# Patient Record
Sex: FEMALE | Race: ASIAN | NOT HISPANIC OR LATINO | Employment: OTHER | URBAN - METROPOLITAN AREA
[De-identification: names, ages, dates, MRNs, and addresses within clinical notes are randomized per-mention and may not be internally consistent; named-entity substitution may affect disease eponyms.]

---

## 2017-01-10 ENCOUNTER — HOSPITAL ENCOUNTER (OUTPATIENT)
Dept: RADIOLOGY | Facility: HOSPITAL | Age: 82
Discharge: HOME | End: 2017-01-10
Attending: INTERNAL MEDICINE
Payer: MEDICARE

## 2017-01-10 DIAGNOSIS — R05.9 COUGH: ICD-10-CM

## 2017-01-10 PROCEDURE — 71020 XR CHEST PA AND LATERAL: CPT | Mod: 26,,, | Performed by: RADIOLOGY

## 2017-01-10 PROCEDURE — 71020 XR CHEST PA AND LATERAL: CPT | Mod: TC

## 2017-01-13 ENCOUNTER — HOSPITAL ENCOUNTER (INPATIENT)
Facility: HOSPITAL | Age: 82
LOS: 1 days | Discharge: HOME HEALTH | DRG: 195 | End: 2017-01-17
Attending: EMERGENCY MEDICINE | Admitting: INTERNAL MEDICINE
Payer: MEDICARE

## 2017-01-13 DIAGNOSIS — R53.1 GENERALIZED WEAKNESS: ICD-10-CM

## 2017-01-13 DIAGNOSIS — I10 ESSENTIAL HYPERTENSION: ICD-10-CM

## 2017-01-13 DIAGNOSIS — J18.9 PNEUMONIA DUE TO INFECTIOUS ORGANISM, UNSPECIFIED LATERALITY, UNSPECIFIED PART OF LUNG: ICD-10-CM

## 2017-01-13 DIAGNOSIS — E86.0 DEHYDRATION: Primary | ICD-10-CM

## 2017-01-13 DIAGNOSIS — G20.A1 PARKINSON'S DISEASE: ICD-10-CM

## 2017-01-13 LAB
ANION GAP SERPL CALC-SCNC: 12 MMOL/L
BASOPHILS # BLD AUTO: 0 K/UL
BASOPHILS NFR BLD: 0.1 %
BUN SERPL-MCNC: 35 MG/DL
CALCIUM SERPL-MCNC: 10 MG/DL
CHLORIDE SERPL-SCNC: 99 MMOL/L
CO2 SERPL-SCNC: 24 MMOL/L
CREAT SERPL-MCNC: 1.1 MG/DL
DIFFERENTIAL METHOD: ABNORMAL
EOSINOPHIL # BLD AUTO: 0 K/UL
EOSINOPHIL NFR BLD: 0 %
ERYTHROCYTE [DISTWIDTH] IN BLOOD BY AUTOMATED COUNT: 12.3 %
EST. GFR  (AFRICAN AMERICAN): 51 ML/MIN/1.73 M^2
EST. GFR  (NON AFRICAN AMERICAN): 45 ML/MIN/1.73 M^2
GLUCOSE SERPL-MCNC: 111 MG/DL
HCT VFR BLD AUTO: 34.3 %
HGB BLD-MCNC: 11.6 G/DL
LYMPHOCYTES # BLD AUTO: 1.2 K/UL
LYMPHOCYTES NFR BLD: 10 %
MCH RBC QN AUTO: 32.4 PG
MCHC RBC AUTO-ENTMCNC: 33.9 %
MCV RBC AUTO: 96 FL
MONOCYTES # BLD AUTO: 0.4 K/UL
MONOCYTES NFR BLD: 3.5 %
NEUTROPHILS # BLD AUTO: 10.8 K/UL
NEUTROPHILS NFR BLD: 86.4 %
PLATELET # BLD AUTO: 168 K/UL
PMV BLD AUTO: 8.4 FL
POTASSIUM SERPL-SCNC: 4.2 MMOL/L
RBC # BLD AUTO: 3.58 M/UL
SODIUM SERPL-SCNC: 135 MMOL/L
WBC # BLD AUTO: 12.5 K/UL

## 2017-01-13 PROCEDURE — 25000003 PHARM REV CODE 250: Performed by: EMERGENCY MEDICINE

## 2017-01-13 PROCEDURE — 25000003 PHARM REV CODE 250: Performed by: INTERNAL MEDICINE

## 2017-01-13 PROCEDURE — 63600175 PHARM REV CODE 636 W HCPCS: Performed by: INTERNAL MEDICINE

## 2017-01-13 PROCEDURE — 93005 ELECTROCARDIOGRAM TRACING: CPT

## 2017-01-13 PROCEDURE — 99285 EMERGENCY DEPT VISIT HI MDM: CPT | Mod: 25

## 2017-01-13 PROCEDURE — 99219 PR INITIAL OBSERVATION CARE,LEVL II: CPT | Mod: ,,, | Performed by: INTERNAL MEDICINE

## 2017-01-13 PROCEDURE — G0378 HOSPITAL OBSERVATION PER HR: HCPCS

## 2017-01-13 PROCEDURE — 36415 COLL VENOUS BLD VENIPUNCTURE: CPT

## 2017-01-13 PROCEDURE — 96360 HYDRATION IV INFUSION INIT: CPT

## 2017-01-13 PROCEDURE — 85025 COMPLETE CBC W/AUTO DIFF WBC: CPT

## 2017-01-13 PROCEDURE — 63600175 PHARM REV CODE 636 W HCPCS: Performed by: EMERGENCY MEDICINE

## 2017-01-13 PROCEDURE — 80048 BASIC METABOLIC PNL TOTAL CA: CPT

## 2017-01-13 RX ORDER — PANTOPRAZOLE SODIUM 40 MG/1
40 TABLET, DELAYED RELEASE ORAL DAILY
Status: DISCONTINUED | OUTPATIENT
Start: 2017-01-14 | End: 2017-01-17 | Stop reason: HOSPADM

## 2017-01-13 RX ORDER — CALCIUM CARBONATE 200(500)MG
1 TABLET,CHEWABLE ORAL 3 TIMES DAILY
Status: DISCONTINUED | OUTPATIENT
Start: 2017-01-13 | End: 2017-01-17 | Stop reason: HOSPADM

## 2017-01-13 RX ORDER — CARBIDOPA AND LEVODOPA 25; 100 MG/1; MG/1
2 TABLET ORAL 2 TIMES DAILY WITH MEALS
Status: DISCONTINUED | OUTPATIENT
Start: 2017-01-14 | End: 2017-01-17 | Stop reason: HOSPADM

## 2017-01-13 RX ORDER — AMOXICILLIN 250 MG
1 CAPSULE ORAL DAILY
Status: DISCONTINUED | OUTPATIENT
Start: 2017-01-14 | End: 2017-01-17 | Stop reason: HOSPADM

## 2017-01-13 RX ORDER — CARBIDOPA AND LEVODOPA 25; 100 MG/1; MG/1
1 TABLET, EXTENDED RELEASE ORAL NIGHTLY
Status: DISCONTINUED | OUTPATIENT
Start: 2017-01-13 | End: 2017-01-14

## 2017-01-13 RX ORDER — HYDRALAZINE HYDROCHLORIDE 20 MG/ML
10 INJECTION INTRAMUSCULAR; INTRAVENOUS EVERY 6 HOURS PRN
Status: DISCONTINUED | OUTPATIENT
Start: 2017-01-13 | End: 2017-01-17 | Stop reason: HOSPADM

## 2017-01-13 RX ORDER — FERROUS SULFATE 325(65) MG
325 TABLET, DELAYED RELEASE (ENTERIC COATED) ORAL DAILY
Status: DISCONTINUED | OUTPATIENT
Start: 2017-01-14 | End: 2017-01-17 | Stop reason: HOSPADM

## 2017-01-13 RX ORDER — CHOLECALCIFEROL (VITAMIN D3) 25 MCG
1000 TABLET ORAL DAILY
Status: DISCONTINUED | OUTPATIENT
Start: 2017-01-14 | End: 2017-01-13

## 2017-01-13 RX ORDER — ENOXAPARIN SODIUM 100 MG/ML
40 INJECTION SUBCUTANEOUS EVERY 24 HOURS
Status: DISCONTINUED | OUTPATIENT
Start: 2017-01-13 | End: 2017-01-17 | Stop reason: HOSPADM

## 2017-01-13 RX ORDER — CARBIDOPA AND LEVODOPA 50; 200 MG/1; MG/1
1 TABLET, EXTENDED RELEASE ORAL NIGHTLY
Status: DISCONTINUED | OUTPATIENT
Start: 2017-01-13 | End: 2017-01-17 | Stop reason: HOSPADM

## 2017-01-13 RX ORDER — OXYCODONE AND ACETAMINOPHEN 10; 325 MG/1; MG/1
1 TABLET ORAL 3 TIMES DAILY
Status: DISCONTINUED | OUTPATIENT
Start: 2017-01-13 | End: 2017-01-17 | Stop reason: HOSPADM

## 2017-01-13 RX ORDER — CARBIDOPA AND LEVODOPA 50; 200 MG/1; MG/1
1 TABLET, EXTENDED RELEASE ORAL NIGHTLY
Status: DISCONTINUED | OUTPATIENT
Start: 2017-01-13 | End: 2017-01-13

## 2017-01-13 RX ORDER — CARBIDOPA AND LEVODOPA 25; 100 MG/1; MG/1
2 TABLET ORAL 2 TIMES DAILY
COMMUNITY
End: 2020-04-18 | Stop reason: CLARIF

## 2017-01-13 RX ORDER — ONDANSETRON 2 MG/ML
4 INJECTION INTRAMUSCULAR; INTRAVENOUS EVERY 12 HOURS PRN
Status: DISCONTINUED | OUTPATIENT
Start: 2017-01-13 | End: 2017-01-17 | Stop reason: HOSPADM

## 2017-01-13 RX ORDER — CARBIDOPA AND LEVODOPA 50; 200 MG/1; MG/1
1.5 TABLET, EXTENDED RELEASE ORAL NIGHTLY
Status: DISCONTINUED | OUTPATIENT
Start: 2017-01-13 | End: 2017-01-13

## 2017-01-13 RX ORDER — SODIUM CHLORIDE 9 MG/ML
INJECTION, SOLUTION INTRAVENOUS CONTINUOUS
Status: DISCONTINUED | OUTPATIENT
Start: 2017-01-13 | End: 2017-01-15

## 2017-01-13 RX ORDER — PRAVASTATIN SODIUM 40 MG/1
40 TABLET ORAL NIGHTLY
Status: DISCONTINUED | OUTPATIENT
Start: 2017-01-13 | End: 2017-01-17 | Stop reason: HOSPADM

## 2017-01-13 RX ORDER — OLMESARTAN MEDOXOMIL 20 MG/1
20 TABLET ORAL DAILY
Status: DISCONTINUED | OUTPATIENT
Start: 2017-01-13 | End: 2017-01-17 | Stop reason: HOSPADM

## 2017-01-13 RX ORDER — PNV NO.95/FERROUS FUM/FOLIC AC 28MG-0.8MG
100 TABLET ORAL DAILY
Status: DISCONTINUED | OUTPATIENT
Start: 2017-01-14 | End: 2017-01-17 | Stop reason: HOSPADM

## 2017-01-13 RX ADMIN — SODIUM CHLORIDE: 0.9 INJECTION, SOLUTION INTRAVENOUS at 05:01

## 2017-01-13 RX ADMIN — OXYCODONE HYDROCHLORIDE AND ACETAMINOPHEN 1 TABLET: 10; 325 TABLET ORAL at 09:01

## 2017-01-13 RX ADMIN — HYDRALAZINE HYDROCHLORIDE 10 MG: 20 INJECTION INTRAMUSCULAR; INTRAVENOUS at 05:01

## 2017-01-13 RX ADMIN — CALCIUM CARBONATE (ANTACID) CHEW TAB 500 MG 500 MG: 500 CHEW TAB at 09:01

## 2017-01-13 RX ADMIN — ENOXAPARIN SODIUM 40 MG: 100 INJECTION SUBCUTANEOUS at 05:01

## 2017-01-13 RX ADMIN — CARBIDOPA AND LEVODOPA 1 TABLET: 50; 200 TABLET, EXTENDED RELEASE ORAL at 09:01

## 2017-01-13 RX ADMIN — PRAVASTATIN SODIUM 40 MG: 40 TABLET ORAL at 09:01

## 2017-01-13 RX ADMIN — OLMESARTAN MEDOXOMIL 20 MG: 20 TABLET, FILM COATED ORAL at 05:01

## 2017-01-13 RX ADMIN — SODIUM CHLORIDE 500 ML: 0.9 INJECTION, SOLUTION INTRAVENOUS at 02:01

## 2017-01-13 NOTE — ED NOTES
Pt here for eval of decreased appetite and generalized fatigue for several days. pts caretaker states yesterday she was disoriented.

## 2017-01-13 NOTE — H&P
PCP: Haris Brambila MD    History & Physical    Chief Complaint: Increasing fatigue    History of Present Illness:  Patient is a 89 y.o. female admitted to Hospitalist Service from [facility] Emergency Room with complaint of increasing fatigue. Patient reportedly has past medical history significant for Parkinson's disease and hypertension. Part of the history obtained from patient's sister in law. Patient is mostly bed-bound and with assistance can get to bedside wheel chair. Patient has not been eating or drinking for 2 days, patient has a care taker. No fever or chills reported. No abdominal pain. Patient denied chest pain, shortness of breath, abdominal pain, nausea, vomiting, headache, vision changes, focal neuro-deficits, cough or fever.    Past Medical History   Diagnosis Date    Arthritis     Hypertension     Parkinson's disease      Past Surgical History   Procedure Laterality Date    Fracture surgery      Joint replacement       right hip replacement     History reviewed. No pertinent family history.  Social History   Substance Use Topics    Smoking status: Light Tobacco Smoker     Packs/day: 0.25     Years: 5.00     Last attempt to quit: 4/17/2009    Smokeless tobacco: None    Alcohol use No      Review of patient's allergies indicates:  No Known Allergies  PTA Medications   Medication Sig    calcium carbonate (CALCIUM ANTACID) 300 mg (750 mg) Chew Take 1 tablet by mouth 2 (two) times daily.    carbidopa-levodopa  mg (SINEMET CR)  mg TbSR Take 1.5 tablets by mouth every evening.    cholecalciferol, vitamin D3, (VITAMIN D3) 2,000 unit Tab Take by mouth once daily.    clonazepam (KLONOPIN) 0.5 MG tablet Take 0.5 mg by mouth 2 (two) times daily.    cyanocobalamin (VITAMIN B-12) 1000 MCG tablet Take 100 mcg by mouth once daily.    ferrous sulfate 325 (65 FE) MG EC tablet Take 325 mg by mouth once daily.    multivit-iron-min-folic acid (MULTIVITAMIN-IRON-MINERALS-FOLIC  ACID) 3,500-18-0.4 unit-mg-mg Chew Take 1 tablet by mouth once daily.      olmesartan-hydrochlorothiazide (BENICAR HCT) 20-12.5 mg per tablet Take 1 tablet by mouth once daily.    omega-3 fatty acids-vitamin E (FISH OIL) 1,000 mg Cap Take 1 capsule by mouth once daily.    omeprazole (PRILOSEC) 40 MG capsule Take 40 mg by mouth once daily.    oxycodone-acetaminophen  mg (PERCOCET)  mg per tablet Take 1 tablet by mouth 3 (three) times daily.    potassium chloride SA (K-DUR,KLOR-CON) 10 MEQ tablet Take 10 mEq by mouth once daily.      pravastatin (PRAVACHOL) 40 MG tablet Take 40 mg by mouth every evening.    senna-docusate 8.6-50 mg (SENNA WITH DOCUSATE SODIUM) 8.6-50 mg per tablet Take 1 tablet by mouth once daily.    tizanidine 4 mg Cap Take 4 mg by mouth 3 (three) times daily.     Review of Systems:  Constitutional: no fever or chills. + fatigue  Eyes: no visual changes  Ears, nose, mouth, throat, and face: no nasal congestion or sore throat  Respiratory: no cough or shorness of breath  Cardiovascular: no chest pain or palpitations  Gastrointestinal: no nausea or vomiting, no abdominal pain or change in bowel habits  Genitourinary: no hematuria or dysuria  Integument/breast: no rash or pruritis  Hematologic/lymphatic: no easy bruising or lymphadenopathy  Musculoskeletal: no arthralgias or myalgias  Neurological: no seizures or tremors.  Behavioral/Psych: no auditory or visual hallucinations  Endocrine: no heat or cold intolerance     OBJECTIVE:     Vital Signs (Most Recent)  Temp: 98.4 °F (36.9 °C) (01/13/17 1612)  Pulse: 87 (01/13/17 1612)  Resp: 18 (01/13/17 1612)  BP: (!) 194/82 (01/13/17 1612)  SpO2: 97 % (01/13/17 1612)    Physical Exam:  General appearance: well developed, appears stated age  Head: normocephalic, atraumatic  Eyes:  conjunctivae/corneas clear. PERRL.  Nose: Nares normal. Septum midline.  Throat: lips, DRY mucosa, and tongue normal; teeth and gums normal, no throat  erythema.  Neck: supple, symmetrical, trachea midline, no JVD and thyroid not enlarged, symmetric, no tenderness/mass/nodules  Lungs:  clear to auscultation bilaterally and normal respiratory effort  Chest wall: no tenderness  Heart: regular rate and rhythm, S1, S2 normal, no murmur, click, rub or gallop  Abdomen: soft, non-tender non-distented; bowel sounds normal; no masses,  no organomegaly  Extremities: no cyanosis, clubbing or edema.   Pulses: 2+ and symmetric  Skin: Skin color, texture, turgor normal. No rashes or lesions.  Lymph nodes: Cervical, supraclavicular, and axillary nodes normal.  Neurologic: CNII-XII intact. Generalized hypertonia and rigidity noted.    Laboratory:   CBC:   Recent Labs  Lab 01/13/17  1243   WBC 12.50   RBC 3.58*   HGB 11.6*   HCT 34.3*      MCV 96   MCH 32.4*   MCHC 33.9     CMP:   Recent Labs  Lab 01/13/17  1243   *   CALCIUM 10.0   *   K 4.2   CO2 24   CL 99   BUN 35*   CREATININE 1.1     No results found for: HGBA1C  Microbiology Results (last 7 days)     ** No results found for the last 168 hours. **        Diagnostic Results:  Chest X-Ray: No active cardiopulmonary process.  No significant change.    Assessment/Plan:   * Dehydration  Start IVF hydration and monitor serum lytes and renal panel.    Weakness  Supportive care, up with assist.  Fall precautions.  PT to evaluate and treat in AM.    Hypertension  Chronic problem. Will continue chronic medications and monitor for any changes, adjusting as needed.    Parkinson's disease  Chronic problem. Will continue chronic medications and monitor for any changes, adjusting as needed.    VTE Risk Mitigation         Ordered     enoxaparin injection 40 mg  Daily     Route:  Subcutaneous        01/13/17 1609     Medium Risk of VTE  Once      01/13/17 1609        Nichol Buchanan MD  Department of [facility]   [facility]

## 2017-01-13 NOTE — ED PROVIDER NOTES
"Encounter Date: 1/13/2017    SCRIBE #1 NOTE: I, Bette Alarcon, am scribing for, and in the presence of, Dr. Nicole.       History     Chief Complaint   Patient presents with    Fatigue     cbg 130. per ems pt has not been wanting to eat or drink     Review of patient's allergies indicates:  No Known Allergies  HPI Comments:   01/13/2017 12:02 PM     Chief Complaint: Not wanting to eat or drink      The patient is a 89 y.o. female with a PMHx of HTN and parkinson's disease who presents to the ED via EMS with an acute onset of not wanting to eat or drink. She resides at home with family, who are not currently present. The patient denies SOB, dysuria, any pain, or any other symptoms at this time. No pertinent SHx or known drug allergies noted.  Per EMS, the family states that she eats "a lot" and are concerned.  HPI is limited due to the patient's condition.     The history is provided by the patient and the EMS personnel.     Past Medical History   Diagnosis Date    Arthritis     Hypertension     Parkinson's disease      Past Medical History Pertinent Negatives   Diagnosis Date Noted    Anticoagulant long-term use 4/17/2012    Cancer 4/17/2012    CHF (congestive heart failure) 4/17/2012    COPD (chronic obstructive pulmonary disease) 4/17/2012    Coronary artery disease 4/17/2012    General anesthetics causing adverse effect in therapeutic use 4/17/2012    Hypotension, iatrogenic 4/17/2012    Malignant hyperthermia 4/17/2012    PONV (postoperative nausea and vomiting) 4/17/2012    Respiratory distress 4/17/2012    Seizures 4/17/2012    Stroke 4/17/2012    Thyroid disease 4/17/2012    Transfusion reaction 4/17/2012     Past Surgical History   Procedure Laterality Date    Fracture surgery      Joint replacement       right hip replacement     History reviewed. No pertinent family history.  Social History   Substance Use Topics    Smoking status: Light Tobacco Smoker     Packs/day: 0.25     " Years: 5.00     Last attempt to quit: 4/17/2009    Smokeless tobacco: None    Alcohol use No     Review of Systems   Constitutional: Negative for chills and fever.   HENT: Negative for congestion, rhinorrhea, sneezing and sore throat.    Eyes: Negative for visual disturbance.   Respiratory: Negative for cough and shortness of breath.    Cardiovascular: Negative for chest pain.   Gastrointestinal: Negative for abdominal pain, diarrhea, nausea and vomiting.   Genitourinary: Negative for dysuria.   Musculoskeletal: Negative for back pain and neck pain.   Skin: Negative for rash.   Neurological: Negative for syncope and headaches.     Physical Exam     Visit Vitals    BP (!) 173/74    Pulse 82    Resp 16    SpO2 98%     Physical Exam    Nursing note and vitals reviewed.  Constitutional: She appears well-developed and well-nourished.   HENT:   Head: Normocephalic and atraumatic.   Mouth/Throat: Oropharynx is clear and moist. Mucous membranes are dry (with some tongue fissuring).   Eyes: Conjunctivae are normal.   Neck: Neck supple.   Cardiovascular: Normal rate, regular rhythm, normal heart sounds and intact distal pulses. Exam reveals no gallop and no friction rub.    No murmur heard.  Pulmonary/Chest: Breath sounds normal. She has no wheezes. She has no rhonchi. She has no rales.   Abdominal: Soft. She exhibits no distension. There is no tenderness.   Musculoskeletal: Normal range of motion. She exhibits no edema (peripheral).   Neurological: She is alert. She has normal strength. No cranial nerve deficit.   Cranial nerves III-XII intact. 5/5 strength.   Skin: No rash noted. No erythema.   Psychiatric:   Flat affect. Slow speech pattern but no dysarthria.       ED Course   Procedures  Labs Reviewed   CBC W/ AUTO DIFFERENTIAL - Abnormal; Notable for the following:        Result Value    RBC 3.58 (*)     Hemoglobin 11.6 (*)     Hematocrit 34.3 (*)     MCH 32.4 (*)     MPV 8.4 (*)     Gran # 10.8 (*)     Gran% 86.4  (*)     Lymph% 10.0 (*)     Mono% 3.5 (*)     All other components within normal limits   BASIC METABOLIC PANEL - Abnormal; Notable for the following:     Sodium 135 (*)     Glucose 111 (*)     BUN, Bld 35 (*)     eGFR if  51 (*)     eGFR if non  45 (*)     All other components within normal limits     Imaging Results         X-Ray Chest 1 View (Final result) Result time:  01/13/17 12:39:38    Final result by Amrik Jeong MD (01/13/17 12:39:38)    Impression:     No active cardiopulmonary process.  No significant change.      Electronically signed by: Amrik Jeong MD  Date:     01/13/17  Time:    12:39     Narrative:    AP chest compared to 01/10/2017    Findings: The cardiomediastinal silhouette is within normal limits.  There is calcification of the aorta.  There is no consolidation.  A few scattered old calcified granulomas are seen.  There is no pleural effusion.          (radiology reading, visualized by me)        Medical Decision Making:   History:   Old Medical Records: I decided to obtain old medical records.  Clinical Tests:   Lab Tests: Reviewed and Ordered  Radiological Study: Reviewed and Ordered  Medical Tests: Reviewed and Ordered            Scribe Attestation:   Scribe #1: I performed the above scribed service and the documentation accurately describes the services I performed. I attest to the accuracy of the note.    Attending Attestation:           Physician Attestation for Scribe:  Physician Attestation Statement for Scribe #1: I, Dr. Nicole, reviewed documentation, as scribed by Bette Alarcon in my presence, and it is both accurate and complete.         Maricarmen Woodward is a 89 y.o. female presenting with generalized weakness in the setting of decreased intake as well as recent URI symptoms.  Work of breathing is normal with symptoms consistent with viral URI.  Patient's appetite is been decreased.  Likely noncompliance with medications as oral  intake has declined over the last few days.  I suspect parkinsonian symptoms are increased with lack of carbidopa levodopa.  Patient appears dehydrated as well here.  Although she is wheelchair-bound, she is not able to assist with transfers is normal and family is having difficulty caring for her.  I will admit for IV fluids for hydration as well as resumption of normal medications reassessment.  No focal deficits to suggest emergent primary neurologic process such as CVA.  No sign of other infectious phenomenon.  I have discussed with Dr. Buchanan from the hospitalist service who will assume care.        ED Course   Comment By Time   EKG:  Sinus rhythm with rate of 78, occasional PACs.  Mirela intervals and axis.  There are no acute ST or T wave changes suggestive of acute ischemia or infarction.   Faustino Nicole MD 01/13 120     Clinical Impression:     1. Dehydration    2. Generalized weakness          Disposition:   Disposition: Admitted       Faustino Nicole MD  01/13/17 2079

## 2017-01-13 NOTE — ASSESSMENT & PLAN NOTE
Chronic problem. Will continue chronic medications and monitor for any changes, adjusting as needed.

## 2017-01-13 NOTE — IP AVS SNAPSHOT
[address] [place] [address]  Phone: [phone]           Patient Discharge Instructions     Our goal is to set you up for success. This packet includes information on your condition, medications, and your home care. It will help you to care for yourself so you don't get sicker and need to go back to the hospital.     Please ask your nurse if you have any questions.        There are many details to remember when preparing to leave the hospital. Here is what you will need to do:    1. Take your medicine. If you are prescribed medications, review your Medication List in the following pages. You may have new medications to  at the pharmacy and others that you'll need to stop taking. Review the instructions for how and when to take your medications. Talk with your doctor or nurses if you are unsure of what to do.     2. Go to your follow-up appointments. Specific follow-up information is listed in the following pages. Your may be contacted by a transition nurse or clinical provider about future appointments. Be sure we have all of the phone numbers to reach you, if needed. Please contact your provider's office if you are unable to make an appointment.     3. Watch for warning signs. Your doctor or nurse will give you detailed warning signs to watch for and when to call for assistance. These instructions may also include educational information about your condition. If you experience any of warning signs to your health, call your doctor.               [facility]  Unless otherwise directed by your provider, please contact [facility], our nurse care line that is available for 24/7 assistance.     [phone] (toll-free)    Registered nurses in the [facility] provide clinical advisement, health education, appointment booking, and other advisory services.                    ** Verify the list of medication(s) below is accurate and up to date.  Carry this with you in case of emergency. If your medications have changed, please notify your healthcare provider.             Medication List      START taking these medications        Additional Info                      levoFLOXacin 500 MG tablet   Commonly known as:  LEVAQUIN   Quantity:  7 tablet   Refills:  0   Dose:  500 mg    Instructions:  Take 1 tablet (500 mg total) by mouth once daily.     Begin Date    AM    Noon    PM    Bedtime         CONTINUE taking these medications        Additional Info                      CALCIUM ANTACID 300 mg (750 mg) Chew   Refills:  0   Dose:  1 tablet   Generic drug:  calcium carbonate    Last time this was given:  500 mg on 1/17/2017  1:17 PM   Instructions:  Take 1 tablet by mouth 2 (two) times daily.     Begin Date    AM    Noon    PM    Bedtime       * carbidopa-levodopa  mg  mg per tablet   Commonly known as:  SINEMET   Refills:  0   Dose:  2 tablet    Last time this was given:  2 tablets on 1/17/2017  1:17 PM   Instructions:  Take 2 tablets by mouth 2 (two) times daily with meals.     Begin Date    AM    Noon    PM    Bedtime       * carbidopa-levodopa  mg  mg Tbsr   Commonly known as:  SINEMET CR   Refills:  0   Dose:  1.5 tablet    Last time this was given:  1 tablet on 1/16/2017  9:05 PM   Instructions:  Take 1.5 tablets by mouth every evening.     Begin Date    AM    Noon    PM    Bedtime       CENTRUM 3,500-18-0.4 unit-mg-mg Chew   Refills:  0   Dose:  1 tablet   Generic drug:  multivit-iron-min-folic acid    Instructions:  Take 1 tablet by mouth once daily.     Begin Date    AM    Noon    PM    Bedtime       clonazePAM 0.5 MG tablet   Commonly known as:  KLONOPIN   Refills:  0   Dose:  0.5 mg    Instructions:  Take 0.5 mg by mouth 2 (two) times daily.     Begin Date    AM    Noon    PM    Bedtime       ferrous sulfate 325 (65 FE) MG EC tablet   Refills:  0   Dose:  325 mg    Last time this was given:  325 mg on 1/17/2017  9:01 AM    Instructions:  Take 325 mg by mouth once daily.     Begin Date    AM    Noon    PM    Bedtime       FISH OIL 1,000 mg Cap   Refills:  0   Dose:  1 capsule   Generic drug:  omega-3 fatty acids-vitamin E    Instructions:  Take 1 capsule by mouth once daily.     Begin Date    AM    Noon    PM    Bedtime       olmesartan-hydrochlorothiazide 20-12.5 mg per tablet   Commonly known as:  BENICAR HCT   Refills:  0   Dose:  1 tablet    Instructions:  Take 1 tablet by mouth once daily.     Begin Date    AM    Noon    PM    Bedtime       omeprazole 40 MG capsule   Commonly known as:  PRILOSEC   Refills:  0   Dose:  40 mg    Instructions:  Take 40 mg by mouth once daily.     Begin Date    AM    Noon    PM    Bedtime       PERCOCET  mg per tablet   Refills:  0   Dose:  1 tablet   Indications:  Pain   Generic drug:  oxycodone-acetaminophen    Last time this was given:  1 tablet on 1/17/2017  2:00 PM   Instructions:  Take 1 tablet by mouth 3 (three) times daily.     Begin Date    AM    Noon    PM    Bedtime       potassium chloride SA 10 MEQ tablet   Commonly known as:  K-DUR,KLOR-CON   Refills:  0   Dose:  10 mEq    Last time this was given:  40 mEq on 1/15/2017 12:46 PM   Instructions:  Take 10 mEq by mouth once daily.     Begin Date    AM    Noon    PM    Bedtime       pravastatin 40 MG tablet   Commonly known as:  PRAVACHOL   Refills:  0   Dose:  40 mg    Last time this was given:  40 mg on 1/16/2017  9:05 PM   Instructions:  Take 40 mg by mouth every evening.     Begin Date    AM    Noon    PM    Bedtime       SENNA WITH DOCUSATE SODIUM 8.6-50 mg per tablet   Refills:  0   Dose:  1 tablet   Generic drug:  senna-docusate 8.6-50 mg    Last time this was given:  1 tablet on 1/17/2017  8:58 AM   Instructions:  Take 1 tablet by mouth once daily.     Begin Date    AM    Noon    PM    Bedtime       tizanidine 4 mg Cap   Refills:  0   Dose:  4 mg    Instructions:  Take 4 mg by mouth 3 (three) times daily.     Begin Date    AM     Noon    PM    Bedtime       VITAMIN B-12 1000 MCG tablet   Refills:  0   Dose:  100 mcg   Generic drug:  cyanocobalamin    Last time this was given:  100 mcg on 1/17/2017  8:58 AM   Instructions:  Take 100 mcg by mouth once daily.     Begin Date    AM    Noon    PM    Bedtime       VITAMIN D3 2,000 unit Tab   Refills:  0   Generic drug:  cholecalciferol (vitamin D3)    Instructions:  Take by mouth once daily.     Begin Date    AM    Noon    PM    Bedtime       * Notice:  This list has 2 medication(s) that are the same as other medications prescribed for you. Read the directions carefully, and ask your doctor or other care provider to review them with you.         Where to Get Your Medications      You can get these medications from any pharmacy     Bring a paper prescription for each of these medications     levoFLOXacin 500 MG tablet                  Please bring to all follow up appointments:    1. A copy of your discharge instructions.  2. All medicines you are currently taking in their original bottles.  3. Identification and insurance card.    Please arrive 15 minutes ahead of scheduled appointment time.    Please call 24 hours in advance if you must reschedule your appointment and/or time.        Follow-up Information     Follow up with [facility].    Specialty:  Home Health Services    Why:  Home Health    Contact information:    [place] [id number] [phone]        Referrals     Future Orders    Ambulatory referral to Home Health     Referral to Home health         Discharge Instructions     Future Orders    Call MD for:     Comments:    For worsening symptoms, chest pain, shortness of breath, increased abdominal pain, high grade fever, stroke or stroke like symptoms, immediately go to the nearest Emergency Room or call 911 as soon as possible.    Diet general     Comments:    Cardiac/ 2 gram sodium low cholesterol diet    Questions:    Total calories:       "Fat restriction, if any:      Protein restriction, if any:      Na restriction, if any:      Fluid restriction:      Additional restrictions:      Other restrictions (specify):     Comments:    Fall precautions        Primary Diagnosis     Your primary diagnosis was:  Dehydration      Admission Information     Date & Time Provider Department CSN    1/13/2017 11:43 AM Nichol Buchanan MD [facility] [id number]      Care Providers     Provider Role Specialty Primary office phone    Nichol Buchanan MD Attending Provider Internal Medicine [phone]      Important Medicare Message          Most Recent Value    Important Message from Medicare Regarding Discharge Appeal Rights  Explained to patient/caregiver, Signed/date by patient/caregiver yes 01/17/2017 0938      Your Vitals Were     BP Pulse Temp Resp Height Weight    159/71 91 98.2 °F (36.8 °C) (Oral) 18 5' 3" (1.6 m) 54.4 kg (120 lb)    SpO2 BMI             97% 21.26 kg/m2         Recent Lab Values     No lab values to display.      Allergies as of 1/17/2017     No Known Allergies      Advance Directives     An advance directive is a document which, in the event you are no longer able to make decisions for yourself, tells your healthcare team what kind of treatment you do or do not want to receive, or who you would like to make those decisions for you.  If you do not currently have an advance directive, [facility] encourages you to create one.  For more information call:  [phone] ([phone]), [phone] ([phone]),  or log on to [email or web address].        Smoking Cessation     If you would like to quit smoking:   You may be eligible for free services if you are a [place] resident and started smoking cigarettes before September 1, 1988.  Call the Smoking Cessation [employer] (SCT) toll free at (463) 177-7212 or (942) 772-3970.   Call 4-800QUIT-NOW if you do not meet the above criteria.            Language Assistance Services     " ATTENTION: Language assistance services are available, free of charge. Please call [phone].      ATENCIÓN: Si an anton, tiene a baez disposición servicios gratuitos de asistencia lingüística. Llame al [phone].     CHÚ Ý: N?u b?n nói Ti?ng Vi?t, có các d?ch v? h? tr? ngôn ng? mi?n phí dành cho b?n. G?i s? [phone].        Pneumonmia Discharge Instructions                [facility] Sign-Up     Activating your [facility] account is as easy as 1-2-3!     1) Visit [email or web address], select Sign Up Now, enter this activation code and your date of birth, then select Next.  [email or web address]  Expires: 3/3/2017  2:09 PM      2) Create a username and password to use when you visit [facility] in the future and select a security question in case you lose your password and select Next.    3) Enter your e-mail address and click Sign Up!    Additional Information  If you have questions, please e-mail [email or web address] or call [phone] to talk to our [facility] staff. Remember, [facility] is NOT to be used for urgent needs. For medical emergencies, dial 911.          [facility] complies with applicable Federal civil rights laws and does not discriminate on the basis of race, color, national origin, age, disability, or sex.

## 2017-01-13 NOTE — IP AVS SNAPSHOT
[address] [place] [address]  Phone: [phone]           I have received a copy of my After Visit Summary and discharge instructions from [facility].    INSTRUCTIONS RECEIVED AND UNDERSTOOD BY:                     Patient/Patient Representative: ________________________________________________________________     Date/Time: ________________________________________________________________                     Instructions Given By: ________________________________________________________________     Date/Time: ________________________________________________________________

## 2017-01-14 LAB
ANION GAP SERPL CALC-SCNC: 11 MMOL/L
BASOPHILS # BLD AUTO: 0 K/UL
BASOPHILS NFR BLD: 0.2 %
BUN SERPL-MCNC: 26 MG/DL
CALCIUM SERPL-MCNC: 8.7 MG/DL
CHLORIDE SERPL-SCNC: 105 MMOL/L
CO2 SERPL-SCNC: 20 MMOL/L
CREAT SERPL-MCNC: 0.8 MG/DL
DIFFERENTIAL METHOD: ABNORMAL
EOSINOPHIL # BLD AUTO: 0 K/UL
EOSINOPHIL NFR BLD: 0.2 %
ERYTHROCYTE [DISTWIDTH] IN BLOOD BY AUTOMATED COUNT: 12.4 %
EST. GFR  (AFRICAN AMERICAN): >60 ML/MIN/1.73 M^2
EST. GFR  (NON AFRICAN AMERICAN): >60 ML/MIN/1.73 M^2
GLUCOSE SERPL-MCNC: 80 MG/DL
HCT VFR BLD AUTO: 29.5 %
HGB BLD-MCNC: 10 G/DL
LYMPHOCYTES # BLD AUTO: 1.3 K/UL
LYMPHOCYTES NFR BLD: 14.8 %
MCH RBC QN AUTO: 32.4 PG
MCHC RBC AUTO-ENTMCNC: 34 %
MCV RBC AUTO: 95 FL
MONOCYTES # BLD AUTO: 0.4 K/UL
MONOCYTES NFR BLD: 4.3 %
NEUTROPHILS # BLD AUTO: 7.3 K/UL
NEUTROPHILS NFR BLD: 80.5 %
PLATELET # BLD AUTO: 156 K/UL
PMV BLD AUTO: 8.4 FL
POTASSIUM SERPL-SCNC: 3.5 MMOL/L
RBC # BLD AUTO: 3.1 M/UL
SODIUM SERPL-SCNC: 136 MMOL/L
WBC # BLD AUTO: 9 K/UL

## 2017-01-14 PROCEDURE — 36415 COLL VENOUS BLD VENIPUNCTURE: CPT

## 2017-01-14 PROCEDURE — 80048 BASIC METABOLIC PNL TOTAL CA: CPT

## 2017-01-14 PROCEDURE — 63600175 PHARM REV CODE 636 W HCPCS: Performed by: EMERGENCY MEDICINE

## 2017-01-14 PROCEDURE — G0378 HOSPITAL OBSERVATION PER HR: HCPCS

## 2017-01-14 PROCEDURE — 87086 URINE CULTURE/COLONY COUNT: CPT

## 2017-01-14 PROCEDURE — 99225 PR SUBSEQUENT OBSERVATION CARE,LEVEL II: CPT | Mod: ,,, | Performed by: INTERNAL MEDICINE

## 2017-01-14 PROCEDURE — G8979 MOBILITY GOAL STATUS: HCPCS | Mod: CM

## 2017-01-14 PROCEDURE — G8978 MOBILITY CURRENT STATUS: HCPCS | Mod: CN

## 2017-01-14 PROCEDURE — 97161 PT EVAL LOW COMPLEX 20 MIN: CPT

## 2017-01-14 PROCEDURE — 81000 URINALYSIS NONAUTO W/SCOPE: CPT

## 2017-01-14 PROCEDURE — 25000003 PHARM REV CODE 250: Performed by: INTERNAL MEDICINE

## 2017-01-14 PROCEDURE — 25000003 PHARM REV CODE 250: Performed by: EMERGENCY MEDICINE

## 2017-01-14 PROCEDURE — 85025 COMPLETE CBC W/AUTO DIFF WBC: CPT

## 2017-01-14 RX ADMIN — CALCIUM CARBONATE (ANTACID) CHEW TAB 500 MG 500 MG: 500 CHEW TAB at 08:01

## 2017-01-14 RX ADMIN — VITAM B12 100 MCG: 100 TAB at 09:01

## 2017-01-14 RX ADMIN — OXYCODONE HYDROCHLORIDE AND ACETAMINOPHEN 1 TABLET: 10; 325 TABLET ORAL at 05:01

## 2017-01-14 RX ADMIN — ENOXAPARIN SODIUM 40 MG: 100 INJECTION SUBCUTANEOUS at 12:01

## 2017-01-14 RX ADMIN — Medication 1 CAPSULE: at 09:01

## 2017-01-14 RX ADMIN — STANDARDIZED SENNA CONCENTRATE AND DOCUSATE SODIUM 1 TABLET: 8.6; 5 TABLET, FILM COATED ORAL at 09:01

## 2017-01-14 RX ADMIN — PANTOPRAZOLE SODIUM 40 MG: 40 TABLET, DELAYED RELEASE ORAL at 09:01

## 2017-01-14 RX ADMIN — CALCIUM CARBONATE (ANTACID) CHEW TAB 500 MG 500 MG: 500 CHEW TAB at 02:01

## 2017-01-14 RX ADMIN — CALCIUM CARBONATE (ANTACID) CHEW TAB 500 MG 500 MG: 500 CHEW TAB at 05:01

## 2017-01-14 RX ADMIN — CARBIDOPA AND LEVODOPA 2 TABLET: 25; 100 TABLET ORAL at 09:01

## 2017-01-14 RX ADMIN — CARBIDOPA AND LEVODOPA 2 TABLET: 25; 100 TABLET ORAL at 12:01

## 2017-01-14 RX ADMIN — OXYCODONE HYDROCHLORIDE AND ACETAMINOPHEN 1 TABLET: 10; 325 TABLET ORAL at 08:01

## 2017-01-14 RX ADMIN — PRAVASTATIN SODIUM 40 MG: 40 TABLET ORAL at 08:01

## 2017-01-14 RX ADMIN — FERROUS SULFATE TAB EC 325 MG (65 MG FE EQUIVALENT) 325 MG: 325 (65 FE) TABLET DELAYED RESPONSE at 09:01

## 2017-01-14 RX ADMIN — OXYCODONE HYDROCHLORIDE AND ACETAMINOPHEN 1 TABLET: 10; 325 TABLET ORAL at 02:01

## 2017-01-14 RX ADMIN — OLMESARTAN MEDOXOMIL 20 MG: 20 TABLET, FILM COATED ORAL at 09:01

## 2017-01-14 RX ADMIN — CARBIDOPA AND LEVODOPA 1 TABLET: 50; 200 TABLET, EXTENDED RELEASE ORAL at 08:01

## 2017-01-14 NOTE — PT/OT/SLP EVAL
Physical Therapy  Evaluation    Maricarmen Woodward   MRN: [id number]   Admitting Diagnosis: Dehydration    PT Received On: 17  PT Start Time: 845     PT Stop Time: 907    PT Total Time (min): 22 min       Billable Minutes:  Evaluation 22    Diagnosis: Dehydration      Past Medical History   Diagnosis Date    Arthritis     Hypertension     Parkinson's disease       Past Surgical History   Procedure Laterality Date    Fracture surgery      Joint replacement       right hip replacement       Referring physician:   Date referred to PT: 17    General Precautions: Standard, fall  Orthopedic Precautions:     Braces:              Patient History:  Lives With: other (see comments) (sitter)  Living Arrangements: house  Home Layout: Able to live on 1st floor  Transportation Available: family or friend will provide  Equipment Currently Used at Home: 3-in-1 commode, wheelchair  DME owned (not currently used): wheelchair    Previous Level of Function:  Ambulation Skills: needs device and assist  Transfer Skills: needs device and assist  ADL Skills: needs device and assist  Work/Leisure Activity: needs device and assist    Subjective:  Communicated with RN prior to session.    Chief Complaint: Weakness.  Patient goals: evaluation    Pain Ratin/10                    Objective:   Patient found with: peripheral IV     Cognitive Exam:  Oriented to: Person, Place, Time and Situation    Follows Commands/attention: Follows multistep  commands  Communication: clear/fluent  Safety awareness/insight to disability: intact    Physical Exam:  Postural examination/scapula alignment: Rounded shoulder, Head forward, Abnormal trunk flexion and Kyphosis    Skin integrity: Visible skin intact  Edema: None noted     Sensation:   Intact    Upper Extremity Range of Motion:  Right Upper Extremity:   Left Upper Extremity:     Upper Extremity Strength:  Right Upper Extremity:   Left Upper Extremity:     Lower Extremity Range of  Motion:  Right Lower Extremity: WFL  Left Lower Extremity: WFL    Lower Extremity Strength:3-  Right Lower Extremity: WFL 3-  Left Lower Extremity: 3-     Fine motor coordination:      Gross motor coordination: WFL    Functional Mobility:  Bed Mobility:  Rolling/Turning to Left: Moderate assistance  Rolling/Turning Right: Moderate assistance  Scooting/Bridging: Moderate Assistance  Supine to Sit: Modified Independent  Sit to Supine: Modified Independent    Transfers:  Sit <> Stand Assistance: Maximum Assistance  Sit <> Stand Assistive Device: Rolling Walker    Gait:   Gait Distance: 1'  Assistance 1: Maximum assistance  Gait Assistive Device: Rolling walker  Gait Pattern: 2-point gait  Gait Deviation(s): decreased nany, increased time in double stance, decreased velocity of limb motion, decreased stride length, decreased step length, decreased weight-shifting ability    Stairs:      Balance:   Static Sit: FAIR+: Able to take MINIMAL challenges from all directions  Dynamic Sit: FAIR+: Maintains balance through MINIMAL excursions of active trunk motion  Static Stand: POOR: Needs MODERATE assist to maintain  Dynamic stand: POOR: N/A    Therapeutic Activities and Exercises:  EVAL    AM-PAC 6 CLICK MOBILITY  How much help from another person does this patient currently need?   1 = Unable, Total/Dependent Assistance  2 = A lot, Maximum/Moderate Assistance  3 = A little, Minimum/Contact Guard/Supervision  4 = None, Modified Drift/Independent          AM-PAC Raw Score CMS G-Code Modifier Level of Impairment Assistance   6 % Total / Unable   7 - 9 CM 80 - 100% Maximal Assist   10 - 14 CL 60 - 80% Moderate Assist   15 - 19 CK 40 - 60% Moderate Assist   20 - 22 CJ 20 - 40% Minimal Assist   23 CI 1-20% SBA / CGA   24 CH 0% Independent/ Mod I     Patient left supine with all lines intact, call button in reach and bed alarm on.    Assessment:   Maricarmen Woodward is a 89 y.o. female with a medical diagnosis  of Dehydration and presents with decreased functional status, requires mod/max A for bed mobility, transfers. .    Rehab identified problem list/impairments: Rehab identified problem list/impairments: weakness, impaired endurance, gait instability, impaired functional mobilty, impaired balance    Rehab potential is fair.    Activity tolerance: Good    Discharge recommendations: Discharge Facility/Level Of Care Needs: home health PT     Barriers to discharge: Barriers to Discharge: None    Equipment recommendations: Equipment Needed After Discharge: none     GOALS:   Physical Therapy Goals        Problem: Physical Therapy Goal    Goal Priority Disciplines Outcome Goal Variances Interventions   Physical Therapy Goal     PT/OT, PT            1.Pt will require min A for bed mobility and transfers.  2.Pt will ambulate 100' with RW with mod A.    PLAN:    Patient to be seen daily to address the above listed problems via    Plan of Care expires:  1/21/17  Plan of Care reviewed with: patient          Shorty Domitila, PT  01/14/2017

## 2017-01-14 NOTE — NURSING
1010: pt bladder distended.  Has urge to urinate but only able to urinate <50cc odorous urine.  Bladder scan reveals >700cc urine.  Primary nurse updated, phys called, orders received, placed and carried out.

## 2017-01-14 NOTE — PLAN OF CARE
Problem: Patient Care Overview  Goal: Plan of Care Review  Outcome: Ongoing (interventions implemented as appropriate)  Plan of care reviewed with patient. Patient verbalized understanding. Patient is receiving IV fluids for hydration. Pain monitored , restroom offered, patient repositions with 1 person assist.  Patient has remained free from fall/injury. Side rails up X2 , bed in lowest , locked position, call light within reach.

## 2017-01-14 NOTE — UM SECONDARY REVIEW
Physician Advisor External    Level of Care Issue    Case sent to EHR for review of OBS continued stay 1/14/2017.

## 2017-01-14 NOTE — PROGRESS NOTES
01/14/17 1017   PT G-Codes   Functional Assessment Tool Used FIM   Functional Limitation Mobility: Walking and moving around   Mobility: Walking and Moving Around Current Status () CN   Mobility: Walking and Moving Around Goal Status () CM

## 2017-01-14 NOTE — PLAN OF CARE
Problem: Physical Therapy Goal  Goal: Physical Therapy Goal  1.Pt will require min A for bed mobility and transfers.  2.Pt will ambulate 100' with RW with mod A.

## 2017-01-14 NOTE — PLAN OF CARE
This discharge planner met with patient at the bedside; AAOx2, unable to assist with assessment questionnaire, however, gave permission to contact her Jose manuel at [phone].  Per Jose, she lives with the patient and is her caretaker. Patient will discharge back home with caretaker, but would benefit from home health care w/ nursing aide at time of discharge, as the sitter is unable to manage patient's ADL's at time.  Kaleb unable to recall name of previous home health provider and gave verbal consent for patient's choice disclosure form, choosing the first avail agency within patient's insurance network[place] [facility]     1/14/17 1000   Discharge Assessment    Assessment Type  Discharge Planning Assessment    Assessment information obtained from?  Jose Manuel   Type of Healthcare Directive Received  Patient has no MPOA or Living will at this time   Arrived From  Home    Lives With  kaleb   Prior to hospitalization cognitive status:  Alert/Oriented    Prior to hospitalization functional status:  dependent   Current cognitive status:   AAOx2   Current Functional Status:  Dependent    Is patient able to return to prior living arrangements?  yes   Is patient able to care for self after discharge?  no   Does the patient have family/friends to help with healtcare needs after discharge?  yes   Who are your caregiver(s) and their phone number(s)?  Jose, [phone]   Patient's perception of discharge disposition  Home with home health    Readmission Within the Last 30 Days  no   Patient currently being followed by outpatient case management?  no   Patient currently receives home health services?  no   Patient currently receives private duty nursing?  no   Does the patient currently use HME?  yes   Equipment Currently Used at Home  Wheel chair, walker, shower bench, bedside commode,    Do you have any problems affording any of your prescribed medications?  yes   Is the patient taking medications as  prescribed?  yes   Do you have any financial concerns preventing you from receiving the healthcare you need?  no   Transportation Available  sitter   On Dialysis?  no   Does the patient receive outpatient dialysis?  no   Does the patient receive services at the [facility]?  no   Confirmed/corrected address and phone number on facesheet?   Patient able to verify discharging home address as [id number] [address] [facility] [address] [place] [address]   Discharge Plan A  Home with home health    Discharge Plan B  Pending PT/OT marco

## 2017-01-14 NOTE — PROGRESS NOTES
Progress Note  Hospital Medicine  Patient Name:Maricarmen Woodward  MRN:  [id number]  Patient Class: OP- Observation  Admit Date: 1/13/2017  Length of Stay: 0 days  Expected Discharge Date:   Attending Physician: Nichol Buchanan MD  Primary Care Provider:  Haris Brambila MD    SUBJECTIVE:     Principal Problem: Dehydration  Initial history of present illness: Patient is a 89 y.o. female admitted to Hospitalist Service from [facility] with complaint of increasing fatigue. Patient reportedly has past medical history significant for Parkinson's disease and hypertension. Part of the history obtained from patient's sister in law. Patient is mostly bed-bound and with assistance can get to bedside wheel chair. Patient has not been eating or drinking for 2 days, patient has a care taker. No fever or chills reported. No abdominal pain. Patient denied chest pain, shortness of breath, abdominal pain, nausea, vomiting, headache, vision changes, focal neuro-deficits, cough or fever.    PMH/PSH/SH/FH/Meds: reviewed.    Symptoms/Review of Systems: Feeling a little better. Having urinary retention, Jenkins placed. No shortness of breath, cough, chest pain or headache, fever or abdominal pain.     Diet:  Adequate intake.    Activity level: Up with assistance  Pain:  Patient reports no pain.       OBJECTIVE:   Vital Signs (Most Recent):      Temp: 98.4 °F (36.9 °C) (01/14/17 0800)  Pulse: 76 (01/14/17 0800)  Resp: 18 (01/14/17 0800)  BP: 139/69 (01/14/17 0800)  SpO2: (!) 94 % (01/14/17 0800)       Vital Signs Range (Last 24H):  Temp:  [97.6 °F (36.4 °C)-98.5 °F (36.9 °C)]   Pulse:  [73-92]   Resp:  [16-18]   BP: (119-194)/(56-96)   SpO2:  [93 %-98 %]     Weight: 54.4 kg (120 lb)  Body mass index is 21.26 kg/(m^2).    Intake/Output Summary (Last 24 hours) at 01/14/17 1116  Last data filed at 01/13/17 7226   Gross per 24 hour   Intake                0 ml   Output                0 ml   Net                0 ml      Physical Examination:  General appearance: well developed, appears stated age  Head: normocephalic, atraumatic  Eyes: conjunctivae/corneas clear. PERRL.  Nose: Nares normal. Septum midline.  Throat: lips, DRY mucosa, and tongue normal; teeth and gums normal, no throat erythema.  Neck: supple, symmetrical, trachea midline, no JVD and thyroid not enlarged, symmetric, no tenderness/mass/nodules  Lungs: clear to auscultation bilaterally and normal respiratory effort  Chest wall: no tenderness  Heart: regular rate and rhythm, S1, S2 normal, no murmur, click, rub or gallop  Abdomen: soft, non-tender non-distented; bowel sounds normal; no masses, no organomegaly  Extremities: no cyanosis, clubbing or edema.   Pulses: 2+ and symmetric  Skin: Skin color, texture, turgor normal. No rashes or lesions.  Lymph nodes: Cervical, supraclavicular, and axillary nodes normal.  Neurologic: CNII-XII intact. Generalized hypertonia and rigidity noted.    Laboratory:  CBC:    Recent Labs  Lab 01/13/17  1243 01/14/17  0506   WBC 12.50 9.00   RBC 3.58* 3.10*   HGB 11.6* 10.0*   HCT 34.3* 29.5*    156   MCV 96 95   MCH 32.4* 32.4*   MCHC 33.9 34.0   BMP    Recent Labs  Lab 01/13/17  1243 01/14/17  0506   * 80   * 136   K 4.2 3.5   CL 99 105   CO2 24 20*   BUN 35* 26*   CREATININE 1.1 0.8   CALCIUM 10.0 8.7      Diagnostic Results:  Chest X-Ray: No active cardiopulmonary process.  No significant change.    Microbiology Results (last 7 days)     ** No results found for the last 168 hours. **         Assessment/Plan:     Active Hospital Problems    Diagnosis  POA    *Dehydration [E86.0]  Yes    Hypertension [I10]  Yes    Parkinson's disease [G20]  Yes    Weakness [R53.1]  Yes      Resolved Hospital Problems    Diagnosis Date Resolved POA   No resolved problems to display.   Dehydration  Continue IVF hydration and monitor serum lytes and renal panel.     Weakness  Supportive care, up with assist.  Fall precautions.  PT  to evaluate and treat.     Hypertension  Chronic problem. Will continue chronic medications and monitor for any changes, adjusting as needed.     Parkinson's disease  Chronic problem. Will continue chronic medications and monitor for any changes, adjusting as needed.    Urinary Retention  Jenkins placed. Voiding trial in AM.  VTE Risk Mitigation         Ordered     enoxaparin injection 40 mg  Daily     Route:  Subcutaneous        01/13/17 1609     Medium Risk of VTE  Once      01/13/17 1609        Anticipated Disposition: Home or Self Care    Nichol Buchanan MD  Department of Hospital Medicine  [facility]

## 2017-01-15 PROBLEM — E87.6 HYPOKALEMIA: Status: ACTIVE | Noted: 2017-01-15

## 2017-01-15 PROBLEM — J18.9 PNEUMONIA: Status: ACTIVE | Noted: 2017-01-15

## 2017-01-15 LAB
ANION GAP SERPL CALC-SCNC: 8 MMOL/L
BACTERIA #/AREA URNS HPF: NORMAL /HPF
BASOPHILS # BLD AUTO: 0 K/UL
BASOPHILS NFR BLD: 0.3 %
BILIRUB UR QL STRIP: NEGATIVE
BUN SERPL-MCNC: 15 MG/DL
CALCIUM SERPL-MCNC: 8.1 MG/DL
CHLORIDE SERPL-SCNC: 108 MMOL/L
CLARITY UR: CLEAR
CO2 SERPL-SCNC: 20 MMOL/L
COLOR UR: YELLOW
CREAT SERPL-MCNC: 0.7 MG/DL
DIFFERENTIAL METHOD: ABNORMAL
EOSINOPHIL # BLD AUTO: 0.1 K/UL
EOSINOPHIL NFR BLD: 1.3 %
ERYTHROCYTE [DISTWIDTH] IN BLOOD BY AUTOMATED COUNT: 12.3 %
EST. GFR  (AFRICAN AMERICAN): >60 ML/MIN/1.73 M^2
EST. GFR  (NON AFRICAN AMERICAN): >60 ML/MIN/1.73 M^2
GLUCOSE SERPL-MCNC: 93 MG/DL
GLUCOSE UR QL STRIP: NEGATIVE
HCT VFR BLD AUTO: 29 %
HGB BLD-MCNC: 9.8 G/DL
HGB UR QL STRIP: ABNORMAL
KETONES UR QL STRIP: NEGATIVE
LEUKOCYTE ESTERASE UR QL STRIP: NEGATIVE
LYMPHOCYTES # BLD AUTO: 1.3 K/UL
LYMPHOCYTES NFR BLD: 18.6 %
MCH RBC QN AUTO: 32.2 PG
MCHC RBC AUTO-ENTMCNC: 33.9 %
MCV RBC AUTO: 95 FL
MICROSCOPIC COMMENT: NORMAL
MONOCYTES # BLD AUTO: 0.4 K/UL
MONOCYTES NFR BLD: 6.1 %
NEUTROPHILS # BLD AUTO: 5 K/UL
NEUTROPHILS NFR BLD: 73.7 %
NITRITE UR QL STRIP: NEGATIVE
PH UR STRIP: 6 [PH] (ref 5–8)
PLATELET # BLD AUTO: 170 K/UL
PMV BLD AUTO: 7.6 FL
POTASSIUM SERPL-SCNC: 3.2 MMOL/L
PROT UR QL STRIP: ABNORMAL
RBC # BLD AUTO: 3.06 M/UL
RBC #/AREA URNS HPF: 2 /HPF (ref 0–4)
SODIUM SERPL-SCNC: 136 MMOL/L
SP GR UR STRIP: 1.01 (ref 1–1.03)
URN SPEC COLLECT METH UR: ABNORMAL
UROBILINOGEN UR STRIP-ACNC: NEGATIVE EU/DL
WBC # BLD AUTO: 6.8 K/UL
WBC #/AREA URNS HPF: 4 /HPF (ref 0–5)

## 2017-01-15 PROCEDURE — G0378 HOSPITAL OBSERVATION PER HR: HCPCS

## 2017-01-15 PROCEDURE — 80048 BASIC METABOLIC PNL TOTAL CA: CPT

## 2017-01-15 PROCEDURE — 25000003 PHARM REV CODE 250: Performed by: EMERGENCY MEDICINE

## 2017-01-15 PROCEDURE — 99226 PR SUBSEQUENT OBSERVATION CARE,LEVEL III: CPT | Mod: ,,, | Performed by: INTERNAL MEDICINE

## 2017-01-15 PROCEDURE — 63600175 PHARM REV CODE 636 W HCPCS: Performed by: EMERGENCY MEDICINE

## 2017-01-15 PROCEDURE — 36415 COLL VENOUS BLD VENIPUNCTURE: CPT

## 2017-01-15 PROCEDURE — 85025 COMPLETE CBC W/AUTO DIFF WBC: CPT

## 2017-01-15 PROCEDURE — 25000003 PHARM REV CODE 250: Performed by: INTERNAL MEDICINE

## 2017-01-15 PROCEDURE — 63600175 PHARM REV CODE 636 W HCPCS: Performed by: INTERNAL MEDICINE

## 2017-01-15 RX ORDER — FUROSEMIDE 10 MG/ML
20 INJECTION INTRAMUSCULAR; INTRAVENOUS ONCE
Status: COMPLETED | OUTPATIENT
Start: 2017-01-15 | End: 2017-01-15

## 2017-01-15 RX ORDER — MOXIFLOXACIN HYDROCHLORIDE 400 MG/250ML
400 INJECTION, SOLUTION INTRAVENOUS
Status: DISCONTINUED | OUTPATIENT
Start: 2017-01-15 | End: 2017-01-17 | Stop reason: HOSPADM

## 2017-01-15 RX ORDER — POTASSIUM CHLORIDE 20 MEQ/1
40 TABLET, EXTENDED RELEASE ORAL ONCE
Status: COMPLETED | OUTPATIENT
Start: 2017-01-15 | End: 2017-01-15

## 2017-01-15 RX ADMIN — OXYCODONE HYDROCHLORIDE AND ACETAMINOPHEN 1 TABLET: 10; 325 TABLET ORAL at 02:01

## 2017-01-15 RX ADMIN — CALCIUM CARBONATE (ANTACID) CHEW TAB 500 MG 500 MG: 500 CHEW TAB at 02:01

## 2017-01-15 RX ADMIN — SODIUM CHLORIDE: 0.9 INJECTION, SOLUTION INTRAVENOUS at 05:01

## 2017-01-15 RX ADMIN — CARBIDOPA AND LEVODOPA 1 TABLET: 50; 200 TABLET, EXTENDED RELEASE ORAL at 08:01

## 2017-01-15 RX ADMIN — CALCIUM CARBONATE (ANTACID) CHEW TAB 500 MG 500 MG: 500 CHEW TAB at 10:01

## 2017-01-15 RX ADMIN — CARBIDOPA AND LEVODOPA 2 TABLET: 25; 100 TABLET ORAL at 08:01

## 2017-01-15 RX ADMIN — HYDRALAZINE HYDROCHLORIDE 10 MG: 20 INJECTION INTRAMUSCULAR; INTRAVENOUS at 12:01

## 2017-01-15 RX ADMIN — CALCIUM CARBONATE (ANTACID) CHEW TAB 500 MG 500 MG: 500 CHEW TAB at 05:01

## 2017-01-15 RX ADMIN — STANDARDIZED SENNA CONCENTRATE AND DOCUSATE SODIUM 1 TABLET: 8.6; 5 TABLET, FILM COATED ORAL at 08:01

## 2017-01-15 RX ADMIN — PIPERACILLIN SODIUM AND TAZOBACTAM SODIUM 4.5 G: 4; .5 INJECTION, POWDER, FOR SOLUTION INTRAVENOUS at 08:01

## 2017-01-15 RX ADMIN — PRAVASTATIN SODIUM 40 MG: 40 TABLET ORAL at 08:01

## 2017-01-15 RX ADMIN — CARBIDOPA AND LEVODOPA 2 TABLET: 25; 100 TABLET ORAL at 12:01

## 2017-01-15 RX ADMIN — FERROUS SULFATE TAB EC 325 MG (65 MG FE EQUIVALENT) 325 MG: 325 (65 FE) TABLET DELAYED RESPONSE at 08:01

## 2017-01-15 RX ADMIN — MOXIFLOXACIN HYDROCHLORIDE 400 MG: 400 INJECTION, SOLUTION INTRAVENOUS at 06:01

## 2017-01-15 RX ADMIN — Medication 1 CAPSULE: at 08:01

## 2017-01-15 RX ADMIN — PANTOPRAZOLE SODIUM 40 MG: 40 TABLET, DELAYED RELEASE ORAL at 08:01

## 2017-01-15 RX ADMIN — OXYCODONE HYDROCHLORIDE AND ACETAMINOPHEN 1 TABLET: 10; 325 TABLET ORAL at 10:01

## 2017-01-15 RX ADMIN — POTASSIUM CHLORIDE 40 MEQ: 1500 TABLET, EXTENDED RELEASE ORAL at 12:01

## 2017-01-15 RX ADMIN — ENOXAPARIN SODIUM 40 MG: 100 INJECTION SUBCUTANEOUS at 12:01

## 2017-01-15 RX ADMIN — OLMESARTAN MEDOXOMIL 20 MG: 20 TABLET, FILM COATED ORAL at 08:01

## 2017-01-15 RX ADMIN — FUROSEMIDE 20 MG: 10 INJECTION, SOLUTION INTRAMUSCULAR; INTRAVENOUS at 06:01

## 2017-01-15 RX ADMIN — VITAM B12 100 MCG: 100 TAB at 08:01

## 2017-01-15 NOTE — PROGRESS NOTES
Progress Note  Hospital Medicine  Patient Name:Maricarmen Woodward  MRN:  [id number]  Patient Class: OP- Observation  Admit Date: 1/13/2017  Length of Stay: 0 days  Expected Discharge Date:   Attending Physician: Nichol Buchanan MD  Primary Care Provider:  Haris Brambila MD    SUBJECTIVE:     Principal Problem: Dehydration  Initial history of present illness: Patient is a 89 y.o. female admitted to Hospitalist Service from [facility] with complaint of increasing fatigue. Patient reportedly has past medical history significant for Parkinson's disease and hypertension. Part of the history obtained from patient's sister in law. Patient is mostly bed-bound and with assistance can get to bedside wheel chair. Patient has not been eating or drinking for 2 days, patient has a care taker. No fever or chills reported. No abdominal pain. Patient denied chest pain, shortness of breath, abdominal pain, nausea, vomiting, headache, vision changes, focal neuro-deficits, cough or fever.    PMH/PSH/SH/FH/Meds: reviewed.    Symptoms/Review of Systems: Feeling a little better. Having urinary retention, Jenkins placed. No shortness of breath, cough, chest pain or headache, fever or abdominal pain.     Diet:  Adequate intake.    Activity level: Up with assistance  Pain:  Patient reports no pain.       OBJECTIVE:   Vital Signs (Most Recent):      Temp: 97.8 °F (36.6 °C) (01/15/17 0400)  Pulse: 78 (01/15/17 0400)  Resp: 20 (01/15/17 0400)  BP: (!) 144/64 (01/15/17 0400)  SpO2: 95 % (01/15/17 0400)       Vital Signs Range (Last 24H):  Temp:  [96.2 °F (35.7 °C)-98.3 °F (36.8 °C)]   Pulse:  [73-82]   Resp:  [16-20]   BP: (127-166)/(58-70)   SpO2:  [94 %-97 %]     Weight: 54.4 kg (120 lb)  Body mass index is 21.26 kg/(m^2).    Intake/Output Summary (Last 24 hours) at 01/15/17 1029  Last data filed at 01/15/17 0640   Gross per 24 hour   Intake          4063.33 ml   Output             1880 ml   Net          2183.33 ml      Physical Examination:  General appearance: well developed, appears stated age  Head: normocephalic, atraumatic  Eyes: conjunctivae/corneas clear. PERRL.  Nose: Nares normal. Septum midline.  Throat: lips, DRY mucosa, and tongue normal; teeth and gums normal, no throat erythema.  Neck: supple, symmetrical, trachea midline, no JVD and thyroid not enlarged, symmetric, no tenderness/mass/nodules  Lungs: clear to auscultation bilaterally and normal respiratory effort  Chest wall: no tenderness  Heart: regular rate and rhythm, S1, S2 normal, no murmur, click, rub or gallop  Abdomen: soft, non-tender non-distented; bowel sounds normal; no masses, no organomegaly  Extremities: no cyanosis, clubbing or edema.   Pulses: 2+ and symmetric  Skin: Skin color, texture, turgor normal. No rashes or lesions.  Lymph nodes: Cervical, supraclavicular, and axillary nodes normal.  Neurologic: CNII-XII intact. Generalized hypertonia and rigidity noted.    Laboratory:  CBC:    Recent Labs  Lab 01/13/17  1243 01/14/17  0506 01/15/17  0528   WBC 12.50 9.00 6.80   RBC 3.58* 3.10* 3.06*   HGB 11.6* 10.0* 9.8*   HCT 34.3* 29.5* 29.0*    156 170   MCV 96 95 95   MCH 32.4* 32.4* 32.2*   MCHC 33.9 34.0 33.9   BMP    Recent Labs  Lab 01/13/17  1243 01/14/17  0506 01/15/17  0528   * 80 93   * 136 136   K 4.2 3.5 3.2*   CL 99 105 108   CO2 24 20* 20*   BUN 35* 26* 15   CREATININE 1.1 0.8 0.7   CALCIUM 10.0 8.7 8.1*      Diagnostic Results:  Chest X-Ray: No active cardiopulmonary process.  No significant change.    Microbiology Results (last 7 days)     Procedure Component Value Units Date/Time    Urine culture [[id number]] Collected:  01/14/17 7125    Order Status:  Sent Specimen:  Urine from Urine, Catheterized Updated:  01/15/17 0954         Assessment/Plan:     Active Hospital Problems    Diagnosis  POA    *Dehydration [E86.0]  Yes    Hypertension [I10]  Yes    Parkinson's disease [G20]  Yes    Weakness [R53.1]  Yes       Resolved Hospital Problems    Diagnosis Date Resolved POA   No resolved problems to display.   RLL Pneumonia  Sputum HS and Cx.  Start IV Zosyn and Avelox.  Swallow evaluation.    Dehydration  Continue IVF hydration and monitor serum lytes and renal panel.     Weakness  Supportive care, up with assist.  Fall precautions.  PT to evaluate and treat.    Hypokalemia  Replete KCl. Follow BMP.     Hypertension  Chronic problem. Will continue chronic medications and monitor for any changes, adjusting as needed.     Parkinson's disease  Chronic problem. Will continue chronic medications and monitor for any changes, adjusting as needed.    Urinary Retention  DC Jenkins and monitor.    VTE Risk Mitigation         Ordered     enoxaparin injection 40 mg  Daily     Route:  Subcutaneous        01/13/17 1609     Medium Risk of VTE  Once      01/13/17 1609        Anticipated Disposition: Home or Self Care    Harley Rose MD  Department of Hospital Medicine  [facility]

## 2017-01-15 NOTE — NURSING
Dr. Rose making rounds. Notified him regarding chest x ray results. Also addressed the order to discontinue the philippe. Family stated yesterday patient has trouble voiding and the problem has been going on for a couple of months.  Keep philippe in per MD.

## 2017-01-15 NOTE — PLAN OF CARE
Problem: Patient Care Overview  Goal: Plan of Care Review  Outcome: Ongoing (interventions implemented as appropriate)  Patient aao x 4. Denies pain during shift. Jenkins inserted for urinary retention. Tolerated well. Clear yellow urine produced. Regular diet tolerated. PT evaluation done. Patient remained free from fall and injury. Call light within reach and bed in lowest position. Will continue to monitor.

## 2017-01-15 NOTE — NURSING
"Patient has persistent cough this morning. Rechecked lung sounds. Lungs sound "wet". Dr. Rose notified. Fluids discontinued and chest xray ordered.Will continue to monitor.   "

## 2017-01-15 NOTE — PLAN OF CARE
Problem: Patient Care Overview  Goal: Plan of Care Review  Outcome: Ongoing (interventions implemented as appropriate)  Patient aao x 4. Denies pain. Fluids discontinued. Blood pressure monitored during shift. Hydralazine given. Shifted weight and turned patient throughout shift. Patient remained free from fall and injury. Plan of care was reviewed with sister in law and patient. Verbalized understanding.  Bed in lowest position and call light within reach. Will continue to monitor.

## 2017-01-15 NOTE — UM SECONDARY REVIEW
Physician Advisor External    Level of Care Issue    Per Dr. Solis at [facility], pt is OP appropriate for 1/13/2017 and 1/14/2017

## 2017-01-16 LAB
ANION GAP SERPL CALC-SCNC: 11 MMOL/L
BACTERIA UR CULT: NO GROWTH
BASOPHILS # BLD AUTO: 0 K/UL
BASOPHILS NFR BLD: 0.3 %
BUN SERPL-MCNC: 16 MG/DL
CALCIUM SERPL-MCNC: 8.2 MG/DL
CHLORIDE SERPL-SCNC: 104 MMOL/L
CO2 SERPL-SCNC: 21 MMOL/L
CREAT SERPL-MCNC: 1 MG/DL
DIFFERENTIAL METHOD: ABNORMAL
EOSINOPHIL # BLD AUTO: 0.1 K/UL
EOSINOPHIL NFR BLD: 1.1 %
ERYTHROCYTE [DISTWIDTH] IN BLOOD BY AUTOMATED COUNT: 12.3 %
EST. GFR  (AFRICAN AMERICAN): 58 ML/MIN/1.73 M^2
EST. GFR  (NON AFRICAN AMERICAN): 50 ML/MIN/1.73 M^2
GLUCOSE SERPL-MCNC: 103 MG/DL
HCT VFR BLD AUTO: 28.9 %
HGB BLD-MCNC: 9.8 G/DL
LYMPHOCYTES # BLD AUTO: 1.4 K/UL
LYMPHOCYTES NFR BLD: 19.9 %
MCH RBC QN AUTO: 32.4 PG
MCHC RBC AUTO-ENTMCNC: 34.1 %
MCV RBC AUTO: 95 FL
MONOCYTES # BLD AUTO: 0.5 K/UL
MONOCYTES NFR BLD: 7.4 %
NEUTROPHILS # BLD AUTO: 5.1 K/UL
NEUTROPHILS NFR BLD: 71.3 %
PLATELET # BLD AUTO: 197 K/UL
PMV BLD AUTO: 8.1 FL
POTASSIUM SERPL-SCNC: 3.7 MMOL/L
RBC # BLD AUTO: 3.04 M/UL
SODIUM SERPL-SCNC: 136 MMOL/L
WBC # BLD AUTO: 7.1 K/UL

## 2017-01-16 PROCEDURE — 99232 SBSQ HOSP IP/OBS MODERATE 35: CPT | Mod: ,,, | Performed by: INTERNAL MEDICINE

## 2017-01-16 PROCEDURE — 36415 COLL VENOUS BLD VENIPUNCTURE: CPT

## 2017-01-16 PROCEDURE — 25000003 PHARM REV CODE 250: Performed by: EMERGENCY MEDICINE

## 2017-01-16 PROCEDURE — 97530 THERAPEUTIC ACTIVITIES: CPT

## 2017-01-16 PROCEDURE — 63600175 PHARM REV CODE 636 W HCPCS: Performed by: EMERGENCY MEDICINE

## 2017-01-16 PROCEDURE — 85025 COMPLETE CBC W/AUTO DIFF WBC: CPT

## 2017-01-16 PROCEDURE — 80048 BASIC METABOLIC PNL TOTAL CA: CPT

## 2017-01-16 PROCEDURE — 25000003 PHARM REV CODE 250: Performed by: INTERNAL MEDICINE

## 2017-01-16 PROCEDURE — 12000002 HC ACUTE/MED SURGE SEMI-PRIVATE ROOM

## 2017-01-16 PROCEDURE — 97110 THERAPEUTIC EXERCISES: CPT

## 2017-01-16 RX ADMIN — CARBIDOPA AND LEVODOPA 2 TABLET: 25; 100 TABLET ORAL at 08:01

## 2017-01-16 RX ADMIN — PIPERACILLIN SODIUM AND TAZOBACTAM SODIUM 4.5 G: 4; .5 INJECTION, POWDER, FOR SOLUTION INTRAVENOUS at 06:01

## 2017-01-16 RX ADMIN — OXYCODONE HYDROCHLORIDE AND ACETAMINOPHEN 1 TABLET: 10; 325 TABLET ORAL at 05:01

## 2017-01-16 RX ADMIN — FERROUS SULFATE TAB EC 325 MG (65 MG FE EQUIVALENT) 325 MG: 325 (65 FE) TABLET DELAYED RESPONSE at 08:01

## 2017-01-16 RX ADMIN — PRAVASTATIN SODIUM 40 MG: 40 TABLET ORAL at 09:01

## 2017-01-16 RX ADMIN — MOXIFLOXACIN HYDROCHLORIDE 400 MG: 400 INJECTION, SOLUTION INTRAVENOUS at 05:01

## 2017-01-16 RX ADMIN — OXYCODONE HYDROCHLORIDE AND ACETAMINOPHEN 1 TABLET: 10; 325 TABLET ORAL at 09:01

## 2017-01-16 RX ADMIN — CARBIDOPA AND LEVODOPA 1 TABLET: 50; 200 TABLET, EXTENDED RELEASE ORAL at 09:01

## 2017-01-16 RX ADMIN — PIPERACILLIN SODIUM AND TAZOBACTAM SODIUM 4.5 G: 4; .5 INJECTION, POWDER, FOR SOLUTION INTRAVENOUS at 12:01

## 2017-01-16 RX ADMIN — CALCIUM CARBONATE (ANTACID) CHEW TAB 500 MG 500 MG: 500 CHEW TAB at 05:01

## 2017-01-16 RX ADMIN — CARBIDOPA AND LEVODOPA 2 TABLET: 25; 100 TABLET ORAL at 11:01

## 2017-01-16 RX ADMIN — ENOXAPARIN SODIUM 40 MG: 100 INJECTION SUBCUTANEOUS at 11:01

## 2017-01-16 RX ADMIN — PANTOPRAZOLE SODIUM 40 MG: 40 TABLET, DELAYED RELEASE ORAL at 08:01

## 2017-01-16 RX ADMIN — PIPERACILLIN SODIUM AND TAZOBACTAM SODIUM 4.5 G: 4; .5 INJECTION, POWDER, FOR SOLUTION INTRAVENOUS at 08:01

## 2017-01-16 RX ADMIN — STANDARDIZED SENNA CONCENTRATE AND DOCUSATE SODIUM 1 TABLET: 8.6; 5 TABLET, FILM COATED ORAL at 08:01

## 2017-01-16 RX ADMIN — OXYCODONE HYDROCHLORIDE AND ACETAMINOPHEN 1 TABLET: 10; 325 TABLET ORAL at 01:01

## 2017-01-16 RX ADMIN — VITAM B12 100 MCG: 100 TAB at 08:01

## 2017-01-16 RX ADMIN — OLMESARTAN MEDOXOMIL 20 MG: 20 TABLET, FILM COATED ORAL at 08:01

## 2017-01-16 RX ADMIN — CALCIUM CARBONATE (ANTACID) CHEW TAB 500 MG 500 MG: 500 CHEW TAB at 09:01

## 2017-01-16 RX ADMIN — Medication 1 CAPSULE: at 08:01

## 2017-01-16 RX ADMIN — CALCIUM CARBONATE (ANTACID) CHEW TAB 500 MG 500 MG: 500 CHEW TAB at 01:01

## 2017-01-16 NOTE — UM SECONDARY REVIEW
Physician Advisor External    Level of Care Issue    Approved Inpatient for 1/15/17 per ehr review, will notify dr larkin...

## 2017-01-16 NOTE — PLAN OF CARE
Problem: Patient Care Overview  Goal: Plan of Care Review  Outcome: Ongoing (interventions implemented as appropriate)  Resting quietly during two hour rounds. Antibiotic therapy in progress. No pressure ulcer development to date. Encouraged fluids. No falls or trauma this shift. Jenkins intact & patent draining yellow urine to  Bag. Cath care provided. Pt. Verbalizes understanding of their plan of care.

## 2017-01-16 NOTE — PLAN OF CARE
Problem: Physical Therapy Goal  Goal: Physical Therapy Goal  Outcome: Ongoing (interventions implemented as appropriate)  Pt seen for LE thera ex x 10 reps. Stood with mod assist with inability to stand upright, unable to step. the patient with posterior leaning.

## 2017-01-16 NOTE — PHYSICIAN QUERY
"PT Name: Maricarmen Woodward  MR #: [id number]  Physician Query Form - Renal Clarification   Reviewer  Ext [phone] Tawanna Buckner RN CDS    This form is a permanent document in the medical record.     QueryDate: January 16, 2017    By submitting this query, we are merely seeking further clarification of documentation. Please utilize your independent clinical judgment when addressing the question(s) below.    The Medical record reflects the following:   Indicator Supporting Clinical Findings Location in Medical Record    "Renal Insufficiency" documented      X "Dehydration" documented   Patient appears dehydrated as well here    DX: Dehydration ED Prov note 1/13      ED prov note 1/13  H/P 1/13     X CR/BUN =               GFR = BUN 35-26  GFR 45 Lab 1/13   X K=  K+ 3.2 Lab 1/15    "Nausea/Vomiting" documented      Dialysis/CRRT      X Medication:  0.9 % NaCl infusion 125 ml/hr IV continuous    Sodium Chloride 0.9 % bolus 500 ml IV ED 1 time MAR 1/13-1/17      MAR 1/13   X Treatment: Jenkins cath, Bedrest Flowsheet 1/13-1/16    Other:        Provider, please specify the diagnosis or diagnoses associated with above clinical findings.    [ ] Acute Kidney Injury / Acute Renal Failure    [ ] Acute Kidney Injury With lesion of tubular necrosis (ATN)    [ ] Acute Interstitial Nephritis (AIN)    [ ] Glomerulonephritis    [x] Other Renal Diagnosis (Specify) _Dehydration; no acute kidney injury_________________________________________________    [ ] Clinically Undetermined      Please document in your progress notes daily for the duration of treatment, until resolved, and include in your discharge summary.  "

## 2017-01-16 NOTE — PROGRESS NOTES
Dr. Buchanan on rounds. Okay to VICTORINO philippe at this time and monitor for urinary retention

## 2017-01-16 NOTE — PT/OT/SLP PROGRESS
Physical Therapy  Treatment    Maricarmen Woodward   MRN: [id number]   Admitting Diagnosis: Dehydration    PT Received On: 17  PT Start Time: 835     PT Stop Time: 858    PT Total Time (min): 23 min       Billable Minutes:  Therapeutic Activity 13 and Therapeutic Exercise 10    Treatment Type: Treatment  PT/PTA: PT             General Precautions: Standard, fall  Orthopedic Precautions: N/A   Braces:           Subjective:  Communicated with nurse Parekh prior to session.  Pt seen seated at the bedside recliner, alert, interactive    Pain Ratin/10                   Objective:   Patient found with: peripheral IV    Functional Mobility:  Bed Mobility:        Transfers:  Sit <> Stand Assistance: Moderate Assistance, Maximum Assistance  Sit <> Stand Assistive Device: No Assistive Device    Gait:   Gait Distance: attempted gait but unable to stand upright/posterior leaning  Assistance 1: Maximum assistance  Gait Assistive Device: Hand held assist  Gait Deviation(s): decreased weight-shifting ability    Stairs:      Balance:   Static Sit: FAIR-: Maintains without assist but inconsistent   Dynamic Sit: FAIR: Cannot move trunk without losing balance  Static Stand: 0: Needs MAXIMAL assist to maintain   Dynamic stand: 0: N/A     Therapeutic Activities and Exercises:  Requiring frequent cueing to attend to task  Completed LE thera ex x 10 reps   sit to stand activities max assist with posterior poor midline orientation  Unable to step       AM-PAC 6 CLICK MOBILITY  How much help from another person does this patient currently need?   1 = Unable, Total/Dependent Assistance  2 = A lot, Maximum/Moderate Assistance  3 = A little, Minimum/Contact Guard/Supervision  4 = None, Modified [place]/Independent         AM-PAC Raw Score CMS G-Code Modifier Level of Impairment Assistance   6 % Total / Unable   7 - 9 CM 80 - 100% Maximal Assist   10 - 14 CL 60 - 80% Moderate Assist   15 - 19 CK 40 - 60% Moderate Assist    20 - 22 CJ 20 - 40% Minimal Assist   23 CI 1-20% SBA / CGA   24 CH 0% Independent/ Mod I     Patient left up in chair with all lines intact, call button in reach and CNA eric notified.    Assessment:  Maricarmen Woodward is a 89 y.o. female with a medical diagnosis of Dehydration and presents with weakness, impaired mobility with inability to ambulate. Pt will benefit from 24 hr supervision /SNF with therapies.    Rehab identified problem list/impairments: Rehab identified problem list/impairments: weakness, impaired endurance, impaired self care skills, impaired functional mobilty, impaired balance, gait instability, decreased lower extremity function    Rehab potential is fair.    Activity tolerance: Fair    Discharge recommendations: Discharge Facility/Level Of Care Needs: nursing facility, skilled     Barriers to discharge: Barriers to Discharge: Decreased caregiver support    Equipment recommendations:       GOALS:   Physical Therapy Goals        Problem: Physical Therapy Goal    Goal Priority Disciplines Outcome Goal Variances Interventions   Physical Therapy Goal     PT/OT, PT Ongoing (interventions implemented as appropriate)               PLAN:    Patient to be seen 6 x/week  to address the above listed problems via gait training, therapeutic activities, therapeutic exercises  Plan of Care expires: 01/21/17  Plan of Care reviewed with: patient         Lupe Kentmontse, PT  01/16/2017

## 2017-01-16 NOTE — PROGRESS NOTES
Progress Note  Hospital Medicine  Patient Name:Maricarmen Woodward  MRN:  [id number]  Patient Class: IP- Inpatient  Admit Date: 1/13/2017  Length of Stay: 0 days  Expected Discharge Date:   Attending Physician: Nichol Buchanan MD  Primary Care Provider:  Haris Brambila MD    SUBJECTIVE:     Principal Problem: Dehydration  Initial history of present illness: Patient is a 89 y.o. female admitted to Hospitalist Service from [facility] Room with complaint of increasing fatigue. Patient reportedly has past medical history significant for Parkinson's disease and hypertension. Part of the history obtained from patient's sister in law. Patient is mostly bed-bound and with assistance can get to bedside wheel chair. Patient has not been eating or drinking for 2 days, patient has a care taker. No fever or chills reported. No abdominal pain. Patient denied chest pain, shortness of breath, abdominal pain, nausea, vomiting, headache, vision changes, focal neuro-deficits, cough or fever.    PMH/PSH/SH/FH/Meds: reviewed.    Symptoms/Review of Systems: Feeling a little better. No shortness of breath, cough, chest pain or headache, fever or abdominal pain.     Diet:  Adequate intake.    Activity level: Up with assistance  Pain:  Patient reports no pain.       OBJECTIVE:   Vital Signs (Most Recent):      Temp: 98.2 °F (36.8 °C) (01/16/17 0811)  Pulse: 79 (01/16/17 0811)  Resp: 18 (01/16/17 0811)  BP: 133/78 (01/16/17 0811)  SpO2: 98 % (01/16/17 0811)       Vital Signs Range (Last 24H):  Temp:  [98.2 °F (36.8 °C)-98.5 °F (36.9 °C)]   Pulse:  [76-94]   Resp:  [16-18]   BP: (132-198)/(59-78)   SpO2:  [95 %-98 %]     Weight: 54.4 kg (120 lb)  Body mass index is 21.26 kg/(m^2).    Intake/Output Summary (Last 24 hours) at 01/16/17 1056  Last data filed at 01/16/17 0817   Gross per 24 hour   Intake              540 ml   Output             1100 ml   Net             -560 ml     Physical Examination:  General appearance:  well developed, appears stated age  Head: normocephalic, atraumatic  Eyes: conjunctivae/corneas clear. PERRL.  Nose: Nares normal. Septum midline.  Throat: lips, DRY mucosa, and tongue normal; teeth and gums normal, no throat erythema.  Neck: supple, symmetrical, trachea midline, no JVD and thyroid not enlarged, symmetric, no tenderness/mass/nodules  Lungs: clear to auscultation bilaterally and normal respiratory effort  Chest wall: no tenderness  Heart: regular rate and rhythm, S1, S2 normal, no murmur, click, rub or gallop  Abdomen: soft, non-tender non-distented; bowel sounds normal; no masses, no organomegaly  Extremities: no cyanosis, clubbing or edema.   Pulses: 2+ and symmetric  Skin: Skin color, texture, turgor normal. No rashes or lesions.  Lymph nodes: Cervical, supraclavicular, and axillary nodes normal.  Neurologic: CNII-XII intact. Generalized hypertonia and rigidity noted.    Laboratory:  CBC:    Recent Labs  Lab 01/14/17  0506 01/15/17  0528 01/16/17  0505   WBC 9.00 6.80 7.10   RBC 3.10* 3.06* 3.04*   HGB 10.0* 9.8* 9.8*   HCT 29.5* 29.0* 28.9*    170 197   MCV 95 95 95   MCH 32.4* 32.2* 32.4*   MCHC 34.0 33.9 34.1   BMP    Recent Labs  Lab 01/14/17  0506 01/15/17  0528 01/16/17  0505   GLU 80 93 103    136 136   K 3.5 3.2* 3.7    108 104   CO2 20* 20* 21*   BUN 26* 15 16   CREATININE 0.8 0.7 1.0   CALCIUM 8.7 8.1* 8.2*      Diagnostic Results:  Chest X-Ray: No active cardiopulmonary process.  No significant change.    Microbiology Results (last 7 days)     Procedure Component Value Units Date/Time    Urine culture [[id number]] Collected:  01/14/17 9191    Order Status:  Completed Specimen:  Urine from Urine, Catheterized Updated:  01/16/17 1036     Urine Culture, Routine No growth    Culture, Respiratory with Gram Stain [[id number]]     Order Status:  No result Specimen:  Respiratory          Assessment/Plan:     Active Hospital Problems    Diagnosis  POA    *Dehydration [E86.0]   Yes    Hypokalemia [E87.6]  No    Pneumonia- RLL [J18.9]  No    Essential hypertension [I10]  Yes    Parkinson's disease [G20]  Yes    Generalized weakness [R53.1]  Yes      Resolved Hospital Problems    Diagnosis Date Resolved POA   No resolved problems to display.   RLL Pneumonia  Sputum HS and Cx.  Continue IV Zosyn and Avelox.  Swallow evaluation.    Dehydration  Continue IVF hydration and monitor serum lytes and renal panel.     Weakness  Supportive care, up with assist.  Fall precautions.  PT to evaluate and treat.    Hypokalemia - corrected  Follow BMP.     Hypertension  Chronic problem. Will continue chronic medications and monitor for any changes, adjusting as needed.     Parkinson's disease  Chronic problem. Will continue chronic medications and monitor for any changes, adjusting as needed.    Urinary Retention  DC Jenkins and monitor.    VTE Risk Mitigation         Ordered     enoxaparin injection 40 mg  Daily     Route:  Subcutaneous        01/13/17 1609     Medium Risk of VTE  Once      01/13/17 1609        Anticipated Disposition: Home or Self Care    Nichol Buchanan MD  Department of Hospital Medicine  [facility]

## 2017-01-17 VITALS
OXYGEN SATURATION: 97 % | TEMPERATURE: 98 F | DIASTOLIC BLOOD PRESSURE: 71 MMHG | SYSTOLIC BLOOD PRESSURE: 159 MMHG | WEIGHT: 120 LBS | BODY MASS INDEX: 21.26 KG/M2 | HEART RATE: 91 BPM | RESPIRATION RATE: 18 BRPM | HEIGHT: 63 IN

## 2017-01-17 LAB
ANION GAP SERPL CALC-SCNC: 9 MMOL/L
BASOPHILS # BLD AUTO: 0 K/UL
BASOPHILS NFR BLD: 0.4 %
BUN SERPL-MCNC: 12 MG/DL
CALCIUM SERPL-MCNC: 8.6 MG/DL
CHLORIDE SERPL-SCNC: 108 MMOL/L
CO2 SERPL-SCNC: 23 MMOL/L
CREAT SERPL-MCNC: 0.8 MG/DL
DIFFERENTIAL METHOD: ABNORMAL
EOSINOPHIL # BLD AUTO: 0.1 K/UL
EOSINOPHIL NFR BLD: 1.6 %
ERYTHROCYTE [DISTWIDTH] IN BLOOD BY AUTOMATED COUNT: 11.9 %
EST. GFR  (AFRICAN AMERICAN): >60 ML/MIN/1.73 M^2
EST. GFR  (NON AFRICAN AMERICAN): >60 ML/MIN/1.73 M^2
GLUCOSE SERPL-MCNC: 97 MG/DL
HCT VFR BLD AUTO: 30.9 %
HGB BLD-MCNC: 10.5 G/DL
LYMPHOCYTES # BLD AUTO: 1.2 K/UL
LYMPHOCYTES NFR BLD: 19.4 %
MCH RBC QN AUTO: 32.3 PG
MCHC RBC AUTO-ENTMCNC: 33.9 %
MCV RBC AUTO: 95 FL
MONOCYTES # BLD AUTO: 0.5 K/UL
MONOCYTES NFR BLD: 7.4 %
NEUTROPHILS # BLD AUTO: 4.4 K/UL
NEUTROPHILS NFR BLD: 71.2 %
PLATELET # BLD AUTO: 224 K/UL
PMV BLD AUTO: 7.7 FL
POTASSIUM SERPL-SCNC: 3.7 MMOL/L
RBC # BLD AUTO: 3.25 M/UL
SODIUM SERPL-SCNC: 140 MMOL/L
WBC # BLD AUTO: 6.2 K/UL

## 2017-01-17 PROCEDURE — 85025 COMPLETE CBC W/AUTO DIFF WBC: CPT

## 2017-01-17 PROCEDURE — 36415 COLL VENOUS BLD VENIPUNCTURE: CPT

## 2017-01-17 PROCEDURE — 25000003 PHARM REV CODE 250: Performed by: EMERGENCY MEDICINE

## 2017-01-17 PROCEDURE — 25000003 PHARM REV CODE 250: Performed by: INTERNAL MEDICINE

## 2017-01-17 PROCEDURE — 80048 BASIC METABOLIC PNL TOTAL CA: CPT

## 2017-01-17 PROCEDURE — G8979 MOBILITY GOAL STATUS: HCPCS | Mod: CM

## 2017-01-17 PROCEDURE — 99239 HOSP IP/OBS DSCHRG MGMT >30: CPT | Mod: ,,, | Performed by: INTERNAL MEDICINE

## 2017-01-17 PROCEDURE — 97530 THERAPEUTIC ACTIVITIES: CPT

## 2017-01-17 PROCEDURE — G8980 MOBILITY D/C STATUS: HCPCS | Mod: CN

## 2017-01-17 RX ORDER — LEVOFLOXACIN 500 MG/1
500 TABLET, FILM COATED ORAL DAILY
Qty: 7 TABLET | Refills: 0 | Status: SHIPPED | OUTPATIENT
Start: 2017-01-17 | End: 2017-01-27

## 2017-01-17 RX ADMIN — OXYCODONE HYDROCHLORIDE AND ACETAMINOPHEN 1 TABLET: 10; 325 TABLET ORAL at 05:01

## 2017-01-17 RX ADMIN — PANTOPRAZOLE SODIUM 40 MG: 40 TABLET, DELAYED RELEASE ORAL at 09:01

## 2017-01-17 RX ADMIN — STANDARDIZED SENNA CONCENTRATE AND DOCUSATE SODIUM 1 TABLET: 8.6; 5 TABLET, FILM COATED ORAL at 08:01

## 2017-01-17 RX ADMIN — CARBIDOPA AND LEVODOPA 2 TABLET: 25; 100 TABLET ORAL at 09:01

## 2017-01-17 RX ADMIN — FERROUS SULFATE TAB EC 325 MG (65 MG FE EQUIVALENT) 325 MG: 325 (65 FE) TABLET DELAYED RESPONSE at 09:01

## 2017-01-17 RX ADMIN — CALCIUM CARBONATE (ANTACID) CHEW TAB 500 MG 500 MG: 500 CHEW TAB at 01:01

## 2017-01-17 RX ADMIN — PIPERACILLIN SODIUM AND TAZOBACTAM SODIUM 4.5 G: 4; .5 INJECTION, POWDER, FOR SOLUTION INTRAVENOUS at 09:01

## 2017-01-17 RX ADMIN — PIPERACILLIN SODIUM AND TAZOBACTAM SODIUM 4.5 G: 4; .5 INJECTION, POWDER, FOR SOLUTION INTRAVENOUS at 12:01

## 2017-01-17 RX ADMIN — OLMESARTAN MEDOXOMIL 20 MG: 20 TABLET, FILM COATED ORAL at 08:01

## 2017-01-17 RX ADMIN — Medication 1 CAPSULE: at 09:01

## 2017-01-17 RX ADMIN — CARBIDOPA AND LEVODOPA 2 TABLET: 25; 100 TABLET ORAL at 01:01

## 2017-01-17 RX ADMIN — VITAM B12 100 MCG: 100 TAB at 08:01

## 2017-01-17 RX ADMIN — CALCIUM CARBONATE (ANTACID) CHEW TAB 500 MG 500 MG: 500 CHEW TAB at 05:01

## 2017-01-17 RX ADMIN — OXYCODONE HYDROCHLORIDE AND ACETAMINOPHEN 1 TABLET: 10; 325 TABLET ORAL at 02:01

## 2017-01-17 NOTE — PT/OT/SLP PROGRESS
Physical Therapy  Treatment    Maricarmen Woodward   MRN: [id number]   Admitting Diagnosis: Dehydration    PT Received On: 17  PT Start Time: 855     PT Stop Time: 925    PT Total Time (min): 30 min       Billable Minutes:  Therapeutic Activity 30    Treatment Type: Treatment  PT/PTA: PTA     PTA Visit Number: 1       General Precautions: Standard, fall  Orthopedic Precautions: N/A   Braces:           Subjective:  Communicated with nurse Tyler prior to session.  Pt pleasant and agreeable to therapy. Pt requesting BTB after standing attempts.     Pain Ratin/10                   Objective:   Patient found with: peripheral IV; seated in bedside chair with grand daughter present.    Functional Mobility:  Bed Mobility:   Rolling/Turning to Left: Moderate assistance  Rolling/Turning Right: Moderate assistance  Scooting/Bridging: Moderate Assistance (to L in bed)  Sit to Supine: Moderate Assistance    Transfers:  Sit <> Stand Assistance: Maximum Assistance (3 x's total)  Sit <> Stand Assistive Device: Rolling Walker     Bed to Chair t/f: Stand pivot  Bed to Chair t/f Assistance: Maximum Assistance  Bed to Chair t/f Device: Rolling Walker      Gait:   Gait Distance: did not occur; pt unable to fully stand upright       Balance:   Static Sit: FAIR: Maintains without assist, but unable to take any challenges   Dynamic Sit: FAIR: Cannot move trunk without losing balance  Static Stand: 0: Needs MAXIMAL assist to maintain   Dynamic stand: 0: N/A     Therapeutic Activities and Exercises:    Static Stand x 3 trials: 30-40 seconds each trial with Max/Mod A to maintain balance/upright position. Increased knee flexion, trunk flexion and posterior lean noted.        Patient left supine with all lines intact, call button in reach, nurse Tyler notified and grand daughter present.    Assessment:  Maricarmen Woodward is a 89 y.o. female with a medical diagnosis of Dehydration and presents with significant weakness,  deconditioning and impaired trunk control/core strength in standing. Pt unable to initiate steps despite allotted increased time and encouragement.     Rehab identified problem list/impairments: Rehab identified problem list/impairments: weakness, impaired endurance, impaired self care skills, impaired functional mobilty, impaired balance, decreased lower extremity function, decreased safety awareness, impaired coordination, decreased ROM    Rehab potential is fair.    Activity tolerance: Fair    Discharge recommendations:       Barriers to discharge:      Equipment recommendations:       GOALS:   Physical Therapy Goals        Problem: Physical Therapy Goal    Goal Priority Disciplines Outcome Goal Variances Interventions   Physical Therapy Goal     PT/OT, PT Ongoing (interventions implemented as appropriate)               PLAN:    Patient to be seen 6 x/week  to address the above listed problems via gait training, therapeutic activities, therapeutic exercises  Plan of Care expires: 01/21/17  Plan of Care reviewed with: patient         Zoya Garrett, PTA  01/17/2017

## 2017-01-17 NOTE — PROGRESS NOTES
01/17/17 1200   Room Air SpO2 At Rest   Room Air SpO2 At Rest 96 %   Exertional SpO2 Evaluation on Room Air   Room Air SpO2 on Exertion 97 %   Pulse 96 bpm

## 2017-01-17 NOTE — PLAN OF CARE
Problem: Patient Care Overview  Goal: Plan of Care Review  Outcome: Ongoing (interventions implemented as appropriate)  Pt VSS and afebrile, free of falls, trauma, injury, and skin break down, pain controlled with medication, positions self in bed, up with assist x2, incontinent of bowel and bladder. Pt in low locked bed, call light in reach, safety precautions maintained.

## 2017-01-17 NOTE — PROGRESS NOTES
Pt remains free of falls, RR even and unlabored, Abd non-distended, BS in all four quadrants, clear speech, makes needs known, bed in low position with wheels locked call light in reach, Patient discharging home, no signs of distress at this time, VS stable, IV removed as ordered, tolerated well, discharge instructions given and patient verbalizes understanding, patient leaving the floor assisted by staff member, past home O2 eval

## 2017-01-17 NOTE — PROGRESS NOTES
Pt remains free of falls, VS stable, RR even and unlabored, Abd non-distended, BS in all four quadrants, clear speech, makes needs known, bed in low position with wheels locked call light in reach.

## 2017-01-17 NOTE — PLAN OF CARE
Home health orders sent to [facility] via [facility] system. ROD [place]     Accepted by Marylou . Bonita SSC

## 2017-01-17 NOTE — DISCHARGE SUMMARY
Discharge Summary  Hospital Medicine    Admit Date: 1/13/2017    Date and Time: 1/17/20171:38 PM    Discharge Attending Physician: Nichol Buchanan MD    Primary Care Physician: Haris Brambila MD    Diagnoses:  Active Hospital Problems    Diagnosis  POA    *Dehydration [E86.0]  Yes    Hypokalemia [E87.6]  No    Pneumonia- RLL [J18.9]  No    Essential hypertension [I10]  Yes    Parkinson's disease [G20]  Yes    Generalized weakness [R53.1]  Yes      Resolved Hospital Problems    Diagnosis Date Resolved POA   No resolved problems to display.     Discharged Condition: Good    Hospital Course:   Patient is a 89 y.o. female admitted to Hospitalist Service from [facility] Room with complaint of increasing fatigue. Patient reportedly has past medical history significant for Parkinson's disease and hypertension. Part of the history obtained from patient's sister in law. Patient is mostly bed-bound and with assistance can get to bedside wheel chair. Patient had not been eating or drinking for 2 days, patient has a care taker. No fever or chills reported. No abdominal pain. Patient denied chest pain, shortness of breath, abdominal pain, nausea, vomiting, headache, vision changes, focal neuro-deficits, cough or fever. Patient was admitted to Hospitalist medicine service. Patient was treated with IVF hydration for dehydration. CXR later confirmed pneumonia which was treated with IV antibiotics. Symptoms improved. Patient received PT. Home health and home PT is arranged. Patient was discharged home in stable condition with following discharge plan of care. Total time with the patient was 30 minutes and greater than 50% was spent in counseling and coordination of care. The assessment and plan have been discussed at length. Physicians' notes reviewed. Labs and procedure reviewed.     Consults: None    Significant Diagnostic Studies:   Chest X-Ray: No active cardiopulmonary process.  No significant  change.    Microbiology Results (last 7 days)     Procedure Component Value Units Date/Time    Urine culture [[id number]] Collected:  01/14/17 0888    Order Status:  Completed Specimen:  Urine from Urine, Catheterized Updated:  01/16/17 1036     Urine Culture, Routine No growth    Culture, Respiratory with Gram Stain [[id number]]     Order Status:  No result Specimen:  Respiratory         Special Treatments/Procedures: None  Disposition: Home or Self Care    Medications:  Reconciled Home Medications: Current Discharge Medication List      START taking these medications    Details   levoFLOXacin (LEVAQUIN) 500 MG tablet Take 1 tablet (500 mg total) by mouth once daily.  Qty: 7 tablet, Refills: 0         CONTINUE these medications which have NOT CHANGED    Details   calcium carbonate (CALCIUM ANTACID) 300 mg (750 mg) Chew Take 1 tablet by mouth 2 (two) times daily.      carbidopa-levodopa  mg (SINEMET)  mg per tablet Take 2 tablets by mouth 2 (two) times daily with meals.      carbidopa-levodopa  mg (SINEMET CR)  mg TbSR Take 1.5 tablets by mouth every evening.      cholecalciferol, vitamin D3, (VITAMIN D3) 2,000 unit Tab Take by mouth once daily.      clonazepam (KLONOPIN) 0.5 MG tablet Take 0.5 mg by mouth 2 (two) times daily.      cyanocobalamin (VITAMIN B-12) 1000 MCG tablet Take 100 mcg by mouth once daily.      ferrous sulfate 325 (65 FE) MG EC tablet Take 325 mg by mouth once daily.      multivit-iron-min-folic acid (MULTIVITAMIN-IRON-MINERALS-FOLIC ACID) 3,500-18-0.4 unit-mg-mg Chew Take 1 tablet by mouth once daily.        olmesartan-hydrochlorothiazide (BENICAR HCT) 20-12.5 mg per tablet Take 1 tablet by mouth once daily.      omega-3 fatty acids-vitamin E (FISH OIL) 1,000 mg Cap Take 1 capsule by mouth once daily.      omeprazole (PRILOSEC) 40 MG capsule Take 40 mg by mouth once daily.      oxycodone-acetaminophen  mg (PERCOCET)  mg per tablet Take 1 tablet by mouth 3  (three) times daily.      potassium chloride SA (K-DUR,KLOR-CON) 10 MEQ tablet Take 10 mEq by mouth once daily.        pravastatin (PRAVACHOL) 40 MG tablet Take 40 mg by mouth every evening.      senna-docusate 8.6-50 mg (SENNA WITH DOCUSATE SODIUM) 8.6-50 mg per tablet Take 1 tablet by mouth once daily.      tizanidine 4 mg Cap Take 4 mg by mouth 3 (three) times daily.             Discharge Procedure Orders  Ambulatory referral to Home Health   Referral Priority: Routine Referral Type: Home Health   Referral Reason: Specialty Services Required    Requested Specialty: Home Health Services    Number of Visits Requested: 1      Referral to Home health   Referral Priority: Routine Referral Type: Home Health   Referral Reason: Specialty Services Required    Requested Specialty: Home Health Services    Number of Visits Requested: 1      Diet general   Order Comments: Cardiac/ 2 gram sodium low cholesterol diet     Other restrictions (specify):   Order Comments: Fall precautions     Call MD for:   Order Comments: For worsening symptoms, chest pain, shortness of breath, increased abdominal pain, high grade fever, stroke or stroke like symptoms, immediately go to the nearest Emergency Room or call 911 as soon as possible.

## 2017-01-17 NOTE — PROGRESS NOTES
5:04 PM  Spoke w/ patient's home aid Anastasia @ [phone], who states patient will be picked up by family friend Tina. Patient at this time reporting that her wheelchair is missing.   Anastasia confirms patient did not bring wheelchair- wheelchair at patient's home at this time.

## 2017-01-18 NOTE — PLAN OF CARE
01/18/17 0754   Final Note   Assessment Type Discharge Planning Assessment   Discharge Disposition Home-Health   Discharge planning education complete? Yes

## 2017-01-24 ENCOUNTER — HOSPITAL ENCOUNTER (EMERGENCY)
Facility: HOSPITAL | Age: 82
Discharge: HOME | End: 2017-01-24
Attending: EMERGENCY MEDICINE
Payer: MEDICARE

## 2017-01-24 VITALS
HEIGHT: 63 IN | TEMPERATURE: 97 F | SYSTOLIC BLOOD PRESSURE: 99 MMHG | OXYGEN SATURATION: 99 % | HEART RATE: 76 BPM | BODY MASS INDEX: 22.15 KG/M2 | DIASTOLIC BLOOD PRESSURE: 48 MMHG | WEIGHT: 125 LBS | RESPIRATION RATE: 14 BRPM

## 2017-01-24 DIAGNOSIS — R41.0 CONFUSION: ICD-10-CM

## 2017-01-24 LAB
ALBUMIN SERPL BCP-MCNC: 3.4 G/DL
ALP SERPL-CCNC: 46 U/L
ALT SERPL W/O P-5'-P-CCNC: <5 U/L
ANION GAP SERPL CALC-SCNC: 13 MMOL/L
AST SERPL-CCNC: 16 U/L
BASOPHILS # BLD AUTO: 0 K/UL
BASOPHILS NFR BLD: 0.1 %
BILIRUB SERPL-MCNC: 0.4 MG/DL
BILIRUB UR QL STRIP: NEGATIVE
BUN SERPL-MCNC: 17 MG/DL
CALCIUM SERPL-MCNC: 10.8 MG/DL
CHLORIDE SERPL-SCNC: 104 MMOL/L
CLARITY UR: CLEAR
CO2 SERPL-SCNC: 20 MMOL/L
COLOR UR: YELLOW
CREAT SERPL-MCNC: 1 MG/DL
DIFFERENTIAL METHOD: ABNORMAL
EOSINOPHIL # BLD AUTO: 0 K/UL
EOSINOPHIL NFR BLD: 0.4 %
ERYTHROCYTE [DISTWIDTH] IN BLOOD BY AUTOMATED COUNT: 12.5 %
EST. GFR  (AFRICAN AMERICAN): 58 ML/MIN/1.73 M^2
EST. GFR  (NON AFRICAN AMERICAN): 50 ML/MIN/1.73 M^2
GLUCOSE SERPL-MCNC: 112 MG/DL
GLUCOSE UR QL STRIP: NEGATIVE
HCT VFR BLD AUTO: 30.5 %
HGB BLD-MCNC: 10 G/DL
HGB UR QL STRIP: NEGATIVE
KETONES UR QL STRIP: NEGATIVE
LEUKOCYTE ESTERASE UR QL STRIP: NEGATIVE
LYMPHOCYTES # BLD AUTO: 1.3 K/UL
LYMPHOCYTES NFR BLD: 13.1 %
MCH RBC QN AUTO: 31.7 PG
MCHC RBC AUTO-ENTMCNC: 32.7 %
MCV RBC AUTO: 97 FL
MONOCYTES # BLD AUTO: 0.3 K/UL
MONOCYTES NFR BLD: 2.9 %
NEUTROPHILS # BLD AUTO: 8.2 K/UL
NEUTROPHILS NFR BLD: 83.5 %
NITRITE UR QL STRIP: NEGATIVE
PH UR STRIP: 7 [PH] (ref 5–8)
PLATELET # BLD AUTO: 381 K/UL
PMV BLD AUTO: 6.9 FL
POTASSIUM SERPL-SCNC: 4.1 MMOL/L
PROT SERPL-MCNC: 6.6 G/DL
PROT UR QL STRIP: ABNORMAL
RBC # BLD AUTO: 3.15 M/UL
SODIUM SERPL-SCNC: 137 MMOL/L
SP GR UR STRIP: 1.01 (ref 1–1.03)
URN SPEC COLLECT METH UR: ABNORMAL
UROBILINOGEN UR STRIP-ACNC: NEGATIVE EU/DL
WBC # BLD AUTO: 9.9 K/UL

## 2017-01-24 PROCEDURE — 85025 COMPLETE CBC W/AUTO DIFF WBC: CPT

## 2017-01-24 PROCEDURE — 87086 URINE CULTURE/COLONY COUNT: CPT

## 2017-01-24 PROCEDURE — 81003 URINALYSIS AUTO W/O SCOPE: CPT

## 2017-01-24 PROCEDURE — 80053 COMPREHEN METABOLIC PANEL: CPT

## 2017-01-24 PROCEDURE — 99284 EMERGENCY DEPT VISIT MOD MDM: CPT | Mod: 25

## 2017-01-24 PROCEDURE — P9612 CATHETERIZE FOR URINE SPEC: HCPCS

## 2017-01-24 PROCEDURE — 36415 COLL VENOUS BLD VENIPUNCTURE: CPT

## 2017-01-24 NOTE — DISCHARGE INSTRUCTIONS
Confusion  Confusion is a change in a persons ability to think clearly. There may be trouble recognizing familiar people and places or knowing what day it is. Memory, judgment, and decision-making may also be affected. In severe cases, the person may have limited or no response to being spoken to.  Confusion is usually a sign of an underlying problem. It may occur suddenly. Or it may develop gradually over time. Causes of confusion include brain injury, medicines, alcohol, withdrawal from certain medicines or illegal drugs, and infection. Heart attack and stroke may cause it. Confusion can also be a sign of dementia or a mental illness.  Treatment will depend on the cause of the problem. If the issue is a medicine, stopping the medicine may help. The healthcare provider will perform an evaluation and certain tests may be done. Follow up with the healthcare provider for the results.  Home care  · Be sure someone is with the confused person at all times. He or she should not be left alone or unsupervised.  · Tell the healthcare provider about all medicines that the person takes. These include prescription, over-the-counter, herbs, and supplements.  · Dehydration can increase confusion. Ask the healthcare provider how much fluid the person should be drinking. Offer liquids and ensure that they are taken.  · Keep all medicines in a secure place under the caregivers control. To prevent overdose, a confused person should take medicines only under the supervision of a caregiver.  · To help a person with confusion:  ¨ Establish a daily routine. Change can be a source of stress for someone with confusion. Make and keep a time schedule for common tasks such as bathing, dressing, taking medicines, meals, going for walks, shopping, naps and bed time.  ¨ Speak slowly and clearly with a gentle tone of voice. Use short simple words and sentences. Ask one question at a time. Do not interrupt, criticize or argue. Be calm and  supportive. Use friendly facial expressions. Use pointing and touching to help communicate. If there has been loss of long-term memory, do not ask questions about past events. This would only cause frustration for the person.  ¨ Use lists, signs, family photos, clocks and calendars as memory aids. Label cabinets and drawers. Try to distract, not confront, the patient. When he/she becomes frustrated or upset, redirect his/her attention to eating or some other activity of interest.  ¨ If this proves to be due to a permanent condition, talk to the healthcare provider or a  about getting a Power of  for healthcare and for financial decisions. It is best to do this while the person can still sign legal documents and make his or her own decisions. Otherwise, a court order will be required.  Follow-up care  Follow up with the person's healthcare provider or as advised for further testing or changes in medical care.  When to seek medical advice  Call the healthcare provider for any of the following:  · Frequent falling  · Refusal to eat or drink  · Violent behavior or behavior too difficult to manage at home  · New hallucinations or delusions  · Increased drowsiness  · Complaints of headache or numbness or weakness of the face, arm or leg  · Nausea or vomiting  · Slurred speech or trouble speaking, walking, or seeing  · Fainting spell, dizziness or seizure  · Unexplained fever over 100.4º F (38.0º C) or as directed by the healthcare provider  © 1379-4620 [employer]. [address] [place] [address], [place] [address]. All rights reserved. This information is not intended as a substitute for professional medical care. Always follow your healthcare professional's instructions.

## 2017-01-24 NOTE — ED AVS SNAPSHOT
[facility] [address]  [place] [address]               Maricarmen Woodward   2017 11:32 AM   ED    Description:  Female : 1927   Department:  [facility]           Your Care was Coordinated By:     Provider Role From To    Luke Ibarra III, MD Attending Provider 17 1158 --      Reason for Visit     Altered Mental Status           Diagnoses this Visit        Comments    Confusion           ED Disposition     None           To Do List           Follow-up Information     Follow up with Haris Brambila MD In 3 days.    Specialty:  Internal Medicine    Contact information:    [address]  [place] [address]  [phone]        [facility]     [facility] -  Assistance  Registered nurses in the [facility] provide clinical advisement, health education, appointment booking, and other advisory services.  Call for this free service at [phone].             Medications           Message regarding Medications     Verify the changes and/or additions to your medication regime listed below are the same as discussed with your clinician today.  If any of these changes or additions are incorrect, please notify your healthcare provider.             Verify that the below list of medications is an accurate representation of the medications you are currently taking.  If none reported, the list may be blank. If incorrect, please contact your healthcare provider. Carry this list with you in case of emergency.           Current Medications     calcium carbonate (CALCIUM ANTACID) 300 mg (750 mg) Chew Take 1 tablet by mouth 2 (two) times daily.    carbidopa-levodopa  mg (SINEMET)  mg per tablet Take 2 tablets by mouth 2 (two) times daily with meals.    carbidopa-levodopa  mg (SINEMET CR)  mg TbSR Take 1.5 tablets by mouth every evening.    cholecalciferol, vitamin D3,  "(VITAMIN D3) 2,000 unit Tab Take by mouth once daily.    clonazepam (KLONOPIN) 0.5 MG tablet Take 0.5 mg by mouth 2 (two) times daily.    cyanocobalamin (VITAMIN B-12) 1000 MCG tablet Take 100 mcg by mouth once daily.    ferrous sulfate 325 (65 FE) MG EC tablet Take 325 mg by mouth once daily.    levoFLOXacin (LEVAQUIN) 500 MG tablet Take 1 tablet (500 mg total) by mouth once daily.    multivit-iron-min-folic acid (MULTIVITAMIN-IRON-MINERALS-FOLIC ACID) 3,500-18-0.4 unit-mg-mg Chew Take 1 tablet by mouth once daily.      olmesartan-hydrochlorothiazide (BENICAR HCT) 20-12.5 mg per tablet Take 1 tablet by mouth once daily.    omega-3 fatty acids-vitamin E (FISH OIL) 1,000 mg Cap Take 1 capsule by mouth once daily.    omeprazole (PRILOSEC) 40 MG capsule Take 40 mg by mouth once daily.    oxycodone-acetaminophen  mg (PERCOCET)  mg per tablet Take 1 tablet by mouth 3 (three) times daily.    potassium chloride SA (K-DUR,KLOR-CON) 10 MEQ tablet Take 10 mEq by mouth once daily.      pravastatin (PRAVACHOL) 40 MG tablet Take 40 mg by mouth every evening.    senna-docusate 8.6-50 mg (SENNA WITH DOCUSATE SODIUM) 8.6-50 mg per tablet Take 1 tablet by mouth once daily.    tizanidine 4 mg Cap Take 4 mg by mouth 3 (three) times daily.           Clinical Reference Information           Your Vitals Were     BP Pulse Temp Resp Height Weight    94/50 77 96.9 °F (36.1 °C) (Oral) 14 5' 3" (1.6 m) 56.7 kg (125 lb)    SpO2 BMI             96% 22.14 kg/m2         Allergies as of 1/24/2017     No Known Allergies      Immunizations Administered on Date of Encounter - 1/24/2017     None      ED Micro, Lab, POCT     Start Ordered       Status Ordering Provider    01/24/17 1205 01/24/17 1204  Urine culture  STAT      In process     01/24/17 1205 01/24/17 1204  Urinalysis  STAT      Final result     01/24/17 1205 01/24/17 1204  CBC auto differential  STAT      Final result     01/24/17 1205 01/24/17 1204  Comprehensive metabolic " panel  STAT      Final result       ED Imaging Orders     Start Ordered       Status Ordering Provider    01/24/17 1205 01/24/17 1204  X-Ray Chest AP Portable  1 time imaging      Final result         Discharge Instructions         Confusion  Confusion is a change in a persons ability to think clearly. There may be trouble recognizing familiar people and places or knowing what day it is. Memory, judgment, and decision-making may also be affected. In severe cases, the person may have limited or no response to being spoken to.  Confusion is usually a sign of an underlying problem. It may occur suddenly. Or it may develop gradually over time. Causes of confusion include brain injury, medicines, alcohol, withdrawal from certain medicines or illegal drugs, and infection. Heart attack and stroke may cause it. Confusion can also be a sign of dementia or a mental illness.  Treatment will depend on the cause of the problem. If the issue is a medicine, stopping the medicine may help. The healthcare provider will perform an evaluation and certain tests may be done. Follow up with the healthcare provider for the results.  Home care  · Be sure someone is with the confused person at all times. He or she should not be left alone or unsupervised.  · Tell the healthcare provider about all medicines that the person takes. These include prescription, over-the-counter, herbs, and supplements.  · Dehydration can increase confusion. Ask the healthcare provider how much fluid the person should be drinking. Offer liquids and ensure that they are taken.  · Keep all medicines in a secure place under the caregivers control. To prevent overdose, a confused person should take medicines only under the supervision of a caregiver.  · To help a person with confusion:  ¨ Establish a daily routine. Change can be a source of stress for someone with confusion. Make and keep a time schedule for common tasks such as bathing, dressing, taking medicines,  meals, going for walks, shopping, naps and bed time.  ¨ Speak slowly and clearly with a gentle tone of voice. Use short simple words and sentences. Ask one question at a time. Do not interrupt, criticize or argue. Be calm and supportive. Use friendly facial expressions. Use pointing and touching to help communicate. If there has been loss of long-term memory, do not ask questions about past events. This would only cause frustration for the person.  ¨ Use lists, signs, family photos, clocks and calendars as memory aids. Label cabinets and drawers. Try to distract, not confront, the patient. When he/she becomes frustrated or upset, redirect his/her attention to eating or some other activity of interest.  ¨ If this proves to be due to a permanent condition, talk to the healthcare provider or a  about getting a Power of  for healthcare and for financial decisions. It is best to do this while the person can still sign legal documents and make his or her own decisions. Otherwise, a court order will be required.  Follow-up care  Follow up with the person's healthcare provider or as advised for further testing or changes in medical care.  When to seek medical advice  Call the healthcare provider for any of the following:  · Frequent falling  · Refusal to eat or drink  · Violent behavior or behavior too difficult to manage at home  · New hallucinations or delusions  · Increased drowsiness  · Complaints of headache or numbness or weakness of the face, arm or leg  · Nausea or vomiting  · Slurred speech or trouble speaking, walking, or seeing  · Fainting spell, dizziness or seizure  · Unexplained fever over 100.4º F (38.0º C) or as directed by the healthcare provider  © 1758-0691 [employer]. [address] [place] [address], [place] [address]. All rights reserved. This information is not intended as a substitute for professional medical care. Always follow your healthcare professional's  instructions.          [facility] Sign-Up     Activating your [facility] account is as easy as 1-2-3!     1) Visit [email or web address], select Sign Up Now, enter this activation code and your date of birth, then select Next.  [email or web address]  Expires: 3/3/2017  2:09 PM      2) Create a username and password to use when you visit [facility] in the future and select a security question in case you lose your password and select Next.    3) Enter your e-mail address and click Sign Up!    Additional Information  If you have questions, please e-mail [email or web address] or call [phone] to talk to our [facility] staff. Remember, [facility] is NOT to be used for urgent needs. For medical emergencies, dial 911.         Smoking Cessation     If you would like to quit smoking:   You may be eligible for free services if you are a [place] resident and started smoking cigarettes before September 1, 1988.  Call the Smoking Cessation Trust (SCT) toll free at (926) 121-6711 or (992) 822-9861.   Call -688-QUIT-NOW if you do not meet the above criteria.             [facility] complies with applicable Federal civil rights laws and does not discriminate on the basis of race, color, national origin, age, disability, or sex.        Language Assistance Services     ATTENTION: Language assistance services are available, free of charge. Please call [phone].      ATENCIÓN: Si habla español, tiene a baez disposición servicios gratuitos de asistencia lingüística. Llame al [phone].     CHÚ Ý: N?u b?n nói Ti?ng Vi?t, có các d?ch v? h? tr? ngôn ng? mi?n phí dành cho b?n. G?i s? [phone].

## 2017-01-24 NOTE — ED NOTES
Sitter has not returned as expected, called [facility] (cellphone rolled over to voicemail & left message that pt needed ride home); called patient's house as pt suggested and sitter was evident not there--also left message on home answer machine re: ride home requested.

## 2017-01-24 NOTE — ED NOTES
AAOx3, unremarkable presentation for elderly female. Denies any c/o of her own. Is aware that sitter called to have her evaluated d/t being difficult to arouse this AM. Primarily W/C dependent @ home with assoc use of walker with assist.

## 2017-01-24 NOTE — ED PROVIDER NOTES
Encounter Date: 1/24/2017       History     Chief Complaint   Patient presents with    Altered Mental Status     Difficult to arouse per family. EMS aroused patient with panful stimulus and she became talkative,nad.     Review of patient's allergies indicates:  No Known Allergies  HPI Comments: Chief complaint: Difficult to arouse    History of present illness:Maricarmen Woodward is a 89 y.o. female who presents via ambulance after the family had difficulty arousing her.  After painful stimuli were applied she became talkative and returned to her baseline mental status.  She was discharged from the hospital one week ago after treatment for pneumonia with a 7 day course of Levaquin.  Her past medical history is significant for Parkinson's disease.  She is predominantly bedridden but does spend a small portion of the day and wheelchair and on the couch.  She denies any symptoms.  She has had no fever, headache or neck stiffness.  She denies any cough currently and has no shortness of breath.  She denies any dysuria or urinary frequency.    The history is provided by the patient.     Past Medical History   Diagnosis Date    Arthritis     Hypertension     Parkinson's disease      Past Medical History Pertinent Negatives   Diagnosis Date Noted    Anticoagulant long-term use 4/17/2012    Cancer 4/17/2012    CHF (congestive heart failure) 4/17/2012    COPD (chronic obstructive pulmonary disease) 4/17/2012    Coronary artery disease 4/17/2012    General anesthetics causing adverse effect in therapeutic use 4/17/2012    Hypotension, iatrogenic 4/17/2012    Malignant hyperthermia 4/17/2012    PONV (postoperative nausea and vomiting) 4/17/2012    Respiratory distress 4/17/2012    Seizures 4/17/2012    Stroke 4/17/2012    Thyroid disease 4/17/2012    Transfusion reaction 4/17/2012     Past Surgical History   Procedure Laterality Date    Fracture surgery      Joint replacement       right hip replacement      History reviewed. No pertinent family history.  Social History   Substance Use Topics    Smoking status: Light Tobacco Smoker     Packs/day: 0.25     Years: 5.00     Last attempt to quit: 4/17/2009    Smokeless tobacco: None    Alcohol use No     Review of Systems   Constitutional: Negative for activity change, appetite change and fever.   HENT: Negative for voice change.    Eyes: Negative for visual disturbance.   Respiratory: Negative for apnea and shortness of breath.    Cardiovascular: Negative for chest pain.   Gastrointestinal: Negative for abdominal pain and vomiting.   Genitourinary: Negative for decreased urine volume.   Musculoskeletal: Negative for back pain and neck pain.   Skin: Negative for color change.   Neurological: Negative for tremors, seizures, syncope, facial asymmetry, speech difficulty, weakness, light-headedness, numbness and headaches.   Hematological: Does not bruise/bleed easily.   Psychiatric/Behavioral: Positive for decreased concentration. Negative for confusion.       Physical Exam   Initial Vitals   BP Pulse Resp Temp SpO2   01/24/17 1135 01/24/17 1135 01/24/17 1135 01/24/17 1135 01/24/17 1135   111/51 79 14 96.9 °F (36.1 °C) 100 %     Physical Exam    Nursing note and vitals reviewed.  Constitutional: She appears well-developed.   HENT:   Head: Normocephalic and atraumatic.   Eyes: EOM are normal. Pupils are equal, round, and reactive to light.   Neck: Neck supple.   Cardiovascular: Normal rate, regular rhythm, normal heart sounds and intact distal pulses.   Pulmonary/Chest: She has rhonchi.   Abdominal: Soft. Bowel sounds are normal.   Musculoskeletal: Normal range of motion.   Neurological: She is alert and oriented to person, place, and time.   Skin: Skin is warm and dry.   Psychiatric: She has a normal mood and affect.         ED Course   Procedures  Labs Reviewed   URINALYSIS - Abnormal; Notable for the following:        Result Value    Protein, UA Trace (*)     All  other components within normal limits   CBC W/ AUTO DIFFERENTIAL - Abnormal; Notable for the following:     RBC 3.15 (*)     Hemoglobin 10.0 (*)     Hematocrit 30.5 (*)     MCH 31.7 (*)     Platelets 381 (*)     MPV 6.9 (*)     Gran # 8.2 (*)     Gran% 83.5 (*)     Lymph% 13.1 (*)     Mono% 2.9 (*)     All other components within normal limits   COMPREHENSIVE METABOLIC PANEL - Abnormal; Notable for the following:     CO2 20 (*)     Glucose 112 (*)     Calcium 10.8 (*)     Albumin 3.4 (*)     Alkaline Phosphatase 46 (*)     ALT <5 (*)     eGFR if  58 (*)     eGFR if non  50 (*)     All other components within normal limits   CULTURE, URINE             Medical Decision Making:   History:   Old Records Summarized: records from previous admission(s).  Independently Interpreted Test(s):   I have ordered and independently interpreted X-rays - see summary below.       <> Summary of X-Ray Reading(s): chest x-ray interpreted by me reveals no infiltrates, effusions or mediastinal widening  Clinical Tests:   Lab Tests: Ordered and Reviewed  The following lab test(s) were unremarkable: CBC, CMP and Urinalysis  ED Management:  Maricarmen Woodward is a 89 y.o. female who presents via ambulance after she was difficult to arouse.  She has no localizing symptoms at present and is oriented to person place time and situation.  She is afebrile with no leukocytosis with no suggestion of infection.  Chest x-ray and urinalysis failed to demonstrate any evidence of pneumonia and UTI respectively.  She has no headache or neck stiffness to suggest intracranial hemorrhage or infection.                   ED Course     Clinical Impression:   The encounter diagnosis was Confusion.          Luke Ibarra III, MD  01/24/17 9641

## 2017-01-25 ENCOUNTER — LAB VISIT (OUTPATIENT)
Dept: LAB | Facility: HOSPITAL | Age: 82
End: 2017-01-25
Attending: INTERNAL MEDICINE
Payer: MEDICARE

## 2017-01-25 DIAGNOSIS — J18.9 PNEUMONIA, ORGANISM UNSPECIFIED(486): Primary | ICD-10-CM

## 2017-01-25 LAB
ANION GAP SERPL CALC-SCNC: 12 MMOL/L
BACTERIA UR CULT: NO GROWTH
BASOPHILS # BLD AUTO: 0.01 K/UL
BASOPHILS NFR BLD: 0.1 %
BUN SERPL-MCNC: 18 MG/DL
CALCIUM SERPL-MCNC: 10.7 MG/DL
CHLORIDE SERPL-SCNC: 98 MMOL/L
CO2 SERPL-SCNC: 24 MMOL/L
CREAT SERPL-MCNC: 1.1 MG/DL
DIFFERENTIAL METHOD: ABNORMAL
EOSINOPHIL # BLD AUTO: 0 K/UL
EOSINOPHIL NFR BLD: 0.5 %
ERYTHROCYTE [DISTWIDTH] IN BLOOD BY AUTOMATED COUNT: 13.2 %
EST. GFR  (AFRICAN AMERICAN): 51.4 ML/MIN/1.73 M^2
EST. GFR  (NON AFRICAN AMERICAN): 44.6 ML/MIN/1.73 M^2
GLUCOSE SERPL-MCNC: 86 MG/DL
HCT VFR BLD AUTO: 32 %
HGB BLD-MCNC: 10.8 G/DL
LYMPHOCYTES # BLD AUTO: 2 K/UL
LYMPHOCYTES NFR BLD: 26.7 %
MCH RBC QN AUTO: 32.5 PG
MCHC RBC AUTO-ENTMCNC: 33.8 %
MCV RBC AUTO: 96 FL
MONOCYTES # BLD AUTO: 0.3 K/UL
MONOCYTES NFR BLD: 4.5 %
NEUTROPHILS # BLD AUTO: 5.1 K/UL
NEUTROPHILS NFR BLD: 67.8 %
PLATELET # BLD AUTO: 422 K/UL
PMV BLD AUTO: 9.8 FL
POTASSIUM SERPL-SCNC: 3.6 MMOL/L
RBC # BLD AUTO: 3.32 M/UL
SODIUM SERPL-SCNC: 134 MMOL/L
WBC # BLD AUTO: 7.5 K/UL

## 2017-01-25 PROCEDURE — 80048 BASIC METABOLIC PNL TOTAL CA: CPT

## 2017-01-25 PROCEDURE — 85025 COMPLETE CBC W/AUTO DIFF WBC: CPT

## 2018-12-05 ENCOUNTER — HOSPITAL ENCOUNTER (INPATIENT)
Facility: HOSPITAL | Age: 83
LOS: 6 days | Discharge: NURSING, REHAB OR LONG-TERM CARE | DRG: 470 | End: 2018-12-11
Attending: EMERGENCY MEDICINE | Admitting: INTERNAL MEDICINE
Payer: MEDICARE

## 2018-12-05 ENCOUNTER — ANESTHESIA EVENT (OUTPATIENT)
Dept: SURGERY | Facility: HOSPITAL | Age: 83
DRG: 470 | End: 2018-12-05
Payer: MEDICARE

## 2018-12-05 DIAGNOSIS — Z01.818 PRE-OP EVALUATION: ICD-10-CM

## 2018-12-05 DIAGNOSIS — D64.9 ANEMIA, UNSPECIFIED TYPE: ICD-10-CM

## 2018-12-05 DIAGNOSIS — G20.A1 PARKINSON'S DISEASE: ICD-10-CM

## 2018-12-05 DIAGNOSIS — S72.002A CLOSED LEFT HIP FRACTURE, INITIAL ENCOUNTER: Primary | ICD-10-CM

## 2018-12-05 DIAGNOSIS — I10 ESSENTIAL HYPERTENSION: ICD-10-CM

## 2018-12-05 DIAGNOSIS — M25.552 LEFT HIP PAIN: ICD-10-CM

## 2018-12-05 LAB
ABO + RH BLD: NORMAL
ALBUMIN SERPL BCP-MCNC: 4.5 G/DL
ALP SERPL-CCNC: 53 U/L
ALT SERPL W/O P-5'-P-CCNC: <5 U/L
ANION GAP SERPL CALC-SCNC: 9 MMOL/L
AST SERPL-CCNC: 25 U/L
BASOPHILS # BLD AUTO: 0 K/UL
BASOPHILS NFR BLD: 0.4 %
BILIRUB SERPL-MCNC: 0.7 MG/DL
BLD GP AB SCN CELLS X3 SERPL QL: NORMAL
BUN SERPL-MCNC: 19 MG/DL
CALCIUM SERPL-MCNC: 10.2 MG/DL
CHLORIDE SERPL-SCNC: 101 MMOL/L
CO2 SERPL-SCNC: 27 MMOL/L
CREAT SERPL-MCNC: 0.9 MG/DL
DIFFERENTIAL METHOD: ABNORMAL
EOSINOPHIL # BLD AUTO: 0.1 K/UL
EOSINOPHIL NFR BLD: 1.6 %
ERYTHROCYTE [DISTWIDTH] IN BLOOD BY AUTOMATED COUNT: 12.5 %
EST. GFR  (AFRICAN AMERICAN): >60 ML/MIN/1.73 M^2
EST. GFR  (NON AFRICAN AMERICAN): 56 ML/MIN/1.73 M^2
GLUCOSE SERPL-MCNC: 103 MG/DL
HCT VFR BLD AUTO: 34.4 %
HGB BLD-MCNC: 11.6 G/DL
INR PPP: 1
LYMPHOCYTES # BLD AUTO: 1.9 K/UL
LYMPHOCYTES NFR BLD: 31.2 %
MCH RBC QN AUTO: 32.8 PG
MCHC RBC AUTO-ENTMCNC: 33.8 G/DL
MCV RBC AUTO: 97 FL
MONOCYTES # BLD AUTO: 0.3 K/UL
MONOCYTES NFR BLD: 5.1 %
NEUTROPHILS # BLD AUTO: 3.8 K/UL
NEUTROPHILS NFR BLD: 61.7 %
PLATELET # BLD AUTO: 192 K/UL
PMV BLD AUTO: 9 FL
POTASSIUM SERPL-SCNC: 3.7 MMOL/L
PROT SERPL-MCNC: 7.5 G/DL
PROTHROMBIN TIME: 10 SEC
RBC # BLD AUTO: 3.54 M/UL
SODIUM SERPL-SCNC: 137 MMOL/L
WBC # BLD AUTO: 6.2 K/UL

## 2018-12-05 PROCEDURE — 93010 ELECTROCARDIOGRAM REPORT: CPT | Mod: ,,, | Performed by: INTERNAL MEDICINE

## 2018-12-05 PROCEDURE — 80053 COMPREHEN METABOLIC PANEL: CPT

## 2018-12-05 PROCEDURE — 99223 1ST HOSP IP/OBS HIGH 75: CPT | Mod: AI,,, | Performed by: INTERNAL MEDICINE

## 2018-12-05 PROCEDURE — 93005 ELECTROCARDIOGRAM TRACING: CPT

## 2018-12-05 PROCEDURE — 63600175 PHARM REV CODE 636 W HCPCS: Performed by: EMERGENCY MEDICINE

## 2018-12-05 PROCEDURE — 86850 RBC ANTIBODY SCREEN: CPT

## 2018-12-05 PROCEDURE — 99285 EMERGENCY DEPT VISIT HI MDM: CPT | Mod: 25

## 2018-12-05 PROCEDURE — 25000003 PHARM REV CODE 250: Performed by: EMERGENCY MEDICINE

## 2018-12-05 PROCEDURE — 85610 PROTHROMBIN TIME: CPT

## 2018-12-05 PROCEDURE — 96374 THER/PROPH/DIAG INJ IV PUSH: CPT

## 2018-12-05 PROCEDURE — 85025 COMPLETE CBC W/AUTO DIFF WBC: CPT

## 2018-12-05 PROCEDURE — 25000003 PHARM REV CODE 250: Performed by: NURSE PRACTITIONER

## 2018-12-05 PROCEDURE — 36415 COLL VENOUS BLD VENIPUNCTURE: CPT

## 2018-12-05 PROCEDURE — 12000002 HC ACUTE/MED SURGE SEMI-PRIVATE ROOM

## 2018-12-05 RX ORDER — AMOXICILLIN 250 MG
1 CAPSULE ORAL DAILY
Status: DISCONTINUED | OUTPATIENT
Start: 2018-12-06 | End: 2018-12-11 | Stop reason: HOSPADM

## 2018-12-05 RX ORDER — IRBESARTAN 150 MG/1
150 TABLET ORAL DAILY
Status: DISCONTINUED | OUTPATIENT
Start: 2018-12-06 | End: 2018-12-11 | Stop reason: HOSPADM

## 2018-12-05 RX ORDER — ONDANSETRON 2 MG/ML
4 INJECTION INTRAMUSCULAR; INTRAVENOUS EVERY 8 HOURS PRN
Status: DISCONTINUED | OUTPATIENT
Start: 2018-12-05 | End: 2018-12-11 | Stop reason: HOSPADM

## 2018-12-05 RX ORDER — PANTOPRAZOLE SODIUM 40 MG/1
40 TABLET, DELAYED RELEASE ORAL DAILY
Status: DISCONTINUED | OUTPATIENT
Start: 2018-12-06 | End: 2018-12-11 | Stop reason: HOSPADM

## 2018-12-05 RX ORDER — AMOXICILLIN 250 MG
1 CAPSULE ORAL 2 TIMES DAILY PRN
Status: DISCONTINUED | OUTPATIENT
Start: 2018-12-05 | End: 2018-12-11 | Stop reason: HOSPADM

## 2018-12-05 RX ORDER — OXYCODONE AND ACETAMINOPHEN 5; 325 MG/1; MG/1
2 TABLET ORAL
Status: ACTIVE | OUTPATIENT
Start: 2018-12-05 | End: 2018-12-06

## 2018-12-05 RX ORDER — PRAVASTATIN SODIUM 40 MG/1
40 TABLET ORAL NIGHTLY
Status: DISCONTINUED | OUTPATIENT
Start: 2018-12-05 | End: 2018-12-11 | Stop reason: HOSPADM

## 2018-12-05 RX ORDER — SODIUM CHLORIDE 9 MG/ML
INJECTION, SOLUTION INTRAVENOUS CONTINUOUS
Status: DISCONTINUED | OUTPATIENT
Start: 2018-12-06 | End: 2018-12-11

## 2018-12-05 RX ORDER — FERROUS SULFATE 325(65) MG
325 TABLET, DELAYED RELEASE (ENTERIC COATED) ORAL DAILY
Status: DISCONTINUED | OUTPATIENT
Start: 2018-12-06 | End: 2018-12-11 | Stop reason: HOSPADM

## 2018-12-05 RX ORDER — PANTOPRAZOLE SODIUM 40 MG/1
40 TABLET, DELAYED RELEASE ORAL DAILY
Status: DISCONTINUED | OUTPATIENT
Start: 2018-12-06 | End: 2018-12-05 | Stop reason: SDUPTHER

## 2018-12-05 RX ORDER — IRBESARTAN AND HYDROCHLOROTHIAZIDE 150; 12.5 MG/1; MG/1
1 TABLET, FILM COATED ORAL DAILY
Status: DISCONTINUED | OUTPATIENT
Start: 2018-12-06 | End: 2018-12-05 | Stop reason: RX

## 2018-12-05 RX ORDER — CARBIDOPA AND LEVODOPA 25; 100 MG/1; MG/1
2 TABLET ORAL 2 TIMES DAILY WITH MEALS
Status: DISCONTINUED | OUTPATIENT
Start: 2018-12-05 | End: 2018-12-11 | Stop reason: HOSPADM

## 2018-12-05 RX ORDER — IRBESARTAN AND HYDROCHLOROTHIAZIDE 150; 12.5 MG/1; MG/1
1 TABLET, FILM COATED ORAL DAILY
Status: ON HOLD | COMMUNITY
End: 2019-11-10 | Stop reason: HOSPADM

## 2018-12-05 RX ORDER — ACETAMINOPHEN 325 MG/1
650 TABLET ORAL EVERY 6 HOURS PRN
Status: DISCONTINUED | OUTPATIENT
Start: 2018-12-05 | End: 2018-12-11 | Stop reason: HOSPADM

## 2018-12-05 RX ORDER — TIZANIDINE 4 MG/1
4 TABLET ORAL EVERY 8 HOURS PRN
Status: DISCONTINUED | OUTPATIENT
Start: 2018-12-06 | End: 2018-12-11 | Stop reason: HOSPADM

## 2018-12-05 RX ORDER — LIDOCAINE HYDROCHLORIDE 10 MG/ML
5 INJECTION, SOLUTION EPIDURAL; INFILTRATION; INTRACAUDAL; PERINEURAL ONCE
Status: CANCELLED | OUTPATIENT
Start: 2018-12-05 | End: 2018-12-05

## 2018-12-05 RX ORDER — OMEGA-3/DHA/EPA/FISH OIL 300-1000MG
1 CAPSULE,DELAYED RELEASE (ENTERIC COATED) ORAL DAILY
Status: DISCONTINUED | OUTPATIENT
Start: 2018-12-06 | End: 2018-12-06 | Stop reason: SDUPTHER

## 2018-12-05 RX ORDER — DEXTROSE MONOHYDRATE, SODIUM CHLORIDE, AND POTASSIUM CHLORIDE 50; 1.49; 9 G/1000ML; G/1000ML; G/1000ML
INJECTION, SOLUTION INTRAVENOUS CONTINUOUS
Status: ACTIVE | OUTPATIENT
Start: 2018-12-05 | End: 2018-12-06

## 2018-12-05 RX ORDER — MORPHINE SULFATE 8 MG/ML
3 INJECTION INTRAMUSCULAR; INTRAVENOUS; SUBCUTANEOUS
Status: COMPLETED | OUTPATIENT
Start: 2018-12-05 | End: 2018-12-05

## 2018-12-05 RX ORDER — HYDROCHLOROTHIAZIDE 12.5 MG/1
12.5 TABLET ORAL DAILY
Status: DISCONTINUED | OUTPATIENT
Start: 2018-12-06 | End: 2018-12-11 | Stop reason: HOSPADM

## 2018-12-05 RX ORDER — DEXTROSE MONOHYDRATE, SODIUM CHLORIDE, AND POTASSIUM CHLORIDE 50; 1.49; 9 G/1000ML; G/1000ML; G/1000ML
INJECTION, SOLUTION INTRAVENOUS CONTINUOUS
Status: DISCONTINUED | OUTPATIENT
Start: 2018-12-05 | End: 2018-12-05

## 2018-12-05 RX ORDER — OXYCODONE AND ACETAMINOPHEN 10; 325 MG/1; MG/1
1 TABLET ORAL 3 TIMES DAILY
Status: DISCONTINUED | OUTPATIENT
Start: 2018-12-05 | End: 2018-12-11 | Stop reason: HOSPADM

## 2018-12-05 RX ORDER — CLONAZEPAM 0.5 MG/1
0.5 TABLET ORAL 2 TIMES DAILY
Status: DISCONTINUED | OUTPATIENT
Start: 2018-12-05 | End: 2018-12-11 | Stop reason: HOSPADM

## 2018-12-05 RX ORDER — OLMESARTAN MEDOXOMIL AND HYDROCHLOROTHIAZIDE 20/12.5 20; 12.5 MG/1; MG/1
1 TABLET ORAL DAILY
Status: DISCONTINUED | OUTPATIENT
Start: 2018-12-06 | End: 2018-12-05 | Stop reason: SDUPTHER

## 2018-12-05 RX ORDER — CALCIUM CARBONATE 500(1250)
500 TABLET ORAL 2 TIMES DAILY
Status: DISCONTINUED | OUTPATIENT
Start: 2018-12-06 | End: 2018-12-11 | Stop reason: HOSPADM

## 2018-12-05 RX ORDER — MORPHINE SULFATE 2 MG/ML
2 INJECTION, SOLUTION INTRAMUSCULAR; INTRAVENOUS EVERY 4 HOURS PRN
Status: DISCONTINUED | OUTPATIENT
Start: 2018-12-05 | End: 2018-12-11 | Stop reason: HOSPADM

## 2018-12-05 RX ORDER — CARBIDOPA AND LEVODOPA 25; 100 MG/1; MG/1
3 TABLET, EXTENDED RELEASE ORAL NIGHTLY
Status: DISCONTINUED | OUTPATIENT
Start: 2018-12-05 | End: 2018-12-11 | Stop reason: HOSPADM

## 2018-12-05 RX ORDER — SODIUM CHLORIDE, SODIUM LACTATE, POTASSIUM CHLORIDE, CALCIUM CHLORIDE 600; 310; 30; 20 MG/100ML; MG/100ML; MG/100ML; MG/100ML
INJECTION, SOLUTION INTRAVENOUS CONTINUOUS
Status: CANCELLED | OUTPATIENT
Start: 2018-12-05

## 2018-12-05 RX ADMIN — DEXTROSE MONOHYDRATE, SODIUM CHLORIDE, AND POTASSIUM CHLORIDE: 50; 9; 1.49 INJECTION, SOLUTION INTRAVENOUS at 11:12

## 2018-12-05 RX ADMIN — CLONAZEPAM 0.5 MG: 0.5 TABLET ORAL at 09:12

## 2018-12-05 RX ADMIN — CARBIDOPA AND LEVODOPA 3 TABLET: 25; 100 TABLET, EXTENDED RELEASE ORAL at 10:12

## 2018-12-05 RX ADMIN — PRAVASTATIN SODIUM 40 MG: 40 TABLET ORAL at 09:12

## 2018-12-05 RX ADMIN — DEXTROSE MONOHYDRATE, SODIUM CHLORIDE, AND POTASSIUM CHLORIDE: 50; 9; 1.49 INJECTION, SOLUTION INTRAVENOUS at 10:12

## 2018-12-05 RX ADMIN — MORPHINE SULFATE 3.04 MG: 8 INJECTION INTRAVENOUS at 06:12

## 2018-12-05 RX ADMIN — OXYCODONE HYDROCHLORIDE AND ACETAMINOPHEN 1 TABLET: 10; 325 TABLET ORAL at 09:12

## 2018-12-05 RX ADMIN — MORPHINE SULFATE 2 MG: 2 INJECTION, SOLUTION INTRAMUSCULAR; INTRAVENOUS at 11:12

## 2018-12-05 NOTE — ED PROVIDER NOTES
"Encounter Date: 12/5/2018    SCRIBE #1 NOTE: I, Harley Bettencourt, am scribing for, and in the presence of, Dr. Regalado.       History     Chief Complaint   Patient presents with    Fall     LEFT hip/leg pain     Time seen by provider: 4:06 PM on 12/05/2018      Maricarmen Woodward is a 89 y.o. female with a PMHx of HTN, arthritis, and parkinson's disease who presents to the ED via EMS for further evaluation of left hip pain status post fall that occurred < 3 hours PTA. Patient states that she was in her wheelchair, when she hit a bump in her house and fell out of the chair. She states that she landed on her left hip and has pain down the leg. Patient denies LOC and headache. She states that it is more of a "muscle pain." Patient admits that she does have a sitter in the house, but she left for the day. She admits that she has been wheelchair bound for the last year. Patient denies chest pain, abdominal pain, fever, back pain, neck pain, weakness, numbness, SOB, and any other compliant at this time. The patient has a PSHx of fracture surgery and joint replacement of the right hip. Patient is on Klonopin, percocet's, Prilosec, and other medications.       The history is provided by the patient.     Review of patient's allergies indicates:  No Known Allergies  Past Medical History:   Diagnosis Date    Arthritis     Hypertension     Parkinson's disease      Past Surgical History:   Procedure Laterality Date    COLONOSCOPY N/A 4/19/2012    [facility]    EGD (ESOPHAGOGASTRODUODENOSCOPY) N/A 5/15/2012    [facility]    EGD (ESOPHAGOGASTRODUODENOSCOPY) N/A 4/18/2012    [facility]    FRACTURE SURGERY      JOINT REPLACEMENT      right hip replacement     History reviewed. No pertinent family history.  Social History     Tobacco Use    Smoking status: Light Tobacco Smoker     Packs/day: 0.25     Years: 5.00     Pack years: " 1.25     Last attempt to quit: 2009     Years since quittin.6   Substance Use Topics    Alcohol use: No    Drug use: No     Review of Systems  REVIEW OF SYSTEMS:  CONSTITUTIONAL: no fevers, chills, appetite changes, weight changes  ENT: no sore throat, congestion, hearing deficitis, or ear pain  EYES: no blurred vision, eye pain,   Neck: no pain, stiffness   CV: no chest pain, edema, palpitations, or chest wall pain  RESP: no shortness of breath, wheezing, dyspnea on exertion, orthopnea, or cough  GI: no abdominal pain, nausea, vomiting, diarrhea, or bloody stools  : no dysuria, hematuria, discharge,   MUSCULOSKELETAL: no back pain or neck pain. Positive for arthralgia and   SKIN: no rashes or lesions  NEURO: no numbness, weakness, headaches, syncope    Physical Exam     Initial Vitals [18 1534]   BP Pulse Resp Temp SpO2   (!) 191/81 72 16 98 °F (36.7 °C) 95 %      MAP       --         Physical Exam    Nursing note and vitals reviewed.  Constitutional: She appears well-developed.   HENT:   Head: Normocephalic and atraumatic.   Eyes: EOM are normal. Pupils are equal, round, and reactive to light.   Neck: Neck supple.   Cardiovascular: Normal rate, regular rhythm, normal heart sounds and intact distal pulses. Exam reveals no gallop and no friction rub.    No murmur heard.  Pulmonary/Chest: Breath sounds normal. No respiratory distress. She has no decreased breath sounds. She has no wheezes. She has no rhonchi. She has no rales.   Abdominal: Soft. Bowel sounds are normal. She exhibits no distension. There is no tenderness.   Musculoskeletal: Normal range of motion. She exhibits tenderness.   Able to bend right leg. No internal rotation or shortening of the left leg. Left anterior hip tenderness.    Neurological: She is alert and oriented to person, place, and time.   Skin: Skin is warm and dry.   Psychiatric: She has a normal mood and affect.         ED Course   Procedures  Labs Reviewed - No data to  display       Imaging Results    None          Medical Decision Making:   History:   Old Medical Records: I decided to obtain old medical records.  Clinical Tests:   Lab Tests: Ordered and Reviewed  Radiological Study: Ordered and Reviewed  Patient has a hip fracture.  This recurred for mechanical fall.  Spoke with the family and the orthopedist and the hospitalist.  Patient will be admitted for surgery in the a.m..  No signs of head or neck injury.            Scribe Attestation:   Scribe #1: I performed the above scribed service and the documentation accurately describes the services I performed. I attest to the accuracy of the note.    I, Dr. Harley Regalado personally performed the services described in this documentation. All medical record entries made by the scribe were at my direction and in my presence.  I have reviewed the chart and agree that the record reflects my personal performance and is accurate and complete. Harley Regalado MD.  12:13 PM 12/06/2018    DISCLAIMER: This note was prepared with Dragon NaturallySpeaking voice recognition transcription software. Garbled syntax, mangled pronouns, and other bizarre constructions may be attributed to that software system            Clinical Impression:   The primary encounter diagnosis was Closed left hip fracture, initial encounter. Diagnoses of Left hip pain and Pre-op evaluation were also pertinent to this visit.      Disposition:   Disposition: Admitted                        Harley Regalado MD  12/06/18 1213

## 2018-12-06 ENCOUNTER — ANESTHESIA (OUTPATIENT)
Dept: SURGERY | Facility: HOSPITAL | Age: 83
DRG: 470 | End: 2018-12-06
Payer: MEDICARE

## 2018-12-06 PROBLEM — D64.9 ANEMIA: Status: ACTIVE | Noted: 2018-12-06

## 2018-12-06 PROBLEM — Z01.818 PRE-OP EVALUATION: Status: ACTIVE | Noted: 2018-12-06

## 2018-12-06 LAB
ALBUMIN SERPL BCP-MCNC: 4.2 G/DL
ALP SERPL-CCNC: 50 U/L
ALT SERPL W/O P-5'-P-CCNC: 6 U/L
ANION GAP SERPL CALC-SCNC: 11 MMOL/L
AST SERPL-CCNC: 28 U/L
BACTERIA #/AREA URNS HPF: ABNORMAL /HPF
BASOPHILS # BLD AUTO: 0.1 K/UL
BASOPHILS NFR BLD: 0.5 %
BILIRUB SERPL-MCNC: 0.9 MG/DL
BILIRUB UR QL STRIP: NEGATIVE
BUN SERPL-MCNC: 18 MG/DL
CALCIUM SERPL-MCNC: 9.6 MG/DL
CHLORIDE SERPL-SCNC: 104 MMOL/L
CLARITY UR: ABNORMAL
CO2 SERPL-SCNC: 24 MMOL/L
COLOR UR: YELLOW
CREAT SERPL-MCNC: 0.9 MG/DL
DIFFERENTIAL METHOD: ABNORMAL
EOSINOPHIL # BLD AUTO: 0 K/UL
EOSINOPHIL NFR BLD: 0.1 %
ERYTHROCYTE [DISTWIDTH] IN BLOOD BY AUTOMATED COUNT: 12.5 %
EST. GFR  (AFRICAN AMERICAN): >60 ML/MIN/1.73 M^2
EST. GFR  (NON AFRICAN AMERICAN): 56 ML/MIN/1.73 M^2
FERRITIN SERPL-MCNC: 1121 NG/ML
FOLATE SERPL-MCNC: 19.2 NG/ML
GLUCOSE SERPL-MCNC: 121 MG/DL
GLUCOSE UR QL STRIP: NEGATIVE
HCT VFR BLD AUTO: 35.7 %
HGB BLD-MCNC: 12.3 G/DL
HGB UR QL STRIP: NEGATIVE
IRON SERPL-MCNC: 36 UG/DL
KETONES UR QL STRIP: NEGATIVE
LEUKOCYTE ESTERASE UR QL STRIP: ABNORMAL
LYMPHOCYTES # BLD AUTO: 0.9 K/UL
LYMPHOCYTES NFR BLD: 7.7 %
MAGNESIUM SERPL-MCNC: 2 MG/DL
MCH RBC QN AUTO: 33 PG
MCHC RBC AUTO-ENTMCNC: 34.3 G/DL
MCV RBC AUTO: 96 FL
MICROSCOPIC COMMENT: ABNORMAL
MONOCYTES # BLD AUTO: 0.6 K/UL
MONOCYTES NFR BLD: 4.8 %
NEUTROPHILS # BLD AUTO: 10 K/UL
NEUTROPHILS NFR BLD: 86.9 %
NITRITE UR QL STRIP: NEGATIVE
PH UR STRIP: 7 [PH] (ref 5–8)
PHOSPHATE SERPL-MCNC: 2.1 MG/DL
PLATELET # BLD AUTO: 164 K/UL
PMV BLD AUTO: 9.6 FL
POTASSIUM SERPL-SCNC: 3.9 MMOL/L
PROT SERPL-MCNC: 7.1 G/DL
PROT UR QL STRIP: ABNORMAL
RBC # BLD AUTO: 3.72 M/UL
RBC #/AREA URNS HPF: 0 /HPF (ref 0–4)
SATURATED IRON: 13 %
SODIUM SERPL-SCNC: 139 MMOL/L
SP GR UR STRIP: 1.01 (ref 1–1.03)
SQUAMOUS #/AREA URNS HPF: 1 /HPF
TOTAL IRON BINDING CAPACITY: 271 UG/DL
TRANSFERRIN SERPL-MCNC: 183 MG/DL
URN SPEC COLLECT METH UR: ABNORMAL
UROBILINOGEN UR STRIP-ACNC: NEGATIVE EU/DL
WBC # BLD AUTO: 11.5 K/UL
WBC #/AREA URNS HPF: 36 /HPF (ref 0–5)

## 2018-12-06 PROCEDURE — 82746 ASSAY OF FOLIC ACID SERUM: CPT

## 2018-12-06 PROCEDURE — 37000009 HC ANESTHESIA EA ADD 15 MINS: Performed by: ORTHOPAEDIC SURGERY

## 2018-12-06 PROCEDURE — 99900104 DSU ONLY-NO CHARGE-EA ADD'L HR (STAT): Performed by: ORTHOPAEDIC SURGERY

## 2018-12-06 PROCEDURE — 80053 COMPREHEN METABOLIC PANEL: CPT

## 2018-12-06 PROCEDURE — 63600175 PHARM REV CODE 636 W HCPCS: Performed by: EMERGENCY MEDICINE

## 2018-12-06 PROCEDURE — 25000003 PHARM REV CODE 250: Performed by: NURSE PRACTITIONER

## 2018-12-06 PROCEDURE — D9220A PRA ANESTHESIA: Mod: ANES,,, | Performed by: ANESTHESIOLOGY

## 2018-12-06 PROCEDURE — 99499 UNLISTED E&M SERVICE: CPT | Mod: ,,, | Performed by: ORTHOPAEDIC SURGERY

## 2018-12-06 PROCEDURE — 81000 URINALYSIS NONAUTO W/SCOPE: CPT

## 2018-12-06 PROCEDURE — D9220A PRA ANESTHESIA: Mod: CRNA,,, | Performed by: NURSE ANESTHETIST, CERTIFIED REGISTERED

## 2018-12-06 PROCEDURE — 71000039 HC RECOVERY, EACH ADD'L HOUR: Performed by: ORTHOPAEDIC SURGERY

## 2018-12-06 PROCEDURE — 84100 ASSAY OF PHOSPHORUS: CPT

## 2018-12-06 PROCEDURE — 63600175 PHARM REV CODE 636 W HCPCS: Performed by: NURSE ANESTHETIST, CERTIFIED REGISTERED

## 2018-12-06 PROCEDURE — 99232 SBSQ HOSP IP/OBS MODERATE 35: CPT | Mod: ,,, | Performed by: ORTHOPAEDIC SURGERY

## 2018-12-06 PROCEDURE — 12000002 HC ACUTE/MED SURGE SEMI-PRIVATE ROOM

## 2018-12-06 PROCEDURE — 83735 ASSAY OF MAGNESIUM: CPT

## 2018-12-06 PROCEDURE — 82728 ASSAY OF FERRITIN: CPT

## 2018-12-06 PROCEDURE — 36000707: Performed by: ORTHOPAEDIC SURGERY

## 2018-12-06 PROCEDURE — 27200651 HC AIRWAY, LMA: Performed by: NURSE ANESTHETIST, CERTIFIED REGISTERED

## 2018-12-06 PROCEDURE — 83540 ASSAY OF IRON: CPT

## 2018-12-06 PROCEDURE — 25000003 PHARM REV CODE 250: Performed by: EMERGENCY MEDICINE

## 2018-12-06 PROCEDURE — 25000003 PHARM REV CODE 250: Performed by: ANESTHESIOLOGY

## 2018-12-06 PROCEDURE — 87086 URINE CULTURE/COLONY COUNT: CPT

## 2018-12-06 PROCEDURE — 71000033 HC RECOVERY, INTIAL HOUR: Performed by: ORTHOPAEDIC SURGERY

## 2018-12-06 PROCEDURE — 99232 SBSQ HOSP IP/OBS MODERATE 35: CPT | Mod: ,,, | Performed by: INTERNAL MEDICINE

## 2018-12-06 PROCEDURE — 99900103 DSU ONLY-NO CHARGE-INITIAL HR (STAT): Performed by: ORTHOPAEDIC SURGERY

## 2018-12-06 PROCEDURE — 37000008 HC ANESTHESIA 1ST 15 MINUTES: Performed by: ORTHOPAEDIC SURGERY

## 2018-12-06 PROCEDURE — 85025 COMPLETE CBC W/AUTO DIFF WBC: CPT

## 2018-12-06 PROCEDURE — 94761 N-INVAS EAR/PLS OXIMETRY MLT: CPT

## 2018-12-06 PROCEDURE — 36000706: Performed by: ORTHOPAEDIC SURGERY

## 2018-12-06 RX ORDER — CEFAZOLIN SODIUM 2 G/50ML
2 SOLUTION INTRAVENOUS ONCE
Status: DISCONTINUED | OUTPATIENT
Start: 2018-12-06 | End: 2018-12-06 | Stop reason: HOSPADM

## 2018-12-06 RX ORDER — ONDANSETRON 2 MG/ML
4 INJECTION INTRAMUSCULAR; INTRAVENOUS ONCE
Status: DISCONTINUED | OUTPATIENT
Start: 2018-12-06 | End: 2018-12-06 | Stop reason: HOSPADM

## 2018-12-06 RX ORDER — SODIUM CHLORIDE, SODIUM LACTATE, POTASSIUM CHLORIDE, CALCIUM CHLORIDE 600; 310; 30; 20 MG/100ML; MG/100ML; MG/100ML; MG/100ML
INJECTION, SOLUTION INTRAVENOUS CONTINUOUS
Status: DISCONTINUED | OUTPATIENT
Start: 2018-12-06 | End: 2018-12-08

## 2018-12-06 RX ORDER — HYDROMORPHONE HYDROCHLORIDE 2 MG/ML
0.2 INJECTION, SOLUTION INTRAMUSCULAR; INTRAVENOUS; SUBCUTANEOUS EVERY 5 MIN PRN
Status: DISCONTINUED | OUTPATIENT
Start: 2018-12-06 | End: 2018-12-06 | Stop reason: HOSPADM

## 2018-12-06 RX ORDER — CEFAZOLIN SODIUM 1 G/3ML
INJECTION, POWDER, FOR SOLUTION INTRAMUSCULAR; INTRAVENOUS
Status: DISCONTINUED | OUTPATIENT
Start: 2018-12-06 | End: 2018-12-06 | Stop reason: HOSPADM

## 2018-12-06 RX ORDER — SODIUM CHLORIDE, SODIUM LACTATE, POTASSIUM CHLORIDE, CALCIUM CHLORIDE 600; 310; 30; 20 MG/100ML; MG/100ML; MG/100ML; MG/100ML
75 INJECTION, SOLUTION INTRAVENOUS CONTINUOUS
Status: DISCONTINUED | OUTPATIENT
Start: 2018-12-06 | End: 2018-12-08

## 2018-12-06 RX ORDER — FENTANYL CITRATE 50 UG/ML
25 INJECTION, SOLUTION INTRAMUSCULAR; INTRAVENOUS EVERY 5 MIN PRN
Status: DISCONTINUED | OUTPATIENT
Start: 2018-12-06 | End: 2018-12-06 | Stop reason: HOSPADM

## 2018-12-06 RX ORDER — PROPOFOL 10 MG/ML
VIAL (ML) INTRAVENOUS
Status: DISCONTINUED | OUTPATIENT
Start: 2018-12-06 | End: 2018-12-06 | Stop reason: HOSPADM

## 2018-12-06 RX ORDER — DIPHENHYDRAMINE HYDROCHLORIDE 50 MG/ML
25 INJECTION INTRAMUSCULAR; INTRAVENOUS EVERY 6 HOURS PRN
Status: DISCONTINUED | OUTPATIENT
Start: 2018-12-06 | End: 2018-12-06 | Stop reason: HOSPADM

## 2018-12-06 RX ORDER — SODIUM CHLORIDE 0.9 % (FLUSH) 0.9 %
3 SYRINGE (ML) INJECTION
Status: DISCONTINUED | OUTPATIENT
Start: 2018-12-06 | End: 2018-12-06 | Stop reason: HOSPADM

## 2018-12-06 RX ORDER — GLUCOSAM/CHONDRO/HERB 149/HYAL 750-100 MG
1 TABLET ORAL DAILY
Status: DISCONTINUED | OUTPATIENT
Start: 2018-12-06 | End: 2018-12-11 | Stop reason: HOSPADM

## 2018-12-06 RX ORDER — OXYCODONE HYDROCHLORIDE 5 MG/1
5 TABLET ORAL
Status: DISCONTINUED | OUTPATIENT
Start: 2018-12-06 | End: 2018-12-06 | Stop reason: HOSPADM

## 2018-12-06 RX ORDER — MEPERIDINE HYDROCHLORIDE 50 MG/ML
12.5 INJECTION INTRAMUSCULAR; INTRAVENOUS; SUBCUTANEOUS ONCE AS NEEDED
Status: DISCONTINUED | OUTPATIENT
Start: 2018-12-06 | End: 2018-12-06 | Stop reason: HOSPADM

## 2018-12-06 RX ORDER — FENTANYL CITRATE 50 UG/ML
INJECTION, SOLUTION INTRAMUSCULAR; INTRAVENOUS
Status: DISCONTINUED | OUTPATIENT
Start: 2018-12-06 | End: 2018-12-06 | Stop reason: HOSPADM

## 2018-12-06 RX ADMIN — STANDARDIZED SENNA CONCENTRATE AND DOCUSATE SODIUM 1 TABLET: 8.6; 5 TABLET, FILM COATED ORAL at 08:12

## 2018-12-06 RX ADMIN — OMEGA-3 FATTY ACIDS CAP 1000 MG 1 CAPSULE: 1000 CAP at 08:12

## 2018-12-06 RX ADMIN — CLONAZEPAM 0.5 MG: 0.5 TABLET ORAL at 09:12

## 2018-12-06 RX ADMIN — PRAVASTATIN SODIUM 40 MG: 40 TABLET ORAL at 09:12

## 2018-12-06 RX ADMIN — MORPHINE SULFATE 2 MG: 2 INJECTION, SOLUTION INTRAMUSCULAR; INTRAVENOUS at 05:12

## 2018-12-06 RX ADMIN — CARBIDOPA AND LEVODOPA 3 TABLET: 25; 100 TABLET, EXTENDED RELEASE ORAL at 09:12

## 2018-12-06 RX ADMIN — OXYCODONE HYDROCHLORIDE AND ACETAMINOPHEN 1 TABLET: 10; 325 TABLET ORAL at 08:12

## 2018-12-06 RX ADMIN — CARBIDOPA AND LEVODOPA 2 TABLET: 25; 100 TABLET ORAL at 04:12

## 2018-12-06 RX ADMIN — MULTIPLE VITAMINS W/ MINERALS TAB 1 TABLET: TAB at 08:12

## 2018-12-06 RX ADMIN — OXYCODONE HYDROCHLORIDE AND ACETAMINOPHEN 1 TABLET: 10; 325 TABLET ORAL at 04:12

## 2018-12-06 RX ADMIN — OXYCODONE HYDROCHLORIDE AND ACETAMINOPHEN 1 TABLET: 10; 325 TABLET ORAL at 09:12

## 2018-12-06 RX ADMIN — CLONAZEPAM 0.5 MG: 0.5 TABLET ORAL at 08:12

## 2018-12-06 RX ADMIN — SODIUM CHLORIDE: 0.9 INJECTION, SOLUTION INTRAVENOUS at 05:12

## 2018-12-06 RX ADMIN — Medication 500 MG: at 08:12

## 2018-12-06 RX ADMIN — PANTOPRAZOLE SODIUM 40 MG: 40 TABLET, DELAYED RELEASE ORAL at 08:12

## 2018-12-06 RX ADMIN — SODIUM CHLORIDE: 0.9 INJECTION, SOLUTION INTRAVENOUS at 09:12

## 2018-12-06 RX ADMIN — FERROUS SULFATE TAB EC 325 MG (65 MG FE EQUIVALENT) 325 MG: 325 (65 FE) TABLET DELAYED RESPONSE at 08:12

## 2018-12-06 RX ADMIN — SODIUM CHLORIDE, SODIUM LACTATE, POTASSIUM CHLORIDE, AND CALCIUM CHLORIDE: .6; .31; .03; .02 INJECTION, SOLUTION INTRAVENOUS at 10:12

## 2018-12-06 RX ADMIN — Medication 500 MG: at 09:12

## 2018-12-06 RX ADMIN — FENTANYL CITRATE 50 MCG: 50 INJECTION, SOLUTION INTRAMUSCULAR; INTRAVENOUS at 12:12

## 2018-12-06 RX ADMIN — PROPOFOL 80 MG: 10 INJECTION, EMULSION INTRAVENOUS at 12:12

## 2018-12-06 RX ADMIN — CARBIDOPA AND LEVODOPA 2 TABLET: 25; 100 TABLET ORAL at 08:12

## 2018-12-06 RX ADMIN — CEFAZOLIN 2 G: 1 INJECTION, POWDER, FOR SOLUTION INTRAVENOUS at 12:12

## 2018-12-06 NOTE — PROGRESS NOTES
12/05/18 2048   Patient Assessment/Suction   Level of Consciousness (AVPU) alert   Respiratory Effort Normal;Unlabored   PRE-TX-O2-ETCO2   O2 Device (Oxygen Therapy) room air   SpO2 99 %   Pulse 95   Resp 18

## 2018-12-06 NOTE — ASSESSMENT & PLAN NOTE
Bedrest for now.  NPO after midnight  Consult orthopedic surgeon  PT/OT consult  Case management for discharge planning  Requiring IV narcotics for pain control.

## 2018-12-06 NOTE — PROGRESS NOTES
Progress Note  Hospital Medicine  Patient Name:Maricarmen Woodward  MRN:  [id number]  Patient Class: IP- Inpatient  Admit Date: 12/5/2018  Length of Stay: 1 days  Expected Discharge Date:   Attending Physician: Nichol Buchanan MD  Primary Care Provider:  Haris Brambila MD    SUBJECTIVE:     Principal Problem: Closed left hip fracture, initial encounter  Initial history of present illness: Maricarmen Woodward is a 89 y/o female with a medical history significant for Parkinson's disease and HTN who presented to the ED today with c/o L hip pain which began after an accidental fall from her wheelchair today.  Pt lives at home and has a  24 hr sitter.  She requires assistance to get from her bed to her wheelchair due to her Parkinson's disease.  Today, she hit a bump on her floor, causing her to slip out of the seat of her wheelchair, landing on her L hip.  Xray confirms a L hip fracture.  Pt denies recent fever, cough, chest pain, SOB, N/V or dysuria.  Denies further injury.  She will be admitted to the service of hospital medicine with an orthopedic consult.    PMH/PSH/SH/FH/Meds: reviewed.    Symptoms/Review of Systems:  No shortness of breath, cough, chest pain or headache, fever or abdominal pain.     Diet:  NPO   Activity level: Bed rest  Pain: Controlled     OBJECTIVE:   Vital Signs (Most Recent):      Temp: 98.9 °F (37.2 °C) (12/06/18 0757)  Pulse: 91 (12/06/18 0757)  Resp: 18 (12/06/18 0757)  BP: (!) 160/70 (12/06/18 0757)  SpO2: 96 % (12/06/18 0757)       Vital Signs Range (Last 24H):  Temp:  [98 °F (36.7 °C)-99 °F (37.2 °C)]   Pulse:  []   Resp:  [16-20]   BP: (147-191)/(70-81)   SpO2:  [95 %-100 %]     Weight: 52.5 kg (115 lb 11.9 oz)  Body mass index is 20.5 kg/m².    Intake/Output Summary (Last 24 hours) at 12/6/2018 0923  Last data filed at 12/6/2018 0600  Gross per 24 hour   Intake 1262.91 ml   Output 550 ml   Net 712.91 ml     Physical Examination:  Constitutional: She is oriented to person, place,  and time. She appears well-developed and well-nourished.   HENT:   Head: Normocephalic and atraumatic.   Mouth/Throat: Oropharynx is clear and moist.   Eyes: Conjunctivae and EOM are normal. Pupils are equal, round, and reactive to light.   Neck: Normal range of motion. Neck supple. No JVD present.   Cardiovascular: Normal rate, regular rhythm, normal heart sounds and intact distal pulses.   No murmur heard.  Pulmonary/Chest: Effort normal and breath sounds normal. No respiratory distress.   Abdominal: Soft. Bowel sounds are normal. She exhibits no distension. There is no tenderness.   Genitourinary:   Genitourinary Comments: deferred   Musculoskeletal: She exhibits tenderness (left hip). She exhibits no deformity.   Neurological: She is alert and oriented to person, place, and time.   Skin: Skin is warm and dry. Capillary refill takes 2 to 3 seconds. No rash noted.   Psychiatric: She has a normal mood and affect. Her behavior is normal. Judgment and thought content normal.   Nursing note and vitals reviewed.  CRANIAL NERVES   CN III, IV, VI   Pupils are equal, round, and reactive to light.  Extraocular motions are normal.     CBC:  Recent Labs   Lab 12/05/18  1647 12/06/18  0637   WBC 6.20 11.50   RBC 3.54* 3.72*   HGB 11.6* 12.3   HCT 34.4* 35.7*    164   MCV 97 96   MCH 32.8* 33.0*   MCHC 33.8 34.3   BMP  Recent Labs   Lab 12/05/18  1647 12/06/18  0637    121*    139   K 3.7 3.9    104   CO2 27 24   BUN 19 18   CREATININE 0.9 0.9   CALCIUM 10.2 9.6   MG  --  2.0      Diagnostic Results:  Microbiology Results (last 7 days)     ** No results found for the last 168 hours. **         CXR: Strandy reticulonodular scarring of the right lung apex, stable.  Atherosclerosis.    Left hip x-ray: Impacted left femoral neck fracture.    Assessment/Plan:     * Closed left hip fracture, initial encounter     Maintain NPO. Left hip pinning planned for today by Dr. Mei.  Consult orthopedic  surgeon  PT/OT consult  Case management for discharge planning  Requiring IV narcotics for pain control.  Would need aggressive IS post-operatively.      Essential hypertension     Chronic problem. Will continue chronic medications and monitor for any changes, adjusting as needed.     Parkinson's disease     Chronic issue.  Fall precautions.  Continue chronic meds.     Anemia     Chronic problem.  Stable.  Monitor H/H.  Continue iron supplementation.  Transfuse for hemodynamic instability and/or H/H <7/21  Check iron, TIBC, ferritin, folate  T&S pending possible orthopedic procedure.      Pre-op evaluation     NPO after midnight.    CXR, EKG in am  CBC, CMP, PT/INR, UA  Type and screen  RCRI score-0     VTE Risk Mitigation (From admission, onward)        Ordered     IP VTE HIGH RISK PATIENT  Once. Start Lovenox 40 mg SQ q day post-operatively, once cleared by Dr. Mei.      12/05/18 2019     Place VIVIEN hose  Until discontinued      12/05/18 2019     Place sequential compression device  Until discontinued      12/05/18 2019        Nichol Buchanan MD  Department of Hospital Medicine   [facility]

## 2018-12-06 NOTE — SUBJECTIVE & OBJECTIVE
Past Medical History:   Diagnosis Date    Arthritis     Hypertension     Parkinson's disease        Past Surgical History:   Procedure Laterality Date    COLONOSCOPY N/A 4/19/2012    [facility]formed [facility]    EGD (ESOPHAGOGASTRODUODENOSCOPY) N/A 5/15/2012    Performed by [facility] at [facility]    EGD (ESOPHAGOGASTRODUODENOSCOPY) N/A 4/18/2012    Performed by [facility] at [facility]    FRACTURE SURGERY      JOINT REPLACEMENT      right hip replacement       Review of patient's allergies indicates:  No Known Allergies    No current facility-administered medications on file prior to encounter.      Current Outpatient Medications on File Prior to Encounter   Medication Sig    calcium carbonate (CALCIUM ANTACID) 300 mg (750 mg) Chew Take 1 tablet by mouth 2 (two) times daily.    carbidopa-levodopa  mg (SINEMET)  mg per tablet Take 2 tablets by mouth 2 (two) times daily with meals.    carbidopa-levodopa  mg (SINEMET CR)  mg TbSR Take 1.5 tablets by mouth every evening.    cholecalciferol, vitamin D3, (VITAMIN D3) 2,000 unit Tab Take 1 tablet by mouth once daily.     clonazepam (KLONOPIN) 0.5 MG tablet Take 0.5 mg by mouth 2 (two) times daily.    cyanocobalamin (VITAMIN B-12) 1000 MCG tablet Take 100 mcg by mouth once daily.    ferrous sulfate 325 (65 FE) MG EC tablet Take 325 mg by mouth once daily.    irbesartan-hydrochlorothiazide (AVALIDE) 150-12.5 mg per tablet Take 1 tablet by mouth once daily.    multivit-iron-min-folic acid (MULTIVITAMIN-IRON-MINERALS-FOLIC ACID) 3,500-18-0.4 unit-mg-mg Chew Take 1 tablet by mouth once daily.      omega-3 fatty acids-vitamin E (FISH OIL) 1,000 mg Cap Take 1 capsule by mouth once daily.    omeprazole (PRILOSEC) 40 MG capsule Take 40 mg by mouth once daily.    oxycodone-acetaminophen  mg (PERCOCET)  mg per tablet Take 1 tablet by mouth 3 (three) times daily.    potassium chloride SA  (K-DUR,KLOR-CON) 10 MEQ tablet Take 10 mEq by mouth once daily.      pravastatin (PRAVACHOL) 40 MG tablet Take 40 mg by mouth every evening.    senna-docusate 8.6-50 mg (SENNA WITH DOCUSATE SODIUM) 8.6-50 mg per tablet Take 1 tablet by mouth once daily.    tizanidine 4 mg Cap Take 4 mg by mouth 3 (three) times daily.    [DISCONTINUED] olmesartan-hydrochlorothiazide (BENICAR HCT) 20-12.5 mg per tablet Take 1 tablet by mouth once daily.     Family History     Problem Relation (Age of Onset)    No Known Problems Mother        Tobacco Use    Smoking status: Light Tobacco Smoker     Packs/day: 0.25     Years: 5.00     Pack years: 1.25     Last attempt to quit: 2009     Years since quittin.6   Substance and Sexual Activity    Alcohol use: No    Drug use: No    Sexual activity: No     Partners: Male     Birth control/protection: Abstinence, Post-menopausal     Review of Systems   Constitutional: Negative for appetite change, chills, fatigue and fever.   HENT: Negative for sore throat and trouble swallowing.    Eyes: Negative for photophobia and visual disturbance.   Respiratory: Negative for cough, shortness of breath and wheezing.    Cardiovascular: Negative for chest pain and palpitations.   Gastrointestinal: Negative for abdominal pain, diarrhea, nausea and vomiting.   Endocrine: Negative for polyphagia and polyuria.   Genitourinary: Negative for dysuria and flank pain.   Musculoskeletal: Positive for arthralgias and gait problem.   Skin: Negative for color change and rash.   Neurological: Negative for weakness and light-headedness.   Hematological: Negative for adenopathy.   Psychiatric/Behavioral: Negative for agitation and confusion.   All other systems reviewed and are negative.    Objective:     Vital Signs (Most Recent):  Temp: 98.7 °F (37.1 °C) (18)  Pulse: 100 (18)  Resp: 20 (18)  BP: (!) 174/76 (18)  SpO2: 96 % (18) Vital Signs (24h  Range):  Temp:  [98 °F (36.7 °C)-99 °F (37.2 °C)] 98.7 °F (37.1 °C)  Pulse:  [] 100  Resp:  [16-20] 20  SpO2:  [95 %-100 %] 96 %  BP: (147-191)/(70-81) 174/76     Weight: 52.5 kg (115 lb 11.9 oz)  Body mass index is 20.5 kg/m².    Physical Exam   Constitutional: She is oriented to person, place, and time. She appears well-developed and well-nourished.   HENT:   Head: Normocephalic and atraumatic.   Mouth/Throat: Oropharynx is clear and moist.   Eyes: Conjunctivae and EOM are normal. Pupils are equal, round, and reactive to light.   Neck: Normal range of motion. Neck supple. No JVD present.   Cardiovascular: Normal rate, regular rhythm, normal heart sounds and intact distal pulses.   No murmur heard.  Pulmonary/Chest: Effort normal and breath sounds normal. No respiratory distress.   Abdominal: Soft. Bowel sounds are normal. She exhibits no distension. There is no tenderness.   Genitourinary:   Genitourinary Comments: deferred   Musculoskeletal: She exhibits tenderness (left hip). She exhibits no deformity.   Neurological: She is alert and oriented to person, place, and time.   Skin: Skin is warm and dry. Capillary refill takes 2 to 3 seconds. No rash noted.   Psychiatric: She has a normal mood and affect. Her behavior is normal. Judgment and thought content normal.   Nursing note and vitals reviewed.        CRANIAL NERVES     CN III, IV, VI   Pupils are equal, round, and reactive to light.  Extraocular motions are normal.        Significant Labs:   CBC:   Recent Labs   Lab 12/05/18  1647   WBC 6.20   HGB 11.6*   HCT 34.4*        CMP:   Recent Labs   Lab 12/05/18  1647      K 3.7      CO2 27      BUN 19   CREATININE 0.9   CALCIUM 10.2   PROT 7.5   ALBUMIN 4.5   BILITOT 0.7   ALKPHOS 53*   AST 25   ALT <5*   ANIONGAP 9   EGFRNONAA 56*       Significant Imaging: XRAY L hip:  Impacted left femoral neck fracture.

## 2018-12-06 NOTE — ASSESSMENT & PLAN NOTE
Chronic problem.  Stable.  Monitor H/H.  Continue iron supplementation.  Transfuse for hemodynamic instability and/or H/H <7/21  Check iron, TIBC, ferritin, folate  T&S pending possible orthopedic procedure.

## 2018-12-06 NOTE — TRANSFER OF CARE
"Anesthesia Transfer of Care Note    Patient: Maricarmen MILLER Trace    Procedure(s) Performed: Procedure(s) (LRB):  ORIF, HIP (Left)    Patient location: PACU    Anesthesia Type: general    Transport from OR: Transported from OR on 2-3 L/min O2 by NC with adequate spontaneous ventilation    Post pain: adequate analgesia    Post assessment: no apparent anesthetic complications    Post vital signs: stable    Level of consciousness: sedated    Nausea/Vomiting: no nausea/vomiting    Complications: none    Transfer of care protocol was followed      Last vitals:   Visit Vitals  BP (!) 175/75 (BP Location: Right arm, Patient Position: Lying)   Pulse 94   Temp 37.4 °C (99.3 °F) (Temporal)   Resp 16   Ht 5' 3" (1.6 m)   Wt 52.2 kg (115 lb)   SpO2 97%   Breastfeeding? No   BMI 20.37 kg/m²     "

## 2018-12-06 NOTE — PLAN OF CARE
Report from Kayce RAMOS    Pt has philippe/NPO after midnight sip of water this am with meds  Pt nephedilberto has POA and will come to pre with the pt  Denny called for transfer

## 2018-12-06 NOTE — NURSING
Jenkins catheter placed in pt.  Pt tolerated well.  500 mL of dark yellow urine emptied from urimeter

## 2018-12-06 NOTE — ASSESSMENT & PLAN NOTE
Hypertension Medications             irbesartan-hydrochlorothiazide (AVALIDE) 150-12.5 mg per tablet Take 1 tablet by mouth once daily.      Hospital Medications             hydroCHLOROthiazide tablet 12.5 mg Take 1 tablet (12.5 mg total) by mouth once daily.    irbesartan tablet 150 mg Take 2 tablets (150 mg total) by mouth once daily.        Chronic problem.  Initially hypertensive, possibly 2/2 pain.  Currently stable.  Continue home meds.  Monitor and treat as clinically indicated.

## 2018-12-06 NOTE — H&P
[facility] Hospital Medicine  History & Physical    Patient Name: Maricarmen Woodward  MRN: [id number]  Admission Date: 12/5/2018  Attending Physician: Nichol Buchanan MD   Primary Care Provider: Haris Brambila MD         Patient information was obtained from patient, past medical records and ER records.     Subjective:     Principal Problem:Closed left hip fracture, initial encounter    Chief Complaint:   Chief Complaint   Patient presents with    Fall     LEFT hip/leg pain        HPI: Maricarmen Woodward is a 89 y/o female with a medical history significant for Parkinson's disease and HTN who presented to the ED today with c/o L hip pain which began after an accidental fall from her wheelchair today.  Pt lives at home and has a  24 hr sitter.  She requires assistance to get from her bed to her wheelchair due to her Parkinson's disease.  Today, she hit a bump on her floor, causing her to slip out of the seat of her wheelchair, landing on her L hip.  Xray confirms a L hip fracture.  Pt denies recent fever, cough, chest pain, SOB, N/V or dysuria.  Denies further injury.  She will be admitted to the service of hospital medicine with an orthopedic consult.    Past Medical History:   Diagnosis Date    Arthritis     Hypertension     Parkinson's disease        Past Surgical History:   Procedure Laterality Date    COLONOSCOPY N/A 4/19/2012    [facility]    EGD (ESOPHAGOGASTRODUODENOSCOPY) N/A 5/15/2012    Performed by [facility] at [facility] END[facility]    EGD (ESOPHAGOGASTRODUODENOSCOPY) N/A 4/18/2012    Performed by [facility] at [facility] END[facility]    FRACTURE SURGERY      JOINT REPLACEMENT      right hip replacement       Review of patient's allergies indicates:  No Known Allergies    No current facility-administered medications on file prior to encounter.      Current Outpatient Medications on File Prior to Encounter   Medication Sig    calcium carbonate (CALCIUM  ANTACID) 300 mg (750 mg) Chew Take 1 tablet by mouth 2 (two) times daily.    carbidopa-levodopa  mg (SINEMET)  mg per tablet Take 2 tablets by mouth 2 (two) times daily with meals.    carbidopa-levodopa  mg (SINEMET CR)  mg TbSR Take 1.5 tablets by mouth every evening.    cholecalciferol, vitamin D3, (VITAMIN D3) 2,000 unit Tab Take 1 tablet by mouth once daily.     clonazepam (KLONOPIN) 0.5 MG tablet Take 0.5 mg by mouth 2 (two) times daily.    cyanocobalamin (VITAMIN B-12) 1000 MCG tablet Take 100 mcg by mouth once daily.    ferrous sulfate 325 (65 FE) MG EC tablet Take 325 mg by mouth once daily.    irbesartan-hydrochlorothiazide (AVALIDE) 150-12.5 mg per tablet Take 1 tablet by mouth once daily.    multivit-iron-min-folic acid (MULTIVITAMIN-IRON-MINERALS-FOLIC ACID) 3,500-18-0.4 unit-mg-mg Chew Take 1 tablet by mouth once daily.      omega-3 fatty acids-vitamin E (FISH OIL) 1,000 mg Cap Take 1 capsule by mouth once daily.    omeprazole (PRILOSEC) 40 MG capsule Take 40 mg by mouth once daily.    oxycodone-acetaminophen  mg (PERCOCET)  mg per tablet Take 1 tablet by mouth 3 (three) times daily.    potassium chloride SA (K-DUR,KLOR-CON) 10 MEQ tablet Take 10 mEq by mouth once daily.      pravastatin (PRAVACHOL) 40 MG tablet Take 40 mg by mouth every evening.    senna-docusate 8.6-50 mg (SENNA WITH DOCUSATE SODIUM) 8.6-50 mg per tablet Take 1 tablet by mouth once daily.    tizanidine 4 mg Cap Take 4 mg by mouth 3 (three) times daily.    [DISCONTINUED] olmesartan-hydrochlorothiazide (BENICAR HCT) 20-12.5 mg per tablet Take 1 tablet by mouth once daily.     Family History     Problem Relation (Age of Onset)    No Known Problems Mother        Tobacco Use    Smoking status: Light Tobacco Smoker     Packs/day: 0.25     Years: 5.00     Pack years: 1.25     Last attempt to quit: 2009     Years since quittin.6   Substance and Sexual Activity    Alcohol use: No     Drug use: No    Sexual activity: No     Partners: Male     Birth control/protection: Abstinence, Post-menopausal     Review of Systems   Constitutional: Negative for appetite change, chills, fatigue and fever.   HENT: Negative for sore throat and trouble swallowing.    Eyes: Negative for photophobia and visual disturbance.   Respiratory: Negative for cough, shortness of breath and wheezing.    Cardiovascular: Negative for chest pain and palpitations.   Gastrointestinal: Negative for abdominal pain, diarrhea, nausea and vomiting.   Endocrine: Negative for polyphagia and polyuria.   Genitourinary: Negative for dysuria and flank pain.   Musculoskeletal: Positive for arthralgias and gait problem.   Skin: Negative for color change and rash.   Neurological: Negative for weakness and light-headedness.   Hematological: Negative for adenopathy.   Psychiatric/Behavioral: Negative for agitation and confusion.   All other systems reviewed and are negative.    Objective:     Vital Signs (Most Recent):  Temp: 98.7 °F (37.1 °C) (12/05/18 2240)  Pulse: 100 (12/05/18 2240)  Resp: 20 (12/05/18 2240)  BP: (!) 174/76 (12/05/18 2240)  SpO2: 96 % (12/05/18 2240) Vital Signs (24h Range):  Temp:  [98 °F (36.7 °C)-99 °F (37.2 °C)] 98.7 °F (37.1 °C)  Pulse:  [] 100  Resp:  [16-20] 20  SpO2:  [95 %-100 %] 96 %  BP: (147-191)/(70-81) 174/76     Weight: 52.5 kg (115 lb 11.9 oz)  Body mass index is 20.5 kg/m².    Physical Exam   Constitutional: She is oriented to person, place, and time. She appears well-developed and well-nourished.   HENT:   Head: Normocephalic and atraumatic.   Mouth/Throat: Oropharynx is clear and moist.   Eyes: Conjunctivae and EOM are normal. Pupils are equal, round, and reactive to light.   Neck: Normal range of motion. Neck supple. No JVD present.   Cardiovascular: Normal rate, regular rhythm, normal heart sounds and intact distal pulses.   No murmur heard.  Pulmonary/Chest: Effort normal and breath sounds  normal. No respiratory distress.   Abdominal: Soft. Bowel sounds are normal. She exhibits no distension. There is no tenderness.   Genitourinary:   Genitourinary Comments: deferred   Musculoskeletal: She exhibits tenderness (left hip). She exhibits no deformity.   Neurological: She is alert and oriented to person, place, and time.   Skin: Skin is warm and dry. Capillary refill takes 2 to 3 seconds. No rash noted.   Psychiatric: She has a normal mood and affect. Her behavior is normal. Judgment and thought content normal.   Nursing note and vitals reviewed.        CRANIAL NERVES     CN III, IV, VI   Pupils are equal, round, and reactive to light.  Extraocular motions are normal.        Significant Labs:   CBC:   Recent Labs   Lab 12/05/18  1647   WBC 6.20   HGB 11.6*   HCT 34.4*        CMP:   Recent Labs   Lab 12/05/18  1647      K 3.7      CO2 27      BUN 19   CREATININE 0.9   CALCIUM 10.2   PROT 7.5   ALBUMIN 4.5   BILITOT 0.7   ALKPHOS 53*   AST 25   ALT <5*   ANIONGAP 9   EGFRNONAA 56*       Significant Imaging: XRAY L hip:  Impacted left femoral neck fracture.    Assessment/Plan:     * Closed left hip fracture, initial encounter    Bedrest for now.  NPO after midnight  Consult orthopedic surgeon  PT/OT consult  Case management for discharge planning  Requiring IV narcotics for pain control.     Essential hypertension    Hypertension Medications             irbesartan-hydrochlorothiazide (AVALIDE) 150-12.5 mg per tablet Take 1 tablet by mouth once daily.      Hospital Medications             hydroCHLOROthiazide tablet 12.5 mg Take 1 tablet (12.5 mg total) by mouth once daily.    irbesartan tablet 150 mg Take 2 tablets (150 mg total) by mouth once daily.        Chronic problem.  Initially hypertensive, possibly 2/2 pain.  Currently stable.  Continue home meds.  Monitor and treat as clinically indicated.       Parkinson's disease    Chronic issue.  Fall precautions.  Continue chronic  meds.       Anemia    Chronic problem.  Stable.  Monitor H/H.  Continue iron supplementation.  Transfuse for hemodynamic instability and/or H/H <7/21  Check iron, TIBC, ferritin, folate  T&S pending possible orthopedic procedure.       Pre-op evaluation    NPO after midnight.    CXR, EKG in am  CBC, CMP, PT/INR, UA  Type and screen  RCRI score-0             VTE Risk Mitigation (From admission, onward)        Ordered     IP VTE HIGH RISK PATIENT  Once      12/05/18 2019     Place VIVIEN hose  Until discontinued      12/05/18 2019     Place sequential compression device  Until discontinued      12/05/18 2019      Time spent seeing patient( greater than 1/2 spent in direct contact) : 60 minutes       BERNIE Drake  Department of Hospital Medicine   [facility]

## 2018-12-06 NOTE — NURSING
Pt had a medication list in bag with her clothes, it did not match completley with the list in the computer and her nephew is not here to ask  clonazapam  Pravastatin  Irbesartan-hctz  Tizanidine hcl  K+ clor  Carbidop/levo  Oxycodone  Carbidopa/levo  This is the list I had and pt was unsure of the time of last dose.

## 2018-12-06 NOTE — PLAN OF CARE
12/06/18 1003   PRE-TX-O2-ETCO2   O2 Device (Oxygen Therapy) room air   SpO2 (!) 93 %   Pulse Oximetry Type Intermittent   $ Pulse Oximetry - Multiple Charge Pulse Oximetry - Multiple

## 2018-12-06 NOTE — PLAN OF CARE
Problem: Patient Care Overview  Goal: Plan of Care Review  Outcome: Revised  Pt is awake and alert, confused intermittently, reoriented throughout shift.  Pt c/o pain, prn medication given, pt tolerated well.  Cardiac monitoring in place, ST.  IVF infusing, no redness or swelling at site.  L DP pulses palpable, neuro checks Q 4 hr.  VSS, in NAD, pt remains afebrile.  Pt remains free from injury.  Bed in low position, wheels locked, call light within reach.  Pt verbalized understanding of POC.  Will continue to monitor.

## 2018-12-06 NOTE — ANESTHESIA PREPROCEDURE EVALUATION
12/06/2018  Maricarmen Woodward is a 89 y.o., female.    Anesthesia Evaluation    I have reviewed the Patient Summary Reports.    I have reviewed the Nursing Notes.   I have reviewed the Medications.     Review of Systems  Anesthesia Hx:  No problems with previous Anesthesia    Social:  Non-Smoker    Cardiovascular:   Hypertension    Pulmonary:   Pneumonia    Musculoskeletal:  Bone Disorders: Fracture , location of femur.     Neurological:  Movement Disorder Dx, Parkinson's Disease       Physical Exam  General:  Well nourished    Airway/Jaw/Neck:  Airway Findings: Mouth Opening: Normal Tongue: Normal  General Airway Assessment: Adult, Good  Mallampati: II  Improves to II with phonation.  TM Distance: 4-6 cm      Dental:  Dental Findings: In tact   Chest/Lungs:  Chest/Lungs Findings: Clear to auscultation, Normal Respiratory Rate     Heart/Vascular:  Heart Findings: Rate: Normal  Rhythm: Regular Rhythm  Sounds: Normal  Heart murmur: negative       Mental Status:  Mental Status Findings:  Somnolent, Cooperative         Anesthesia Plan  Type of Anesthesia, risks & benefits discussed:  Anesthesia Type:  general  Patient's Preference:   Intra-op Monitoring Plan: standard ASA monitors  Intra-op Monitoring Plan Comments:   Post Op Pain Control Plan:   Post Op Pain Control Plan Comments:   Induction:   IV  Beta Blocker:  Patient is not currently on a Beta-Blocker (No further documentation required).       Informed Consent: Patient understands risks and agrees with Anesthesia plan.  Questions answered. Anesthesia consent signed with patient.  ASA Score: 3  emergent   Day of Surgery Review of History & Physical: I have interviewed and examined the patient. I have reviewed the patient's H&P dated:  There are no significant changes.  H&P update referred to the surgeon.         Ready For Surgery From Anesthesia  Perspective.

## 2018-12-06 NOTE — NURSING
Nephew keily at bedside at this time, he states she does live at home alone with a 24 hour sitter, she is unable to ambulate even prior to fall related to parkinson.  He states she has assistance to stand and is placed in a wheelchair.  He states the wheelchair is brought to restroom and she is assisted to toilet but he said she does not have incontinence.  She states she had a BM today.

## 2018-12-06 NOTE — NURSING
Pt to room via stretcher to room 301 bed B  Left hip pain, pulses positive, no discoloration.  Iv flushes well, she is on room air, c/o pain 8/10.  She oriented to self only.  Her nephew was here with her

## 2018-12-06 NOTE — PLAN OF CARE
12/06/18 0929   Discharge Assessment   Assessment Type Discharge Planning Assessment   Confirmed/corrected address and phone number on facesheet? Yes   Assessment information obtained from? Other  (Denny Trace, nephew MPOA)   Expected Length of Stay (days) 4   Communicated expected length of stay with patient/caregiver yes   Prior to hospitilization cognitive status: Alert/Oriented   Prior to hospitalization functional status: Needs Assistance;Partially Dependent   Lives With alone  (24/7 Anastasia manuel )   Able to Return to Prior Arrangements unable to determine at this time (comments)   Is patient able to care for self after discharge? Unable to determine at this time (comments)   Patient's perception of discharge disposition admitted as an inpatient   Readmission Within The Last 30 Days no previous admission in last 30 days   Patient currently being followed by outpatient case management? No   Patient currently receives any other outside agency services? No   Equipment Currently Used at Home walker, rolling;wheelchair;shower chair   Do you have any problems affording any of your prescribed medications? No   Is the patient taking medications as prescribed? yes   Discharge Plan A Skilled Nursing Facility     I was called to the unit to speak with the nephewDenny regarding MPOA; he has a MPOA, I placed a copy in the pt's blue folder.   The pt was only able to transfer at home, she was not ambulating any distance before coming into the hospital. Her caretaker, Anastasia is also older and mainly there as a .   Denny states that the pt has been to SNF in the past but he can not remember which one. We will further discuss SNF as a discharge option post op.   At this time the pt's discharge plan can not be determined until she participates with PT/OT after surgery....RACHEL Ndiaye CM

## 2018-12-06 NOTE — CONSULTS
Past Medical History:   Diagnosis Date    Arthritis     Hypertension     Parkinson's disease        Past Surgical History:   Procedure Laterality Date    COLONOSCOPY N/A 4/19/2012    Performed by [facility] at [facility]    EGD (ESOPHAGOGASTRODUODENOSCOPY) N/A 5/15/2012    Performed by [facility] at [facility] END[facility]    EGD (ESOPHAGOGASTRODUODENOSCOPY) N/A 4/18/2012    Performed by [facility] at [facility] END[facility]    FRACTURE SURGERY      JOINT REPLACEMENT      right hip replacement       Current Facility-Administered Medications   Medication    0.9%  NaCl infusion    acetaminophen tablet 650 mg    calcium carbonate (OS-JALIL) tablet 500 mg    carbidopa-levodopa  mg per tablet 2 tablet    carbidopa-levodopa  mg TBSR 3 tablet    cefazolin (ANCEF) 2 gram in dextrose 5% 50 mL IVPB (premix)    clonazePAM tablet 0.5 mg    ferrous sulfate EC tablet 325 mg    hydroCHLOROthiazide tablet 12.5 mg    irbesartan tablet 150 mg    lactated ringers infusion    morphine injection 2 mg    multivit-iron-FA-calcium-mins 9 mg iron-400 mcg tablet Tab 1 tablet    omega 3-dha-epa-fish oil 1,000 mg (120 mg-180 mg) Cap 1 capsule    ondansetron injection 4 mg    oxyCODONE-acetaminophen  mg per tablet 1 tablet    pantoprazole EC tablet 40 mg    pravastatin tablet 40 mg    senna-docusate 8.6-50 mg per tablet 1 tablet    senna-docusate 8.6-50 mg per tablet 1 tablet    tiZANidine tablet 4 mg       Review of patient's allergies indicates:  No Known Allergies    Family History   Problem Relation Age of Onset    No Known Problems Mother        Social History     Socioeconomic History    Marital status:      Spouse name: Not on file    Number of children: Not on file    Years of education: Not on file    Highest education level: Not on file   Social Needs    Financial resource strain: Not on file    Food insecurity - worry: Not on file    Food insecurity - inability: Not on file     "Transportation needs - medical: Not on file    Transportation needs - non-medical: Not on file   Occupational History    Not on file   Tobacco Use    Smoking status: Light Tobacco Smoker     Packs/day: 0.25     Years: 5.00     Pack years: 1.25     Last attempt to quit: 2009     Years since quittin.6   Substance and Sexual Activity    Alcohol use: No    Drug use: No    Sexual activity: No     Partners: Male     Birth control/protection: Abstinence, Post-menopausal   Other Topics Concern    Not on file   Social History Narrative    Not on file       Chief Complaint:   Chief Complaint   Patient presents with    Fall     LEFT hip/leg pain       Consulting Physician: Self, Aaareferral    History of Present Illness:    This is a 89 y.o. year old female who complains of left hip pain following a fall. Seen this am in her room with her nephew who has POA.      ROS    Examination:    Vital Signs:    Vitals:    18 0255 18 0757 18 1003 18 1015   BP: (!) 166/72 (!) 160/70  (!) 175/75   Pulse: 103 91  94   Resp: 20 18  16   Temp: 98 °F (36.7 °C) 98.9 °F (37.2 °C)  99.3 °F (37.4 °C)   TempSrc: Oral Oral  Temporal   SpO2: 97% 96% (!) 93% 97%   Weight:    52.2 kg (115 lb)   Height:    5' 3" (1.6 m)       Body mass index is 20.37 kg/m².    This a well-developed, well nourished patient in no acute distress.    Pt is sleeping and not easily aroused. Nursing reports confusion overnight.    Physical Exam: Left Hip Exam    Gait:   Non weight bearing    Skin  Rash:   None  Scars:   None    Inspection  Erythema:  None  Bruising:  Mild  Swelling:  Mild  Masses:  None  Lymphadenopathy: None    Range of Motion: Not tested due to fracture    Tenderness:  Diffuse    Strength:  Not tested due to fracture    Stability:  Not tested due to fracture    Sensation:  Intact    Vascular  Pulses:  Palpable distally          Imaging: X-rays ordered and images interpreted today personally by me of left hip show a " non-displaced femoral neck fracture.    Assessment: Closed left hip fracture, initial encounter    Left hip pain    Pre-op evaluation  -     EKG 12-lead; Standing    Other orders  -     X-Ray Hip 2 View Left; Standing  -     Cancel: X-Ray Pelvis Routine AP; Standing  -     CBC auto differential; Standing  -     Comprehensive metabolic panel; Standing  -     Saline lock IV; Standing  -     EKG 12-lead; Standing  -     Protime-INR; Standing  -     Type & Screen; Standing  -     carbidopa-levodopa  mg per tablet 2 tablet  -     carbidopa-levodopa  mg TBSR 3 tablet  -     clonazePAM tablet 0.5 mg  -     Discontinue: irbesartan-hydrochlorothiazide 150-12.5 mg per tablet 1 tablet  -     Discontinue: olmesartan-hydrochlorothiazide 20-12.5 mg per tablet 1 tablet  -     pantoprazole EC tablet 40 mg  -     oxyCODONE-acetaminophen  mg per tablet 1 tablet  -     pravastatin tablet 40 mg  -     senna-docusate 8.6-50 mg per tablet 1 tablet  -     Vital signs; Standing  -     Notify physician ; Standing  -     IP VTE HIGH RISK PATIENT; Standing  -     senna-docusate 8.6-50 mg per tablet 1 tablet  -     Discontinue: pantoprazole EC tablet 40 mg  -     acetaminophen tablet 650 mg  -     Pulse Oximetry Q4H; Standing  -     Full code; Standing  -     Admit to Inpatient; Standing  -     Bed rest; Standing  -     Diet NPO; Standing  -     Cancel: Diet Adult Regular (IDDSI Level 7); Standing  -     Discontinue: dextrose 5 % and 0.9 % NaCl with KCl 20 mEq infusion  -     Place VIVIEN hose; Standing  -     Place sequential compression device; Standing  -     morphine injection 2 mg  -     ondansetron injection 4 mg  -     CBC auto differential; Standing  -     Comprehensive metabolic panel; Standing  -     Inpatient consult to Orthopedic Surgery; Standing  -     Cardiac Monitoring - Adult; Standing  -     morphine injection 3.04 mg  -     oxyCODONE-acetaminophen 5-325 mg per tablet 2 tablet  -     Vital signs; Standing  -      Cancel: Insert peripheral IV; Standing  -     Cancel: Use 1% lidocaine at IV site; Standing  -     Cancel: lactated ringers infusion  -     Cancel: lidocaine (PF) 10 mg/ml (1%) injection 5 mg  -     Pulse Oximetry Once; Standing  -     irbesartan tablet 150 mg  -     hydroCHLOROthiazide tablet 12.5 mg  -     calcium carbonate (OS-JALIL) tablet 500 mg  -     ferrous sulfate EC tablet 325 mg  -     multivit-iron-FA-calcium-mins 9 mg iron-400 mcg tablet Tab 1 tablet  -     Discontinue: omega 3-dha-epa-fish oil capsule 1 capsule  -     tiZANidine tablet 4 mg  -     dextrose 5 % and 0.9 % NaCl with KCl 20 mEq infusion  -     0.9%  NaCl infusion  -     Cancel: Magnesium; Standing  -     Magnesium; Standing  -     Phosphorus; Standing  -     Cancel: Phosphorus; Standing  -     X-Ray Chest AP Portable; Standing  -     PT evaluate and treat; Standing  -     OT evaluate and treat; Standing  -     Inpatient consult to Social Work/Case Management; Standing  -     Ferritin; Standing  -     Folate; Standing  -     Iron and TIBC; Standing  -     Fall precautions; Standing  -     Urinalysis, Reflex to Urine Culture Urine, Clean Catch; Standing  -     omega 3-dha-epa-fish oil 1,000 mg (120 mg-180 mg) Cap 1 capsule  -     Philippe to Gravity; Standing  -     Philippe Catheter Care every 12 hours; Standing  -     Nurse to discontinue philippe when patient no longer meets criteria; Standing  -     Post Philippe Catheter Removal Protocol; Standing  -     Admit to Phase 1 PACU, transfer to Phase 2 per protocol when indicated ; Standing  -     Vital signs; Standing  -     Apply warming blanket; Standing  -     Notify Anesthesiologist; Standing  -     Notify Physician - Potential Need of Opioid Reversal; Standing  -     sodium chloride 0.9% flush 3 mL  -     lactated ringers infusion  -     oxyCODONE immediate release tablet 5 mg  -     fentaNYL injection 25 mcg  -     hydromorphone (PF) injection 0.2 mg  -     meperidine injection 12.5 mg  -      diphenhydrAMINE injection 25 mg  -     ondansetron injection 4 mg  -     promethazine (PHENERGAN) 6.25 mg in dextrose 5 % 50 mL IVPB  -     Oxygen Continuous; Standing  -     Pulse Oximetry Continuous; Standing  -     cefazolin (ANCEF) 2 gram in dextrose 5% 50 mL IVPB (premix)  -     lactated ringers infusion  -     Cancel: SURG Fl Surgery FLuoro Greater Than 1 Hour; Standing  -     SURG FL Surgery Fluoro Less Than 1 Hour; Standing        Plan:  We discussed options with Pt nephew (POA) and elected to proceed with hip pinning. After discussing the diagnosis and reviewing treatment options, the patient/family elected to proceed with surgical intervention.    Pre-operative antibiotics were ordered.    The risks, benefits, and alternatives to the procedure were explained to the patient including, but not limited to: incomplete pain relief, surgical failure, hardware failure, need for further procedures, damage to nerves, arteries, blood vessels and other structures, infection, Deep Vein Thrombosis (DVT), Pulmonary Embolus (PE), Complex Regional Pain Syndrome as well as general anesthetic complications including seizure, stroke, heart attack and even death. The patient understood these risks and wished to proceed and signed the informed consent. All questions were answered. No guarantees were implied or stated.    We will obtain the necessary clearances and schedule the patient for surgery.            DISCLAIMER: This note may have been dictated using voice recognition software and may contain grammatical errors.     NOTE: Consult report sent to referring provider via [employer].

## 2018-12-06 NOTE — HPI
Maricarmen Woodward is a 89 y/o female with a medical history significant for Parkinson's disease and HTN who presented to the ED today with c/o L hip pain which began after an accidental fall from her wheelchair today.  Pt lives at home and has a  24 hr sitter.  She requires assistance to get from her bed to her wheelchair due to her Parkinson's disease.  Today, she hit a bump on her floor, causing her to slip out of the seat of her wheelchair, landing on her L hip.  Xray confirms a L hip fracture.  Pt denies recent fever, cough, chest pain, SOB, N/V or dysuria.  Denies further injury.  She will be admitted to the service of hospital medicine with an orthopedic consult.

## 2018-12-06 NOTE — PLAN OF CARE
Pt with urinary catheter, urine is cloudy    157/69  89hr  98% on room air    Pt nephew is at the bedside    Denny NUNEZ

## 2018-12-06 NOTE — NURSING
"Situation Principle Problem:  Closed left hip fracture, initial encounter      Reason for Calling: Pt unable to void    Provider Calling: Cathy Booker NP   Background Vitals:    12/05/18 2048 12/05/18 2101 12/05/18 2240 12/06/18 0255   BP:   (!) 174/76 (!) 166/72   BP Location:       Patient Position:   Lying    Pulse: 95  100 103   Resp: 18  20 20   Temp:   98.7 °F (37.1 °C) 98 °F (36.7 °C)   TempSrc:   Oral Oral   SpO2: 99%  96% 97%   Weight:  52.5 kg (115 lb 11.9 oz)     Height:  5' 3" (1.6 m)         No results found for: POCTGLUCOSE    Intake/Output:  No intake or output data in the 24 hours ending 12/06/18 0410     Assessment What is happening: Pt unable to void.  Tried to place on bed pan and brief, pt still unable to void.  Doulens contacted for surgery already.  Bladder scanned pt and found >737 mL in bladder.  Did you want to do In N Out or cathether since she is going to surgery for orif?   Response Provider Response: Gregory     "

## 2018-12-06 NOTE — BRIEF OP NOTE
[facility]  Brief Operative Note    SUMMARY     Surgery Date: 12/6/2018     Surgeon(s) and Role:     * Greg Mei MD - Primary    Assisting Surgeon: None    Pre-op Diagnosis:  left hip fracture    Post-op Diagnosis:  Post-Op Diagnosis Codes:     * Hip fracture [S72.009A]    Procedure(s) (LRB):  ORIF, HIP (Left)    Anesthesia: Choice    Description of Procedure: Attempted percutaneous pinning of left hip.    Description of the findings of the procedure: See Dictation     Estimated Blood Loss: * No values recorded between 12/6/2018 12:00 AM and 12/6/2018  1:05 PM *         Specimens:   Specimen (12h ago, onward)    None      Dictation #1  MRN:[id number]  CSN:[id number]  [id number]

## 2018-12-06 NOTE — PLAN OF CARE
Dr cervantes released pt back to her room from pacu snoring at intervals no grimace no co of pain scd and foot pump on int ivf philippe emptied for 100 cc cloudy urine sr up x 4   fall bracelet on  No nausea No emesis  phil sips of water

## 2018-12-07 LAB
ALBUMIN SERPL BCP-MCNC: 3.1 G/DL
ALP SERPL-CCNC: 41 U/L
ALT SERPL W/O P-5'-P-CCNC: <5 U/L
ANION GAP SERPL CALC-SCNC: 7 MMOL/L
AST SERPL-CCNC: 26 U/L
BASOPHILS # BLD AUTO: 0 K/UL
BASOPHILS NFR BLD: 0.4 %
BILIRUB SERPL-MCNC: 0.8 MG/DL
BUN SERPL-MCNC: 12 MG/DL
CALCIUM SERPL-MCNC: 8.7 MG/DL
CHLORIDE SERPL-SCNC: 108 MMOL/L
CO2 SERPL-SCNC: 23 MMOL/L
CREAT SERPL-MCNC: 0.7 MG/DL
DIFFERENTIAL METHOD: ABNORMAL
EOSINOPHIL # BLD AUTO: 0.1 K/UL
EOSINOPHIL NFR BLD: 1.7 %
ERYTHROCYTE [DISTWIDTH] IN BLOOD BY AUTOMATED COUNT: 12.8 %
EST. GFR  (AFRICAN AMERICAN): >60 ML/MIN/1.73 M^2
EST. GFR  (NON AFRICAN AMERICAN): >60 ML/MIN/1.73 M^2
GLUCOSE SERPL-MCNC: 86 MG/DL
HCT VFR BLD AUTO: 30.1 %
HGB BLD-MCNC: 10.2 G/DL
LYMPHOCYTES # BLD AUTO: 1.4 K/UL
LYMPHOCYTES NFR BLD: 18.4 %
MAGNESIUM SERPL-MCNC: 1.8 MG/DL
MCH RBC QN AUTO: 32.8 PG
MCHC RBC AUTO-ENTMCNC: 33.8 G/DL
MCV RBC AUTO: 97 FL
MONOCYTES # BLD AUTO: 0.3 K/UL
MONOCYTES NFR BLD: 4.3 %
NEUTROPHILS # BLD AUTO: 5.8 K/UL
NEUTROPHILS NFR BLD: 75.2 %
PHOSPHATE SERPL-MCNC: 2.2 MG/DL
PLATELET # BLD AUTO: 170 K/UL
PMV BLD AUTO: 9 FL
POTASSIUM SERPL-SCNC: 3.6 MMOL/L
PROT SERPL-MCNC: 5.7 G/DL
RBC # BLD AUTO: 3.11 M/UL
SODIUM SERPL-SCNC: 138 MMOL/L
WBC # BLD AUTO: 7.7 K/UL

## 2018-12-07 PROCEDURE — 80053 COMPREHEN METABOLIC PANEL: CPT

## 2018-12-07 PROCEDURE — 94761 N-INVAS EAR/PLS OXIMETRY MLT: CPT

## 2018-12-07 PROCEDURE — 83735 ASSAY OF MAGNESIUM: CPT

## 2018-12-07 PROCEDURE — 36415 COLL VENOUS BLD VENIPUNCTURE: CPT

## 2018-12-07 PROCEDURE — 12000002 HC ACUTE/MED SURGE SEMI-PRIVATE ROOM

## 2018-12-07 PROCEDURE — 25000003 PHARM REV CODE 250: Performed by: NURSE PRACTITIONER

## 2018-12-07 PROCEDURE — 25000003 PHARM REV CODE 250: Performed by: INTERNAL MEDICINE

## 2018-12-07 PROCEDURE — 84100 ASSAY OF PHOSPHORUS: CPT

## 2018-12-07 PROCEDURE — 25000003 PHARM REV CODE 250: Performed by: EMERGENCY MEDICINE

## 2018-12-07 PROCEDURE — 99232 SBSQ HOSP IP/OBS MODERATE 35: CPT | Mod: ,,, | Performed by: INTERNAL MEDICINE

## 2018-12-07 PROCEDURE — 63600175 PHARM REV CODE 636 W HCPCS: Performed by: INTERNAL MEDICINE

## 2018-12-07 PROCEDURE — 85025 COMPLETE CBC W/AUTO DIFF WBC: CPT

## 2018-12-07 RX ORDER — ENOXAPARIN SODIUM 100 MG/ML
40 INJECTION SUBCUTANEOUS
Status: DISCONTINUED | OUTPATIENT
Start: 2018-12-07 | End: 2018-12-07

## 2018-12-07 RX ORDER — ENOXAPARIN SODIUM 100 MG/ML
40 INJECTION SUBCUTANEOUS
Status: DISCONTINUED | OUTPATIENT
Start: 2018-12-07 | End: 2018-12-08

## 2018-12-07 RX ORDER — SODIUM,POTASSIUM PHOSPHATES 280-250MG
1 POWDER IN PACKET (EA) ORAL
Status: DISPENSED | OUTPATIENT
Start: 2018-12-07 | End: 2018-12-08

## 2018-12-07 RX ADMIN — OXYCODONE HYDROCHLORIDE AND ACETAMINOPHEN 1 TABLET: 10; 325 TABLET ORAL at 09:12

## 2018-12-07 RX ADMIN — ENOXAPARIN SODIUM 40 MG: 100 INJECTION SUBCUTANEOUS at 04:12

## 2018-12-07 RX ADMIN — POTASSIUM & SODIUM PHOSPHATES POWDER PACK 280-160-250 MG 1 PACKET: 280-160-250 PACK at 04:12

## 2018-12-07 RX ADMIN — FERROUS SULFATE TAB EC 325 MG (65 MG FE EQUIVALENT) 325 MG: 325 (65 FE) TABLET DELAYED RESPONSE at 10:12

## 2018-12-07 RX ADMIN — PRAVASTATIN SODIUM 40 MG: 40 TABLET ORAL at 09:12

## 2018-12-07 RX ADMIN — POTASSIUM & SODIUM PHOSPHATES POWDER PACK 280-160-250 MG 1 PACKET: 280-160-250 PACK at 09:12

## 2018-12-07 RX ADMIN — CLONAZEPAM 0.5 MG: 0.5 TABLET ORAL at 10:12

## 2018-12-07 RX ADMIN — OXYCODONE HYDROCHLORIDE AND ACETAMINOPHEN 1 TABLET: 10; 325 TABLET ORAL at 02:12

## 2018-12-07 RX ADMIN — CARBIDOPA AND LEVODOPA 2 TABLET: 25; 100 TABLET ORAL at 04:12

## 2018-12-07 RX ADMIN — SODIUM CHLORIDE: 0.9 INJECTION, SOLUTION INTRAVENOUS at 04:12

## 2018-12-07 RX ADMIN — IRBESARTAN 150 MG: 150 TABLET ORAL at 10:12

## 2018-12-07 RX ADMIN — STANDARDIZED SENNA CONCENTRATE AND DOCUSATE SODIUM 1 TABLET: 8.6; 5 TABLET, FILM COATED ORAL at 10:12

## 2018-12-07 RX ADMIN — Medication 500 MG: at 09:12

## 2018-12-07 RX ADMIN — HYDROCHLOROTHIAZIDE 12.5 MG: 12.5 CAPSULE ORAL at 10:12

## 2018-12-07 RX ADMIN — PANTOPRAZOLE SODIUM 40 MG: 40 TABLET, DELAYED RELEASE ORAL at 10:12

## 2018-12-07 RX ADMIN — CLONAZEPAM 0.5 MG: 0.5 TABLET ORAL at 09:12

## 2018-12-07 RX ADMIN — CARBIDOPA AND LEVODOPA 3 TABLET: 25; 100 TABLET, EXTENDED RELEASE ORAL at 09:12

## 2018-12-07 RX ADMIN — POTASSIUM & SODIUM PHOSPHATES POWDER PACK 280-160-250 MG 1 PACKET: 280-160-250 PACK at 12:12

## 2018-12-07 RX ADMIN — CARBIDOPA AND LEVODOPA 2 TABLET: 25; 100 TABLET ORAL at 10:12

## 2018-12-07 RX ADMIN — OMEGA-3 FATTY ACIDS CAP 1000 MG 1 CAPSULE: 1000 CAP at 10:12

## 2018-12-07 RX ADMIN — MULTIPLE VITAMINS W/ MINERALS TAB 1 TABLET: TAB at 10:12

## 2018-12-07 RX ADMIN — OXYCODONE HYDROCHLORIDE AND ACETAMINOPHEN 1 TABLET: 10; 325 TABLET ORAL at 10:12

## 2018-12-07 RX ADMIN — Medication 500 MG: at 10:12

## 2018-12-07 NOTE — PT/OT/SLP PROGRESS
Physical Therapy      Patient Name:  Maricarmen Woodward   MRN:  [id number]    PT orders acknowledged. Pt found to have displaced femoral neck fx- sx for LH hemiarthroplasty scheduled for later date . Await orders.    Lupe Rudolph, PT

## 2018-12-07 NOTE — PLAN OF CARE
Problem: Patient Care Overview  Goal: Individualization & Mutuality  Pt oriented to self. Bedrest maintained. Pt resting quietly in between care. IVF infusing per order. Jenkins catheter draining to gravity. VSS. Remains afebrile throughout my shift. SCD/ foot pump in use. Peripheral pulses intact. Patient remains fall free throughout my shift. Tele monitor in use. Comfort level established. Good pain control with prn medications. Bed low, brakes locked, SR up x2, call light within reach. Will continue to monitor.

## 2018-12-07 NOTE — PLAN OF CARE
12/06/18 2003   Patient Assessment/Suction   Level of Consciousness (AVPU) responds to voice   PRE-TX-O2-ETCO2   O2 Device (Oxygen Therapy) room air   SpO2 (!) 94 %   Pulse Oximetry Type Intermittent

## 2018-12-07 NOTE — PROGRESS NOTES
Progress Note  Hospital Medicine  Patient Name:Maricarmen Woodward  MRN:  [id number]  Patient Class: IP- Inpatient  Admit Date: 12/5/2018  Length of Stay: 2 days  Expected Discharge Date:   Attending Physician: Nichol Buchanan MD  Primary Care Provider:  Haris Brambila MD    SUBJECTIVE:     Principal Problem: Closed left hip fracture, initial encounter  Initial history of present illness: Maricarmen Woodward is a 89 y/o female with a medical history significant for Parkinson's disease and HTN who presented to the ED today with c/o L hip pain which began after an accidental fall from her wheelchair today.  Pt lives at home and has a  24 hr sitter.  She requires assistance to get from her bed to her wheelchair due to her Parkinson's disease.  Today, she hit a bump on her floor, causing her to slip out of the seat of her wheelchair, landing on her L hip.  Xray confirms a L hip fracture.  Pt denies recent fever, cough, chest pain, SOB, N/V or dysuria.  Denies further injury.  She will be admitted to the service of hospital medicine with an orthopedic consult.    PMH/PSH/SH/FH/Meds: reviewed.    Symptoms/Review of Systems: Son at bedside, patient without complaints. No shortness of breath, cough, chest pain or headache, fever or abdominal pain.     Diet:  NPO   Activity level: Bed rest  Pain: Controlled     OBJECTIVE:   Vital Signs (Most Recent):      Temp: 98.6 °F (37 °C) (12/07/18 0756)  Pulse: 86 (12/07/18 0756)  Resp: 18 (12/07/18 0756)  BP: (!) 149/65 (12/07/18 0756)  SpO2: 98 % (12/07/18 0822)       Vital Signs Range (Last 24H):  Temp:  [96.7 °F (35.9 °C)-98.8 °F (37.1 °C)]   Pulse:  [66-86]   Resp:  [12-18]   BP: (121-179)/(54-74)   SpO2:  [92 %-100 %]     Weight: 52.2 kg (115 lb)  Body mass index is 20.37 kg/m².    Intake/Output Summary (Last 24 hours) at 12/7/2018 1018  Last data filed at 12/7/2018 0600  Gross per 24 hour   Intake 4019.33 ml   Output 1200 ml   Net 2819.33 ml     Physical  Examination:  Constitutional: She is oriented to person, place, and time. She appears well-developed and well-nourished.   HENT:   Head: Normocephalic and atraumatic.   Mouth/Throat: Oropharynx is clear and moist.   Eyes: Conjunctivae and EOM are normal. Pupils are equal, round, and reactive to light.   Neck: Normal range of motion. Neck supple. No JVD present.   Cardiovascular: Normal rate, regular rhythm, normal heart sounds and intact distal pulses.   No murmur heard.  Pulmonary/Chest: Effort normal and breath sounds normal. No respiratory distress.   Abdominal: Soft. Bowel sounds are normal. She exhibits no distension. There is no tenderness.   Genitourinary:   Genitourinary Comments: deferred   Musculoskeletal: She exhibits tenderness (left hip). She exhibits no deformity.   Neurological: She is alert and oriented to person, place, and time.   Skin: Skin is warm and dry. Capillary refill takes 2 to 3 seconds. No rash noted.   Psychiatric: She has a normal mood and affect. Her behavior is normal. Judgment and thought content normal.   Nursing note and vitals reviewed.  CRANIAL NERVES   CN III, IV, VI   Pupils are equal, round, and reactive to light.  Extraocular motions are normal.     CBC:  Recent Labs   Lab 12/05/18  1647 12/06/18  0637 12/07/18  0516   WBC 6.20 11.50 7.70   RBC 3.54* 3.72* 3.11*   HGB 11.6* 12.3 10.2*   HCT 34.4* 35.7* 30.1*    164 170   MCV 97 96 97   MCH 32.8* 33.0* 32.8*   MCHC 33.8 34.3 33.8   BMP  Recent Labs   Lab 12/05/18  1647 12/06/18  0637 12/07/18  0516    121* 86    139 138   K 3.7 3.9 3.6    104 108   CO2 27 24 23   BUN 19 18 12   CREATININE 0.9 0.9 0.7   CALCIUM 10.2 9.6 8.7   MG  --  2.0 1.8      Diagnostic Results:  Microbiology Results (last 7 days)     Procedure Component Value Units Date/Time    Urine culture [[id number]] Collected:  12/06/18 1928    Order Status:  No result Specimen:  Urine Updated:  12/06/18 1945         CXR: Stephanie  reticulonodular scarring of the right lung apex, stable.  Atherosclerosis.    Left hip x-ray: Impacted left femoral neck fracture.    Assessment/Plan:     * Closed left hip fracture, initial encounter     NPO after MN. Hip fracture repair surgery planned by Dr. Mccormack tomorrow.  PT/OT consult  Case management for discharge planning  Requiring IV narcotics for pain control.  Would need aggressive IS post-operatively.      Essential hypertension     Chronic problem. Will continue chronic medications and monitor for any changes, adjusting as needed.     Parkinson's disease     Chronic issue.  Fall precautions.  Continue chronic meds.     Anemia     Chronic problem.  Stable.  Monitor H/H.  Continue iron supplementation.  Transfuse for hemodynamic instability and/or H/H <7/21  Check iron, TIBC, ferritin, folate  T&S pending possible orthopedic procedure.      Hypophosphatemia     Replete phosphorus and follow level.     DVT prophylaxis: Lovenox 40 mg SQ q day.     Nichol Buchanan MD  Department of Hospital Medicine   [facility]

## 2018-12-07 NOTE — PLAN OF CARE
Problem: Patient Care Overview  Goal: Plan of Care Review  Outcome: Ongoing (interventions implemented as appropriate)  Pt has remained free from injury this shift, she is oriented to self only and has to be reminded of plan of care frequently.  She has a nephew that has signed all consents and is her closest family member here.  She has been medicated for pain with scheduled medication this shift.  She is eating today and NPO after midnight tonight.  Jenkins to gravity in place with yellow urine noted.  No elevated temp today.  She has bed alarm on for added safety and was encouraged to call for assistance or needs.  Bed is in low and locked position and call light is in reach.

## 2018-12-07 NOTE — PLAN OF CARE
Per Dr Mccormack, pt is going to the OR tomorrow. Feed today and NPO after midnight....RACHEL Ndiaye CM

## 2018-12-07 NOTE — PLAN OF CARE
Problem: Patient Care Overview  Goal: Plan of Care Review  Outcome: Revised  Pt is awake and alert, disoriented throughout shift, reoriented as needed.  IVF infusing, no redness or swelling at site.  Jenkins catheter maintained, care performed, emptied as needed.  Cardiac monitoring in place, NSR.  VSS, in NAD, pt remains afebrile.  Pt remains free from injury.  Bed in low position, wheels locked, call light within reach.  Pt verbalized understanding of POC.  Will continue to monitor.

## 2018-12-07 NOTE — PT/OT/SLP PROGRESS
Occupational Therapy      Patient Name:  Maricarmen Woodward   MRN:  [id number]    OT orders acknowledged. Pt found to have displaced femoral neck fx- sx for LH hemiarthroplasty scheduled for later date. Await orders.      Castro Rondon, OT  12/7/2018

## 2018-12-07 NOTE — OP NOTE
DATE OF PROCEDURE:  12/06/2018.    PREOPERATIVE DIAGNOSIS:  Left hip fracture.    POSTOPERATIVE DIAGNOSIS:  Left hip fracture.    PROCEDURE:  Attempted percutaneous pinning of left hip.    HISTORY:  Ms. Woodward is a 89-year-old woman who presented to the ER   yesterday with a complaint of hip pain following a fall.  She was diagnosed with   a nondisplaced femoral neck fracture.  We discussed with her family our   treatment options and we elected to proceed with a percutaneous pinning of the   left hip.  We discussed risks and benefits including the potential for   incomplete pain relief and the need for further procedures.  Risks and benefits   were explained to the family who expressed they understood and wished to   proceed.    PROCEDURE IN DETAIL:  The patient was then brought back to the Operating Room,   placed on to the fracture table in a supine position.  We then began by   localizing our fracture using the C-arm in AP and lateral projections.  Upon   visualization of the C-arm, we realized that the fracture had displaced almost   completely and at this point it was not amenable to a percutaneous pinning and   will require a hemiarthroplasty.  Because the equipment was not available for   the case at this time, we will go ahead and wake her up and bring her back to   her room and plan to prepare to do a left hip hemiarthroplasty at a later date.    This was explained to the family who expressed that they understood.      BRIAN/IN  dd: 12/06/2018 13:22:05 (CST)  td: 12/06/2018 18:39:27 (CST)  Doc ID   #[id number]  Job ID #[id number]    CC:

## 2018-12-08 ENCOUNTER — ANESTHESIA EVENT (OUTPATIENT)
Dept: SURGERY | Facility: HOSPITAL | Age: 83
DRG: 470 | End: 2018-12-08
Payer: MEDICARE

## 2018-12-08 ENCOUNTER — ANESTHESIA (OUTPATIENT)
Dept: SURGERY | Facility: HOSPITAL | Age: 83
DRG: 470 | End: 2018-12-08
Payer: MEDICARE

## 2018-12-08 LAB
ALBUMIN SERPL BCP-MCNC: 3 G/DL
ALP SERPL-CCNC: 42 U/L
ALT SERPL W/O P-5'-P-CCNC: <5 U/L
ANION GAP SERPL CALC-SCNC: 5 MMOL/L
AST SERPL-CCNC: 21 U/L
BACTERIA UR CULT: NO GROWTH
BASOPHILS # BLD AUTO: 0 K/UL
BASOPHILS NFR BLD: 0.6 %
BILIRUB SERPL-MCNC: 0.5 MG/DL
BUN SERPL-MCNC: 11 MG/DL
CALCIUM SERPL-MCNC: 8.5 MG/DL
CHLORIDE SERPL-SCNC: 109 MMOL/L
CO2 SERPL-SCNC: 24 MMOL/L
CREAT SERPL-MCNC: 0.9 MG/DL
DIFFERENTIAL METHOD: ABNORMAL
EOSINOPHIL # BLD AUTO: 0.2 K/UL
EOSINOPHIL NFR BLD: 2.6 %
ERYTHROCYTE [DISTWIDTH] IN BLOOD BY AUTOMATED COUNT: 12.8 %
EST. GFR  (AFRICAN AMERICAN): >60 ML/MIN/1.73 M^2
EST. GFR  (NON AFRICAN AMERICAN): 56 ML/MIN/1.73 M^2
GLUCOSE SERPL-MCNC: 107 MG/DL
HCT VFR BLD AUTO: 28.5 %
HGB BLD-MCNC: 9.7 G/DL
LYMPHOCYTES # BLD AUTO: 1.9 K/UL
LYMPHOCYTES NFR BLD: 24.8 %
MAGNESIUM SERPL-MCNC: 1.6 MG/DL
MCH RBC QN AUTO: 33 PG
MCHC RBC AUTO-ENTMCNC: 34.2 G/DL
MCV RBC AUTO: 97 FL
MONOCYTES # BLD AUTO: 0.4 K/UL
MONOCYTES NFR BLD: 5.3 %
NEUTROPHILS # BLD AUTO: 5 K/UL
NEUTROPHILS NFR BLD: 66.7 %
PHOSPHATE SERPL-MCNC: 2.8 MG/DL
PLATELET # BLD AUTO: 160 K/UL
PMV BLD AUTO: 8.3 FL
POTASSIUM SERPL-SCNC: 3.6 MMOL/L
PROT SERPL-MCNC: 5.7 G/DL
RBC # BLD AUTO: 2.95 M/UL
SODIUM SERPL-SCNC: 138 MMOL/L
WBC # BLD AUTO: 7.5 K/UL

## 2018-12-08 PROCEDURE — 25000003 PHARM REV CODE 250: Performed by: NURSE ANESTHETIST, CERTIFIED REGISTERED

## 2018-12-08 PROCEDURE — 36000710: Performed by: ORTHOPAEDIC SURGERY

## 2018-12-08 PROCEDURE — 25000003 PHARM REV CODE 250: Performed by: EMERGENCY MEDICINE

## 2018-12-08 PROCEDURE — 85025 COMPLETE CBC W/AUTO DIFF WBC: CPT

## 2018-12-08 PROCEDURE — 71000039 HC RECOVERY, EACH ADD'L HOUR: Performed by: ORTHOPAEDIC SURGERY

## 2018-12-08 PROCEDURE — 36000711: Performed by: ORTHOPAEDIC SURGERY

## 2018-12-08 PROCEDURE — D9220A PRA ANESTHESIA: Mod: CRNA,,, | Performed by: NURSE ANESTHETIST, CERTIFIED REGISTERED

## 2018-12-08 PROCEDURE — 63600175 PHARM REV CODE 636 W HCPCS: Performed by: ORTHOPAEDIC SURGERY

## 2018-12-08 PROCEDURE — 0SRS01Z REPLACEMENT OF LEFT HIP JOINT, FEMORAL SURFACE WITH METAL SYNTHETIC SUBSTITUTE, OPEN APPROACH: ICD-10-PCS | Performed by: ORTHOPAEDIC SURGERY

## 2018-12-08 PROCEDURE — 36415 COLL VENOUS BLD VENIPUNCTURE: CPT

## 2018-12-08 PROCEDURE — 37000008 HC ANESTHESIA 1ST 15 MINUTES: Performed by: ORTHOPAEDIC SURGERY

## 2018-12-08 PROCEDURE — 94761 N-INVAS EAR/PLS OXIMETRY MLT: CPT

## 2018-12-08 PROCEDURE — 63600175 PHARM REV CODE 636 W HCPCS: Performed by: EMERGENCY MEDICINE

## 2018-12-08 PROCEDURE — 63600175 PHARM REV CODE 636 W HCPCS: Performed by: NURSE ANESTHETIST, CERTIFIED REGISTERED

## 2018-12-08 PROCEDURE — 83735 ASSAY OF MAGNESIUM: CPT

## 2018-12-08 PROCEDURE — 84100 ASSAY OF PHOSPHORUS: CPT

## 2018-12-08 PROCEDURE — 27201423 OPTIME MED/SURG SUP & DEVICES STERILE SUPPLY: Performed by: ORTHOPAEDIC SURGERY

## 2018-12-08 PROCEDURE — C1776 JOINT DEVICE (IMPLANTABLE): HCPCS | Performed by: ORTHOPAEDIC SURGERY

## 2018-12-08 PROCEDURE — 71000033 HC RECOVERY, INTIAL HOUR: Performed by: ORTHOPAEDIC SURGERY

## 2018-12-08 PROCEDURE — 80053 COMPREHEN METABOLIC PANEL: CPT

## 2018-12-08 PROCEDURE — 25000003 PHARM REV CODE 250: Performed by: ORTHOPAEDIC SURGERY

## 2018-12-08 PROCEDURE — 63600175 PHARM REV CODE 636 W HCPCS: Performed by: ANESTHESIOLOGY

## 2018-12-08 PROCEDURE — D9220A PRA ANESTHESIA: Mod: ANES,,, | Performed by: ANESTHESIOLOGY

## 2018-12-08 PROCEDURE — 11000001 HC ACUTE MED/SURG PRIVATE ROOM

## 2018-12-08 PROCEDURE — 37000009 HC ANESTHESIA EA ADD 15 MINS: Performed by: ORTHOPAEDIC SURGERY

## 2018-12-08 DEVICE — IMPLANTABLE DEVICE: Type: IMPLANTABLE DEVICE | Site: HIP | Status: FUNCTIONAL

## 2018-12-08 DEVICE — STEM FEM SZ6 132DEG 35MM: Type: IMPLANTABLE DEVICE | Site: HIP | Status: FUNCTIONAL

## 2018-12-08 DEVICE — HEAD FEM ENDO UNI MOD 43MM: Type: IMPLANTABLE DEVICE | Site: HIP | Status: FUNCTIONAL

## 2018-12-08 RX ORDER — CEFAZOLIN SODIUM 2 G/50ML
2 SOLUTION INTRAVENOUS
Status: COMPLETED | OUTPATIENT
Start: 2018-12-08 | End: 2018-12-09

## 2018-12-08 RX ORDER — ENOXAPARIN SODIUM 100 MG/ML
40 INJECTION SUBCUTANEOUS
Status: DISCONTINUED | OUTPATIENT
Start: 2018-12-09 | End: 2018-12-11 | Stop reason: HOSPADM

## 2018-12-08 RX ORDER — ASPIRIN 325 MG
325 TABLET ORAL 2 TIMES DAILY
Status: DISCONTINUED | OUTPATIENT
Start: 2018-12-08 | End: 2018-12-11 | Stop reason: HOSPADM

## 2018-12-08 RX ORDER — ACETAMINOPHEN 10 MG/ML
1000 INJECTION, SOLUTION INTRAVENOUS EVERY 8 HOURS
Status: DISCONTINUED | OUTPATIENT
Start: 2018-12-08 | End: 2018-12-08

## 2018-12-08 RX ORDER — GLYCOPYRROLATE 0.2 MG/ML
INJECTION INTRAMUSCULAR; INTRAVENOUS
Status: DISCONTINUED | OUTPATIENT
Start: 2018-12-08 | End: 2018-12-08

## 2018-12-08 RX ORDER — DEXAMETHASONE SODIUM PHOSPHATE 4 MG/ML
INJECTION, SOLUTION INTRA-ARTICULAR; INTRALESIONAL; INTRAMUSCULAR; INTRAVENOUS; SOFT TISSUE
Status: DISCONTINUED | OUTPATIENT
Start: 2018-12-08 | End: 2018-12-08

## 2018-12-08 RX ORDER — ROCURONIUM BROMIDE 10 MG/ML
INJECTION, SOLUTION INTRAVENOUS
Status: DISCONTINUED | OUTPATIENT
Start: 2018-12-08 | End: 2018-12-08

## 2018-12-08 RX ORDER — HYDROCODONE BITARTRATE AND ACETAMINOPHEN 10; 325 MG/1; MG/1
1 TABLET ORAL EVERY 4 HOURS PRN
Status: DISCONTINUED | OUTPATIENT
Start: 2018-12-08 | End: 2018-12-11 | Stop reason: HOSPADM

## 2018-12-08 RX ORDER — FENTANYL CITRATE 50 UG/ML
INJECTION, SOLUTION INTRAMUSCULAR; INTRAVENOUS
Status: DISCONTINUED | OUTPATIENT
Start: 2018-12-08 | End: 2018-12-08

## 2018-12-08 RX ORDER — PROPOFOL 10 MG/ML
VIAL (ML) INTRAVENOUS
Status: DISCONTINUED | OUTPATIENT
Start: 2018-12-08 | End: 2018-12-08

## 2018-12-08 RX ORDER — LIDOCAINE HCL/PF 100 MG/5ML
SYRINGE (ML) INTRAVENOUS
Status: DISCONTINUED | OUTPATIENT
Start: 2018-12-08 | End: 2018-12-08

## 2018-12-08 RX ORDER — HYDROCODONE BITARTRATE AND ACETAMINOPHEN 5; 325 MG/1; MG/1
1 TABLET ORAL EVERY 4 HOURS PRN
Status: DISCONTINUED | OUTPATIENT
Start: 2018-12-08 | End: 2018-12-11 | Stop reason: HOSPADM

## 2018-12-08 RX ORDER — KETAMINE HYDROCHLORIDE 100 MG/ML
INJECTION, SOLUTION INTRAMUSCULAR; INTRAVENOUS
Status: DISCONTINUED | OUTPATIENT
Start: 2018-12-08 | End: 2018-12-08

## 2018-12-08 RX ORDER — SUCCINYLCHOLINE CHLORIDE 20 MG/ML
INJECTION INTRAMUSCULAR; INTRAVENOUS
Status: DISCONTINUED | OUTPATIENT
Start: 2018-12-08 | End: 2018-12-08

## 2018-12-08 RX ORDER — CEFAZOLIN SODIUM 2 G/50ML
2 SOLUTION INTRAVENOUS
Status: DISCONTINUED | OUTPATIENT
Start: 2018-12-08 | End: 2018-12-08

## 2018-12-08 RX ORDER — SODIUM CHLORIDE, SODIUM LACTATE, POTASSIUM CHLORIDE, CALCIUM CHLORIDE 600; 310; 30; 20 MG/100ML; MG/100ML; MG/100ML; MG/100ML
INJECTION, SOLUTION INTRAVENOUS CONTINUOUS PRN
Status: DISCONTINUED | OUTPATIENT
Start: 2018-12-08 | End: 2018-12-08

## 2018-12-08 RX ORDER — SODIUM CHLORIDE 0.9 % (FLUSH) 0.9 %
3 SYRINGE (ML) INJECTION
Status: DISCONTINUED | OUTPATIENT
Start: 2018-12-08 | End: 2018-12-11 | Stop reason: HOSPADM

## 2018-12-08 RX ORDER — SODIUM CHLORIDE 9 MG/ML
INJECTION, SOLUTION INTRAVENOUS CONTINUOUS
Status: DISCONTINUED | OUTPATIENT
Start: 2018-12-08 | End: 2018-12-11

## 2018-12-08 RX ORDER — ACETAMINOPHEN 10 MG/ML
INJECTION, SOLUTION INTRAVENOUS
Status: DISCONTINUED | OUTPATIENT
Start: 2018-12-08 | End: 2018-12-08

## 2018-12-08 RX ORDER — IBUPROFEN 400 MG/1
800 TABLET ORAL 3 TIMES DAILY
Status: DISCONTINUED | OUTPATIENT
Start: 2018-12-08 | End: 2018-12-11 | Stop reason: HOSPADM

## 2018-12-08 RX ORDER — ONDANSETRON 2 MG/ML
INJECTION INTRAMUSCULAR; INTRAVENOUS
Status: DISCONTINUED | OUTPATIENT
Start: 2018-12-08 | End: 2018-12-08

## 2018-12-08 RX ORDER — CEFAZOLIN SODIUM 1 G/3ML
INJECTION, POWDER, FOR SOLUTION INTRAMUSCULAR; INTRAVENOUS
Status: DISCONTINUED | OUTPATIENT
Start: 2018-12-08 | End: 2018-12-08

## 2018-12-08 RX ORDER — SODIUM CHLORIDE 0.9 % (FLUSH) 0.9 %
5 SYRINGE (ML) INJECTION
Status: DISCONTINUED | OUTPATIENT
Start: 2018-12-08 | End: 2018-12-11 | Stop reason: HOSPADM

## 2018-12-08 RX ORDER — HYDROMORPHONE HYDROCHLORIDE 2 MG/ML
0.2 INJECTION, SOLUTION INTRAMUSCULAR; INTRAVENOUS; SUBCUTANEOUS EVERY 5 MIN PRN
Status: DISCONTINUED | OUTPATIENT
Start: 2018-12-08 | End: 2018-12-08

## 2018-12-08 RX ORDER — ACETAMINOPHEN 10 MG/ML
1000 INJECTION, SOLUTION INTRAVENOUS EVERY 8 HOURS
Status: DISPENSED | OUTPATIENT
Start: 2018-12-08 | End: 2018-12-09

## 2018-12-08 RX ORDER — NEOSTIGMINE METHYLSULFATE 1 MG/ML
INJECTION, SOLUTION INTRAVENOUS
Status: DISCONTINUED | OUTPATIENT
Start: 2018-12-08 | End: 2018-12-08

## 2018-12-08 RX ADMIN — SODIUM CHLORIDE: 0.9 INJECTION, SOLUTION INTRAVENOUS at 06:12

## 2018-12-08 RX ADMIN — LIDOCAINE HYDROCHLORIDE 100 MG: 20 INJECTION, SOLUTION INTRAVENOUS at 12:12

## 2018-12-08 RX ADMIN — CEFAZOLIN SODIUM 2 G: 2 SOLUTION INTRAVENOUS at 09:12

## 2018-12-08 RX ADMIN — KETAMINE HYDROCHLORIDE 25 MG: 100 INJECTION, SOLUTION, CONCENTRATE INTRAMUSCULAR; INTRAVENOUS at 12:12

## 2018-12-08 RX ADMIN — SODIUM CHLORIDE, SODIUM LACTATE, POTASSIUM CHLORIDE, AND CALCIUM CHLORIDE: .6; .31; .03; .02 INJECTION, SOLUTION INTRAVENOUS at 01:12

## 2018-12-08 RX ADMIN — FENTANYL CITRATE 50 MCG: 50 INJECTION, SOLUTION INTRAMUSCULAR; INTRAVENOUS at 01:12

## 2018-12-08 RX ADMIN — ASPIRIN 325 MG ORAL TABLET 325 MG: 325 PILL ORAL at 06:12

## 2018-12-08 RX ADMIN — MORPHINE SULFATE 2 MG: 2 INJECTION, SOLUTION INTRAMUSCULAR; INTRAVENOUS at 10:12

## 2018-12-08 RX ADMIN — GLYCOPYRROLATE 0.6 MG: 0.2 INJECTION, SOLUTION INTRAMUSCULAR; INTRAVENOUS at 01:12

## 2018-12-08 RX ADMIN — HYDROMORPHONE HYDROCHLORIDE 0.2 MG: 2 INJECTION, SOLUTION INTRAMUSCULAR; INTRAVENOUS; SUBCUTANEOUS at 02:12

## 2018-12-08 RX ADMIN — CARBIDOPA AND LEVODOPA 3 TABLET: 25; 100 TABLET, EXTENDED RELEASE ORAL at 10:12

## 2018-12-08 RX ADMIN — CLONAZEPAM 0.5 MG: 0.5 TABLET ORAL at 10:12

## 2018-12-08 RX ADMIN — ROCURONIUM BROMIDE 5 MG: 10 INJECTION, SOLUTION INTRAVENOUS at 12:12

## 2018-12-08 RX ADMIN — SODIUM CHLORIDE, SODIUM LACTATE, POTASSIUM CHLORIDE, AND CALCIUM CHLORIDE: .6; .31; .03; .02 INJECTION, SOLUTION INTRAVENOUS at 12:12

## 2018-12-08 RX ADMIN — ACETAMINOPHEN 1000 MG: 10 INJECTION, SOLUTION INTRAVENOUS at 12:12

## 2018-12-08 RX ADMIN — FENTANYL CITRATE 50 MCG: 50 INJECTION, SOLUTION INTRAMUSCULAR; INTRAVENOUS at 12:12

## 2018-12-08 RX ADMIN — CEFAZOLIN 2 G: 1 INJECTION, POWDER, FOR SOLUTION INTRAVENOUS at 12:12

## 2018-12-08 RX ADMIN — ACETAMINOPHEN 1000 MG: 10 INJECTION, SOLUTION INTRAVENOUS at 09:12

## 2018-12-08 RX ADMIN — CARBIDOPA AND LEVODOPA 2 TABLET: 25; 100 TABLET ORAL at 06:12

## 2018-12-08 RX ADMIN — SODIUM CHLORIDE: 0.9 INJECTION, SOLUTION INTRAVENOUS at 10:12

## 2018-12-08 RX ADMIN — PROPOFOL 90 MG: 10 INJECTION, EMULSION INTRAVENOUS at 12:12

## 2018-12-08 RX ADMIN — NEOSTIGMINE METHYLSULFATE 4 MG: 1 INJECTION INTRAVENOUS at 01:12

## 2018-12-08 RX ADMIN — MORPHINE SULFATE 2 MG: 2 INJECTION, SOLUTION INTRAMUSCULAR; INTRAVENOUS at 06:12

## 2018-12-08 RX ADMIN — ROCURONIUM BROMIDE 25 MG: 10 INJECTION, SOLUTION INTRAVENOUS at 12:12

## 2018-12-08 RX ADMIN — DEXAMETHASONE SODIUM PHOSPHATE 4 MG: 4 INJECTION, SOLUTION INTRAMUSCULAR; INTRAVENOUS at 12:12

## 2018-12-08 RX ADMIN — ONDANSETRON 4 MG: 2 INJECTION, SOLUTION INTRAMUSCULAR; INTRAVENOUS at 12:12

## 2018-12-08 RX ADMIN — SUCCINYLCHOLINE CHLORIDE 160 MG: 20 INJECTION, SOLUTION INTRAMUSCULAR; INTRAVENOUS at 12:12

## 2018-12-08 NOTE — PLAN OF CARE
Report to Juli. VS  Stable, no complaint of pain or nausea voiced.Family at bedside..   Resting comfortably with eyes closed.

## 2018-12-08 NOTE — PLAN OF CARE
Problem: Patient Care Overview  Goal: Plan of Care Review  Outcome: Ongoing (interventions implemented as appropriate)   Resting quietly during rounding. Scheduled for surg this AM. Kept NPO. Day nurse Juli verified with the physician to ensure enoxaparin was to be given. She administered as ordered. Confused at times. Jenkins intact and patent draining cleas yellow urine to  bag. Will continue to monitor.

## 2018-12-08 NOTE — TRANSFER OF CARE
"Anesthesia Transfer of Care Note    Patient: Maricarmen Woodward    Procedure(s) Performed: Procedure(s) (LRB):  HEMIARTHROPLASTY, HIP, Terese, peg board, first assist (Left)    Patient location: PACU    Anesthesia Type: general    Transport from OR: Transported from OR on 2-3 L/min O2 by NC with adequate spontaneous ventilation    Post pain: adequate analgesia    Post assessment: no apparent anesthetic complications    Post vital signs: stable    Level of consciousness: awake    Nausea/Vomiting: no nausea/vomiting    Complications: none    Transfer of care protocol was followed      Last vitals:   Visit Vitals  BP (!) 157/67   Pulse 94   Temp 37.3 °C (99.2 °F)   Resp 18   Ht 5' 3" (1.6 m)   Wt 52.2 kg (115 lb)   SpO2 96%   Breastfeeding? No   BMI 20.37 kg/m²     "

## 2018-12-08 NOTE — OP NOTE
Orthopaedic Surgery Operative Report    DATE OF PROCEDURE: 12/08/2018  PREOPERATIVE DIAGNOSIS: left femoral neck fracture.   POSTOPERATIVE DIAGNOSIS: left femoral neck fracture.   PROCEDURE PERFORMED: left hip hemiarthroplasty.   SURGEON: Cheng Mccormack M.D.   ANESTHESIA: General endotracheal.   ESTIMATED BLOOD LOSS: 150 cc.   IMPLANTS: [place] Accolade 2  Size 6 stem with a 43 -4 mm Unitrax head  COMPLICATIONS: none     INDICATIONS FOR PROCEDURE: The patient is an 89 y.o. female who had a low energy fall sustaining a left femoral neck fracture. The patient presented to the Emergency Department and was evaluated by Orthopaedics and Internal Medicine. This procedure, as well as, alternatives to this procedure was discussed at length with the patient. Risks and benefits were also discussed. Risks include but are not limited to bleeding, infection, numbness, scarring, damage to major neurovascular structures, limb length/rotation discrepancy, failure of hardware, need for further surgery, loss of function, myocardial infarction, deep venous thrombosis, pulmonary embolism, nonunion, malunion and death. Patient understood these well and consented for the procedure as described.    PROCEDURE IN DETAIL: The patient was identified in the preoperative holding area and site was marked. The patient was wheeled into the Operating Room and general endotracheal anesthesia was induced in the patient's hospital bed. The patient was then moved over to the operative table and placed into a lateral decubitus position with the fractured hip up. The operative hip and lower extremity were then prepped and draped in the usual sterile fashion. A timeout was taken to confirm the patient, site, surgery, surgeon and administration of preoperative antibiotics. All agreed and we proceeded. Using a standard posterior approach, an approximately 15 cm skin incision was made followed by subcutaneous tissue dissection. The appropriate level of  the iliotibial band and gluteal fascia was identified and incised in line with the skin incision. Bursa was minimally excised. The short external rotators were identified and the gluteus minimus was split from the piriformis and retracted proximally. The piriformis was tagged with a #2 Ethibond suture and incised at its insertion. The obturator internus and conjoined tendon were also incised and tagged at their insertion. At this point, a capsulotomy was made and tagged with a #1 Vicryl suture for repair. The capsulotomy was carried both inferiorly and superiorly along the remnant of the femoral neck for added exposure.  The femoral neck fracture was identified and it was noted to be at the appropriate height above the lesser trochanter at the calcar. The femoral head was then removed with a corkscrew. The ligamentum teres remnant was removed from within the acetabular fovea using electrocautery.  The acetabulum was sized and a 43 mm head trial was noted be appropriate. This was then trialed and found to have sufficient congruency with the acetabulum. Attention was then given to the femoral canal, starting with the a . Box cutting was followed by the canal entry reamer, followed by broaching.  We trialed up to a size 6 and this was found to be most stable.  The femoral stem was placed in the appropriate position . A trialling was again undertaken at this time with a 43 -4 mm head, and it was noted to be stable up to 90 degrees of internal rotation and 90 degrees of flexion. Final head and sleeve were placed. The hip was again reduced and noted to be stable. The wound was copiously irrigated again with normal saline solution. The short external rotators and capsule were repaired to the greater trochanter with two drill holes. The fascia was closed with 0 and #1 Vicryl suture in figure-of-eight fashion. Subcutaneous tissues were closed with 3-0 Vicryl suture and the skin with 3-0 Nylon in a horizontal  mattress fashion. Sterile dressings were applied. All instrument and sponge counts were reported correct at the end of the case. There were no complications. The patient was put back into a supine position. The patient was extubated, awakened and taken to the post anesthesia care unit in stable condition.     PLAN FOR THE PATIENT: Patient will be transferred back to the hospital floor for physical therapy directed early mobilization of operative limb.     Cheng Mccormack MD  [facility]

## 2018-12-08 NOTE — CONSULTS
Orthopaedic Surgery History and Physical     History of present illness:   Maricarmen Woodward is a 89 y.o. female who presents with LEFT hip pain s/p fall.  She was taken back for percutaneous screws but was found to be displaced.  Consultation was placed for a hip surgery.      Allergies:   Review of patient's allergies indicates:  No Known Allergies    Past medical history:   Past Medical History:   Diagnosis Date    Arthritis     Hypertension     Parkinson's disease        Past surgical history:  Past Surgical History:   Procedure Laterality Date    COLONOSCOPY N/A 4/19/2012    Performed by Raf Pizano [facility] at [facility]    EGD (ESOPHAGOGASTRODUODENOSCOPY) N/A 5/15/2012    Performed by Raf [facility]ano [facility] at [facility]    EGD (ESOPHAGOGASTRODUODENOSCOPY) N/A 4/18/2012    Performed by Raf [facility]ano MD at [facility]    FRACTURE SURGERY      JOINT REPLACEMENT      right hip replacement       Social history:   Reviewed per EPIC history for tobacco or alcohol use     Medications:    Current Facility-Administered Medications:     0.9%  NaCl infusion, , Intravenous, Continuous, BERNIE Peterson, Last Rate: 100 mL/hr at 12/07/18 1655    acetaminophen tablet 650 mg, 650 mg, Oral, Q6H PRN, Harley Regalado MD    calcium carbonate (OS-JALIL) tablet 500 mg, 500 mg, Oral, BID, EBRNIE Peterson, 500 mg at 12/07/18 1030    carbidopa-levodopa  mg per tablet 2 tablet, 2 tablet, Oral, BID WM, Harley Regalado MD, 2 tablet at 12/07/18 1648    carbidopa-levodopa  mg TBSR 3 tablet, 3 tablet, Oral, QHS, Harley Regalado MD, 3 tablet at 12/06/18 2149    clonazePAM tablet 0.5 mg, 0.5 mg, Oral, BID, Harley Regalado MD, 0.5 mg at 12/07/18 1024    enoxaparin injection 40 mg, 40 mg, Subcutaneous, Q24H, Nichol Buchanan MD, 40 mg at 12/07/18 1651    ferrous sulfate EC tablet 325 mg, 325 mg, Oral, Daily, BERNIE Peterson, 325 mg at 12/07/18 1027    hydroCHLOROthiazide tablet 12.5 mg, 12.5 mg,  Oral, Daily, Harley Regalado MD, 12.5 mg at 12/07/18 1027    irbesartan tablet 150 mg, 150 mg, Oral, Daily, Harley Regalado MD, 150 mg at 12/07/18 1030    lactated ringers infusion, 75 mL/hr, Intravenous, Continuous, Nuno Pickens MD, Stopped at 12/06/18 1415    lactated ringers infusion, , Intravenous, Continuous, Nuno Pickens MD, Last Rate: 10 mL/hr at 12/06/18 1057    morphine injection 2 mg, 2 mg, Intravenous, Q4H PRN, Harley Regalado MD, 2 mg at 12/06/18 0525    multivit-iron-FA-calcium-mins 9 mg iron-400 mcg tablet Tab 1 tablet, 1 tablet, Oral, Daily, BERNIE Peterson, 1 tablet at 12/07/18 1028    omega 3-dha-epa-fish oil 1,000 mg (120 mg-180 mg) Cap 1 capsule, 1 capsule, Oral, Daily, BERNIE Peterson, 1 capsule at 12/07/18 1030    ondansetron injection 4 mg, 4 mg, Intravenous, Q8H PRN, Harley Regalado MD    oxyCODONE-acetaminophen  mg per tablet 1 tablet, 1 tablet, Oral, TID, Harley Regalado MD, 1 tablet at 12/07/18 1413    pantoprazole EC tablet 40 mg, 40 mg, Oral, Daily, Harley Regalado MD, 40 mg at 12/07/18 1024    potassium, sodium phosphates 280-160-250 mg packet 1 packet, 1 packet, Oral, QID (AC & HS), Nichol Buchanan MD, 1 packet at 12/07/18 1648    pravastatin tablet 40 mg, 40 mg, Oral, QHS, Harley Regalado MD, 40 mg at 12/06/18 2149    senna-docusate 8.6-50 mg per tablet 1 tablet, 1 tablet, Oral, Daily, Harley Regalado MD, 1 tablet at 12/07/18 1024    senna-docusate 8.6-50 mg per tablet 1 tablet, 1 tablet, Oral, BID PRN, Harley Regalado MD    tiZANidine tablet 4 mg, 4 mg, Oral, Q8H PRN, Cathy Booker, BERNIE    Review of systems:  Denies chest pain, palpitations, shortness of breath.   Denies excessive thirst, urination or heat or cold intolerance.   Denies nausea, vomiting, melena or hematochezia.    Denies fever, chills, night sweats, weight loss.    Denies dysuria or hematuria.   Denies history of anxiety or depression.   Denies any  skin abnormalities or rash.   Denies upper or lower extremity paresthesias or lightheadedness.    Denies cough, shortness of breath or hemoptysis.     Physical Exam:   Vitals:    12/07/18 0756 12/07/18 0822 12/07/18 1342 12/07/18 1616   BP: (!) 149/65  (!) 125/59 129/60   BP Location:       Patient Position:       Pulse: 86  91 81   Resp: 18 18 16   Temp: 98.6 °F (37 °C)  99.8 °F (37.7 °C) 98.6 °F (37 °C)   TempSrc:       SpO2: (!) 94% 98% 96% 95%   Weight:       Height:         Recent Labs   Lab 12/07/18  0516   CALCIUM 8.7   PROT 5.7*      K 3.6   CO2 23      BUN 12   CREATININE 0.7     Recent Labs   Lab 12/07/18  0516   WBC 7.70   RBC 3.11*   HGB 10.2*   HCT 30.1*        Recent Labs   Lab 12/05/18  1647   INR 1.0       Awake/alert/oriented x2, No acute distress, Afebrile, Vital signs stable  Normocephalic, Atraumatic  Heart beating at normal rate  Good inspiratory effort with unlaboured breathing  Abdomen soft/nondistended/nontender    left lower extremity  Resting position of the limb is shortened and externally rotated compared to contralateral  Motor intact L2-S1  Sensation intact L2-S1  2+ PT/DP pulses  Skin intact        Imaging:  Radiographs of the Left hip demonstrate no fracture/dislocation.  There is mild femoracetabular degeneration.        Assessment:   89 y.o. female with LEFT hip fracture      Plan:   Will proceed to OR for LEFT hip hemiarthroplasty  Consent procured from POA   NPO at midnight    Cheng Mccormack MD  [facility]

## 2018-12-08 NOTE — PLAN OF CARE
12/07/18 2027   Patient Assessment/Suction   Level of Consciousness (AVPU) responds to voice   PRE-TX-O2-ETCO2   O2 Device (Oxygen Therapy) room air   SpO2 96 %   Pulse Oximetry Type Intermittent

## 2018-12-08 NOTE — ANESTHESIA PREPROCEDURE EVALUATION
12/08/2018  Maricarmen Woodward is a 89 y.o., female.    Pre-op Assessment    I have reviewed the Patient Summary Reports.     I have reviewed the Nursing Notes.   I have reviewed the Medications.     Review of Systems  Anesthesia Hx:  No problems with previous Anesthesia  Denies Family Hx of Anesthesia complications.   Denies Personal Hx of Anesthesia complications.   Social:  Non-Smoker    Hematology/Oncology:         -- Anemia:   Cardiovascular:   Hypertension ECG has been reviewed.    Pulmonary:   Pneumonia    Musculoskeletal:   Left hip fracture Bone Disorders: Fracture , location of femur.     Neurological:   parkinsonism Movement Disorder Dx, Parkinson's Disease       Physical Exam  General:  Well nourished    Airway/Jaw/Neck:  Airway Findings: Mouth Opening: Normal Tongue: Normal  General Airway Assessment: Adult, Good  Mallampati: II  Improves to II with phonation.  TM Distance: 4-6 cm  Jaw/Neck Findings:  Neck ROM: Extension Decreased, Mild      Dental:  Dental Findings: In tact   Chest/Lungs:  Chest/Lungs Findings: Clear to auscultation, Normal Respiratory Rate     Heart/Vascular:  Heart Findings: Rate: Normal  Rhythm: Regular Rhythm  Sounds: Normal  Heart murmur: negative       Mental Status:  Mental Status Findings:  Somnolent, Cooperative         Anesthesia Plan  Type of Anesthesia, risks & benefits discussed:  Anesthesia Type:  general  Patient's Preference:   Intra-op Monitoring Plan: standard ASA monitors  Intra-op Monitoring Plan Comments:   Post Op Pain Control Plan:   Post Op Pain Control Plan Comments:   Induction:   IV  Beta Blocker:  Patient is not currently on a Beta-Blocker (No further documentation required).       Informed Consent: Patient understands risks and agrees with Anesthesia plan.  Questions answered. Anesthesia consent signed with patient.  ASA Score: 3  emergent   Day  of Surgery Review of History & Physical:    H&P update referred to the surgeon.     Anesthesia Plan Notes: Informed consent from POA/nephew.        Ready For Surgery From Anesthesia Perspective.

## 2018-12-08 NOTE — PLAN OF CARE
Problem: Patient Care Overview  Goal: Plan of Care Review  Outcome: Ongoing (interventions implemented as appropriate)  Pt has remained free from injury this shift, she had her surgery and is resting with eyes closed in no distress, o2 at 2 liters placed r/t being post op.  She is easily aroused, vital signs are stable.  She is repositioned for safety and comfort.  Dressing to left hip dry and intact with ice pack noted, pulses positive, she has scd and plexi pulse on.  Jenkins to gravity in place.  Bed alarm is on for added safety. She has call light in reach

## 2018-12-08 NOTE — PLAN OF CARE
12/08/18 0720   PRE-TX-O2-ETCO2   O2 Device (Oxygen Therapy) room air   SpO2 (!) 94 %   Pulse Oximetry Type Intermittent   $ Pulse Oximetry - Multiple Charge Pulse Oximetry - Multiple

## 2018-12-08 NOTE — PROGRESS NOTES
Progress Note  Hospital Medicine  Patient Name:Maricarmen Woodward  MRN:  [id number]  Patient Class: IP- Inpatient  Admit Date: 12/5/2018  Length of Stay: 3 days  Expected Discharge Date:   Attending Physician: Nichol Buchanan MD  Primary Care Provider:  Haris Brambila MD    SUBJECTIVE:     Principal Problem: Closed left hip fracture, initial encounter  Initial history of present illness: Maricarmen Woodward is a 89 y/o female with a medical history significant for Parkinson's disease and HTN who presented to the ED today with c/o L hip pain which began after an accidental fall from her wheelchair today.  Pt lives at home and has a  24 hr sitter.  She requires assistance to get from her bed to her wheelchair due to her Parkinson's disease.  Today, she hit a bump on her floor, causing her to slip out of the seat of her wheelchair, landing on her L hip.  Xray confirms a L hip fracture.  Pt denies recent fever, cough, chest pain, SOB, N/V or dysuria.  Denies further injury.  She will be admitted to the service of hospital medicine with an orthopedic consult.    PMH/PSH/SH/FH/Meds: reviewed.    Symptoms/Review of Systems: Pt went for left hip hemiarthroplasty today. On interview pt is resting comfortably. No c/o.  No shortness of breath, cough, chest pain or headache, fever or abdominal pain.     Diet:  NPO   Activity level: Bed rest  Pain: Controlled     OBJECTIVE:   Vital Signs (Most Recent):      Temp: 98 °F (36.7 °C) (12/08/18 1549)  Pulse: 89 (12/08/18 1549)  Resp: 18 (12/08/18 1549)  BP: (!) 148/70 (12/08/18 1549)  SpO2: 100 % (12/08/18 1549)       Vital Signs Range (Last 24H):  Temp:  [97.2 °F (36.2 °C)-100 °F (37.8 °C)]   Pulse:  [83-94]   Resp:  [18]   BP: (128-157)/(61-70)   SpO2:  [93 %-100 %]     Weight: 52.2 kg (115 lb)  Body mass index is 20.37 kg/m².    Intake/Output Summary (Last 24 hours) at 12/8/2018 0428  Last data filed at 12/8/2018 1310  Gross per 24 hour   Intake 3940 ml   Output 1950 ml   Net 1990  ml     Physical Examination:  Constitutional: She is oriented to person, place, and time. She appears well-developed and well-nourished.   HENT:   Head: Normocephalic and atraumatic.   Mouth/Throat: Oropharynx is clear and moist.   Eyes: Conjunctivae and EOM are normal. Pupils are equal, round, and reactive to light.   Neck: Normal range of motion. Neck supple. No JVD present.   Cardiovascular: Normal rate, regular rhythm, normal heart sounds and intact distal pulses.   No murmur heard.  Pulmonary/Chest: Effort normal and breath sounds normal. No respiratory distress.   Abdominal: Soft. Bowel sounds are normal. She exhibits no distension. There is no tenderness.   Genitourinary:   Genitourinary Comments: deferred   Musculoskeletal: She exhibits tenderness (left hip). She exhibits no deformity.   Neurological: She is alert and oriented to person, place, and time.   Skin: Skin is warm and dry. Capillary refill takes 2 to 3 seconds. No rash noted.   Psychiatric: She has a normal mood and affect. Her behavior is normal. Judgment and thought content normal.   Nursing note and vitals reviewed.  CRANIAL NERVES   CN III, IV, VI   Pupils are equal, round, and reactive to light.  Extraocular motions are normal.     CBC:  Recent Labs   Lab 12/06/18  0637 12/07/18  0516 12/08/18  0528   WBC 11.50 7.70 7.50   RBC 3.72* 3.11* 2.95*   HGB 12.3 10.2* 9.7*   HCT 35.7* 30.1* 28.5*    170 160   MCV 96 97 97   MCH 33.0* 32.8* 33.0*   MCHC 34.3 33.8 34.2   BMP  Recent Labs   Lab 12/06/18  0637 12/07/18  0516 12/08/18  0528   * 86 107    138 138   K 3.9 3.6 3.6    108 109   CO2 24 23 24   BUN 18 12 11   CREATININE 0.9 0.7 0.9   CALCIUM 9.6 8.7 8.5*   MG 2.0 1.8 1.6      Diagnostic Results:  Microbiology Results (last 7 days)     Procedure Component Value Units Date/Time    Urine culture [[id number]] Collected:  12/06/18 1928    Order Status:  Completed Specimen:  Urine Updated:  12/08/18 0059     Urine Culture,  Routine No growth    Narrative:       Preferred Collection Type->Urine, Clean Catch         CXR: Strandy reticulonodular scarring of the right lung apex, stable.  Atherosclerosis.    Left hip x-ray: Impacted left femoral neck fracture.    Assessment/Plan:     * Closed left hip fracture, initial encounter     S/p left hip hemiarthroplasty  Follow ortho recs  PT/OT consult  Case management for discharge planning  Requiring IV narcotics for pain control.  Would need aggressive IS post-operatively.      Essential hypertension     Chronic problem. Will continue chronic medications and monitor for any changes, adjusting as needed.     Parkinson's disease     Chronic issue.  Fall precautions.  Continue chronic meds.     Anemia     Chronic problem.  Stable.  Monitor H/H.  Continue iron supplementation.  Transfuse for hemodynamic instability and/or H/H <7/21  Check iron, TIBC, ferritin, folate  T&S pending possible orthopedic procedure.               VTE Risk Mitigation (From admission, onward)        Ordered     IP VTE HIGH RISK PATIENT  Once. Start Lovenox 40 mg SQ q day post-operatively, once cleared by Dr. Mei.      12/05/18 2019     Place VIVIEN hose  Until discontinued      12/05/18 2019     Place sequential compression device  Until discontinued      12/05/18 2019        Cheng Tobar MD  Department of Hospital Medicine   [facility]

## 2018-12-08 NOTE — ANESTHESIA POSTPROCEDURE EVALUATION
"Anesthesia Post Evaluation    Patient: Maricarmen Woodward    Procedure(s) Performed: Procedure(s) (LRB):  HEMIARTHROPLASTY, HIP, Terese, peg board, first assist (Left)    Final Anesthesia Type: general  Patient location during evaluation: PACU  Patient participation: Yes- Able to Participate  Level of consciousness: awake and alert  Post-procedure vital signs: reviewed and stable  Pain management: adequate  Airway patency: patent  PONV status at discharge: No PONV  Anesthetic complications: no      Cardiovascular status: blood pressure returned to baseline  Respiratory status: unassisted  Hydration status: euvolemic  Follow-up not needed.        Visit Vitals  BP (!) 148/70   Pulse 89   Temp 36.7 °C (98 °F)   Resp 18   Ht 5' 3" (1.6 m)   Wt 52.2 kg (115 lb)   SpO2 100%   Breastfeeding? No   BMI 20.37 kg/m²       Pain/King Score: Pain Assessment Performed: Yes (12/8/2018  2:25 PM)  Presence of Pain: complains of pain/discomfort (12/8/2018  2:25 PM)  Pain Rating Prior to Med Admin: 5 (12/8/2018  2:40 PM)  Pain Rating Post Med Admin: 5 (12/7/2018  3:13 PM)  King Score: 8 (12/8/2018  2:25 PM)        "

## 2018-12-09 LAB
ALBUMIN SERPL BCP-MCNC: 2.7 G/DL
ALP SERPL-CCNC: 37 U/L
ALT SERPL W/O P-5'-P-CCNC: <5 U/L
ANION GAP SERPL CALC-SCNC: 5 MMOL/L
ANION GAP SERPL CALC-SCNC: 7 MMOL/L
AST SERPL-CCNC: 23 U/L
BASOPHILS # BLD AUTO: 0 K/UL
BASOPHILS NFR BLD: 0 %
BILIRUB SERPL-MCNC: 0.5 MG/DL
BUN SERPL-MCNC: 9 MG/DL
BUN SERPL-MCNC: 9 MG/DL
CALCIUM SERPL-MCNC: 8.2 MG/DL
CALCIUM SERPL-MCNC: 8.3 MG/DL
CHLORIDE SERPL-SCNC: 109 MMOL/L
CHLORIDE SERPL-SCNC: 111 MMOL/L
CO2 SERPL-SCNC: 22 MMOL/L
CO2 SERPL-SCNC: 22 MMOL/L
CREAT SERPL-MCNC: 0.8 MG/DL
CREAT SERPL-MCNC: 0.8 MG/DL
DIFFERENTIAL METHOD: ABNORMAL
EOSINOPHIL # BLD AUTO: 0 K/UL
EOSINOPHIL NFR BLD: 0.1 %
ERYTHROCYTE [DISTWIDTH] IN BLOOD BY AUTOMATED COUNT: 12.5 %
EST. GFR  (AFRICAN AMERICAN): >60 ML/MIN/1.73 M^2
EST. GFR  (AFRICAN AMERICAN): >60 ML/MIN/1.73 M^2
EST. GFR  (NON AFRICAN AMERICAN): >60 ML/MIN/1.73 M^2
EST. GFR  (NON AFRICAN AMERICAN): >60 ML/MIN/1.73 M^2
GLUCOSE SERPL-MCNC: 110 MG/DL
GLUCOSE SERPL-MCNC: 110 MG/DL
HCT VFR BLD AUTO: 26.3 %
HGB BLD-MCNC: 9 G/DL
LYMPHOCYTES # BLD AUTO: 0.7 K/UL
LYMPHOCYTES NFR BLD: 8 %
MAGNESIUM SERPL-MCNC: 1.4 MG/DL
MCH RBC QN AUTO: 33.2 PG
MCHC RBC AUTO-ENTMCNC: 34.2 G/DL
MCV RBC AUTO: 97 FL
MONOCYTES # BLD AUTO: 0.5 K/UL
MONOCYTES NFR BLD: 5.3 %
NEUTROPHILS # BLD AUTO: 7.4 K/UL
NEUTROPHILS NFR BLD: 86.6 %
PHOSPHATE SERPL-MCNC: 3 MG/DL
PLATELET # BLD AUTO: 156 K/UL
PMV BLD AUTO: 9.1 FL
POTASSIUM SERPL-SCNC: 3.7 MMOL/L
POTASSIUM SERPL-SCNC: 3.8 MMOL/L
PROT SERPL-MCNC: 5.4 G/DL
RBC # BLD AUTO: 2.71 M/UL
SODIUM SERPL-SCNC: 138 MMOL/L
SODIUM SERPL-SCNC: 138 MMOL/L
WBC # BLD AUTO: 8.6 K/UL

## 2018-12-09 PROCEDURE — 84100 ASSAY OF PHOSPHORUS: CPT

## 2018-12-09 PROCEDURE — 97162 PT EVAL MOD COMPLEX 30 MIN: CPT | Performed by: PHYSICAL THERAPIST

## 2018-12-09 PROCEDURE — 25000003 PHARM REV CODE 250: Performed by: ORTHOPAEDIC SURGERY

## 2018-12-09 PROCEDURE — 11000001 HC ACUTE MED/SURG PRIVATE ROOM

## 2018-12-09 PROCEDURE — 83735 ASSAY OF MAGNESIUM: CPT

## 2018-12-09 PROCEDURE — 63600175 PHARM REV CODE 636 W HCPCS: Performed by: ORTHOPAEDIC SURGERY

## 2018-12-09 PROCEDURE — 25000003 PHARM REV CODE 250: Performed by: NURSE PRACTITIONER

## 2018-12-09 PROCEDURE — 63600175 PHARM REV CODE 636 W HCPCS: Performed by: EMERGENCY MEDICINE

## 2018-12-09 PROCEDURE — 85025 COMPLETE CBC W/AUTO DIFF WBC: CPT

## 2018-12-09 PROCEDURE — 80053 COMPREHEN METABOLIC PANEL: CPT

## 2018-12-09 PROCEDURE — 25000003 PHARM REV CODE 250: Performed by: EMERGENCY MEDICINE

## 2018-12-09 PROCEDURE — G8979 MOBILITY GOAL STATUS: HCPCS | Mod: CK | Performed by: PHYSICAL THERAPIST

## 2018-12-09 PROCEDURE — 94761 N-INVAS EAR/PLS OXIMETRY MLT: CPT

## 2018-12-09 PROCEDURE — 36415 COLL VENOUS BLD VENIPUNCTURE: CPT

## 2018-12-09 PROCEDURE — G8978 MOBILITY CURRENT STATUS: HCPCS | Mod: CM | Performed by: PHYSICAL THERAPIST

## 2018-12-09 PROCEDURE — 99900035 HC TECH TIME PER 15 MIN (STAT)

## 2018-12-09 PROCEDURE — 27000221 HC OXYGEN, UP TO 24 HOURS

## 2018-12-09 RX ADMIN — CEFAZOLIN SODIUM 2 G: 2 SOLUTION INTRAVENOUS at 04:12

## 2018-12-09 RX ADMIN — CLONAZEPAM 0.5 MG: 0.5 TABLET ORAL at 09:12

## 2018-12-09 RX ADMIN — ACETAMINOPHEN 1000 MG: 10 INJECTION, SOLUTION INTRAVENOUS at 04:12

## 2018-12-09 RX ADMIN — STANDARDIZED SENNA CONCENTRATE AND DOCUSATE SODIUM 1 TABLET: 8.6; 5 TABLET, FILM COATED ORAL at 08:12

## 2018-12-09 RX ADMIN — OMEGA-3 FATTY ACIDS CAP 1000 MG 1 CAPSULE: 1000 CAP at 08:12

## 2018-12-09 RX ADMIN — ASPIRIN 325 MG ORAL TABLET 325 MG: 325 PILL ORAL at 08:12

## 2018-12-09 RX ADMIN — CLONAZEPAM 0.5 MG: 0.5 TABLET ORAL at 08:12

## 2018-12-09 RX ADMIN — MULTIPLE VITAMINS W/ MINERALS TAB 1 TABLET: TAB at 08:12

## 2018-12-09 RX ADMIN — Medication 500 MG: at 08:12

## 2018-12-09 RX ADMIN — IBUPROFEN 800 MG: 400 TABLET, FILM COATED ORAL at 09:12

## 2018-12-09 RX ADMIN — SODIUM CHLORIDE: 0.9 INJECTION, SOLUTION INTRAVENOUS at 06:12

## 2018-12-09 RX ADMIN — CARBIDOPA AND LEVODOPA 3 TABLET: 25; 100 TABLET, EXTENDED RELEASE ORAL at 09:12

## 2018-12-09 RX ADMIN — PRAVASTATIN SODIUM 40 MG: 40 TABLET ORAL at 09:12

## 2018-12-09 RX ADMIN — CARBIDOPA AND LEVODOPA 2 TABLET: 25; 100 TABLET ORAL at 08:12

## 2018-12-09 RX ADMIN — OXYCODONE HYDROCHLORIDE AND ACETAMINOPHEN 1 TABLET: 10; 325 TABLET ORAL at 03:12

## 2018-12-09 RX ADMIN — OXYCODONE HYDROCHLORIDE AND ACETAMINOPHEN 1 TABLET: 10; 325 TABLET ORAL at 09:12

## 2018-12-09 RX ADMIN — PANTOPRAZOLE SODIUM 40 MG: 40 TABLET, DELAYED RELEASE ORAL at 08:12

## 2018-12-09 RX ADMIN — IBUPROFEN 800 MG: 400 TABLET, FILM COATED ORAL at 03:12

## 2018-12-09 RX ADMIN — ASPIRIN 325 MG ORAL TABLET 325 MG: 325 PILL ORAL at 09:12

## 2018-12-09 RX ADMIN — ENOXAPARIN SODIUM 40 MG: 100 INJECTION SUBCUTANEOUS at 08:12

## 2018-12-09 RX ADMIN — CARBIDOPA AND LEVODOPA 2 TABLET: 25; 100 TABLET ORAL at 06:12

## 2018-12-09 RX ADMIN — HYDROCODONE BITARTRATE AND ACETAMINOPHEN 1 TABLET: 5; 325 TABLET ORAL at 07:12

## 2018-12-09 RX ADMIN — IRBESARTAN 150 MG: 150 TABLET ORAL at 08:12

## 2018-12-09 RX ADMIN — HYDROCHLOROTHIAZIDE 12.5 MG: 12.5 CAPSULE ORAL at 08:12

## 2018-12-09 RX ADMIN — FERROUS SULFATE TAB EC 325 MG (65 MG FE EQUIVALENT) 325 MG: 325 (65 FE) TABLET DELAYED RESPONSE at 08:12

## 2018-12-09 RX ADMIN — MORPHINE SULFATE 2 MG: 2 INJECTION, SOLUTION INTRAMUSCULAR; INTRAVENOUS at 02:12

## 2018-12-09 RX ADMIN — Medication 500 MG: at 09:12

## 2018-12-09 NOTE — PLAN OF CARE
Problem: Physical Therapy Goal  Goal: Physical Therapy Goal  Goals to be met by: 2018     Patient will increase functional independence with mobility by performin. Supine to sit with MInimal Assistance  2. Sit to supine with MInimal Assistance  3. Sit to stand transfer with Minimal Assistance  4. Bed to chair transfer with Minimal Assistance using Rolling Walker  5. Lower extremity exercise program x10-20 reps per handout, with supervision    Outcome: Ongoing (interventions implemented as appropriate)  Goals established and pt is progressing with bed mobility. Limited due to lethargy.

## 2018-12-09 NOTE — PLAN OF CARE
12/08/18 2013   Patient Assessment/Suction   Level of Consciousness (AVPU) responds to voice   PRE-TX-O2-ETCO2   O2 Device (Oxygen Therapy) nasal cannula   Flow (L/min) 2   Oxygen Concentration (%) 28   SpO2 99 %   Pulse Oximetry Type Intermittent   Ready to Wean/Extubation Screen   FIO2<=50 (chart decimal) 0.28

## 2018-12-09 NOTE — PROGRESS NOTES
@ 1200 pt still had not void following removal of philippe catheter. Pt encouraged to drink fluids throughout the shift. Instructed pt that she needed to void. Pt states she does not feel the urge to void at this time.     @1414 assessed pt to see if she needed to void. Lower abd/ pelvic area nondistended but pt states she feels the urge to void now and states she has been drinking fluids with and between meals. Bladder scanner show > 450 ml in bladder. Notified Dr. Tobar. Order given for in and out cath. RACHEL Torres

## 2018-12-09 NOTE — PROGRESS NOTES
Progress Note  Hospital Medicine  Patient Name:Maricarmen Woodward  MRN:  [id number]  Patient Class: IP- Inpatient  Admit Date: 12/5/2018  Length of Stay: 4 days  Expected Discharge Date:   Attending Physician: Nichol Buchanan MD  Primary Care Provider:  Haris Brambila MD    SUBJECTIVE:     Principal Problem: Closed left hip fracture, initial encounter  Initial history of present illness: Maricarmen Woodward is a 89 y/o female with a medical history significant for Parkinson's disease and HTN who presented to the ED today with c/o L hip pain which began after an accidental fall from her wheelchair today.  Pt lives at home and has a  24 hr sitter.  She requires assistance to get from her bed to her wheelchair due to her Parkinson's disease.  Today, she hit a bump on her floor, causing her to slip out of the seat of her wheelchair, landing on her L hip.  Xray confirms a L hip fracture.  Pt denies recent fever, cough, chest pain, SOB, N/V or dysuria.  Denies further injury.  She will be admitted to the service of hospital medicine with an orthopedic consult.    PMH/PSH/SH/FH/Meds: reviewed.    Symptoms/Review of Systems: S/p left hip hemiarthroplasty today. Doing well today. Pain controlled. Seen by PT: recommend SNF placement. Per nurse pt not voiding: Bladder scan with 400 ccs.  No shortness of breath, cough, chest pain or headache, fever or abdominal pain.     Diet:  Regular  Activity level: Bed rest  Pain: Controlled     OBJECTIVE:   Vital Signs (Most Recent):      Temp: 97.6 °F (36.4 °C) (12/09/18 1153)  Pulse: 75 (12/09/18 1153)  Resp: 18 (12/09/18 1153)  BP: (!) 109/50 (12/09/18 1153)  SpO2: 100 % (12/09/18 1153)       Vital Signs Range (Last 24H):  Temp:  [97.6 °F (36.4 °C)-98.9 °F (37.2 °C)]   Pulse:  []   Resp:  [16-20]   BP: (109-165)/(50-75)   SpO2:  [90 %-100 %]     Weight: 53.5 kg (117 lb 15.1 oz)  Body mass index is 20.89 kg/m².    Intake/Output Summary (Last 24 hours) at 12/9/2018 1503  Last data  filed at 12/9/2018 0838  Gross per 24 hour   Intake 1520 ml   Output 450 ml   Net 1070 ml     Physical Examination:  Constitutional: She is oriented to person, place, and time. She appears well-developed and well-nourished.   HENT:   Head: Normocephalic and atraumatic.   Mouth/Throat: Oropharynx is clear and moist.   Eyes: Conjunctivae and EOM are normal. Pupils are equal, round, and reactive to light.   Neck: Normal range of motion. Neck supple. No JVD present.   Cardiovascular: Normal rate, regular rhythm, normal heart sounds and intact distal pulses.   No murmur heard.  Pulmonary/Chest: Effort normal and breath sounds normal. No respiratory distress.   Abdominal: Soft. Bowel sounds are normal. She exhibits no distension. There is no tenderness.   Genitourinary:   Genitourinary Comments: deferred   Musculoskeletal: She exhibits tenderness (left hip). She exhibits no deformity.   Neurological: She is alert and oriented to person, place, and time.   Skin: Skin is warm and dry. Capillary refill takes 2 to 3 seconds. No rash noted.   Psychiatric: She has a normal mood and affect. Her behavior is normal. Judgment and thought content normal.   Nursing note and vitals reviewed.  CRANIAL NERVES   CN III, IV, VI   Pupils are equal, round, and reactive to light.  Extraocular motions are normal.     CBC:  Recent Labs   Lab 12/07/18  0516 12/08/18  0528 12/09/18  0546   WBC 7.70 7.50 8.60   RBC 3.11* 2.95* 2.71*   HGB 10.2* 9.7* 9.0*   HCT 30.1* 28.5* 26.3*    160 156   MCV 97 97 97   MCH 32.8* 33.0* 33.2*   MCHC 33.8 34.2 34.2   BMP  Recent Labs   Lab 12/07/18  0516 12/08/18  0528 12/09/18  0546   GLU 86 107 110  110    138 138  138   K 3.6 3.6 3.7  3.8    109 109  111*   CO2 23 24 22*  22*   BUN 12 11 9*  9*   CREATININE 0.7 0.9 0.8  0.8   CALCIUM 8.7 8.5* 8.2*  8.3*   MG 1.8 1.6 1.4*      Diagnostic Results:  Microbiology Results (last 7 days)     Procedure Component Value Units Date/Time     Urine culture [[id number]] Collected:  12/06/18 1928    Order Status:  Completed Specimen:  Urine Updated:  12/08/18 0059     Urine Culture, Routine No growth    Narrative:       Preferred Collection Type->Urine, Clean Catch         CXR: Strandy reticulonodular scarring of the right lung apex, stable.  Atherosclerosis.    Left hip x-ray: Impacted left femoral neck fracture.    Assessment/Plan:     * Closed left hip fracture, initial encounter     S/p left hip hemiarthroplasty  Follow ortho recs  PT/OT consult  Case management for discharge planning  Requiring IV narcotics for pain control.  Would need aggressive IS post-operatively.      Essential hypertension     Chronic problem. Will continue chronic medications and monitor for any changes, adjusting as needed.     Parkinson's disease     Chronic issue.  Fall precautions.  Continue chronic meds.     Anemia     Chronic problem. .  Monitor H/H.  Continue iron supplementation.  Transfuse for hemodynamic instability and/or H/H <7/21  Check iron, TIBC, ferritin, folate  T&S pending possible orthopedic procedure.      Difficulty urinating    Likely d/t anesthesia  Bladder scan with 400 cc  OK to in and out x 1  Monitor      VTE Risk Mitigation (From admission, onward)        Ordered     IP VTE HIGH RISK PATIENT  Once. Start Lovenox 40 mg SQ q day post-operatively, once cleared by Dr. Mei.      12/05/18 2019     Place VIVIEN hose  Until discontinued      12/05/18 2019     Place sequential compression device  Until discontinued      12/05/18 2019        Cheng Toabr MD  Department of Hospital Medicine   [facility]

## 2018-12-09 NOTE — NURSING
Ms. Woodward is very lethargic secondary to surg. Unable to awaken to take oral medication. IZZY Lomas notified.

## 2018-12-09 NOTE — PT/OT/SLP EVAL
Physical Therapy Evaluation    Patient Name:  Maricarmen Woodward   MRN:  [id number]    Recommendations:     Discharge Recommendations:  nursing facility, skilled   Discharge Equipment Recommendations: none   Barriers to discharge: Decreased caregiver support    Assessment:     Maricarmen Woodward is a 89 y.o. female admitted s/p fall with a medical diagnosis of Closed left hip fracture, initial encounter.  On 12/6/2018 orthopedic surgery attempted a percutaneous pinning of the L hip, however once she was in surgery, her fracture was found to be nearly completely displaced.  Therefore they were unable to perform this surgery, and instead she underwent a L hip hemiarthroplasty on 12/8/2018.     She presents with the following impairments/functional limitations:  weakness, impaired self care skills, impaired balance, decreased coordination, decreased safety awareness, impaired endurance, impaired functional mobilty, decreased upper extremity function, edema, impaired cognition, decreased lower extremity function, impaired cardiopulmonary response to activity, orthopedic precautions.  During PT evaluation, she was very lethargic, and unable to answer PLOF questions.  She required Total A for transfer supine<>sit, and then again sit<>supine.  She required Min A for static sitting balance due to lethargy, and difficulty keeping eyes open.  She is recommended to d/c to SNF.     Rehab Prognosis: Fair; patient would benefit from acute skilled PT services to address these deficits and reach maximum level of function.    Recent Surgery: Procedure(s) (LRB):  HEMIARTHROPLASTY, HIP, Terese, peg board, first assist (Left) 1 Day Post-Op    Plan:     During this hospitalization, patient to be seen BID to address the identified rehab impairments via therapeutic activities, therapeutic exercises and progress toward the following goals:    GOALS:   Multidisciplinary Problems     Physical Therapy Goals        Problem: Physical  Therapy Goal    Goal Priority Disciplines Outcome Goal Variances Interventions   Physical Therapy Goal     PT, PT/OT Ongoing (interventions implemented as appropriate)     Description:  Goals to be met by: 2018     Patient will increase functional independence with mobility by performin. Supine to sit with MInimal Assistance  2. Sit to supine with MInimal Assistance  3. Sit to stand transfer with Minimal Assistance  4. Bed to chair transfer with Minimal Assistance using Rolling Walker  5. Lower extremity exercise program x10-20 reps per handout, with supervision                      · Plan of Care Expires:  18    Subjective     Chief Complaint: none  Patient/Family Comments/goals: none stated, pt is lethargic  Pain/Comfort:  · Pain Rating 1: 0/10    Patients cultural, spiritual, Anabaptist conflicts given the current situation:      Living Environment:  Per chart review, pt lives alone with a 24/7 sitter.  She does not ambulate, but is able to perform transfers.    Equipment used at home: bedside commode, walker, rolling, wheelchair, other (see comments)(lift chair).  DME owned (not currently used): none.  Upon discharge, patient is recommended for SNF.    Objective:     Communicated with RACHEL Esquivel prior to session.  Patient found all lines intact, call button in reach and supine in bed with KI donned, peripheral IV, telemetry, SCD, knee immobilizer  upon PT entry to room.    General Precautions: Standard, fall   Orthopedic Precautions:LLE weight bearing as tolerated, LLE posterior precautions   Braces: Knee immobilizer     Exams:  · Cognitive Exam:  Patient is oriented to Person  · Postural Exam:  Patient presented with the following abnormalities:    · -       Rounded shoulders  · -       Forward head  · -       Posterior pelvic tilt  · -       Abnormal trunk flexion  · RLE ROM: WFL  · RLE Strength: WFL  · LLE ROM: Deficits: hip not tested due to restrictions, but ankle and knee are both  WFL  · LLE Strength: Deficits: limited due to difficulty following commands with lethargy    Functional Mobility:  · Bed Mobility:     · Scooting: total assistance and use of bed pad to assist pt with positioning in the bed  · Supine to Sit: total assistance and HOB completely raised.  Pt required assistance at LE's and trunk.  Pt does not initiate task.   · Sit to Supine: total assistance and assistance to lift LE's and lower trunk into the bed, pt does not initiate task   · Balance: seated balance is poor, with pt requiring Min A for static sitting balance.       Therapeutic Activities and Exercises:   Pt is unable to follow commands to perform LE exercises due to lethargy.     AM-PAC 6 CLICK MOBILITY  Total Score:8     Patient left HOB elevated with all lines intact and call button in reach.    GOALS:   Multidisciplinary Problems     Physical Therapy Goals        Problem: Physical Therapy Goal    Goal Priority Disciplines Outcome Goal Variances Interventions   Physical Therapy Goal     PT, PT/OT Ongoing (interventions implemented as appropriate)     Description:  Goals to be met by: 2018     Patient will increase functional independence with mobility by performin. Supine to sit with MInimal Assistance  2. Sit to supine with MInimal Assistance  3. Sit to stand transfer with Minimal Assistance  4. Bed to chair transfer with Minimal Assistance using Rolling Walker  5. Lower extremity exercise program x10-20 reps per handout, with supervision                      History:     Past Medical History:   Diagnosis Date    Arthritis     Hypertension     Parkinson's disease        Past Surgical History:   Procedure Laterality Date    COLONOSCOPY N/A 2012    [facility]    EGD (ESOPHAGOGASTRODUODENOSCOPY) N/A 5/15/2012    Per[facility] at [facility]    EGD (ESOPHAGOGASTRODUODENOSCOPY) N/A 2012    [facility] at [facility]    FRACTURE  SURGERY      JOINT REPLACEMENT      right hip replacement       Clinical Decision Making:     History  Co-morbidities and personal factors that may impact the plan of care Examination  Body Structures and Functions, activity limitations and participation restrictions that may impact the plan of care Clinical Presentation   Decision Making/ Complexity Score   Co-morbidities:   [] Time since onset of injury / illness / exacerbation  [] Status of current condition  []Patient's cognitive status and safety concerns    [] Multiple Medical Problems (see med hx)  Personal Factors:   [] Patient's age  [] Prior Level of function   [] Patient's home situation (environment and family support)  [] Patient's level of motivation  [] Expected progression of patient      HISTORY:(criteria)    [] 38386 - no personal factors/history    [] 43435 - has 1-2 personal factor/comorbidity     [] 29919 - has >3 personal factor/comorbidity     Body Regions:  [] Objective examination findings  [] Head     []  Neck  [] Trunk   [] Upper Extremity  [] Lower Extremity    Body Systems:  [] For communication ability, affect, cognition, language, and learning style: the assessment of the ability to make needs known, consciousness, orientation (person, place, and time), expected emotional /behavioral responses, and learning preferences (eg, learning barriers, education  needs)  [] For the neuromuscular system: a general assessment of gross coordinated movement (eg, balance, gait, locomotion, transfers, and transitions) and motor function  (motor control and motor learning)  [] For the musculoskeletal system: the assessment of gross symmetry, gross range of motion, gross strength, height, and weight  [] For the integumentary system: the assessment of pliability(texture), presence of scar formation, skin color, and skin integrity  [] For cardiovascular/pulmonary system: the assessment of heart rate, respiratory rate, blood pressure, and edema      Activity limitations:    [] Patient's cognitive status and saf ety concerns          [] Status of current condition      [] Weight bearing restriction  [] Cardiopulmunary Restriction    Participation Restrictions:   [] Goals and goal agreement with the patient     [] Rehab potential (prognosis) and probable outcome      Examination of Body System: (criteria)    [] 49284 - addressing 1-2 elements    [] 80918 - addressing a total of 3 or more elements     [] 14537 -  Addressing a total of 4 or more elements         Clinical Presentation: (criteria)  Choose one     On examination of body system using standardized tests and measures patient presents with (CHOOSE ONE) elements from any of the following: body structures and functions, activity limitations, and/or participation restrictions.  Leading to a clinical presentation that is considered (CHOOSE ONE)                              Clinical Decision Making  (Eval Complexity):  Choose One     Time Tracking:     PT Received On: 12/09/18  PT Start Time: 1006     PT Stop Time: 1024  PT Total Time (min): 18 min     Billable Minutes: Evaluation 18      Rose Cazares, PT  12/09/2018

## 2018-12-10 LAB
ABO + RH BLD: NORMAL
ALBUMIN SERPL BCP-MCNC: 2.5 G/DL
ALP SERPL-CCNC: 36 U/L
ALT SERPL W/O P-5'-P-CCNC: <5 U/L
ANION GAP SERPL CALC-SCNC: 5 MMOL/L
AST SERPL-CCNC: 22 U/L
BASOPHILS # BLD AUTO: 0 K/UL
BASOPHILS NFR BLD: 0.2 %
BILIRUB SERPL-MCNC: 0.4 MG/DL
BLD GP AB SCN CELLS X3 SERPL QL: NORMAL
BLD PROD TYP BPU: NORMAL
BLD PROD TYP BPU: NORMAL
BLOOD UNIT EXPIRATION DATE: NORMAL
BLOOD UNIT EXPIRATION DATE: NORMAL
BLOOD UNIT TYPE CODE: 5100
BLOOD UNIT TYPE CODE: 5100
BLOOD UNIT TYPE: NORMAL
BLOOD UNIT TYPE: NORMAL
BUN SERPL-MCNC: 16 MG/DL
CALCIUM SERPL-MCNC: 7.9 MG/DL
CHLORIDE SERPL-SCNC: 113 MMOL/L
CO2 SERPL-SCNC: 22 MMOL/L
CODING SYSTEM: NORMAL
CODING SYSTEM: NORMAL
CREAT SERPL-MCNC: 1.2 MG/DL
DIFFERENTIAL METHOD: ABNORMAL
DISPENSE STATUS: NORMAL
DISPENSE STATUS: NORMAL
EOSINOPHIL # BLD AUTO: 0.2 K/UL
EOSINOPHIL NFR BLD: 2.2 %
ERYTHROCYTE [DISTWIDTH] IN BLOOD BY AUTOMATED COUNT: 13 %
EST. GFR  (AFRICAN AMERICAN): 46 ML/MIN/1.73 M^2
EST. GFR  (NON AFRICAN AMERICAN): 40 ML/MIN/1.73 M^2
GLUCOSE SERPL-MCNC: 107 MG/DL
HCT VFR BLD AUTO: 23.4 %
HGB BLD-MCNC: 7.8 G/DL
LYMPHOCYTES # BLD AUTO: 1.2 K/UL
LYMPHOCYTES NFR BLD: 14.8 %
MAGNESIUM SERPL-MCNC: 1.5 MG/DL
MCH RBC QN AUTO: 32.8 PG
MCHC RBC AUTO-ENTMCNC: 33.5 G/DL
MCV RBC AUTO: 98 FL
MONOCYTES # BLD AUTO: 0.4 K/UL
MONOCYTES NFR BLD: 5.3 %
NEUTROPHILS # BLD AUTO: 6.2 K/UL
NEUTROPHILS NFR BLD: 77.5 %
NUM UNITS TRANS PACKED RBC: NORMAL
NUM UNITS TRANS PACKED RBC: NORMAL
PHOSPHATE SERPL-MCNC: 2.2 MG/DL
PLATELET # BLD AUTO: 166 K/UL
PMV BLD AUTO: 8.7 FL
POTASSIUM SERPL-SCNC: 3.6 MMOL/L
PROT SERPL-MCNC: 5.1 G/DL
RBC # BLD AUTO: 2.39 M/UL
SODIUM SERPL-SCNC: 140 MMOL/L
WBC # BLD AUTO: 8 K/UL

## 2018-12-10 PROCEDURE — 97166 OT EVAL MOD COMPLEX 45 MIN: CPT

## 2018-12-10 PROCEDURE — 25000003 PHARM REV CODE 250: Performed by: ORTHOPAEDIC SURGERY

## 2018-12-10 PROCEDURE — 86920 COMPATIBILITY TEST SPIN: CPT

## 2018-12-10 PROCEDURE — 25000003 PHARM REV CODE 250: Performed by: EMERGENCY MEDICINE

## 2018-12-10 PROCEDURE — 86850 RBC ANTIBODY SCREEN: CPT

## 2018-12-10 PROCEDURE — G8988 SELF CARE GOAL STATUS: HCPCS | Mod: CL

## 2018-12-10 PROCEDURE — 63600175 PHARM REV CODE 636 W HCPCS: Performed by: ORTHOPAEDIC SURGERY

## 2018-12-10 PROCEDURE — 36415 COLL VENOUS BLD VENIPUNCTURE: CPT

## 2018-12-10 PROCEDURE — 97530 THERAPEUTIC ACTIVITIES: CPT

## 2018-12-10 PROCEDURE — P9016 RBC LEUKOCYTES REDUCED: HCPCS

## 2018-12-10 PROCEDURE — 80053 COMPREHEN METABOLIC PANEL: CPT

## 2018-12-10 PROCEDURE — 84100 ASSAY OF PHOSPHORUS: CPT

## 2018-12-10 PROCEDURE — 86580 TB INTRADERMAL TEST: CPT | Performed by: INTERNAL MEDICINE

## 2018-12-10 PROCEDURE — 85025 COMPLETE CBC W/AUTO DIFF WBC: CPT

## 2018-12-10 PROCEDURE — 94761 N-INVAS EAR/PLS OXIMETRY MLT: CPT

## 2018-12-10 PROCEDURE — 25000003 PHARM REV CODE 250: Performed by: NURSE PRACTITIONER

## 2018-12-10 PROCEDURE — 83735 ASSAY OF MAGNESIUM: CPT

## 2018-12-10 PROCEDURE — 11000001 HC ACUTE MED/SURG PRIVATE ROOM

## 2018-12-10 PROCEDURE — 25000003 PHARM REV CODE 250: Performed by: INTERNAL MEDICINE

## 2018-12-10 PROCEDURE — 99233 SBSQ HOSP IP/OBS HIGH 50: CPT | Mod: ,,, | Performed by: INTERNAL MEDICINE

## 2018-12-10 PROCEDURE — G8987 SELF CARE CURRENT STATUS: HCPCS | Mod: CL

## 2018-12-10 PROCEDURE — 63600175 PHARM REV CODE 636 W HCPCS: Performed by: INTERNAL MEDICINE

## 2018-12-10 PROCEDURE — 27000221 HC OXYGEN, UP TO 24 HOURS

## 2018-12-10 RX ORDER — FUROSEMIDE 10 MG/ML
20 INJECTION INTRAMUSCULAR; INTRAVENOUS
Status: DISCONTINUED | OUTPATIENT
Start: 2018-12-10 | End: 2018-12-11 | Stop reason: HOSPADM

## 2018-12-10 RX ORDER — MAGNESIUM SULFATE HEPTAHYDRATE 40 MG/ML
2 INJECTION, SOLUTION INTRAVENOUS ONCE
Status: COMPLETED | OUTPATIENT
Start: 2018-12-10 | End: 2018-12-10

## 2018-12-10 RX ORDER — HYDROCODONE BITARTRATE AND ACETAMINOPHEN 500; 5 MG/1; MG/1
TABLET ORAL
Status: DISCONTINUED | OUTPATIENT
Start: 2018-12-10 | End: 2018-12-11 | Stop reason: HOSPADM

## 2018-12-10 RX ADMIN — IBUPROFEN 800 MG: 400 TABLET, FILM COATED ORAL at 08:12

## 2018-12-10 RX ADMIN — CARBIDOPA AND LEVODOPA 2 TABLET: 25; 100 TABLET ORAL at 04:12

## 2018-12-10 RX ADMIN — HYDROCODONE BITARTRATE AND ACETAMINOPHEN 1 TABLET: 5; 325 TABLET ORAL at 12:12

## 2018-12-10 RX ADMIN — ASPIRIN 325 MG ORAL TABLET 325 MG: 325 PILL ORAL at 08:12

## 2018-12-10 RX ADMIN — CARBIDOPA AND LEVODOPA 3 TABLET: 25; 100 TABLET, EXTENDED RELEASE ORAL at 09:12

## 2018-12-10 RX ADMIN — SODIUM CHLORIDE: 0.9 INJECTION, SOLUTION INTRAVENOUS at 01:12

## 2018-12-10 RX ADMIN — HYDROCHLOROTHIAZIDE 12.5 MG: 12.5 CAPSULE ORAL at 08:12

## 2018-12-10 RX ADMIN — PANTOPRAZOLE SODIUM 40 MG: 40 TABLET, DELAYED RELEASE ORAL at 08:12

## 2018-12-10 RX ADMIN — OMEGA-3 FATTY ACIDS CAP 1000 MG 1 CAPSULE: 1000 CAP at 08:12

## 2018-12-10 RX ADMIN — IBUPROFEN 800 MG: 400 TABLET, FILM COATED ORAL at 09:12

## 2018-12-10 RX ADMIN — Medication 500 MG: at 09:12

## 2018-12-10 RX ADMIN — FERROUS SULFATE TAB EC 325 MG (65 MG FE EQUIVALENT) 325 MG: 325 (65 FE) TABLET DELAYED RESPONSE at 08:12

## 2018-12-10 RX ADMIN — CLONAZEPAM 0.5 MG: 0.5 TABLET ORAL at 08:12

## 2018-12-10 RX ADMIN — MAGNESIUM SULFATE HEPTAHYDRATE 2 G: 40 INJECTION, SOLUTION INTRAVENOUS at 08:12

## 2018-12-10 RX ADMIN — POTASSIUM PHOSPHATE, MONOBASIC AND POTASSIUM PHOSPHATE, DIBASIC 20 MMOL: 224; 236 INJECTION, SOLUTION INTRAVENOUS at 08:12

## 2018-12-10 RX ADMIN — OXYCODONE HYDROCHLORIDE AND ACETAMINOPHEN 1 TABLET: 10; 325 TABLET ORAL at 09:12

## 2018-12-10 RX ADMIN — Medication 500 MG: at 08:12

## 2018-12-10 RX ADMIN — PRAVASTATIN SODIUM 40 MG: 40 TABLET ORAL at 09:12

## 2018-12-10 RX ADMIN — MULTIPLE VITAMINS W/ MINERALS TAB 1 TABLET: TAB at 08:12

## 2018-12-10 RX ADMIN — CLONAZEPAM 0.5 MG: 0.5 TABLET ORAL at 09:12

## 2018-12-10 RX ADMIN — Medication 5 UNITS: at 04:12

## 2018-12-10 RX ADMIN — IRBESARTAN 150 MG: 150 TABLET ORAL at 08:12

## 2018-12-10 RX ADMIN — ENOXAPARIN SODIUM 40 MG: 100 INJECTION SUBCUTANEOUS at 08:12

## 2018-12-10 RX ADMIN — ASPIRIN 325 MG ORAL TABLET 325 MG: 325 PILL ORAL at 09:12

## 2018-12-10 RX ADMIN — STANDARDIZED SENNA CONCENTRATE AND DOCUSATE SODIUM 1 TABLET: 8.6; 5 TABLET, FILM COATED ORAL at 08:12

## 2018-12-10 RX ADMIN — CARBIDOPA AND LEVODOPA 2 TABLET: 25; 100 TABLET ORAL at 08:12

## 2018-12-10 NOTE — PLAN OF CARE
Problem: Patient Care Overview  Goal: Plan of Care Review  Outcome: Ongoing (interventions implemented as appropriate)  Patient Oriented x 2, VSS, afebrile this shift. PIV infusing IVF, CDI, no redness or edema noted. Left Hip dressing CDI, NVI, tender to touch. Complaints of pain controlled with PO medication. Pt due to void during shift, tried using brief, tried bedpan and no relief. Bladder scan showed <100 all 3 times, did in/out on pt and receied back 150. Notified MD/P Claudio on call an no interventions at this time. POC reviewed with patient, open  Discussion facilitated, pt verbalized understanding.  Patient has remained free of falls, trauma, and injury this shift. Bed locked, in lowest position, bed alarm on, SR up x 2, call light within reach. Will continue to monitor, POC in progress.

## 2018-12-10 NOTE — PT/OT/SLP PROGRESS
Physical Therapy Treatment    Patient Name:  Maricarmen Woodward   MRN:  [id number]    Recommendations:     Discharge Recommendations:  nursing facility, skilled       Assessment:     Maricarmen Woodward is a 89 y.o. female admitted with a medical diagnosis of Closed left hip fracture, initial encounter.  She presents with the following impairments/functional limitations:  weakness, impaired endurance, impaired self care skills, impaired functional mobilty, impaired balance, decreased coordination, decreased upper extremity function, decreased lower extremity function, decreased safety awareness, pain, decreased ROM, orthopedic precautions, impaired cardiopulmonary response to activity .    Rehab Prognosis:  Fair-poor; patient would benefit from acute skilled PT services to address these deficits and reach maximum level of function.      Recent Surgery: Procedure(s) (LRB):  HEMIARTHROPLASTY, HIP, Terese, peg board, first assist (Left) 2 Days Post-Op    Plan:     During this hospitalization, patient to be seen BID to address the above listed problems via therapeutic activities, therapeutic exercises  · Plan of Care Expires:  12/23/18   Plan of Care Reviewed with: patient    Subjective     Communicated with nurse Lomas prior to session.  Patient found supine upon PT entry to room, agreeable to treatment.      Chief Complaint: none expressed  Patient comments/goals: needing to get on bedside commode again  Pain/Comfort:  · Pain Rating 1: (did not rate)  · Location - Side 1: Left  · Location 1: hip  · Pain Addressed 1: Reposition, Distraction, Cessation of Activity, Nurse notified    Patients cultural, spiritual, Baptist conflicts given the current situation:      Objective:     Patient found with: oxygen, telemetry, peripheral IV, SCD     General Precautions: Standard, fall   Orthopedic Precautions:LLE weight bearing as tolerated, LLE posterior precautions   Braces: (no brace in pt room)     Functional  Mobility:  · Bed Mobility:     · Scooting: total assistance of 2 persons   · Bridging: total assistance and of 2 persons utilizing draw sheet to HOB  · Supine to Sit: total assistance and of 2 persons  · Sit to Supine: total assistance and of 2 persons    · Transfers:     · Sit to Stand:  total assistance and of 2 persons with no AD  · Toilet Transfer: total assistance and of 2-3 persons with  bedside commode  using  Stand Pivot    · Gait: pt unable    Therapeutic Activities and Exercises:   pt assisted on/off commode with total A of 2   pt required SBA for toileting to ensure safety    Patient left supine with all lines intact, call button in reach and nurse notified..    GOALS:   Multidisciplinary Problems     Physical Therapy Goals        Problem: Physical Therapy Goal    Goal Priority Disciplines Outcome Goal Variances Interventions   Physical Therapy Goal     PT, PT/OT Ongoing (interventions implemented as appropriate)     Description:  Goals to be met by: 2018     Patient will increase functional independence with mobility by performin. Supine to sit with MInimal Assistance  2. Sit to supine with MInimal Assistance  3. Sit to stand transfer with Minimal Assistance  4. Bed to chair transfer with Minimal Assistance using Rolling Walker  5. Lower extremity exercise program x10-20 reps per handout, with supervision                      Time Tracking:     PT Received On: 12/10/18  PT Start Time: 1325     PT Stop Time: 1349  PT Total Time (min): 24 min     Billable Minutes: Therapeutic Activity 24    Treatment Type: Treatment  PT/PTA: PTA     PTA Visit Number: 1     Zoya Garrett PTA  12/10/2018

## 2018-12-10 NOTE — PLAN OF CARE
"Spoke with the pt regarding her need for SNF at the time for discharge; the pt says she has a "hanh home" and wants to return to it when she leaves the hospital. I explained that she would need 2 people to assist her in even transferring to the bathroom; she has a caregiver that stays with her 24/7 but I did clarify that her current caregiver can not physically assist the pt. The pt states that she will call her nephew, Denny and he will stay with her full time. She did agree for me to call Denny to discuss the discharge plan.   I spoke with Denny, he works full time and can not stay with the pt 24/7 to care for her. He will be here this evening and will discuss SNF with her at that time. I did give him the names of the local SNFs along with my name and number.   I will follow up with Denny in the morning regarding the discharge plan.....RACHEL Ndiaye       12/10/18 1411   Post-Acute Status   Post-Acute Authorization Placement     "

## 2018-12-10 NOTE — PHYSICIAN QUERY
"PT Name: Maricarmen Woodward  MR #: [id number]    Physician Query Form - Hematology Clarification      CDS/: Ruth Richmond               Contact information:[phone]    This form is a permanent document in the medical record.      Query Date: December 10, 2018    By submitting this query, we are merely seeking further clarification of documentation. Please utilize your independent clinical judgment when addressing the question(s) below.    The Medical record contains the following:   Indicators  Supporting Clinical Findings Location in Medical Record    x "Anemia" documented  Anemia Chronic problem. Stable.  [facility] Medicine H&P 12/5/2018    x H & H =  11.6 & 34.4---->12.3 & 35.7---->10.2 & 30.1   9.7 & 28.5------>9.0 & 26.3----->7.8 & 23.4  Lab Results 12/5, 12/6, 12/7,   12/8, 12/9 & 12/10/2018    x   BP =                     HR=  BP=(109-165)/ (50-75)  HR=    VS Flow Sheet 12/8 thru 12/10/2018    "GI bleeding" documented      Acute bleeding (Non GI site)      x Transfusion(s)  Transfuse 2 Units   MD Orders 12/10/2018    x Treatment:  Monitor H/H.  Continue iron supplementation.  [facility] Medicine H&P 12/5/2018    x Other:   PROCEDURE PERFORMED: left hip hemiarthroplasty.     ESTIMATED BLOOD LOSS: 150 cc.     Op Note 12/8/2018     Provider, please specify diagnosis or diagnoses associated with above clinical findings.    [  ] Acute blood loss anemia expected post-operatively   [ x ] Acute blood loss anemia   [  ] Iron deficiency anemia   [  ] Chronic blood loss anemia     [  ] Anemia of chronic disease ( Specify chronic disease)       [  ] Other (Specify):   [  ] Clinically Undetermined       [  ] Other Hematological Diagnosis (please specify):     [  ] Clinically Undetermined       Please document in your progress notes daily for the duration of treatment, until resolved, and include in your discharge summary.                                                                                          "

## 2018-12-10 NOTE — NURSING
Situation Principle Problem:  Closed left hip fracture, initial encounter      Reason for Calling: Mg 1.4 and no PRN orders. Not replaced today.     Provider Calling: Jose Eduardo Claudio   Background Vitals:    12/09/18 0830 12/09/18 0927 12/09/18 1153 12/09/18 1629   BP:  (!) 165/75 (!) 109/50 (!) 123/56   BP Location:   Right arm Right arm   Patient Position:   Lying Lying   Pulse: 72 (!) 111 75 68   Resp: 18 20 18 18   Temp:  98.6 °F (37 °C) 97.6 °F (36.4 °C) 97.9 °F (36.6 °C)   TempSrc:   Axillary Oral   SpO2: 99% (!) 90% 100% 97%   Weight:       Height:           No results found for: POCTGLUCOSE    Intake/Output:    Intake/Output Summary (Last 24 hours) at 12/9/2018 2139  Last data filed at 12/9/2018 1700  Gross per 24 hour   Intake 200 ml   Output 550 ml   Net -350 ml        Assessment What is happening: Pt mg is 1.4. She has no PRN orders and it wasn't replaced today. Any interventions?   Response Provider Response: No need to replace at this time. Will await morning lab values.

## 2018-12-10 NOTE — PROGRESS NOTES
Daily Orthopaedic Progress Note    Maricarmen Woodward is a 89 y.o. female admitted on 12/5/2018  Hospital Day: 4  Post Op Day: 1 Day Post-Op      The patient was seen and examined this morning at the bedside. Patient reports no acute issues overnight and adequate control of pain on current regimen.  Patient worked with physical therapy over the last 24 hours.      PHYSICAL EXAM:  Awake/alert/oriented x3, No acute distress, Afebrile, Vital signs stable  Good inspiratory effort with unlaboured breathing  Dressings c/d/i  NVI distally    Vitals:    12/09/18 0810 12/09/18 0927 12/09/18 1153 12/09/18 1629   BP: (!) 147/67 (!) 165/75 (!) 109/50 (!) 123/56   BP Location:   Right arm Right arm   Patient Position:   Lying Lying   Pulse: 77 (!) 111 75 68   Resp: 18 20 18 18   Temp: 97.7 °F (36.5 °C) 98.6 °F (37 °C) 97.6 °F (36.4 °C) 97.9 °F (36.6 °C)   TempSrc: Oral  Axillary Oral   SpO2: 100% (!) 90% 100% 97%   Weight:       Height:         I/O last 3 completed shifts:  In: 3758.3 [P.O.:420; I.V.:3338.3]  Out: 1850 [Urine:1700; Blood:150]  Recent Labs   Lab 12/07/18  0516 12/08/18  0528 12/09/18  0546   CALCIUM 8.7 8.5* 8.2*  8.3*   PROT 5.7* 5.7* 5.4*    138 138  138   K 3.6 3.6 3.7  3.8   CO2 23 24 22*  22*    109 109  111*   BUN 12 11 9*  9*   CREATININE 0.7 0.9 0.8  0.8     Recent Labs   Lab 12/07/18  0516 12/08/18  0528 12/09/18  0546   WBC 7.70 7.50 8.60   RBC 3.11* 2.95* 2.71*   HGB 10.2* 9.7* 9.0*   HCT 30.1* 28.5* 26.3*    160 156     No results for input(s): PT, INR, APTT in the last 72 hours.    A/P: 89 y.o. female 1 Day Post-Op s/p left hip hemiarthroplasty  -Continue with current pain control regimen  -Continue with current physical therapy plan, WBAT with posterior hip precautions  -Continue with DVT prophylaxis,  BID x 30 days  -Follow-up in clinic in 2 weeks    Cheng Mccormack MD  [facility]

## 2018-12-10 NOTE — PLAN OF CARE
Problem: Occupational Therapy Goal  Goal: Occupational Therapy Goal  Goals to be met by: 12/18/18     Patient will increase functional independence with ADLs by performing:    Sitting at edge of bed x5 minutes with Contact Guard Assistance and use of upper extremity support.  Supine to sit with Moderate Assistance and use of bedrail as needed.  Upper extremity exercise program x10 reps per handout, with assistance as needed.  Pt to adhere to posterior hip precautions during all ADL & functional mobility tasks.      Outcome: Ongoing (interventions implemented as appropriate)  OT evaluation completed today. Goals & care plan established.    WILLIS Rider  12/10/2018

## 2018-12-10 NOTE — PLAN OF CARE
Problem: Patient Care Overview  Goal: Plan of Care Review  Outcome: Ongoing (interventions implemented as appropriate)  Pt on 2.5L NC with 98% sats.

## 2018-12-10 NOTE — PT/OT/SLP PROGRESS
Physical Therapy Treatment    Patient Name:  Maricarmen Woodward   MRN:  [id number]    Recommendations:     Discharge Recommendations:  nursing facility, skilled       Assessment:     Maricarmen Woodward is a 89 y.o. female admitted with a medical diagnosis of Closed left hip fracture, initial encounter.  She presents with the following impairments/functional limitations:  weakness, impaired endurance, impaired self care skills, impaired functional mobilty, impaired balance, decreased upper extremity function, decreased lower extremity function, decreased safety awareness, pain, decreased ROM, orthopedic precautions, impaired joint extensibility .    Rehab Prognosis:  fair; patient would benefit from acute skilled PT services to address these deficits and reach maximum level of function.      Recent Surgery: Procedure(s) (LRB):  HEMIARTHROPLASTY, HIP, Terese, peg board, first assist (Left) 2 Days Post-Op    Plan:     During this hospitalization, patient to be seen BID to address the above listed problems via therapeutic activities, therapeutic exercises  · Plan of Care Expires:  12/23/18   Plan of Care Reviewed with: patient    Subjective     Communicated with nurse Lomas prior to session.  Patient found sitting EOB with nurse Lomas and Anastasia PORRAS upon PT entry to room, agreeable to treatment.      Chief Complaint: L hip pain  Patient comments/goals: pt needing to get up and get on commode  Pain/Comfort:  · Pain Rating 1: (did not rate)  · Location - Side 1: Left  · Location 1: hip  · Pain Addressed 1: Reposition, Distraction, Cessation of Activity, Nurse notified    Patients cultural, spiritual, Baptist conflicts given the current situation:      Objective:     Patient found with: telemetry     General Precautions: Standard, fall   Orthopedic Precautions:LLE weight bearing as tolerated, LLE posterior precautions   Braces: Knee immobilizer     Functional Mobility:  · Bed Mobility:     · Scooting: A of 3  for scooting back in bed post commode to bed t/f  · Bridging: A of 2 utilizing draw sheet to HOB  · Sit to Supine: maximal assistance and of 2 persons    · Transfers:     · Sit to Stand:  maximal assistance and of 2 persons with no AD  · Toilet Transfer: total assistance and of 2 persons with  bedside commode  using  Squat Pivo      Therapeutic Activities and Exercises:   pt assisted on/off commode with total A of 2 via squat pivot.   pt required sba for toileting to ensure safety and A for hygiene task.   pt assisted BTB.    Patient left supine with all lines intact, call button in reach and nurse Cesilia and Anastasia PORRAS present/ notified..    GOALS:   Multidisciplinary Problems     Physical Therapy Goals        Problem: Physical Therapy Goal    Goal Priority Disciplines Outcome Goal Variances Interventions   Physical Therapy Goal     PT, PT/OT Ongoing (interventions implemented as appropriate)     Description:  Goals to be met by: 2018     Patient will increase functional independence with mobility by performin. Supine to sit with MInimal Assistance  2. Sit to supine with MInimal Assistance  3. Sit to stand transfer with Minimal Assistance  4. Bed to chair transfer with Minimal Assistance using Rolling Walker  5. Lower extremity exercise program x10-20 reps per handout, with supervision                      Time Tracking:     PT Received On: 12/10/18  PT Start Time: 855     PT Stop Time: 923  PT Total Time (min): 28 min     Billable Minutes: Therapeutic Activity 24    Treatment Type: Treatment  PT/PTA: PTA     PTA Visit Number: 1     Zoya Garrett, YANIV  12/10/2018

## 2018-12-10 NOTE — PROGRESS NOTES
Progress Note  Hospital Medicine  Patient Name:Maricarmen Woodward  MRN:  [id number]  Patient Class: IP- Inpatient  Admit Date: 12/5/2018  Length of Stay: 5 days  Expected Discharge Date:   Attending Physician: Nichol Buchanan MD  Primary Care Provider:  Haris Brambila MD    SUBJECTIVE:     Principal Problem: Closed left hip fracture, initial encounter  Initial history of present illness: Maricarmen Woodward is a 89 y/o female with a medical history significant for Parkinson's disease and HTN who presented to the ED today with c/o L hip pain which began after an accidental fall from her wheelchair today.  Pt lives at home and has a  24 hr sitter.  She requires assistance to get from her bed to her wheelchair due to her Parkinson's disease.  Today, she hit a bump on her floor, causing her to slip out of the seat of her wheelchair, landing on her L hip.  Xray confirms a L hip fracture.  Pt denies recent fever, cough, chest pain, SOB, N/V or dysuria.  Denies further injury.  She will be admitted to the service of hospital medicine with an orthopedic consult.    PMH/PSH/SH/FH/Meds: reviewed.    Symptoms/Review of Systems: S/p left hip hemiarthroplasty. Pain controlled.  No shortness of breath, cough, chest pain or headache, fever or abdominal pain.     Diet:  Regular  Activity level: Up with assist  Pain: Controlled     OBJECTIVE:   Vital Signs (Most Recent):      Temp: 98.7 °F (37.1 °C) (12/10/18 0359)  Pulse: 79 (12/10/18 0359)  Resp: 18 (12/10/18 0359)  BP: (!) 148/60 (12/10/18 0359)  SpO2: 95 % (12/10/18 0359)       Vital Signs Range (Last 24H):  Temp:  [97.6 °F (36.4 °C)-98.7 °F (37.1 °C)]   Pulse:  []   Resp:  [16-20]   BP: (109-165)/(50-75)   SpO2:  [90 %-100 %]     Weight: 53.5 kg (117 lb 15.1 oz)  Body mass index is 20.89 kg/m².    Intake/Output Summary (Last 24 hours) at 12/10/2018 0656  Last data filed at 12/10/2018 0539  Gross per 24 hour   Intake 1700 ml   Output 700 ml   Net 1000 ml     Physical  Examination:  Constitutional: She is oriented to person, place, and time. She appears well-developed and well-nourished.   HENT:   Head: Normocephalic and atraumatic.   Mouth/Throat: Oropharynx is clear and moist.   Eyes: Conjunctivae and EOM are normal. Pupils are equal, round, and reactive to light.   Neck: Normal range of motion. Neck supple. No JVD present.   Cardiovascular: Normal rate, regular rhythm, normal heart sounds and intact distal pulses.   No murmur heard.  Pulmonary/Chest: Effort normal and breath sounds normal. No respiratory distress.   Abdominal: Soft. Bowel sounds are normal. She exhibits no distension. There is no tenderness.   Genitourinary:   Genitourinary Comments: deferred   Musculoskeletal: She exhibits tenderness (left hip). She exhibits no deformity.   Neurological: She is alert and oriented to person, place, and time.   Skin: Skin is warm and dry. Capillary refill takes 2 to 3 seconds. No rash noted.   Psychiatric: She has a normal mood and affect. Her behavior is normal. Judgment and thought content normal.   Nursing note and vitals reviewed.  CRANIAL NERVES   CN III, IV, VI   Pupils are equal, round, and reactive to light.  Extraocular motions are normal.     CBC:  Recent Labs   Lab 12/08/18  0528 12/09/18  0546 12/10/18  0449   WBC 7.50 8.60 8.00   RBC 2.95* 2.71* 2.39*   HGB 9.7* 9.0* 7.8*   HCT 28.5* 26.3* 23.4*    156 166   MCV 97 97 98   MCH 33.0* 33.2* 32.8*   MCHC 34.2 34.2 33.5   BMP  Recent Labs   Lab 12/08/18  0528 12/09/18  0546 12/10/18  0449    110  110 107    138  138 140   K 3.6 3.7  3.8 3.6    109  111* 113*   CO2 24 22*  22* 22*   BUN 11 9*  9* 16   CREATININE 0.9 0.8  0.8 1.2   CALCIUM 8.5* 8.2*  8.3* 7.9*   MG 1.6 1.4* 1.5*      Diagnostic Results:  Microbiology Results (last 7 days)     Procedure Component Value Units Date/Time    Urine culture [[id number]] Collected:  12/06/18 1928    Order Status:  Completed Specimen:  Urine  Updated:  12/08/18 0059     Urine Culture, Routine No growth    Narrative:       Preferred Collection Type->Urine, Clean Catch         CXR: Strandy reticulonodular scarring of the right lung apex, stable.  Atherosclerosis.    Left hip x-ray: Impacted left femoral neck fracture.    Assessment/Plan:     * Closed left hip fracture, initial encounter     S/p left hip hemiarthroplasty  Follow ortho recs  PT/OT consult  Case management for discharge planning  Requiring PO narcotics for pain control.  Aggressive IS post-operatively.      Essential hypertension     Continue chronic medications and monitor for any changes, adjusting as needed.     Parkinson's disease     Chronic issue.  Fall precautions.  Continue chronic meds.     Anemia due to acute surgical blood loss     Chronic problem. .  Monitor H/H.  Transfuse 2 units of pRBC.      Difficulty urinating    Likely d/t anesthesia  Bladder scan with 400 cc  OK to in and out x 1  Monitor      Hypokalemia  Replete potassium, follow level.     Hypophosphatemia  Replete phosphorus and follow level.    Hypomagnesemia  Replete Mag. Follow level.     Await SNF placement.    DVT prophylaxis: Use SCD and TEDs. On  mg PO BID as per Dr. Mccormack.    Nichol Buchanan MD  Department of Hospital Medicine   [facility]

## 2018-12-10 NOTE — PT/OT/SLP EVAL
Occupational Therapy   Evaluation    Name: Maricarmen Woodward  MRN: [id number]  Admitting Diagnosis:  Closed left hip fracture, initial encounter 2 Days Post-Op    Recommendations:     Discharge Recommendations: nursing facility, skilled  Discharge Equipment Recommendations:  none  Barriers to discharge:  Decreased caregiver support    History:     Occupational Profile:  Living Environment: Pt lives alone in a 2 story house with a ramp to enter and has a walk-in shower. She lives on the first floor only.  Previous level of function: Pt has a 24 hour sitter who helps her with ADL's and transfers. Pt is wheelchair bound and needs assistance for all dressing and bathing as well as transfers.  Equipment Used at Home:  shower chair, 3-in-1 commode, wheelchair, walker, rolling(lift chair)  Assistance upon Discharge: Pt will need 24/7 assistance.    Past Medical History:   Diagnosis Date    Arthritis     Hypertension     Parkinson's disease        Past Surgical History:   Procedure Laterality Date    COLONOSCOPY N/A 4/19/2012    Performed by Raf [facility] at [facility]    EGD (ESOPHAGOGASTRODUODENOSCOPY) N/A 5/15/2012    Performed by Raf [facility]ano MD at [facility]    EGD (ESOPHAGOGASTRODUODENOSCOPY) N/A 4/18/2012    Performed by Raf Pizano MD at [facility]    FRACTURE SURGERY      JOINT REPLACEMENT      right hip replacement       Subjective     Chief Complaint: None stated  Patient/Family Comments/goals: To not fall again.    Pain/Comfort:  · Pain Rating 1: 0/10  · Pain Rating Post-Intervention 1: 0/10    Patients cultural, spiritual, Congregational conflicts given the current situation:      Objective:     Communicated with: nurse Cesilia prior to session.  Patient found with left leg internally rotated and telemetry, oxygen, peripheral IV, SCD upon OT entry to room.    General Precautions: Standard, fall   Orthopedic Precautions:LLE weight bearing as tolerated, LLE posterior precautions   Braces: (None on  patient)     Occupational Performance:    Bed Mobility:    · OT repositioned pt's L LE in neutral rotation with hips abducted and a pillow placed between upper legs.    Activities of Daily Living:  · Feeding:  OT repositioned pt to HOB in upright, midline sitting for self feeding & ed pt on safe positioning with self feeding to increase independence & reduce risk of aspiration with task. Task completed with set-up to drink beverages and eat grapes from a cup.     · Grooming: set-up with HOB raised to wash face with a cloth      Cognitive/Visual Perceptual:  Cognitive/Psychosocial Skills:     -       Oriented to: Person, Place and Situation   -       Follows Commands/attention:Follows two-step commands  -       Communication: clear/fluent  -       Memory: Poor immediate recall  -       Safety awareness/insight to disability: impaired   -       Mood/Affect/Coping skills/emotional control: Appropriate to situation, Cooperative and slightly lethargic  Visual/Perceptual:      -Intact  acuity and wearing bifocals      Physical Exam:  Skin integrity: Visible skin intact  Edema:  Mild edema of B UE's  Dominant hand:    -       right  Upper Extremity Range of Motion:     -       Right Upper Extremity: WFL  -       Left Upper Extremity: WFL  Upper Extremity Strength:    -       Right Upper Extremity: WFL  -       Left Upper Extremity: WFL   Strength:    -       Right Upper Extremity: WFL  -       Left Upper Extremity: WFL  Fine Motor Coordination:    -       Intact No tremors noted.    AMPAC 6 Click ADL:  AMPAC Total Score: 12    Treatment & Education:  OT oriented pt to time as well as how to use call button and bed controls.  OT ed pt on OT role & POC as well as discharge recommendations.  OT educated pt on posterior hip precautions with instruction sheet and demonstration provided.  All tasks required extra time due to pt slow to respond to questions and commands.    Education:    Patient left HOB elevated with all  "lines intact, call button in reach, bed alarm on and Cesilia nurse notified    Assessment:     Maricarmen Woodward is a 89 y.o. female with a medical diagnosis of Closed left hip fracture, initial encounter.  She presents with the following performance deficits affecting function: weakness, impaired self care skills, impaired balance, impaired endurance, impaired functional mobilty, gait instability, orthopedic precautions, decreased lower extremity function, pain, decreased safety awareness, edema, decreased ROM. Pt needs extensive assistance for all dressing, bathing and toileting and bed mobility. She has impaired activity tolerance, generalized weakness and poor recall and is at risk for hip dislocation although she was very receptive to extensive education on posterior precautions. Handout on precautions issued and reviewed with patient but she will need reinforcement. Pt will benefit from further inpatient therapy to maximize return to prior level of function, due to pt currently needing extensive assistance for ADLs and functional mobility.       Rehab Prognosis: Fair; patient would benefit from acute skilled OT services to address these deficits and reach maximum level of function.         Clinical Decision Makin.  OT Mod:  "Pt evaluation falls under moderate complexity for evaluation coding due to identification of 3-5 performance deficits noted as stated above. Eval required Min/Mod assistance to complete on this date and detailed assessment(s) were utilized. Moreover, an expanded review of history and occupational profile obtained with additional review of cognitive, physical and psychosocial hx."     Plan:     Patient to be seen 2 x/week to address the above listed problems via self-care/home management, therapeutic activities, therapeutic exercises  · Plan of Care Expires: 01/10/19  · Plan of Care Reviewed with: patient    This Plan of care has been discussed with the patient who was involved " in its development and understands and is in agreement with the identified goals and treatment plan    GOALS:   Multidisciplinary Problems     Occupational Therapy Goals        Problem: Occupational Therapy Goal    Goal Priority Disciplines Outcome Interventions   Occupational Therapy Goal     OT, PT/OT Ongoing (interventions implemented as appropriate)    Description:  Goals to be met by: 12/18/18     Patient will increase functional independence with ADLs by performing:    Sitting at edge of bed x5 minutes with Contact Guard Assistance and use of upper extremity support.  Supine to sit with Moderate Assistance and use of bedrail as needed.  Upper extremity exercise program x10 reps per handout, with assistance as needed.  Pt to adhere to posterior hip precautions during all ADL & functional mobility tasks.                        Time Tracking:     OT Date of Treatment: 12/10/18  OT Start Time: 1040  OT Stop Time: 1105  OT Total Time (min): 25 min    Billable Minutes:Evaluation 10  Therapeutic Activity 15    WILLIS Vargas  12/10/2018

## 2018-12-10 NOTE — PROGRESS NOTES
12/09/18 0830   Patient Assessment/Suction   Level of Consciousness (AVPU) responds to voice   All Lung Fields Breath Sounds clear   PRE-TX-O2-ETCO2   O2 Device (Oxygen Therapy) nasal cannula   $ Is the patient on Low Flow Oxygen? Yes   Flow (L/min) 2   Oxygen Concentration (%) 28   SpO2 99 %   Pulse Oximetry Type Intermittent   $ Pulse Oximetry - Multiple Charge Pulse Oximetry - Multiple   Pulse 72   Resp 18   Aerosol Therapy   $ Aerosol Therapy Charges PRN treatment not required   Respiratory Treatment Status (SVN) PRN treatment not required

## 2018-12-10 NOTE — PLAN OF CARE
Problem: Patient Care Overview  Goal: Plan of Care Review  Outcome: Ongoing (interventions implemented as appropriate)  Plan of care discussed with patient. VS stable. PIV 20g to left FA clean and intact infusing NS at 100ml/h. Tele 8615. O2 2L. Dressing on left hip clean and intact. SCD on right leg and foot pump on left foot. Patient denies pain. No signs of distress. Bed in lowest postion, bed wheels locked and call button within reach. Will continue to monitor.

## 2018-12-10 NOTE — CONSULTS
[facility]  Physical Medicine & Rehab  Consult Note    Patient Name: Maricarmen Woodward  MRN: [id number]  Admission Date: 12/5/2018  Hospital Length of Stay: 5 days  Attending Physician: Nichol Buchanan MD   Primary Care Provider: Haris Brambila MD     Inpatient consult to Physical Medicine Rehab  Consult performed by: Richard Leon MD  Consult ordered by: Cheng Mccormack MD        Subjective:     Principal Problem: Closed left hip fracture, initial encounter    HPI: pt is 89 y.o female  ad[facility]. Pt  With exp of fall  From her w.chair. Pt with dx of closed left hip fx.     Hospital Course: pt work up included eval  By ortho  & is now s/p left hip hemiarthroplasty. I am consulted for consideration of admission to in pt rehab       Functional History: PFL : limited    Past Medical History:   Diagnosis Date    Arthritis     Hypertension     Parkinson's disease      Past Surgical History:   Procedure Laterality Date    COLONOSCOPY N/A 4/19/2012    [facility]    EGD (ESOPHAGOGASTRODUODENOSCOPY) N/A 5/15/2012    [facility]    EGD (ESOPHAGOGASTRODUODENOSCOPY) N/A 4/18/2012    [facility]    FRACTURE SURGERY      JOINT REPLACEMENT      right hip replacement     Review of patient's allergies indicates:  No Known Allergies    Scheduled Medications:    aspirin  325 mg Oral BID    calcium carbonate  500 mg Oral BID    carbidopa-levodopa  mg  2 tablet Oral BID WM    carbidopa-levodopa  mg  3 tablet Oral QHS    clonazePAM  0.5 mg Oral BID    enoxaparin  40 mg Subcutaneous Q24H    ferrous sulfate  325 mg Oral Daily    hydroCHLOROthiazide  12.5 mg Oral Daily    ibuprofen  800 mg Oral TID    irbesartan  150 mg Oral Daily    magnesium sulfate IVPB  2 g Intravenous Once    multivit-iron-FA-calcium-mins  1 tablet Oral Daily    omega 3-dha-epa-fish oil  1  capsule Oral Daily    oxyCODONE-acetaminophen  1 tablet Oral TID    pantoprazole  40 mg Oral Daily    potassium phosphate IVPB  20 mmol Intravenous Once    pravastatin  40 mg Oral QHS    senna-docusate 8.6-50 mg  1 tablet Oral Daily       PRN Medications: sodium chloride, acetaminophen, furosemide, HYDROcodone-acetaminophen, HYDROcodone-acetaminophen, morphine, ondansetron, senna-docusate 8.6-50 mg, sodium chloride 0.9%, sodium chloride 0.9%, tiZANidine    Family History     Problem Relation (Age of Onset)    No Known Problems Mother        Tobacco Use    Smoking status: Light Tobacco Smoker     Packs/day: 0.25     Years: 5.00     Pack years: 1.25     Last attempt to quit: 2009     Years since quittin.6   Substance and Sexual Activity    Alcohol use: No    Drug use: No    Sexual activity: No     Partners: Male     Birth control/protection: Abstinence, Post-menopausal     Review of Systems   Constitutional: Negative.    HENT: Negative.    Eyes: Negative.    Respiratory: Negative.    Cardiovascular: Negative.    Gastrointestinal: Negative.    Endocrine: Negative.    Genitourinary: Negative.    Musculoskeletal: Negative.    Skin: Negative.    Allergic/Immunologic: Negative.    Neurological: Negative.    Hematological: Negative.    Psychiatric/Behavioral: Negative.      Objective:     Vital Signs (Most Recent):  Temp: 98.7 °F (37.1 °C) (12/10/18 0359)  Pulse: 79 (12/10/18 0359)  Resp: 18 (12/10/18 0359)  BP: (!) 148/60 (12/10/18 0359)  SpO2: 95 % (12/10/18 0359)    Vital Signs (24h Range):  Temp:  [97.6 °F (36.4 °C)-98.7 °F (37.1 °C)] 98.7 °F (37.1 °C)  Pulse:  [] 79  Resp:  [16-20] 18  SpO2:  [90 %-100 %] 95 %  BP: (109-165)/(50-75) 148/60     Body mass index is 20.89 kg/m².    Physical Exam   Constitutional: She appears well-developed and well-nourished. No distress.   HENT:   Head: Normocephalic and atraumatic.   Right Ear: External ear normal.   Left Ear: External ear normal.   Nose: Nose  normal.   Mouth/Throat: Oropharynx is clear and moist. No oropharyngeal exudate.   Eyes: Conjunctivae and EOM are normal. Pupils are equal, round, and reactive to light. Right eye exhibits no discharge. Left eye exhibits no discharge. No scleral icterus.   Neck: Normal range of motion. Neck supple. No JVD present. No tracheal deviation present. No thyromegaly present.   Cardiovascular: Normal rate, regular rhythm, normal heart sounds and intact distal pulses. Exam reveals no gallop and no friction rub.   No murmur heard.  Pulmonary/Chest: Effort normal and breath sounds normal. No stridor. No respiratory distress. She has no wheezes. She has no rales. She exhibits no tenderness.   Abdominal: Soft. Bowel sounds are normal. She exhibits no distension. There is no tenderness. There is no rebound and no guarding.   Genitourinary:   Genitourinary Comments: deferred   Musculoskeletal: Normal range of motion. She exhibits no edema, tenderness or deformity.   Neurological: She is alert. No cranial nerve deficit. She exhibits normal muscle tone. Coordination normal.   Mms: bilateral upper ext 4-/5, left lower ext 2 + to 3-/5 at best   Right lower ext  4-/5  Gait deferred    Skin: No rash noted. She is not diaphoretic. No erythema. No pallor.   + post op dressing   Psychiatric: She has a normal mood and affect. Her behavior is normal.     NEUROLOGICAL EXAMINATION:     CRANIAL NERVES     CN III, IV, VI   Pupils are equal, round, and reactive to light.  Extraocular motions are normal.       Diagnostic Results: available imaging report reviewed  Lab Results   Component Value Date    WBC 8.00 12/10/2018    HGB 7.8 (L) 12/10/2018    HCT 23.4 (L) 12/10/2018    MCV 98 12/10/2018     12/10/2018     CMP  Sodium   Date Value Ref Range Status   12/10/2018 140 136 - 145 mmol/L Final     Potassium   Date Value Ref Range Status   12/10/2018 3.6 3.5 - 5.1 mmol/L Final     Chloride   Date Value Ref Range Status   12/10/2018 113 (H) 95  - 110 mmol/L Final     CO2   Date Value Ref Range Status   12/10/2018 22 (L) 23 - 29 mmol/L Final     Glucose   Date Value Ref Range Status   12/10/2018 107 70 - 110 mg/dL Final     BUN, Bld   Date Value Ref Range Status   12/10/2018 16 10 - 30 mg/dL Final     Creatinine   Date Value Ref Range Status   12/10/2018 1.2 0.5 - 1.4 mg/dL Final   06/07/2013 0.9 0.5 - 1.4 mg/dL Final     Calcium   Date Value Ref Range Status   12/10/2018 7.9 (L) 8.7 - 10.5 mg/dL Final   06/07/2013 9.3 8.7 - 10.5 mg/dL Final     Total Protein   Date Value Ref Range Status   12/10/2018 5.1 (L) 6.0 - 8.4 g/dL Final     Albumin   Date Value Ref Range Status   12/10/2018 2.5 (L) 3.5 - 5.2 g/dL Final     Total Bilirubin   Date Value Ref Range Status   12/10/2018 0.4 0.1 - 1.0 mg/dL Final     Comment:     For infants and newborns, interpretation of results should be based  on gestational age, weight and in agreement with clinical  observations.  Premature Infant recommended reference ranges:  Up to 24 hours.............<8.0 mg/dL  Up to 48 hours............<12.0 mg/dL  3-5 days..................<15.0 mg/dL  6-29 days.................<15.0 mg/dL       Alkaline Phosphatase   Date Value Ref Range Status   12/10/2018 36 (L) 55 - 135 U/L Final   04/17/2012 53 23 - 119 UNIT/L Final     AST   Date Value Ref Range Status   12/10/2018 22 10 - 40 U/L Final   04/17/2012 11 10 - 30 UNIT/L Final     ALT   Date Value Ref Range Status   12/10/2018 <5 (L) 10 - 44 U/L Final     Anion Gap   Date Value Ref Range Status   12/10/2018 5 (L) 8 - 16 mmol/L Final   06/07/2013 11 5 - 15 meq/L Final     eGFR if    Date Value Ref Range Status   12/10/2018 46 (A) >60 mL/min/1.73 m^2 Final     eGFR if non    Date Value Ref Range Status   12/10/2018 40 (A) >60 mL/min/1.73 m^2 Final     Comment:     Calculation used to obtain the estimated glomerular filtration  rate (eGFR) is the CKD-EPI equation.        Assessment/Plan:     No new Assessment &  Plan notes have been filed under this hospital service since the last note was generated.  Service: Physical Medicine and Rehabilitation      S/p left hip hemiarthroplasty   Consider skill nursing for further therapeutic interventions    Richard Leon MD  Department of Physical Medicine & Rehab  [facility]

## 2018-12-10 NOTE — PLAN OF CARE
Problem: Physical Therapy Goal  Goal: Physical Therapy Goal  Goals to be met by: 2018     Patient will increase functional independence with mobility by performin. Supine to sit with MInimal Assistance  2. Sit to supine with MInimal Assistance  3. Sit to stand transfer with Minimal Assistance  4. Bed to chair transfer with Minimal Assistance using Rolling Walker  5. Lower extremity exercise program x10-20 reps per handout, with supervision     Outcome: Ongoing (interventions implemented as appropriate)  PT BID for bed mobility, transfer training

## 2018-12-11 VITALS
HEIGHT: 63 IN | RESPIRATION RATE: 16 BRPM | TEMPERATURE: 98 F | WEIGHT: 117.94 LBS | SYSTOLIC BLOOD PRESSURE: 166 MMHG | OXYGEN SATURATION: 100 % | HEART RATE: 84 BPM | DIASTOLIC BLOOD PRESSURE: 70 MMHG | BODY MASS INDEX: 20.9 KG/M2

## 2018-12-11 LAB
ALBUMIN SERPL BCP-MCNC: 2.7 G/DL
ALP SERPL-CCNC: 44 U/L
ALT SERPL W/O P-5'-P-CCNC: <5 U/L
ANION GAP SERPL CALC-SCNC: 8 MMOL/L
AST SERPL-CCNC: 23 U/L
BASOPHILS # BLD AUTO: 0.1 K/UL
BASOPHILS NFR BLD: 0.8 %
BILIRUB SERPL-MCNC: 0.7 MG/DL
BUN SERPL-MCNC: 14 MG/DL
CALCIUM SERPL-MCNC: 8.9 MG/DL
CHLORIDE SERPL-SCNC: 109 MMOL/L
CO2 SERPL-SCNC: 24 MMOL/L
CREAT SERPL-MCNC: 0.8 MG/DL
DIFFERENTIAL METHOD: ABNORMAL
EOSINOPHIL # BLD AUTO: 0.3 K/UL
EOSINOPHIL NFR BLD: 4 %
ERYTHROCYTE [DISTWIDTH] IN BLOOD BY AUTOMATED COUNT: 16.8 %
EST. GFR  (AFRICAN AMERICAN): >60 ML/MIN/1.73 M^2
EST. GFR  (NON AFRICAN AMERICAN): >60 ML/MIN/1.73 M^2
GLUCOSE SERPL-MCNC: 118 MG/DL
HCT VFR BLD AUTO: 30.5 %
HGB BLD-MCNC: 10.6 G/DL
LYMPHOCYTES # BLD AUTO: 1.3 K/UL
LYMPHOCYTES NFR BLD: 17.7 %
MAGNESIUM SERPL-MCNC: 1.9 MG/DL
MCH RBC QN AUTO: 31.7 PG
MCHC RBC AUTO-ENTMCNC: 34.7 G/DL
MCV RBC AUTO: 91 FL
MONOCYTES # BLD AUTO: 0.4 K/UL
MONOCYTES NFR BLD: 4.9 %
NEUTROPHILS # BLD AUTO: 5.3 K/UL
NEUTROPHILS NFR BLD: 72.6 %
PHOSPHATE SERPL-MCNC: 2.5 MG/DL
PLATELET # BLD AUTO: 182 K/UL
PMV BLD AUTO: 8.5 FL
POTASSIUM SERPL-SCNC: 3.4 MMOL/L
PROT SERPL-MCNC: 5.6 G/DL
RBC # BLD AUTO: 3.35 M/UL
SODIUM SERPL-SCNC: 141 MMOL/L
WBC # BLD AUTO: 7.3 K/UL

## 2018-12-11 PROCEDURE — 25000003 PHARM REV CODE 250: Performed by: EMERGENCY MEDICINE

## 2018-12-11 PROCEDURE — 63600175 PHARM REV CODE 636 W HCPCS: Performed by: ORTHOPAEDIC SURGERY

## 2018-12-11 PROCEDURE — 94761 N-INVAS EAR/PLS OXIMETRY MLT: CPT

## 2018-12-11 PROCEDURE — 94640 AIRWAY INHALATION TREATMENT: CPT

## 2018-12-11 PROCEDURE — 85025 COMPLETE CBC W/AUTO DIFF WBC: CPT

## 2018-12-11 PROCEDURE — 25000242 PHARM REV CODE 250 ALT 637 W/ HCPCS: Performed by: INTERNAL MEDICINE

## 2018-12-11 PROCEDURE — 84100 ASSAY OF PHOSPHORUS: CPT

## 2018-12-11 PROCEDURE — 83735 ASSAY OF MAGNESIUM: CPT

## 2018-12-11 PROCEDURE — 97530 THERAPEUTIC ACTIVITIES: CPT

## 2018-12-11 PROCEDURE — 97110 THERAPEUTIC EXERCISES: CPT

## 2018-12-11 PROCEDURE — 36415 COLL VENOUS BLD VENIPUNCTURE: CPT

## 2018-12-11 PROCEDURE — 99239 HOSP IP/OBS DSCHRG MGMT >30: CPT | Mod: ,,, | Performed by: INTERNAL MEDICINE

## 2018-12-11 PROCEDURE — 25000003 PHARM REV CODE 250: Performed by: NURSE PRACTITIONER

## 2018-12-11 PROCEDURE — 36430 TRANSFUSION BLD/BLD COMPNT: CPT

## 2018-12-11 PROCEDURE — 99900035 HC TECH TIME PER 15 MIN (STAT)

## 2018-12-11 PROCEDURE — 27000221 HC OXYGEN, UP TO 24 HOURS

## 2018-12-11 PROCEDURE — 25000003 PHARM REV CODE 250: Performed by: INTERNAL MEDICINE

## 2018-12-11 PROCEDURE — 63600175 PHARM REV CODE 636 W HCPCS: Performed by: INTERNAL MEDICINE

## 2018-12-11 PROCEDURE — 80053 COMPREHEN METABOLIC PANEL: CPT

## 2018-12-11 PROCEDURE — 25000003 PHARM REV CODE 250: Performed by: ORTHOPAEDIC SURGERY

## 2018-12-11 RX ORDER — CLONAZEPAM 0.5 MG/1
0.5 TABLET ORAL 2 TIMES DAILY
Qty: 10 TABLET | Refills: 0 | Status: ON HOLD | OUTPATIENT
Start: 2018-12-11 | End: 2019-11-10 | Stop reason: SDUPTHER

## 2018-12-11 RX ORDER — IPRATROPIUM BROMIDE AND ALBUTEROL SULFATE 2.5; .5 MG/3ML; MG/3ML
3 SOLUTION RESPIRATORY (INHALATION)
Status: DISCONTINUED | OUTPATIENT
Start: 2018-12-11 | End: 2018-12-11

## 2018-12-11 RX ORDER — LEVOFLOXACIN 250 MG/1
250 TABLET ORAL DAILY
Qty: 3 TABLET | Refills: 0
Start: 2018-12-11 | End: 2018-12-14

## 2018-12-11 RX ORDER — ASPIRIN 325 MG
325 TABLET ORAL 2 TIMES DAILY
Refills: 0
Start: 2018-12-11 | End: 2019-11-19 | Stop reason: CLARIF

## 2018-12-11 RX ORDER — IPRATROPIUM BROMIDE AND ALBUTEROL SULFATE 2.5; .5 MG/3ML; MG/3ML
3 SOLUTION RESPIRATORY (INHALATION) EVERY 4 HOURS PRN
Status: DISCONTINUED | OUTPATIENT
Start: 2018-12-11 | End: 2018-12-11 | Stop reason: HOSPADM

## 2018-12-11 RX ORDER — IPRATROPIUM BROMIDE AND ALBUTEROL SULFATE 2.5; .5 MG/3ML; MG/3ML
3 SOLUTION RESPIRATORY (INHALATION) EVERY 4 HOURS PRN
Qty: 1 BOX | Refills: 0 | Status: SHIPPED | OUTPATIENT
Start: 2018-12-11 | End: 2020-04-18

## 2018-12-11 RX ORDER — OXYCODONE AND ACETAMINOPHEN 10; 325 MG/1; MG/1
1 TABLET ORAL 3 TIMES DAILY
Qty: 10 TABLET | Refills: 0 | Status: ON HOLD | OUTPATIENT
Start: 2018-12-11 | End: 2019-11-10 | Stop reason: SDUPTHER

## 2018-12-11 RX ADMIN — FERROUS SULFATE TAB EC 325 MG (65 MG FE EQUIVALENT) 325 MG: 325 (65 FE) TABLET DELAYED RESPONSE at 09:12

## 2018-12-11 RX ADMIN — FUROSEMIDE 20 MG: 10 INJECTION, SOLUTION INTRAMUSCULAR; INTRAVENOUS at 06:12

## 2018-12-11 RX ADMIN — HYDROCHLOROTHIAZIDE 12.5 MG: 12.5 CAPSULE ORAL at 09:12

## 2018-12-11 RX ADMIN — OXYCODONE HYDROCHLORIDE AND ACETAMINOPHEN 1 TABLET: 10; 325 TABLET ORAL at 09:12

## 2018-12-11 RX ADMIN — ENOXAPARIN SODIUM 40 MG: 100 INJECTION SUBCUTANEOUS at 09:12

## 2018-12-11 RX ADMIN — OMEGA-3 FATTY ACIDS CAP 1000 MG 1 CAPSULE: 1000 CAP at 09:12

## 2018-12-11 RX ADMIN — STANDARDIZED SENNA CONCENTRATE AND DOCUSATE SODIUM 1 TABLET: 8.6; 5 TABLET, FILM COATED ORAL at 09:12

## 2018-12-11 RX ADMIN — PANTOPRAZOLE SODIUM 40 MG: 40 TABLET, DELAYED RELEASE ORAL at 09:12

## 2018-12-11 RX ADMIN — CLONAZEPAM 0.5 MG: 0.5 TABLET ORAL at 09:12

## 2018-12-11 RX ADMIN — IPRATROPIUM BROMIDE AND ALBUTEROL SULFATE 3 ML: .5; 3 SOLUTION RESPIRATORY (INHALATION) at 01:12

## 2018-12-11 RX ADMIN — IRBESARTAN 150 MG: 150 TABLET ORAL at 09:12

## 2018-12-11 RX ADMIN — Medication 500 MG: at 09:12

## 2018-12-11 RX ADMIN — POTASSIUM PHOSPHATE, MONOBASIC AND POTASSIUM PHOSPHATE, DIBASIC 20 MMOL: 224; 236 INJECTION, SOLUTION INTRAVENOUS at 10:12

## 2018-12-11 RX ADMIN — ASPIRIN 325 MG ORAL TABLET 325 MG: 325 PILL ORAL at 09:12

## 2018-12-11 RX ADMIN — FUROSEMIDE 20 MG: 10 INJECTION, SOLUTION INTRAMUSCULAR; INTRAVENOUS at 01:12

## 2018-12-11 RX ADMIN — MULTIPLE VITAMINS W/ MINERALS TAB 1 TABLET: TAB at 09:12

## 2018-12-11 RX ADMIN — CARBIDOPA AND LEVODOPA 2 TABLET: 25; 100 TABLET ORAL at 09:12

## 2018-12-11 NOTE — PLAN OF CARE
Spoke with pt's nephew, Denny (ph#[phone]) regarding discharge plan; he did speak with the pt and other family members, they want [facility] as their first choice and [facility] as their second choice.  I will notify OMAYRA Larson of the above SNF plan.....RACHEL Ndiaye       12/11/18 5876   Post-Acute Status   Post-Acute Authorization Placement   Post-Acute Placement Status Patient List Provided

## 2018-12-11 NOTE — PLAN OF CARE
Problem: Patient Care Overview  Goal: Plan of Care Review  Outcome: Ongoing (interventions implemented as appropriate)  Patient disoriented to time, VSS, afebrile this shift. PIV infusing 2nd unit of blood, CDI, no redness or edema noted. Left Hip dressing CDI, NVI, tender to touch. Pt due to void during shift, did in/out received great amount of urine, later in shift pt voided in brief. Pt with expiratory wheezing, Prn resp tx given. POC reviewed with patient, open  Discussion facilitated, pt verbalized understanding.  Patient has remained free of falls, trauma, and injury this shift. Bed locked, in lowest position, bed alarm on, SR up x 2, call light within reach. Will continue to monitor, POC in progress.

## 2018-12-11 NOTE — PROGRESS NOTES
12/11/18 0109   Patient Assessment/Suction   Level of Consciousness (AVPU) alert   Respiratory Effort Normal;Unlabored   Expansion/Accessory Muscles/Retractions no use of accessory muscles   All Lung Fields Breath Sounds wheezes, expiratory   Rhythm/Pattern, Respiratory depth regular;pattern regular;unlabored   PRE-TX-O2-ETCO2   O2 Device (Oxygen Therapy) nasal cannula   $ Is the patient on Low Flow Oxygen? Yes   Flow (L/min) 1   SpO2 98 %   Pulse Oximetry Type Intermittent   $ Pulse Oximetry - Multiple Charge Pulse Oximetry - Multiple   Pulse 97   Resp 16   Aerosol Therapy   $ Aerosol Therapy Charges Aerosol Treatment   Daily Review of Necessity (SVN) completed   Respiratory Treatment Status (SVN) given   Treatment Route (SVN) mask   Patient Position (SVN) semi-Thompson's   Post Treatment Assessment (SVN) increased aeration   Signs of Intolerance (SVN) none   Breath Sounds Post-Respiratory Treatment   Throughout All Fields Post-Treatment All Fields   Throughout All Fields Post-Treatment aeration increased   Post-treatment Heart Rate (beats/min) 90   Post-treatment Resp Rate (breaths/min) 18

## 2018-12-11 NOTE — PT/OT/SLP PROGRESS
Physical Therapy Treatment    Patient Name:  Maricarmen Woodward   MRN:  [id number]    Recommendations:     Discharge Recommendations:  nursing facility, skilled       Assessment:     Maricarmen Woodward is a 89 y.o. female admitted with a medical diagnosis of Closed left hip fracture, initial encounter.  She presents with the following impairments/functional limitations:  weakness, impaired endurance, impaired self care skills, impaired functional mobilty, impaired balance, decreased coordination, decreased upper extremity function, decreased lower extremity function, decreased safety awareness, pain, decreased ROM, impaired coordination, orthopedic precautions, impaired cardiopulmonary response to activity .    Rehab Prognosis:  fair; patient would benefit from acute skilled PT services to address these deficits and reach maximum level of function.      Recent Surgery: Procedure(s) (LRB):  HEMIARTHROPLASTY, HIP, Terese, peg board, first assist (Left) 3 Days Post-Op    Plan:     During this hospitalization, patient to be seen BID to address the above listed problems via therapeutic activities, therapeutic exercises  · Plan of Care Expires:  12/23/18   Plan of Care Reviewed with: patient    Subjective     Communicated with nurse Lomas prior to session.  Patient found supine on bed pan upon PT entry to room, agreeable to treatment.      Chief Complaint: feeling tired and L hip pain  Pain/Comfort:  · Pain Rating 1: (did not rate)  · Location - Side 1: Left  · Location 1: hip  · Pain Addressed 1: Reposition, Distraction, Cessation of Activity, Nurse notified    Patients cultural, spiritual, Sabianism conflicts given the current situation:      Objective:     Patient found with: oxygen, telemetry, SCD     General Precautions: Standard, fall   Orthopedic Precautions:LLE weight bearing as tolerated, LLE posterior precautions   Braces: (no brace present)     Functional Mobility:  · Bed Mobility:     · Rolling Right:  maximal assistance  · Scooting: maximal assistance  · Bridging: A of 2 to HOB utilizing draw sheet        Therapeutic Activities and Exercises:     pt assisted with bed mobility  For cleaning, linen change and transferring off of bedpan      B LE supine therex: SLR (R LE only), AP, heel slides, glute sets x 10 reps each    Patient left supine with all lines intact, call button in reach and nurse Cesilia notified..    GOALS:   Multidisciplinary Problems     Physical Therapy Goals        Problem: Physical Therapy Goal    Goal Priority Disciplines Outcome Goal Variances Interventions   Physical Therapy Goal     PT, PT/OT Ongoing (interventions implemented as appropriate)     Description:  Goals to be met by: 2018     Patient will increase functional independence with mobility by performin. Supine to sit with MInimal Assistance  2. Sit to supine with MInimal Assistance  3. Sit to stand transfer with Minimal Assistance  4. Bed to chair transfer with Minimal Assistance using Rolling Walker  5. Lower extremity exercise program x10-20 reps per handout, with supervision                      Time Tracking:     PT Received On: 18  PT Start Time: 1405     PT Stop Time: 1424  PT Total Time (min): 19 min     Billable Minutes: Therapeutic Activity 8 and Therapeutic Exercise 8    Treatment Type: Treatment  PT/PTA: PTA     PTA Visit Number: 2     Zoya Garrett, PTA  2018

## 2018-12-11 NOTE — DISCHARGE SUMMARY
Discharge Summary  Hospital Medicine    Admit Date: 12/5/2018    Date and Time: 12/11/20182:06 PM    Discharge Attending Physician: Nichol Buchanan MD    Primary Care Physician: Haris Brambila MD    Diagnoses:  Active Hospital Problems    Diagnosis  POA    *Closed left hip fracture, initial encounter [S72.002A] S/p left hip hemiarthroplasty  Yes    Left hip pain [M25.552]  Yes    Anemia due to acute surgical blood loss s/p 2 PRBC  Iron deficiency anemia  Yes    Pre-op evaluation [Z01.818]  Not Applicable    Parkinson's disease [G20]  Yes    Essential hypertension [I10]  Yes        Discharged Condition: Good    Hospital Course:   Maricarmen Woodward is a 89 y/o female with a medical history significant for Parkinson's disease and HTN who presented to the ED today with c/o L hip pain which began after an accidental fall from her wheelchair today.  Pt lives at home and has a  24 hr sitter.  She requires assistance to get from her bed to her wheelchair due to her Parkinson's disease. Reportedly, patient hit a bump on her floor, causing her to slip out of the seat of her wheelchair, landing on her L hip.  Xray confirmed a L hip fracture.  Pt denied recent fever, cough, chest pain, SOB, N/V or dysuria.  Denied further injury.  Patient was admitted to Hospitalist medicine service. Patient was evaluated by Dr. Mei and Dr. Mccormack. Patient underwent hip hemiarthroplasty. Post-operative, patient was slow to work with PT. Pain adequately controlled. Patient participated with physical therapy. Patient required 2 PRBC for acute surgical blood loss anemia. Fall precautions discussed with the patient. Patient to follow up with primary care physician next week and orthopedic doctor in 2 weeks. Post-operative anti-coagulation as per orthopedics recommendations advised. In case of chest pain, shortness of breath, stroke or stroke like symptoms, high grade fever or any signs or symptoms of surgical site wound infection  symptoms, patient to return to nearest emergency room as soon as possible. Patient was discharged to SNF in stable condition with following discharge plan of care. Total time with the patient was 30 minutes and greater than 50% was spent in counseling and coordination of care. The assessment and plan have been discussed at length. Physicians' notes reviewed. Labs and procedure reviewed.     Consults: Dr. Mccormack, Dr. Leon, Dr. Mei    Significant Diagnostic Studies:   CXR: Strandy reticulonodular scarring of the right lung apex, stable.  Atherosclerosis.     Left hip x-ray: Impacted left femoral neck fracture.     CXR: Interval slight blunting the right costophrenic sulcus suggesting trace right pleural effusion      Microbiology Results (last 7 days)     Procedure Component Value Units Date/Time    Urine culture [[id number]] Collected:  12/06/18 1928    Order Status:  Completed Specimen:  Urine Updated:  12/08/18 0059     Urine Culture, Routine No growth    Narrative:       Preferred Collection Type->Urine, Clean Catch        Special Treatments/Procedures: as above  Disposition: SNF    Medications:  Reconciled Home Medications:   Current Discharge Medication List      START taking these medications    Details   albuterol-ipratropium (DUO-NEB) 2.5 mg-0.5 mg/3 mL nebulizer solution Take 3 mLs by nebulization every 4 (four) hours as needed for Wheezing or Shortness of Breath. Rescue  Qty: 1 Box, Refills: 0      aspirin 325 MG tablet Take 1 tablet (325 mg total) by mouth 2 (two) times daily.  Refills: 0      levoFLOXacin (LEVAQUIN) 250 MG tablet Take 1 tablet (250 mg total) by mouth once daily. for 3 days  Qty: 3 tablet, Refills: 0         CONTINUE these medications which have CHANGED    Details   clonazePAM (KLONOPIN) 0.5 MG tablet Take 1 tablet (0.5 mg total) by mouth 2 (two) times daily.  Qty: 10 tablet, Refills: 0      oxyCODONE-acetaminophen (PERCOCET)  mg per tablet Take 1 tablet by mouth 3 (three)  times daily.  Qty: 10 tablet, Refills: 0         CONTINUE these medications which have NOT CHANGED    Details   irbesartan-hydrochlorothiazide (AVALIDE) 150-12.5 mg per tablet Take 1 tablet by mouth once daily.      calcium carbonate (CALCIUM ANTACID) 300 mg (750 mg) Chew Take 1 tablet by mouth 2 (two) times daily.      carbidopa-levodopa  mg (SINEMET)  mg per tablet Take 2 tablets by mouth 2 (two) times daily with meals.      carbidopa-levodopa  mg (SINEMET CR)  mg TbSR Take 1.5 tablets by mouth every evening.      cholecalciferol, vitamin D3, (VITAMIN D3) 2,000 unit Tab Take 1 tablet by mouth once daily.       cyanocobalamin (VITAMIN B-12) 1000 MCG tablet Take 100 mcg by mouth once daily.      ferrous sulfate 325 (65 FE) MG EC tablet Take 325 mg by mouth once daily.      multivit-iron-min-folic acid (MULTIVITAMIN-IRON-MINERALS-FOLIC ACID) 3,500-18-0.4 unit-mg-mg Chew Take 1 tablet by mouth once daily.        omega-3 fatty acids-vitamin E (FISH OIL) 1,000 mg Cap Take 1 capsule by mouth once daily.      omeprazole (PRILOSEC) 40 MG capsule Take 40 mg by mouth once daily.      potassium chloride SA (K-DUR,KLOR-CON) 10 MEQ tablet Take 10 mEq by mouth once daily.        pravastatin (PRAVACHOL) 40 MG tablet Take 40 mg by mouth every evening.      senna-docusate 8.6-50 mg (SENNA WITH DOCUSATE SODIUM) 8.6-50 mg per tablet Take 1 tablet by mouth once daily.         STOP taking these medications       tizanidine 4 mg Cap Comments:   Reason for Stopping:             Discharge Procedure Orders   Diet Cardiac     Other restrictions (specify):   Order Comments: PLEASE OBSERVE FALL PRECAUTIONS.  Hip precautions     Call MD for:   Order Comments: For worsening symptoms, chest pain, shortness of breath, increased abdominal pain, high grade fever, stroke or stroke like symptoms, immediately go to the nearest Emergency Room or call 911 as soon as possible.     Follow-up Information     Cheng Mccormack,  MD In 2 weeks.    Specialty:  Orthopedic Surgery  Why:  For wound re-check  Contact information:  [address]  [facility]  [place] [address]  [phone]             [facility] SNF.    Why:  SNF, Nursing Home  Contact information:  [address] [id number] [phone]           Haris Brambila MD In 2 weeks.    Specialty:  Internal Medicine  Contact information:  [address]  [place] [address]  [phone]             Cheng Mccormack MD In 2 weeks.    Specialty:  Orthopedic Surgery  Contact information:  [address]  [facility]  [facility] [address]  [phone]

## 2018-12-11 NOTE — PLAN OF CARE
12/11/18 1516   PRE-TX-O2-ETCO2   O2 Device (Oxygen Therapy) nasal cannula   $ Is the patient on Low Flow Oxygen? Yes   Flow (L/min) 1   Oxygen Concentration (%) 24   SpO2 99 %   Pulse Oximetry Type Intermittent   $ Pulse Oximetry - Multiple Charge Pulse Oximetry - Multiple   Aerosol Therapy   $ Aerosol Therapy Charges PRN treatment not required   Ready to Wean/Extubation Screen   FIO2<=50 (chart decimal) 0.24

## 2018-12-11 NOTE — PLAN OF CARE
I sent the pts face sheet, 3 day packet , current meds, PT/OT notes to [facility] via [facility]. Marsha Guzmán LMSW     Per Ivon with [facility] [phone] they are able to accept the pt today and the pt is able to come complete paperwork today. Per RACHEL Downing CM the pt is medically cleared.     I completed the LOCET assessment with Maite at [phone] opt 3. I faxed the signed and completed PASRR to [facility] at [phone]. Marsha Guzmán LMSW     2:10- I sent discharge orders, PPD , AVS and prescriptions to [facility]. Awaiting pts 142. Marsha Guzmán LMSW     2:50- Pts 142 received, PASRR and 142 scanned into [facility] and placed in the pts blue folder. Marsha Guzmán           12/11/18 1111   Post-Acute Status   Post-Acute Authorization Placement   Post-Acute Placement Status Referrals Sent

## 2018-12-11 NOTE — PLAN OF CARE
12/10/18 2030   PRE-TX-O2-ETCO2   O2 Device (Oxygen Therapy) nasal cannula   $ Is the patient on Low Flow Oxygen? Yes   Flow (L/min) 1   SpO2 97 %   Pulse Oximetry Type Intermittent   $ Pulse Oximetry - Multiple Charge Pulse Oximetry - Multiple   Equipment Change   $ RT Equipment nasal cannula (1-5 liters)   O2 Equipment Changed? Yes   O2 Equipment Change Next Due 12/17/18

## 2018-12-11 NOTE — PLAN OF CARE
· 12/11/2018 3:05:20 PM Note: Ready! Everything looks good. Report can be called to [facility] on [address]. Thanks so much! If only they were all this easy!!  Ivon [email or web address]    Report can be called to Ev at [facility] on [address] and they will transport the pt. Pts nurse Cesilia updated and destination booked via [facility].      12/11/18 1506   Post-Acute Status   Post-Acute Authorization Placement   Post-Acute Placement Status Authorization Obtained

## 2018-12-11 NOTE — NURSING
Situation Principle Problem:  Closed left hip fracture, initial encounter      Reason for Calling: Pt has some expiratory wheezing.    Provider Calling: Cathy Booker    Background Vitals:    12/10/18 2030 12/10/18 2128 12/10/18 2316 12/10/18 2340   BP:  139/61 (!) 140/63 (!) 166/69   BP Location:  Left arm Left arm Left arm   Patient Position:  Lying Lying Lying   Pulse:  85 81 85   Resp:  16 18 20   Temp:  98 °F (36.7 °C) 98.4 °F (36.9 °C) 98.3 °F (36.8 °C)   TempSrc:  Oral Oral Oral   SpO2: 97%  97% 98%   Weight:       Height:           No results found for: POCTGLUCOSE    Intake/Output:    Intake/Output Summary (Last 24 hours) at 12/11/2018 0104  Last data filed at 12/10/2018 1800  Gross per 24 hour   Intake 49902 ml   Output 150 ml   Net 45570 ml        Assessment What is happening: Pt has some expiratory wheezing. I also saw I am supposed to give Lasix after her unit of blood. It does not look like she received Lasix after her first unit today on day shift. Would you like me to pause the second unit hanging now or wait until it's finished?   Response Provider Response: PRN lasix, CXR

## 2018-12-11 NOTE — PT/OT/SLP PROGRESS
"Physical Therapy Treatment    Patient Name:  Maricarmen Woodward   MRN:  [id number]    Recommendations:     Discharge Recommendations:  nursing facility, skilled   Discharge Equipment Recommendations: none       Assessment:     Maricarmen Woodward is a 89 y.o. female admitted with a medical diagnosis of Closed left hip fracture, initial encounter.  She presents with the following impairments/functional limitations:  weakness, impaired endurance, impaired self care skills, impaired functional mobilty, impaired balance, decreased coordination, decreased upper extremity function, decreased lower extremity function, decreased safety awareness, pain, decreased ROM, impaired coordination, impaired cardiopulmonary response to activity, orthopedic precautions .    Rehab Prognosis:  good; patient would benefit from acute skilled PT services to address these deficits and reach maximum level of function.      Recent Surgery: Procedure(s) (LRB):  HEMIARTHROPLASTY, HIP, Terese, peg board, first assist (Left) 3 Days Post-Op    Plan:     During this hospitalization, patient to be seen BID to address the above listed problems via therapeutic activities, therapeutic exercises  · Plan of Care Expires:  12/23/18   Plan of Care Reviewed with: patient    Subjective     Communicated with nurse Lomas prior to session.  Patient found supine upon PT entry to room, agreeable to treatment.      Chief Complaint: L hip pain  Patient comments/goals: eager to work with PT, "I have to."  Pain/Comfort:  · Pain Rating 1: (did not rate)  · Location - Side 1: Left  · Location 1: hip  · Pain Addressed 1: Reposition, Distraction, Cessation of Activity, Nurse notified    Patients cultural, spiritual, Mandaen conflicts given the current situation:      Objective:     Patient found with: oxygen, telemetry, peripheral IV, SCD     General Precautions: Standard, fall   Orthopedic Precautions:LLE weight bearing as tolerated, LLE posterior precautions " "  Braces: (no brace present)     Functional Mobility:  · Bed Mobility:     · Scooting: maximal assistance  · Bridging: A of 2 utilizing draw sheet to HOB  · Supine to Sit: maximal assistance and of 2 persons  · Sit to Supine: maximal assistance and of 2 persons    · Transfers:     · Sit to Stand:  maximal assistance and of 2 persons with rolling walker and 3x's total    · Gait: unable 2' weakness        Therapeutic Activities and Exercises:   pt assisted to EOB with max A of 2. Pt required min A for seated balance at EOB   pt assisted to standing 3x's total with max A of 2 using RW. Pt able to hold static stand x 5-10" each, however required max A of 2 and unable to stand fully upright to take steps    Patient left supine with all lines intact, call button in reach and nurse Cesilia present/ notified..    GOALS:   Multidisciplinary Problems     Physical Therapy Goals        Problem: Physical Therapy Goal    Goal Priority Disciplines Outcome Goal Variances Interventions   Physical Therapy Goal     PT, PT/OT Ongoing (interventions implemented as appropriate)     Description:  Goals to be met by: 2018     Patient will increase functional independence with mobility by performin. Supine to sit with MInimal Assistance  2. Sit to supine with MInimal Assistance  3. Sit to stand transfer with Minimal Assistance  4. Bed to chair transfer with Minimal Assistance using Rolling Walker  5. Lower extremity exercise program x10-20 reps per handout, with supervision                      Time Tracking:     PT Received On: 18  PT Start Time: 1016     PT Stop Time: 1039  PT Total Time (min): 23 min     Billable Minutes: Therapeutic Activity 23    Treatment Type: Treatment  PT/PTA: PTA     PTA Visit Number: 2     Zoya Garrett PTA  2018  "

## 2018-12-12 ENCOUNTER — PATIENT OUTREACH (OUTPATIENT)
Dept: ADMINISTRATIVE | Facility: CLINIC | Age: 83
End: 2018-12-12

## 2018-12-12 NOTE — PLAN OF CARE
12/12/18 0806   Final Note   Assessment Type Final Discharge Note   Anticipated Discharge Disposition SNF

## 2018-12-12 NOTE — NURSING
Patient discharged from unit via wheelchair by [facility] transport with belongings, discharge instructions and 3 paper prescriptions.

## 2018-12-20 DIAGNOSIS — M25.552 LEFT HIP PAIN: Primary | ICD-10-CM

## 2019-05-23 ENCOUNTER — TELEPHONE (OUTPATIENT)
Dept: ORTHOPEDICS | Facility: CLINIC | Age: 84
End: 2019-05-23

## 2019-05-23 NOTE — TELEPHONE ENCOUNTER
Called patient to schedule appointment, but patient prefers to schedule in [place]. Patient will reach out to the [place] office. All questions answered, no other issues discussed.     ----- Message from Majo Dobson LPN sent at 5/23/2019  3:17 PM CDT -----  Needs appt to follow from surgery in December.    Please contact patient.  Majo  ----- Message -----  From: Courtney Ortega  Sent: 5/23/2019  12:40 PM  To: [facility] Staff    Type:  Same Day Appointment Request    Caller is requesting a same day appointment.  Name of Caller:  self  When is the first available appointment?  N/a   Symptoms:  Requesting appt for left leg pain   Best Call Back Number:  [phone] (home)     Additional Information:   May have had surgery December 2018

## 2019-07-01 ENCOUNTER — HOSPITAL ENCOUNTER (EMERGENCY)
Facility: HOSPITAL | Age: 84
Discharge: HOME | End: 2019-07-02
Attending: EMERGENCY MEDICINE
Payer: MEDICARE

## 2019-07-01 VITALS
DIASTOLIC BLOOD PRESSURE: 74 MMHG | HEART RATE: 86 BPM | OXYGEN SATURATION: 98 % | BODY MASS INDEX: 20.73 KG/M2 | TEMPERATURE: 97 F | WEIGHT: 117 LBS | HEIGHT: 63 IN | RESPIRATION RATE: 18 BRPM | SYSTOLIC BLOOD PRESSURE: 190 MMHG

## 2019-07-01 DIAGNOSIS — S32.020A CLOSED COMPRESSION FRACTURE OF SECOND LUMBAR VERTEBRA, INITIAL ENCOUNTER: ICD-10-CM

## 2019-07-01 DIAGNOSIS — S32.050A CLOSED COMPRESSION FRACTURE OF FIFTH LUMBAR VERTEBRA, INITIAL ENCOUNTER: Primary | ICD-10-CM

## 2019-07-01 PROCEDURE — 25000003 PHARM REV CODE 250: Performed by: PHYSICIAN ASSISTANT

## 2019-07-01 PROCEDURE — 99284 EMERGENCY DEPT VISIT MOD MDM: CPT

## 2019-07-01 RX ORDER — HYDROCODONE BITARTRATE AND ACETAMINOPHEN 5; 325 MG/1; MG/1
1 TABLET ORAL
Status: COMPLETED | OUTPATIENT
Start: 2019-07-01 | End: 2019-07-01

## 2019-07-01 RX ADMIN — HYDROCODONE BITARTRATE AND ACETAMINOPHEN 1 TABLET: 5; 325 TABLET ORAL at 10:07

## 2019-07-01 NOTE — DISCHARGE INSTRUCTIONS
Continue pain medication as prescribed.  Follow up with her primary care provider and a back specialist.  For worsening symptoms, chest pain, shortness of breath, increased abdominal pain, high grade fever, stroke or stroke like symptoms, immediately go to the nearest Emergency Room or call 911 as soon as possible.

## 2019-07-01 NOTE — ED PROVIDER NOTES
Encounter Date: 7/1/2019       History     Chief Complaint   Patient presents with    Fall     Friday     Back Pain     Patient is a 89 yo female presenting to the ED with lower back pain following a fall on Friday night. Patients unable to recall the fall. Patient's sitter accompanying her to the ED states she found her sitting on the ground next to her bed side commode. Sister does not think patient hit her head and states patient did not loss conscious. The patient denied headache but the patient is unable to verify if she hit her head. Sitter states that she vommitted once yesterday. Sitter denied any change in her routine mental status. Pt states the pain started last night. Describes the pain as sharp 8 out of 10 that comes and goes. Worsens with movement and palpation, improves minimally with rest. She did take tylenol and is currently out of her other pain medication. She denied any numbness/tingling to lower extremity. She only transfers at her baseline. She denied any loss of bowel/bladder control.         Review of patient's allergies indicates:  No Known Allergies  Past Medical History:   Diagnosis Date    Arthritis     Hypertension     Parkinson's disease      Past Surgical History:   Procedure Laterality Date    COLONOSCOPY N/A 4/19/2012    Performed by [facility] at [facility]    EGD (ESOPHAGOGASTRODUODENOSCOPY) N/A 5/15/2012    Performed by [facility] at [facility]    EGD (ESOPHAGOGASTRODUODENOSCOPY) N/A 4/18/2012    Performed by Raf [facility] at [facility] END[facility]    FRACTURE SURGERY      HEMIARTHROPLASTY, HIP, Skippers, peg board, first assist Left 12/8/2018    Performed by Cheng Mccormack [facility] at [facility] OR    JOINT REPLACEMENT      right hip replacement     Family History   Problem Relation Age of Onset    No Known Problems Mother      Social History     Tobacco Use    Smoking status: Light Tobacco Smoker     Packs/day: 0.25     Years: 5.00     Pack years: 1.25     Last attempt  to quit: 4/17/2009     Years since quitting: 10.2   Substance Use Topics    Alcohol use: No    Drug use: No     Review of Systems   Unable to perform ROS: Dementia       Physical Exam     Initial Vitals [07/01/19 0948]   BP Pulse Resp Temp SpO2   (!) 190/74 86 18 97.4 °F (36.3 °C) 98 %      MAP       --         Physical Exam    Nursing note and vitals reviewed.  Constitutional: She appears well-developed and well-nourished. She is not diaphoretic. No distress.   HENT:   Head: Normocephalic and atraumatic.   Neck: Normal range of motion. Neck supple.   Cardiovascular: Normal rate, regular rhythm, normal heart sounds and intact distal pulses. Exam reveals no gallop and no friction rub.    No murmur heard.  Pulmonary/Chest: Breath sounds normal. No respiratory distress. She has no wheezes. She has no rhonchi. She has no rales.   Musculoskeletal: Normal range of motion. She exhibits tenderness. She exhibits no edema.        Lumbar back: She exhibits tenderness and bony tenderness.   Bony tenderness to the lumbar spine with no step-off. Equal strength and sensation to bilateral lower extremities. Able to internally and externally rotate hips without pain. 2+ pedal pulse bilaterally.    Neurological: She is alert. She has normal strength. No sensory deficit.   Skin: Skin is warm and dry. No rash and no abscess noted. No erythema.   Psychiatric: She has a normal mood and affect.         ED Course   Procedures  Labs Reviewed - No data to display       Imaging Results          X-Ray Lumbar Spine Ap And Lateral (Final result)  Result time 07/01/19 12:00:58    Final result by Valeriy Newby Jr., MD (07/01/19 12:00:58)                 Impression:      Diffuse osteoporosis with prior severe compression fracture of the L2 vertebra and mild compression fracture of the L5 vertebra status post kyphoplasty.  Slight scoliosis.      Electronically signed by: Valeriy Newby MD  Date:    07/01/2019  Time:    12:00              Narrative:    EXAMINATION:  XR LUMBAR SPINE AP AND LATERAL    CLINICAL HISTORY:  Low back pain, minor trauma;    TECHNIQUE:  AP, lateral and spot images were performed of the lumbar spine.    COMPARISON:  None    FINDINGS:  There is very slight lumbar scoliosis.  The patient has had a prior severe compression fracture of the L2 vertebra and a mild compression fracture of the L5 vertebra both status post kyphoplasty with radio opaque glue in place.  A Schmorl's node is noted in the superior endplate of L3.  There is diffuse Osteoporosis noted.  The disc spaces are within normal limits.  No spondylolisthesis is seen.                               CT Head Without Contrast (Final result)  Result time 07/01/19 11:06:10    Final result by Jaycee Vega MD (07/01/19 11:06:10)                 Impression:      Involutional changes and findings consistent with microvascular ischemic change with no acute intracranial findings.      Electronically signed by: Jaycee Vega MD  Date:    07/01/2019  Time:    11:06             Narrative:    EXAMINATION:  CT HEAD WITHOUT CONTRAST    CLINICAL HISTORY:  Headache, post trauma;    TECHNIQUE:  Low dose axial images were obtained through the head.  Coronal and sagittal reformations were also performed. Contrast was not administered.    COMPARISON:  7/12/2015    FINDINGS:  There is mild to moderate dilatation ventricles, sulci, fissures.  There is decreased density in white matter consistent with microvascular ischemic change.  There is no acute intracranial hemorrhage.  There is no intracranial mass effect.  There is no acute major vascular territory infarct.  Note is made that MRI is typically more sensitive than CT particularly for detection of early or small nonhemorrhagic infarcts.  The calvarium appears intact.  The mastoids appear well pneumatized and visualized paranasal sinuses essentially clear.  There are heavy calcifications in parasellar portions of internal carotid  arteries and in vertebral arteries.There are bilateral basal ganglia calcifications most likely on a degenerative basis.                                 Medical Decision Making:   History:   I obtained history from: someone other than patient.  Old Medical Records: I decided to obtain old medical records.  Clinical Tests:   Radiological Study: Ordered and Reviewed       APC / Resident Notes:   Urgent evaluation of a 89-year-old female who presents with back pain after mechanical fall 4 days ago.  Caregiver denies loss of consciousness that the patient hitting her head.  The patient is unable to provide a clear history.  She does have lumbar spine tenderness to palpation with no step-off.  She has equal strength to bilateral lower extremities. She denied loss of bowel or bladder control.  She is alert at this time.  She is oriented to person and place.  She is able to follow commands.  Due to unwitnessed fall, patient on blood thinners and inability to give me a accurate history secondary to chronic medical problems, CT of the head obtained which showed no acute abnormalities.  She has compression fracture of the lumbar spine.  She is currently already on pain medication.  Recommend they continue this and follow up with Orthopedics. Pain controlled. She was able to transfer to the wheelchair without difficulty. Discussed results with patient and caregiver. Return precautions given. Based on my clinical evaluation, I do not appreciate any immediate, emergent, or life threatening condition or etiology that warrants additional workup today and feel that the patient can be discharged with close follow up care.  Patient is to follow up with their primary care provider. Case was discussed with Dr. Ibarra who is in agreement with the plan of care. All questions answered.                    Clinical Impression:       ICD-10-CM ICD-9-CM   1. Closed compression fracture of fifth lumbar vertebra, initial encounter S32.050A  805.4   2. Closed compression fracture of second lumbar vertebra, initial encounter S32.020A 805.4                                Norma Saleem PA-C  07/01/19 1716

## 2019-08-01 ENCOUNTER — TELEPHONE (OUTPATIENT)
Dept: HOME HEALTH SERVICES | Facility: HOSPITAL | Age: 84
End: 2019-08-01
Payer: MEDICARE

## 2019-10-18 DIAGNOSIS — M25.551 BILATERAL HIP PAIN: Primary | ICD-10-CM

## 2019-10-18 DIAGNOSIS — M25.552 BILATERAL HIP PAIN: Primary | ICD-10-CM

## 2019-10-22 ENCOUNTER — HOSPITAL ENCOUNTER (OUTPATIENT)
Dept: RADIOLOGY | Facility: HOSPITAL | Age: 84
Discharge: HOME | End: 2019-10-22
Attending: ANESTHESIOLOGY
Payer: MEDICARE

## 2019-10-22 DIAGNOSIS — M25.551 BILATERAL HIP PAIN: ICD-10-CM

## 2019-10-22 DIAGNOSIS — M25.552 BILATERAL HIP PAIN: ICD-10-CM

## 2019-10-22 PROCEDURE — 73521 X-RAY EXAM HIPS BI 2 VIEWS: CPT | Mod: TC,PO

## 2019-11-09 ENCOUNTER — HOSPITAL ENCOUNTER (OUTPATIENT)
Facility: HOSPITAL | Age: 84
Discharge: HOME HEALTH | End: 2019-11-10
Attending: EMERGENCY MEDICINE | Admitting: INTERNAL MEDICINE
Payer: MEDICARE

## 2019-11-09 DIAGNOSIS — R79.89 ELEVATED TROPONIN: ICD-10-CM

## 2019-11-09 DIAGNOSIS — M19.90 ARTHRITIS: ICD-10-CM

## 2019-11-09 DIAGNOSIS — M24.561 CONTRACTURES INVOLVING BOTH KNEES: ICD-10-CM

## 2019-11-09 DIAGNOSIS — R53.81 DEBILITY: ICD-10-CM

## 2019-11-09 DIAGNOSIS — R55 SYNCOPE: ICD-10-CM

## 2019-11-09 DIAGNOSIS — L89.322 PRESSURE INJURY OF LEFT BUTTOCK, STAGE 2: ICD-10-CM

## 2019-11-09 DIAGNOSIS — R53.1 WEAKNESS: ICD-10-CM

## 2019-11-09 DIAGNOSIS — G20.A1 PARKINSON'S DISEASE: ICD-10-CM

## 2019-11-09 DIAGNOSIS — I10 ESSENTIAL HYPERTENSION: ICD-10-CM

## 2019-11-09 DIAGNOSIS — E86.0 DEHYDRATION: ICD-10-CM

## 2019-11-09 DIAGNOSIS — N17.9 AKI (ACUTE KIDNEY INJURY): ICD-10-CM

## 2019-11-09 DIAGNOSIS — M24.562 CONTRACTURES INVOLVING BOTH KNEES: ICD-10-CM

## 2019-11-09 DIAGNOSIS — R55 NEAR SYNCOPE: Primary | ICD-10-CM

## 2019-11-09 PROBLEM — E87.1 HYPONATREMIA: Status: ACTIVE | Noted: 2019-11-09

## 2019-11-09 PROBLEM — D53.9 MACROCYTIC ANEMIA: Status: ACTIVE | Noted: 2019-11-09

## 2019-11-09 LAB
ALBUMIN SERPL BCP-MCNC: 4.1 G/DL (ref 3.5–5.2)
ALP SERPL-CCNC: 74 U/L (ref 55–135)
ALT SERPL W/O P-5'-P-CCNC: 7 U/L (ref 10–44)
ANION GAP SERPL CALC-SCNC: 13 MMOL/L (ref 8–16)
AST SERPL-CCNC: 27 U/L (ref 10–40)
BASOPHILS # BLD AUTO: 0.04 K/UL (ref 0–0.2)
BASOPHILS NFR BLD: 0.3 % (ref 0–1.9)
BILIRUB SERPL-MCNC: 0.4 MG/DL (ref 0.1–1)
BUN SERPL-MCNC: 38 MG/DL (ref 10–30)
CALCIUM SERPL-MCNC: 10.5 MG/DL (ref 8.7–10.5)
CHLORIDE SERPL-SCNC: 99 MMOL/L (ref 95–110)
CO2 SERPL-SCNC: 22 MMOL/L (ref 23–29)
CREAT SERPL-MCNC: 1.1 MG/DL (ref 0.5–1.4)
DIFFERENTIAL METHOD: ABNORMAL
EOSINOPHIL # BLD AUTO: 0 K/UL (ref 0–0.5)
EOSINOPHIL NFR BLD: 0.3 % (ref 0–8)
ERYTHROCYTE [DISTWIDTH] IN BLOOD BY AUTOMATED COUNT: 14 % (ref 11.5–14.5)
EST. GFR  (AFRICAN AMERICAN): 50 ML/MIN/1.73 M^2
EST. GFR  (NON AFRICAN AMERICAN): 44 ML/MIN/1.73 M^2
GLUCOSE SERPL-MCNC: 113 MG/DL (ref 70–110)
HCT VFR BLD AUTO: 33.1 % (ref 37–48.5)
HGB BLD-MCNC: 10.9 G/DL (ref 12–16)
IMM GRANULOCYTES # BLD AUTO: 0.04 K/UL (ref 0–0.04)
LYMPHOCYTES # BLD AUTO: 1.3 K/UL (ref 1–4.8)
LYMPHOCYTES NFR BLD: 10.9 % (ref 18–48)
MAGNESIUM SERPL-MCNC: 2 MG/DL (ref 1.6–2.6)
MCH RBC QN AUTO: 34.6 PG (ref 27–31)
MCHC RBC AUTO-ENTMCNC: 32.9 G/DL (ref 32–36)
MCV RBC AUTO: 105 FL (ref 82–98)
MONOCYTES # BLD AUTO: 0.4 K/UL (ref 0.3–1)
MONOCYTES NFR BLD: 3.4 % (ref 4–15)
NEUTROPHILS # BLD AUTO: 9.8 K/UL (ref 1.8–7.7)
NEUTROPHILS NFR BLD: 84.8 % (ref 38–73)
NRBC BLD-RTO: 0 /100 WBC
PLATELET # BLD AUTO: 279 K/UL (ref 150–350)
PMV BLD AUTO: 8.8 FL (ref 9.2–12.9)
POTASSIUM SERPL-SCNC: 4.1 MMOL/L (ref 3.5–5.1)
PROT SERPL-MCNC: 7.1 G/DL (ref 6–8.4)
RBC # BLD AUTO: 3.15 M/UL (ref 4–5.4)
SODIUM SERPL-SCNC: 134 MMOL/L (ref 136–145)
TROPONIN I SERPL DL<=0.01 NG/ML-MCNC: 0.04 NG/ML (ref 0–0.03)
TROPONIN I SERPL DL<=0.01 NG/ML-MCNC: 0.04 NG/ML (ref 0–0.03)
TROPONIN I SERPL DL<=0.01 NG/ML-MCNC: 0.06 NG/ML (ref 0–0.03)
WBC # BLD AUTO: 11.51 K/UL (ref 3.9–12.7)

## 2019-11-09 PROCEDURE — 25000003 PHARM REV CODE 250: Performed by: NURSE PRACTITIONER

## 2019-11-09 PROCEDURE — 85025 COMPLETE CBC W/AUTO DIFF WBC: CPT

## 2019-11-09 PROCEDURE — 63600175 PHARM REV CODE 636 W HCPCS: Performed by: NURSE PRACTITIONER

## 2019-11-09 PROCEDURE — 25000003 PHARM REV CODE 250: Performed by: HOSPITALIST

## 2019-11-09 PROCEDURE — 36415 COLL VENOUS BLD VENIPUNCTURE: CPT

## 2019-11-09 PROCEDURE — G0378 HOSPITAL OBSERVATION PER HR: HCPCS

## 2019-11-09 PROCEDURE — 96372 THER/PROPH/DIAG INJ SC/IM: CPT | Mod: 59

## 2019-11-09 PROCEDURE — 63600175 PHARM REV CODE 636 W HCPCS: Performed by: EMERGENCY MEDICINE

## 2019-11-09 PROCEDURE — 99900035 HC TECH TIME PER 15 MIN (STAT)

## 2019-11-09 PROCEDURE — 96360 HYDRATION IV INFUSION INIT: CPT

## 2019-11-09 PROCEDURE — 99285 EMERGENCY DEPT VISIT HI MDM: CPT | Mod: 25

## 2019-11-09 PROCEDURE — 93005 ELECTROCARDIOGRAM TRACING: CPT

## 2019-11-09 PROCEDURE — 94761 N-INVAS EAR/PLS OXIMETRY MLT: CPT

## 2019-11-09 PROCEDURE — 84484 ASSAY OF TROPONIN QUANT: CPT | Mod: 91

## 2019-11-09 PROCEDURE — 96361 HYDRATE IV INFUSION ADD-ON: CPT

## 2019-11-09 PROCEDURE — 25000003 PHARM REV CODE 250: Performed by: INTERNAL MEDICINE

## 2019-11-09 PROCEDURE — 80053 COMPREHEN METABOLIC PANEL: CPT

## 2019-11-09 PROCEDURE — 83735 ASSAY OF MAGNESIUM: CPT

## 2019-11-09 RX ORDER — ACETAMINOPHEN 325 MG/1
650 TABLET ORAL EVERY 6 HOURS PRN
Status: DISCONTINUED | OUTPATIENT
Start: 2019-11-09 | End: 2019-11-09

## 2019-11-09 RX ORDER — SODIUM CHLORIDE 0.9 % (FLUSH) 0.9 %
10 SYRINGE (ML) INJECTION
Status: DISCONTINUED | OUTPATIENT
Start: 2019-11-09 | End: 2019-11-10 | Stop reason: HOSPADM

## 2019-11-09 RX ORDER — ACETAMINOPHEN 325 MG/1
650 TABLET ORAL EVERY 4 HOURS PRN
Status: DISCONTINUED | OUTPATIENT
Start: 2019-11-09 | End: 2019-11-10 | Stop reason: HOSPADM

## 2019-11-09 RX ORDER — PRAVASTATIN SODIUM 40 MG/1
40 TABLET ORAL NIGHTLY
Status: DISCONTINUED | OUTPATIENT
Start: 2019-11-09 | End: 2019-11-10 | Stop reason: HOSPADM

## 2019-11-09 RX ORDER — CALCIUM CARBONATE 200(500)MG
500 TABLET,CHEWABLE ORAL 2 TIMES DAILY
Status: DISCONTINUED | OUTPATIENT
Start: 2019-11-09 | End: 2019-11-10 | Stop reason: HOSPADM

## 2019-11-09 RX ORDER — ONDANSETRON 2 MG/ML
4 INJECTION INTRAMUSCULAR; INTRAVENOUS EVERY 4 HOURS PRN
Status: DISCONTINUED | OUTPATIENT
Start: 2019-11-09 | End: 2019-11-10 | Stop reason: HOSPADM

## 2019-11-09 RX ORDER — CHOLECALCIFEROL (VITAMIN D3) 25 MCG
2000 TABLET ORAL DAILY
Status: DISCONTINUED | OUTPATIENT
Start: 2019-11-10 | End: 2019-11-10 | Stop reason: HOSPADM

## 2019-11-09 RX ORDER — GLUCOSAM/CHONDRO/HERB 149/HYAL 750-100 MG
1 TABLET ORAL DAILY
Status: DISCONTINUED | OUTPATIENT
Start: 2019-11-10 | End: 2019-11-10 | Stop reason: HOSPADM

## 2019-11-09 RX ORDER — SODIUM CHLORIDE 9 MG/ML
INJECTION, SOLUTION INTRAVENOUS CONTINUOUS
Status: DISCONTINUED | OUTPATIENT
Start: 2019-11-09 | End: 2019-11-10 | Stop reason: HOSPADM

## 2019-11-09 RX ORDER — IPRATROPIUM BROMIDE AND ALBUTEROL SULFATE 2.5; .5 MG/3ML; MG/3ML
3 SOLUTION RESPIRATORY (INHALATION) EVERY 4 HOURS PRN
Status: DISCONTINUED | OUTPATIENT
Start: 2019-11-09 | End: 2019-11-10 | Stop reason: HOSPADM

## 2019-11-09 RX ORDER — PANTOPRAZOLE SODIUM 40 MG/1
40 TABLET, DELAYED RELEASE ORAL DAILY
Status: DISCONTINUED | OUTPATIENT
Start: 2019-11-10 | End: 2019-11-10 | Stop reason: HOSPADM

## 2019-11-09 RX ORDER — FERROUS SULFATE 325(65) MG
325 TABLET, DELAYED RELEASE (ENTERIC COATED) ORAL DAILY
Status: DISCONTINUED | OUTPATIENT
Start: 2019-11-10 | End: 2019-11-10 | Stop reason: HOSPADM

## 2019-11-09 RX ORDER — CARBIDOPA AND LEVODOPA 25; 100 MG/1; MG/1
3 TABLET, EXTENDED RELEASE ORAL NIGHTLY
Status: DISCONTINUED | OUTPATIENT
Start: 2019-11-09 | End: 2019-11-10 | Stop reason: HOSPADM

## 2019-11-09 RX ORDER — IPRATROPIUM BROMIDE AND ALBUTEROL SULFATE 2.5; .5 MG/3ML; MG/3ML
3 SOLUTION RESPIRATORY (INHALATION) EVERY 4 HOURS PRN
Status: DISCONTINUED | OUTPATIENT
Start: 2019-11-09 | End: 2019-11-09 | Stop reason: SDUPTHER

## 2019-11-09 RX ORDER — POLYETHYLENE GLYCOL 3350 17 G/17G
17 POWDER, FOR SOLUTION ORAL 2 TIMES DAILY PRN
Status: DISCONTINUED | OUTPATIENT
Start: 2019-11-09 | End: 2019-11-10 | Stop reason: HOSPADM

## 2019-11-09 RX ORDER — POTASSIUM CHLORIDE 750 MG/1
10 TABLET, EXTENDED RELEASE ORAL DAILY
Status: DISCONTINUED | OUTPATIENT
Start: 2019-11-10 | End: 2019-11-10 | Stop reason: HOSPADM

## 2019-11-09 RX ORDER — CLONAZEPAM 0.5 MG/1
0.5 TABLET ORAL 2 TIMES DAILY PRN
Status: DISCONTINUED | OUTPATIENT
Start: 2019-11-09 | End: 2019-11-10 | Stop reason: HOSPADM

## 2019-11-09 RX ORDER — OXYCODONE AND ACETAMINOPHEN 5; 325 MG/1; MG/1
1 TABLET ORAL EVERY 8 HOURS PRN
Status: DISCONTINUED | OUTPATIENT
Start: 2019-11-09 | End: 2019-11-10 | Stop reason: HOSPADM

## 2019-11-09 RX ORDER — PNV NO.95/FERROUS FUM/FOLIC AC 28MG-0.8MG
100 TABLET ORAL DAILY
Status: DISCONTINUED | OUTPATIENT
Start: 2019-11-10 | End: 2019-11-10 | Stop reason: HOSPADM

## 2019-11-09 RX ORDER — CALCIUM CARBONATE 200(500)MG
300 TABLET,CHEWABLE ORAL 2 TIMES DAILY
Status: DISCONTINUED | OUTPATIENT
Start: 2019-11-09 | End: 2019-11-09

## 2019-11-09 RX ORDER — CARBIDOPA AND LEVODOPA 25; 100 MG/1; MG/1
2 TABLET ORAL 2 TIMES DAILY WITH MEALS
Status: DISCONTINUED | OUTPATIENT
Start: 2019-11-09 | End: 2019-11-10 | Stop reason: HOSPADM

## 2019-11-09 RX ORDER — ENOXAPARIN SODIUM 100 MG/ML
30 INJECTION SUBCUTANEOUS EVERY 24 HOURS
Status: DISCONTINUED | OUTPATIENT
Start: 2019-11-09 | End: 2019-11-10 | Stop reason: HOSPADM

## 2019-11-09 RX ORDER — AMOXICILLIN 250 MG
1 CAPSULE ORAL DAILY
Status: DISCONTINUED | OUTPATIENT
Start: 2019-11-10 | End: 2019-11-10 | Stop reason: HOSPADM

## 2019-11-09 RX ORDER — ASPIRIN 325 MG
325 TABLET ORAL 2 TIMES DAILY
Status: DISCONTINUED | OUTPATIENT
Start: 2019-11-09 | End: 2019-11-10 | Stop reason: HOSPADM

## 2019-11-09 RX ADMIN — CARBIDOPA AND LEVODOPA 3 TABLET: 25; 100 TABLET, EXTENDED RELEASE ORAL at 09:11

## 2019-11-09 RX ADMIN — SODIUM CHLORIDE: 0.9 INJECTION, SOLUTION INTRAVENOUS at 03:11

## 2019-11-09 RX ADMIN — ACETAMINOPHEN 650 MG: 325 TABLET ORAL at 02:11

## 2019-11-09 RX ADMIN — ACETAMINOPHEN 650 MG: 325 TABLET ORAL at 04:11

## 2019-11-09 RX ADMIN — PRAVASTATIN SODIUM 40 MG: 40 TABLET ORAL at 09:11

## 2019-11-09 RX ADMIN — ENOXAPARIN SODIUM 30 MG: 100 INJECTION SUBCUTANEOUS at 05:11

## 2019-11-09 RX ADMIN — CARBIDOPA AND LEVODOPA 2 TABLET: 25; 100 TABLET ORAL at 05:11

## 2019-11-09 RX ADMIN — CALCIUM CARBONATE (ANTACID) CHEW TAB 500 MG 500 MG: 500 CHEW TAB at 09:11

## 2019-11-09 RX ADMIN — CLONAZEPAM 0.5 MG: 0.5 TABLET ORAL at 05:11

## 2019-11-09 RX ADMIN — SODIUM CHLORIDE 500 ML: 0.9 INJECTION, SOLUTION INTRAVENOUS at 11:11

## 2019-11-09 RX ADMIN — ASPIRIN 325 MG ORAL TABLET 325 MG: 325 PILL ORAL at 09:11

## 2019-11-09 NOTE — H&P
[facility] Medicine  History & Physical    Patient Name: Maricarmen Woodward  MRN: [id number]  Admission Date: 11/9/2019  Attending Physician: BERNIE Otero  Primary Care Provider: Haris Brambila MD    Patient information was obtained from patient and ER records.     Subjective:     Principal Problem:Near syncope    Chief Complaint:   Chief Complaint   Patient presents with    Loss of Consciousness     while on toilet        HPI: This is a 89 y.o. female with PMHx of HTN, arthritis, and Parkinson's disease who presents to the ED via EMS with an onset of body weakness beginning this morning while she was on the bathroom seat. The caregiver explains that she was unable to get off the toilet by herself, and the caregiver wasn't strong enough to get her off the seat by herself either. The caregiver sat her down on the floor eventually. EMS was called and recommended a check up in the ED. The caregiver endorses the patient taking her usual tizanidine x3 PTA, and eating a light breakfast tomorrow. The patient denies abdominal pain, chest pain, trouble breathing, nausea, vomiting, or any other symptoms at this time. No pertinent PSHx.   the patient was evaluated emergency room where she was noted to have acute kidney injury secondary to dehydration has been placed in the hospital for further evaluation treatment.       Past Medical History:   Diagnosis Date    Arthritis     Hypertension     Parkinson's disease        Past Surgical History:   Procedure Laterality Date    FRACTURE SURGERY      HEMIARTHROPLASTY OF HIP Left 12/8/2018    Procedure: HEMIARTHROPLASTY, HIP, Terese, peg board, first assist;  Surgeon: Cheng Mccormack MD;  [facility];  Service: Orthopedics;  Laterality: Left;    JOINT REPLACEMENT      right hip replacement       Review of patient's allergies indicates:  No Known Allergies    No current facility-administered medications on file prior to  encounter.      Current Outpatient Medications on File Prior to Encounter   Medication Sig    albuterol-ipratropium (DUO-NEB) 2.5 mg-0.5 mg/3 mL nebulizer solution Take 3 mLs by nebulization every 4 (four) hours as needed for Wheezing or Shortness of Breath. Rescue    aspirin 325 MG tablet Take 1 tablet (325 mg total) by mouth 2 (two) times daily.    calcium carbonate (CALCIUM ANTACID) 300 mg (750 mg) Chew Take 1 tablet by mouth 2 (two) times daily.    carbidopa-levodopa  mg (SINEMET)  mg per tablet Take 2 tablets by mouth 2 (two) times daily with meals.    carbidopa-levodopa  mg (SINEMET CR)  mg TbSR Take 1.5 tablets by mouth every evening.    cholecalciferol, vitamin D3, (VITAMIN D3) 2,000 unit Tab Take 1 tablet by mouth once daily.     clonazePAM (KLONOPIN) 0.5 MG tablet Take 1 tablet (0.5 mg total) by mouth 2 (two) times daily.    cyanocobalamin (VITAMIN B-12) 1000 MCG tablet Take 100 mcg by mouth once daily.    ferrous sulfate 325 (65 FE) MG EC tablet Take 325 mg by mouth once daily.    irbesartan-hydrochlorothiazide (AVALIDE) 150-12.5 mg per tablet Take 1 tablet by mouth once daily.    multivit-iron-min-folic acid (MULTIVITAMIN-IRON-MINERALS-FOLIC ACID) 3,500-18-0.4 unit-mg-mg Chew Take 1 tablet by mouth once daily.      omega-3 fatty acids-vitamin E (FISH OIL) 1,000 mg Cap Take 1 capsule by mouth once daily.    omeprazole (PRILOSEC) 40 MG capsule Take 40 mg by mouth once daily.    oxyCODONE-acetaminophen (PERCOCET)  mg per tablet Take 1 tablet by mouth 3 (three) times daily.    potassium chloride SA (K-DUR,KLOR-CON) 10 MEQ tablet Take 10 mEq by mouth once daily.      pravastatin (PRAVACHOL) 40 MG tablet Take 40 mg by mouth every evening.    senna-docusate 8.6-50 mg (SENNA WITH DOCUSATE SODIUM) 8.6-50 mg per tablet Take 1 tablet by mouth once daily.     Family History     Problem Relation (Age of Onset)    No Known Problems Mother        Tobacco Use    Smoking  status: Light Tobacco Smoker     Packs/day: 0.25     Years: 5.00     Pack years: 1.25     Last attempt to quit: 4/17/2009     Years since quitting: 10.5    Smokeless tobacco: Never Used   Substance and Sexual Activity    Alcohol use: No    Drug use: No    Sexual activity: Never     Partners: Male     Birth control/protection: Abstinence, Post-menopausal     Review of Systems   Constitutional: Positive for activity change and fatigue.        Chronic debility   HENT: Positive for hearing loss. Negative for ear discharge, ear pain and facial swelling.    Eyes: Negative for pain and redness.   Respiratory: Negative for cough and shortness of breath.    Cardiovascular: Negative for leg swelling.   Gastrointestinal: Negative for vomiting.   Endocrine: Negative for polydipsia and polyphagia.   Genitourinary: Negative for hematuria.   Musculoskeletal: Positive for gait problem.   Skin: Negative for color change.   Allergic/Immunologic: Negative for food allergies.   Neurological: Positive for weakness. Negative for facial asymmetry and speech difficulty.        General debility   Hematological: Does not bruise/bleed easily.   Psychiatric/Behavioral: Negative for agitation, behavioral problems, hallucinations and suicidal ideas. The patient is not nervous/anxious.      Objective:     Vital Signs (Most Recent):  Temp: 98.3 °F (36.8 °C) (11/09/19 1608)  Pulse: 105 (11/09/19 1608)  Resp: 16 (11/09/19 1608)  BP: (!) 118/50 (11/09/19 1608)  SpO2: 95 % (11/09/19 1608) Vital Signs (24h Range):  Temp:  [98.2 °F (36.8 °C)-99 °F (37.2 °C)] 98.3 °F (36.8 °C)  Pulse:  [] 105  Resp:  [16-18] 16  SpO2:  [95 %-100 %] 95 %  BP: (118-184)/(50-80) 118/50     Weight: 53.1 kg (117 lb)  Body mass index is 20.73 kg/m².    Physical Exam   Constitutional: She appears well-developed and well-nourished. No distress.   Frail elderly   HENT:   Head: Normocephalic and atraumatic.   Eyes: Pupils are equal, round, and reactive to light.  Conjunctivae and EOM are normal. Right eye exhibits no discharge. Left eye exhibits no discharge.   Neck: Normal range of motion. Neck supple. No JVD present.   Cardiovascular: Normal rate, regular rhythm, normal heart sounds and intact distal pulses.   No murmur heard.  Pulmonary/Chest: Effort normal and breath sounds normal. No respiratory distress. She has no wheezes. She has no rales.   Abdominal: Soft. Bowel sounds are normal. She exhibits no distension. There is no tenderness. There is no guarding.   Genitourinary:   Genitourinary Comments: Not examined   Musculoskeletal:   Stiff joints  General debility  Contractures bilateral lower extremities   Neurological: She is alert.   Oriented person place and situation  Responds appropriately simple commands at this time   Skin: Skin is warm and dry. Capillary refill takes 2 to 3 seconds. She is not diaphoretic.   Psychiatric:   Calm and cooperative at this time         CRANIAL NERVES     CN III, IV, VI   Pupils are equal, round, and reactive to light.  Extraocular motions are normal.        Significant Labs: Reviewed    Significant Imaging: Reviewed    Assessment/Plan:     * Near syncope  Telemetry  Suspected secondary to dehydration  Continue IV fluids  Check orthostatic vital signs  Fall precautions  Check echocardiogram      Debility  Fall, skin, aspiration precautions  Turn q.2 hours  Consult physical therapy Occupational therapy      Arthritis  Resume home oxycodone at decreased dose due to acute kidney injury      Hyponatremia  Monitor  Treat dehydration      Macrocytic anemia  Multivitamin      PATI (acute kidney injury)  Secondary to dehydration  Continue IV fluids  Trend BMP  Renal dose all medications      Parkinson's disease  Continue home medications      Essential hypertension  Monitor   Hold home Arb and HCTZ      Dehydration  Continue IV fluids        VTE Risk Mitigation (From admission, onward)         Ordered     enoxaparin injection 30 mg  Daily       11/09/19 1525     IP VTE HIGH RISK PATIENT  Once      11/09/19 1525     Place VIVIEN hose  Until discontinued      11/09/19 1352     Place sequential compression device  Until discontinued      11/09/19 1352               Time spent seeing patient( greater than 1/2 spent in direct contact) : 62 minutes    BERNIE Otero  Department of Hospital Medicine   [facility]

## 2019-11-09 NOTE — CARE UPDATE
11/09/19 1539   Patient Assessment/Suction   Level of Consciousness (AVPU) responds to voice   PRE-TX-O2   O2 Device (Oxygen Therapy) room air   SpO2 100 %   Pulse Oximetry Type Intermittent   $ Pulse Oximetry - Multiple Charge Pulse Oximetry - Multiple   Aerosol Therapy   $ Aerosol Therapy Charges PRN treatment not required

## 2019-11-09 NOTE — ASSESSMENT & PLAN NOTE
Fall, skin, aspiration precautions  Turn q.2 hours  Consult physical therapy Occupational therapy

## 2019-11-09 NOTE — ASSESSMENT & PLAN NOTE
Telemetry  Suspected secondary to dehydration  Continue IV fluids  Check orthostatic vital signs  Fall precautions  Check echocardiogram

## 2019-11-09 NOTE — NURSING
"Notified Denny, pt's relative, of need for home medication list. Stated he will "try to bring pt's home medications when he visits". Updated primary nurse, RACHEL Mchugh  "

## 2019-11-09 NOTE — SUBJECTIVE & OBJECTIVE
Past Medical History:   Diagnosis Date    Arthritis     Hypertension     Parkinson's disease        Past Surgical History:   Procedure Laterality Date    FRACTURE SURGERY      HEMIARTHROPLASTY OF HIP Left 12/8/2018    Procedure: HEMIARTHROPLASTY, HIP, [place], peg board, first assist;  Surgeon: Cheng Mccormack MD;  Location: [facility];  Service: Orthopedics;  Laterality: Left;    JOINT REPLACEMENT      right hip replacement       Review of patient's allergies indicates:  No Known Allergies    No current facility-administered medications on file prior to encounter.      Current Outpatient Medications on File Prior to Encounter   Medication Sig    albuterol-ipratropium (DUO-NEB) 2.5 mg-0.5 mg/3 mL nebulizer solution Take 3 mLs by nebulization every 4 (four) hours as needed for Wheezing or Shortness of Breath. Rescue    aspirin 325 MG tablet Take 1 tablet (325 mg total) by mouth 2 (two) times daily.    calcium carbonate (CALCIUM ANTACID) 300 mg (750 mg) Chew Take 1 tablet by mouth 2 (two) times daily.    carbidopa-levodopa  mg (SINEMET)  mg per tablet Take 2 tablets by mouth 2 (two) times daily with meals.    carbidopa-levodopa  mg (SINEMET CR)  mg TbSR Take 1.5 tablets by mouth every evening.    cholecalciferol, vitamin D3, (VITAMIN D3) 2,000 unit Tab Take 1 tablet by mouth once daily.     clonazePAM (KLONOPIN) 0.5 MG tablet Take 1 tablet (0.5 mg total) by mouth 2 (two) times daily.    cyanocobalamin (VITAMIN B-12) 1000 MCG tablet Take 100 mcg by mouth once daily.    ferrous sulfate 325 (65 FE) MG EC tablet Take 325 mg by mouth once daily.    irbesartan-hydrochlorothiazide (AVALIDE) 150-12.5 mg per tablet Take 1 tablet by mouth once daily.    multivit-iron-min-folic acid (MULTIVITAMIN-IRON-MINERALS-FOLIC ACID) 3,500-18-0.4 unit-mg-mg Chew Take 1 tablet by mouth once daily.      omega-3 fatty acids-vitamin E (FISH OIL) 1,000 mg Cap Take 1 capsule by mouth once daily.     omeprazole (PRILOSEC) 40 MG capsule Take 40 mg by mouth once daily.    oxyCODONE-acetaminophen (PERCOCET)  mg per tablet Take 1 tablet by mouth 3 (three) times daily.    potassium chloride SA (K-DUR,KLOR-CON) 10 MEQ tablet Take 10 mEq by mouth once daily.      pravastatin (PRAVACHOL) 40 MG tablet Take 40 mg by mouth every evening.    senna-docusate 8.6-50 mg (SENNA WITH DOCUSATE SODIUM) 8.6-50 mg per tablet Take 1 tablet by mouth once daily.     Family History     Problem Relation (Age of Onset)    No Known Problems Mother        Tobacco Use    Smoking status: Light Tobacco Smoker     Packs/day: 0.25     Years: 5.00     Pack years: 1.25     Last attempt to quit: 4/17/2009     Years since quitting: 10.5    Smokeless tobacco: Never Used   Substance and Sexual Activity    Alcohol use: No    Drug use: No    Sexual activity: Never     Partners: Male     Birth control/protection: Abstinence, Post-menopausal     Review of Systems   Constitutional: Positive for activity change and fatigue.        Chronic debility   HENT: Positive for hearing loss. Negative for ear discharge, ear pain and facial swelling.    Eyes: Negative for pain and redness.   Respiratory: Negative for cough and shortness of breath.    Cardiovascular: Negative for leg swelling.   Gastrointestinal: Negative for vomiting.   Endocrine: Negative for polydipsia and polyphagia.   Genitourinary: Negative for hematuria.   Musculoskeletal: Positive for gait problem.   Skin: Negative for color change.   Allergic/Immunologic: Negative for food allergies.   Neurological: Positive for weakness. Negative for facial asymmetry and speech difficulty.        General debility   Hematological: Does not bruise/bleed easily.   Psychiatric/Behavioral: Negative for agitation, behavioral problems, hallucinations and suicidal ideas. The patient is not nervous/anxious.      Objective:     Vital Signs (Most Recent):  Temp: 98.3 °F (36.8 °C) (11/09/19 1608)  Pulse: 105  (11/09/19 1608)  Resp: 16 (11/09/19 1608)  BP: (!) 118/50 (11/09/19 1608)  SpO2: 95 % (11/09/19 1608) Vital Signs (24h Range):  Temp:  [98.2 °F (36.8 °C)-99 °F (37.2 °C)] 98.3 °F (36.8 °C)  Pulse:  [] 105  Resp:  [16-18] 16  SpO2:  [95 %-100 %] 95 %  BP: (118-184)/(50-80) 118/50     Weight: 53.1 kg (117 lb)  Body mass index is 20.73 kg/m².    Physical Exam   Constitutional: She appears well-developed and well-nourished. No distress.   Frail elderly   HENT:   Head: Normocephalic and atraumatic.   Eyes: Pupils are equal, round, and reactive to light. Conjunctivae and EOM are normal. Right eye exhibits no discharge. Left eye exhibits no discharge.   Neck: Normal range of motion. Neck supple. No JVD present.   Cardiovascular: Normal rate, regular rhythm, normal heart sounds and intact distal pulses.   No murmur heard.  Pulmonary/Chest: Effort normal and breath sounds normal. No respiratory distress. She has no wheezes. She has no rales.   Abdominal: Soft. Bowel sounds are normal. She exhibits no distension. There is no tenderness. There is no guarding.   Genitourinary:   Genitourinary Comments: Not examined   Musculoskeletal:   Stiff joints  General debility  Contractures bilateral lower extremities   Neurological: She is alert.   Oriented person place and situation  Responds appropriately simple commands at this time   Skin: Skin is warm and dry. Capillary refill takes 2 to 3 seconds. She is not diaphoretic.   Psychiatric:   Calm and cooperative at this time         CRANIAL NERVES     CN III, IV, VI   Pupils are equal, round, and reactive to light.  Extraocular motions are normal.        Significant Labs: Reviewed    Significant Imaging: Reviewed

## 2019-11-09 NOTE — UM SECONDARY REVIEW
Physician Advisor External    Level of Care Issue    Approved Observation/outpt for 11/9/19 per ehr md review.

## 2019-11-09 NOTE — ED PROVIDER NOTES
Encounter Date: 11/9/2019    SCRIBE #1 NOTE: Austen PARRISH, am scribing for, and in the presence of, Diego Hart MD.       History     Chief Complaint   Patient presents with    Loss of Consciousness     while on toilet       Time seen by provider: 10:09 AM on 11/09/2019    Maricarmen Woodward is a 89 y.o. female with PMHx of HTN, arthritis, and Parkinson's disease who presents to the ED via EMS with an onset of body weakness beginning this morning while she was on the bathroom seat. The caregiver explains that she was unable to get off the toilet by herself, and the caregiver wasn't strong enough to get her off the seat by herself either. The caregiver sat her down on the floor eventually. EMS was called and recommended a check up in the ED. The caregiver endorses the patient taking her usual tizanidine x3 PTA, and eating a light breakfast tomorrow. The patient denies abdominal pain, chest pain, trouble breathing, nausea, vomiting, or any other symptoms at this time. No pertinent PSHx.       The history is provided by the patient and a caregiver.     Review of patient's allergies indicates:  No Known Allergies  Past Medical History:   Diagnosis Date    Arthritis     Hypertension     Parkinson's disease      Past Surgical History:   Procedure Laterality Date    FRACTURE SURGERY      HEMIARTHROPLASTY OF HIP Left 12/8/2018    Procedure: HEMIARTHROPLASTY, HIP, [place], peg board, first assist;  Surgeon: Cheng Mccormack MD;  [facility];  Service: Orthopedics;  Laterality: Left;    JOINT REPLACEMENT      right hip replacement     Family History   Problem Relation Age of Onset    No Known Problems Mother      Social History     Tobacco Use    Smoking status: Light Tobacco Smoker     Packs/day: 0.25     Years: 5.00     Pack years: 1.25     Last attempt to quit: 4/17/2009     Years since quitting: 10.5   Substance Use Topics    Alcohol use: No    Drug use: No     Review of Systems    Constitutional: Negative for fever.   HENT: Negative for sore throat.    Respiratory: Negative for apnea and shortness of breath.    Cardiovascular: Negative for chest pain.   Gastrointestinal: Negative for abdominal pain, nausea and vomiting.   Genitourinary: Negative for dysuria.   Musculoskeletal: Negative for back pain.   Skin: Negative for rash.   Neurological: Positive for weakness (generalized).   Hematological: Does not bruise/bleed easily.       Physical Exam     Initial Vitals   BP Pulse Resp Temp SpO2   11/09/19 0948 11/09/19 0950 11/09/19 0950 11/09/19 0948 11/09/19 0950   (!) 145/60 75 18 98.2 °F (36.8 °C) 100 %      MAP       --                Physical Exam    Nursing note and vitals reviewed.  Constitutional: She appears well-developed and well-nourished. She is not diaphoretic. No distress.   Lethargic   HENT:   Head: Normocephalic and atraumatic.   Eyes: EOM are normal. Pupils are equal, round, and reactive to light.   Neck: Normal range of motion. Neck supple.   Cardiovascular: Normal rate, regular rhythm, normal heart sounds and intact distal pulses. Exam reveals no gallop and no friction rub.    No murmur heard.  Pulmonary/Chest: Breath sounds normal. No respiratory distress. She has no wheezes. She has no rhonchi. She has no rales.   Abdominal: Soft. Bowel sounds are normal. There is no tenderness. There is no rebound and no guarding.   Musculoskeletal: Normal range of motion.   Neurological: She is alert and oriented to person, place, and time.   Generalized body weakness. She is unable to get of toilet seat.    Skin: Skin is warm and dry.   Psychiatric: She has a normal mood and affect. Her behavior is normal. Judgment and thought content normal.         ED Course   Procedures  Labs Reviewed - No data to display  EKG Readings: (Independently Interpreted)   Initial Reading: No STEMI. Heart Rate: 81 bpm.   Sinus rhythm of 81 bpm. Normal axis and intervals. No acute ST changes. Occasional  PVC's. NO STEMI.       Imaging Results    None          Medical Decision Making:   Initial Assessment:   89-year-old female presented with a chief complaint of weakness and near-syncope.  Differential Diagnosis:   My differential diagnosis includes medication reaction, dehydration, and atypical MI.    Clinical Tests:   Lab Tests: Ordered and Reviewed  Medical Tests: Ordered and Reviewed  ED Management:  The patient was emergently evaluated in the emergency department, her evaluation was significant for an elderly female with a normal neurologic exam.  The patient's EKG showed no acute abnormalities per my independent interpretation.  The patient's labs were significant for mildly elevated troponin.  I will admit the patient to the hospitalist service for further workup and care.  I have discussed the case with the hospitalist on call, Dr. Liu.  He has accepted the patient for admission.            Scribe Attestation:   Scribe #1: I performed the above scribed service and the documentation accurately describes the services I performed. I attest to the accuracy of the note.    Attending Attestation:             Attending ED Notes:   11:57 Spoke to Dr. Liu in hospital medicine about admission.                 I, Dr. Diego Hart, personally performed the services described in this documentation. All medical record entries made by the scribe were at my direction and in my presence.  I have reviewed the chart and agree that the record reflects my personal performance and is accurate and complete. Diego Hart MD.  6:12 PM 11/09/2019          Clinical Impression:       ICD-10-CM ICD-9-CM   1. Near syncope R55 780.2   2. Weakness R53.1 780.79   3. Elevated troponin R79.89 790.6         Disposition:   Disposition: Placed in Observation                     Diego Hart MD  11/09/19 2829

## 2019-11-09 NOTE — HPI
This is a 89 y.o. female with PMHx of HTN, arthritis, and Parkinson's disease who presents to the ED via EMS with an onset of body weakness beginning this morning while she was on the bathroom seat. The caregiver explains that she was unable to get off the toilet by herself, and the caregiver wasn't strong enough to get her off the seat by herself either. The caregiver sat her down on the floor eventually. EMS was called and recommended a check up in the ED. The caregiver endorses the patient taking her usual tizanidine x3 PTA, and eating a light breakfast tomorrow. The patient denies abdominal pain, chest pain, trouble breathing, nausea, vomiting, or any other symptoms at this time. No pertinent PSHx.  the patient was evaluated emergency room where she was noted to have acute kidney injury secondary to dehydration has been placed in the hospital for further evaluation treatment.

## 2019-11-10 VITALS
BODY MASS INDEX: 20.73 KG/M2 | RESPIRATION RATE: 19 BRPM | DIASTOLIC BLOOD PRESSURE: 61 MMHG | TEMPERATURE: 98 F | SYSTOLIC BLOOD PRESSURE: 135 MMHG | HEART RATE: 91 BPM | OXYGEN SATURATION: 100 % | WEIGHT: 117 LBS

## 2019-11-10 PROBLEM — N17.9 AKI (ACUTE KIDNEY INJURY): Status: RESOLVED | Noted: 2019-11-09 | Resolved: 2019-11-10

## 2019-11-10 PROBLEM — M24.561 CONTRACTURES INVOLVING BOTH KNEES: Status: ACTIVE | Noted: 2019-11-10

## 2019-11-10 PROBLEM — E83.42 HYPOMAGNESEMIA: Status: ACTIVE | Noted: 2019-11-10

## 2019-11-10 PROBLEM — E86.0 DEHYDRATION: Status: RESOLVED | Noted: 2017-01-13 | Resolved: 2019-11-10

## 2019-11-10 PROBLEM — M24.562 CONTRACTURES INVOLVING BOTH KNEES: Status: ACTIVE | Noted: 2019-11-10

## 2019-11-10 PROBLEM — R55 NEAR SYNCOPE: Status: RESOLVED | Noted: 2019-11-09 | Resolved: 2019-11-10

## 2019-11-10 PROBLEM — L89.302 PRESSURE INJURY OF BUTTOCK, STAGE 2: Status: ACTIVE | Noted: 2019-11-10

## 2019-11-10 PROBLEM — N30.00 ACUTE CYSTITIS WITHOUT HEMATURIA: Status: ACTIVE | Noted: 2019-11-10

## 2019-11-10 PROBLEM — L89.209 PRESSURE INJURY OF SKIN OF HIP: Status: ACTIVE | Noted: 2019-11-10

## 2019-11-10 LAB
ANION GAP SERPL CALC-SCNC: 12 MMOL/L (ref 8–16)
BACTERIA #/AREA URNS HPF: ABNORMAL /HPF
BASOPHILS # BLD AUTO: 0.06 K/UL (ref 0–0.2)
BASOPHILS NFR BLD: 0.6 % (ref 0–1.9)
BILIRUB UR QL STRIP: NEGATIVE
BUN SERPL-MCNC: 27 MG/DL (ref 10–30)
CALCIUM SERPL-MCNC: 9.7 MG/DL (ref 8.7–10.5)
CHLORIDE SERPL-SCNC: 103 MMOL/L (ref 95–110)
CLARITY UR: ABNORMAL
CO2 SERPL-SCNC: 20 MMOL/L (ref 23–29)
COLOR UR: YELLOW
CREAT SERPL-MCNC: 0.8 MG/DL (ref 0.5–1.4)
DIFFERENTIAL METHOD: ABNORMAL
EOSINOPHIL # BLD AUTO: 0.2 K/UL (ref 0–0.5)
EOSINOPHIL NFR BLD: 1.6 % (ref 0–8)
ERYTHROCYTE [DISTWIDTH] IN BLOOD BY AUTOMATED COUNT: 14.2 % (ref 11.5–14.5)
EST. GFR  (AFRICAN AMERICAN): >60 ML/MIN/1.73 M^2
EST. GFR  (NON AFRICAN AMERICAN): >60 ML/MIN/1.73 M^2
GLUCOSE SERPL-MCNC: 82 MG/DL (ref 70–110)
GLUCOSE UR QL STRIP: NEGATIVE
HCT VFR BLD AUTO: 32.2 % (ref 37–48.5)
HGB BLD-MCNC: 10.6 G/DL (ref 12–16)
HGB UR QL STRIP: ABNORMAL
IMM GRANULOCYTES # BLD AUTO: 0.04 K/UL (ref 0–0.04)
KETONES UR QL STRIP: NEGATIVE
LEUKOCYTE ESTERASE UR QL STRIP: ABNORMAL
LYMPHOCYTES # BLD AUTO: 1.2 K/UL (ref 1–4.8)
LYMPHOCYTES NFR BLD: 11.3 % (ref 18–48)
MAGNESIUM SERPL-MCNC: 1.7 MG/DL (ref 1.6–2.6)
MCH RBC QN AUTO: 34.1 PG (ref 27–31)
MCHC RBC AUTO-ENTMCNC: 32.9 G/DL (ref 32–36)
MCV RBC AUTO: 104 FL (ref 82–98)
MICROSCOPIC COMMENT: ABNORMAL
MONOCYTES # BLD AUTO: 0.3 K/UL (ref 0.3–1)
MONOCYTES NFR BLD: 3.1 % (ref 4–15)
NEUTROPHILS # BLD AUTO: 8.5 K/UL (ref 1.8–7.7)
NEUTROPHILS NFR BLD: 83 % (ref 38–73)
NITRITE UR QL STRIP: POSITIVE
NRBC BLD-RTO: 0 /100 WBC
PH UR STRIP: 6 [PH] (ref 5–8)
PLATELET # BLD AUTO: 257 K/UL (ref 150–350)
PMV BLD AUTO: 9.5 FL (ref 9.2–12.9)
POTASSIUM SERPL-SCNC: 3.5 MMOL/L (ref 3.5–5.1)
PROT UR QL STRIP: ABNORMAL
RBC # BLD AUTO: 3.11 M/UL (ref 4–5.4)
RBC #/AREA URNS HPF: 2 /HPF (ref 0–4)
SODIUM SERPL-SCNC: 135 MMOL/L (ref 136–145)
SP GR UR STRIP: 1.02 (ref 1–1.03)
SQUAMOUS #/AREA URNS HPF: 1 /HPF
URN SPEC COLLECT METH UR: ABNORMAL
UROBILINOGEN UR STRIP-ACNC: NEGATIVE EU/DL
WBC # BLD AUTO: 10.23 K/UL (ref 3.9–12.7)
WBC #/AREA URNS HPF: 80 /HPF (ref 0–5)
WBC CLUMPS URNS QL MICRO: ABNORMAL

## 2019-11-10 PROCEDURE — 87088 URINE BACTERIA CULTURE: CPT

## 2019-11-10 PROCEDURE — 94761 N-INVAS EAR/PLS OXIMETRY MLT: CPT

## 2019-11-10 PROCEDURE — 87077 CULTURE AEROBIC IDENTIFY: CPT | Mod: 59

## 2019-11-10 PROCEDURE — 63600175 PHARM REV CODE 636 W HCPCS: Performed by: EMERGENCY MEDICINE

## 2019-11-10 PROCEDURE — 36415 COLL VENOUS BLD VENIPUNCTURE: CPT

## 2019-11-10 PROCEDURE — 25000003 PHARM REV CODE 250: Performed by: NURSE PRACTITIONER

## 2019-11-10 PROCEDURE — 96372 THER/PROPH/DIAG INJ SC/IM: CPT

## 2019-11-10 PROCEDURE — 25000003 PHARM REV CODE 250: Performed by: INTERNAL MEDICINE

## 2019-11-10 PROCEDURE — 80048 BASIC METABOLIC PNL TOTAL CA: CPT

## 2019-11-10 PROCEDURE — 93010 EKG 12-LEAD: ICD-10-PCS | Mod: ,,, | Performed by: INTERNAL MEDICINE

## 2019-11-10 PROCEDURE — 93005 ELECTROCARDIOGRAM TRACING: CPT

## 2019-11-10 PROCEDURE — 63600175 PHARM REV CODE 636 W HCPCS: Performed by: NURSE PRACTITIONER

## 2019-11-10 PROCEDURE — 99900035 HC TECH TIME PER 15 MIN (STAT)

## 2019-11-10 PROCEDURE — 93010 ELECTROCARDIOGRAM REPORT: CPT | Mod: ,,, | Performed by: INTERNAL MEDICINE

## 2019-11-10 PROCEDURE — 83735 ASSAY OF MAGNESIUM: CPT

## 2019-11-10 PROCEDURE — 85025 COMPLETE CBC W/AUTO DIFF WBC: CPT

## 2019-11-10 PROCEDURE — 87086 URINE CULTURE/COLONY COUNT: CPT

## 2019-11-10 PROCEDURE — 87186 SC STD MICRODIL/AGAR DIL: CPT

## 2019-11-10 PROCEDURE — 96361 HYDRATE IV INFUSION ADD-ON: CPT

## 2019-11-10 PROCEDURE — 81000 URINALYSIS NONAUTO W/SCOPE: CPT

## 2019-11-10 PROCEDURE — G0378 HOSPITAL OBSERVATION PER HR: HCPCS

## 2019-11-10 RX ORDER — NITROFURANTOIN 25; 75 MG/1; MG/1
100 CAPSULE ORAL 2 TIMES DAILY
Qty: 10 CAPSULE | Refills: 0 | Status: SHIPPED | OUTPATIENT
Start: 2019-11-11 | End: 2019-11-16

## 2019-11-10 RX ORDER — LANOLIN ALCOHOL/MO/W.PET/CERES
400 CREAM (GRAM) TOPICAL DAILY
Refills: 0 | COMMUNITY
Start: 2019-11-10 | End: 2020-04-18 | Stop reason: CLARIF

## 2019-11-10 RX ORDER — NITROFURANTOIN 25; 75 MG/1; MG/1
100 CAPSULE ORAL EVERY 12 HOURS
Qty: 10 CAPSULE | Refills: 0 | Status: SHIPPED | OUTPATIENT
Start: 2019-11-10 | End: 2019-11-15

## 2019-11-10 RX ORDER — NITROFURANTOIN 25; 75 MG/1; MG/1
100 CAPSULE ORAL EVERY 12 HOURS
Status: DISCONTINUED | OUTPATIENT
Start: 2019-11-10 | End: 2019-11-10 | Stop reason: HOSPADM

## 2019-11-10 RX ORDER — OXYCODONE AND ACETAMINOPHEN 10; 325 MG/1; MG/1
1 TABLET ORAL 3 TIMES DAILY PRN
Qty: 10 TABLET | Refills: 0 | Status: ON HOLD
Start: 2019-11-10 | End: 2019-11-26 | Stop reason: HOSPADM

## 2019-11-10 RX ORDER — ACETAMINOPHEN 325 MG/1
650 TABLET ORAL EVERY 4 HOURS PRN
Refills: 0 | COMMUNITY
Start: 2019-11-10

## 2019-11-10 RX ORDER — CLONAZEPAM 0.5 MG/1
0.5 TABLET ORAL 2 TIMES DAILY PRN
Qty: 10 TABLET | Refills: 0 | Status: ON HOLD
Start: 2019-11-10 | End: 2019-11-26 | Stop reason: SDUPTHER

## 2019-11-10 RX ORDER — POTASSIUM CHLORIDE 20 MEQ/1
20 TABLET, EXTENDED RELEASE ORAL ONCE
Status: COMPLETED | OUTPATIENT
Start: 2019-11-10 | End: 2019-11-10

## 2019-11-10 RX ADMIN — FERROUS SULFATE TAB EC 325 MG (65 MG FE EQUIVALENT) 325 MG: 325 (65 FE) TABLET DELAYED RESPONSE at 08:11

## 2019-11-10 RX ADMIN — POTASSIUM CHLORIDE 10 MEQ: 750 TABLET, EXTENDED RELEASE ORAL at 08:11

## 2019-11-10 RX ADMIN — OMEGA-3 FATTY ACIDS CAP 1000 MG 1 CAPSULE: 1000 CAP at 08:11

## 2019-11-10 RX ADMIN — CALCIUM CARBONATE (ANTACID) CHEW TAB 500 MG 500 MG: 500 CHEW TAB at 08:11

## 2019-11-10 RX ADMIN — PANTOPRAZOLE SODIUM 40 MG: 40 TABLET, DELAYED RELEASE ORAL at 08:11

## 2019-11-10 RX ADMIN — NITROFURANTOIN MONOHYDRATE/MACROCRYSTALLINE 100 MG: 25; 75 CAPSULE ORAL at 05:11

## 2019-11-10 RX ADMIN — SENNOSIDES, DOCUSATE SODIUM 1 TABLET: 50; 8.6 TABLET, FILM COATED ORAL at 08:11

## 2019-11-10 RX ADMIN — ENOXAPARIN SODIUM 30 MG: 100 INJECTION SUBCUTANEOUS at 05:11

## 2019-11-10 RX ADMIN — SODIUM CHLORIDE: 0.9 INJECTION, SOLUTION INTRAVENOUS at 01:11

## 2019-11-10 RX ADMIN — SODIUM CHLORIDE: 0.9 INJECTION, SOLUTION INTRAVENOUS at 09:11

## 2019-11-10 RX ADMIN — CLONAZEPAM 0.5 MG: 0.5 TABLET ORAL at 08:11

## 2019-11-10 RX ADMIN — CARBIDOPA AND LEVODOPA 2 TABLET: 25; 100 TABLET ORAL at 05:11

## 2019-11-10 RX ADMIN — OXYCODONE AND ACETAMINOPHEN 1 TABLET: 5; 325 TABLET ORAL at 01:11

## 2019-11-10 RX ADMIN — OXYCODONE AND ACETAMINOPHEN 1 TABLET: 5; 325 TABLET ORAL at 08:11

## 2019-11-10 RX ADMIN — ASPIRIN 325 MG ORAL TABLET 325 MG: 325 PILL ORAL at 08:11

## 2019-11-10 RX ADMIN — VITAM B12 100 MCG: 100 TAB at 08:11

## 2019-11-10 RX ADMIN — POTASSIUM CHLORIDE 20 MEQ: 1500 TABLET, EXTENDED RELEASE ORAL at 01:11

## 2019-11-10 RX ADMIN — CARBIDOPA AND LEVODOPA 2 TABLET: 25; 100 TABLET ORAL at 08:11

## 2019-11-10 RX ADMIN — MELATONIN 2000 UNITS: at 08:11

## 2019-11-10 RX ADMIN — THERA TABS 1 TABLET: TAB at 08:11

## 2019-11-10 NOTE — PLAN OF CARE
11/09/19 1910   Patient Assessment/Suction   Level of Consciousness (AVPU) responds to voice   Respiratory Effort Unlabored   Expansion/Accessory Muscles/Retractions no use of accessory muscles   PRE-TX-O2   O2 Device (Oxygen Therapy) room air   SpO2 96 %   Pulse Oximetry Type Intermittent   $ Pulse Oximetry - Multiple Charge Pulse Oximetry - Multiple   Pulse 93   Resp 17   Aerosol Therapy   $ Aerosol Therapy Charges PRN treatment not required   Respiratory Treatment Status (SVN) PRN treatment not required

## 2019-11-10 NOTE — HOSPITAL COURSE
The patient was monitored closely during her stay.  She is given IV fluids for hydration.  Patient was noted have present on admission stage II decubitus to both buttocks and also an area of redness to the right hip.  Skin care precautions were instituted.  Areas kept clean and dry.  Patient often reluctant to turn every 2 hr.  Patient was also noted to have present on admission mi urinary tract infection was started on Macrobid.  Patient was also noted to have some mildly elevated troponin levels however her EKG did not show any signs of significant ST elevation or depression.  Patient did not display any arrhythmias, chest pain, palpitations, or signs of acute cardiac issues during her stay.  The patient's overall condition was discussed with her nephew who also reported patient had not been complaining of any chest pain or similar symptoms at home.  Patient's overall condition remained stable and she was discharged to home.  Home health was ordered to follow patient at home.

## 2019-11-10 NOTE — DISCHARGE SUMMARY
[facility] Hospital Medicine  Discharge Summary    Patient Name: Maricarmen Woodward  MRN: [id number]  Admission Date: 11/9/2019  Hospital Length of Stay: 0 days  Discharge Date and Time: No discharge date for patient encounter.  Attending Physician: Nichol Buchanan MD   Discharging Provider: BERNIE Otero  Primary Care Provider: Haris Brambila MD    HPI:   This is a 89 y.o. female with PMHx of HTN, arthritis, and Parkinson's disease who presents to the ED via EMS with an onset of body weakness beginning this morning while she was on the bathroom seat. The caregiver explains that she was unable to get off the toilet by herself, and the caregiver wasn't strong enough to get her off the seat by herself either. The caregiver sat her down on the floor eventually. EMS was called and recommended a check up in the ED. The caregiver endorses the patient taking her usual tizanidine x3 PTA, and eating a light breakfast tomorrow. The patient denies abdominal pain, chest pain, trouble breathing, nausea, vomiting, or any other symptoms at this time. No pertinent PSHx.   the patient was evaluated emergency room where she was noted to have acute kidney injury secondary to dehydration has been placed in the hospital for further evaluation treatment.       * No surgery found *      Hospital Course:   The patient was monitored closely during her stay.  She is given IV fluids for hydration.  Patient was noted have present on admission stage II decubitus to both buttocks and also an area of redness to the right hip.  Skin care precautions were instituted.  Areas kept clean and dry.  Patient often reluctant to turn every 2 hr.  Patient was also noted to have present on admission mi urinary tract infection was started on Macrobid.  Patient was also noted to have some mildly elevated troponin levels however her EKG did not show any signs of significant ST elevation or depression.  Patient did not display any  arrhythmias, chest pain, palpitations, or signs of acute cardiac issues during her stay.  The patient's overall condition was discussed with her nephew who also reported patient had not been complaining of any chest pain or similar symptoms at home.  Patient's overall condition remained stable and she was discharged to home.  Home health was ordered to follow patient at home.     Consults:   Consults (From admission, onward)        Status Ordering Provider     IP consult to case management          Acknowledged OZZY FARMER          Final Active Diagnoses:    Diagnosis Date Noted POA    Hypomagnesemia [E83.42] 11/10/2019 Yes    Acute cystitis without hematuria [N30.00] 11/10/2019 Yes    Contractures involving both knees [M24.561, M24.562] 11/10/2019 Yes    Pressure injury of buttock, stage 2 [L89.302] 11/10/2019 Yes    Pressure injury of skin of hip [L89.209] 11/10/2019 Yes    Macrocytic anemia [D53.9] 11/09/2019 Yes    Hyponatremia [E87.1] 11/09/2019 Yes    Arthritis [M19.90] 11/09/2019 Yes    Debility [R53.81] 11/09/2019 Yes    Essential hypertension [I10] 01/13/2017 Yes    Parkinson's disease [G20] 01/13/2017 Yes      Problems Resolved During this Admission:    Diagnosis Date Noted Date Resolved POA    PRINCIPAL PROBLEM:  Near syncope [R55] 11/09/2019 11/10/2019 Yes    PATI (acute kidney injury) [N17.9] 11/09/2019 11/10/2019 Yes    Dehydration [E86.0] 01/13/2017 11/10/2019 Yes       Discharged Condition: stable but long-term prognosis remains guarded due to her age and multiple comorbidities    Disposition: Home-Health Care Svc    Follow Up:  PCP Dr. Haris Brambila in 1-2 weeks    Patient Instructions:      Referral to Home health   Referral Priority: Routine Referral Type: Home Health   Referral Reason: Specialty Services Required   Requested Specialty: Home Health Services   Number of Visits Requested: 1     Diet Cardiac   Order Comments: Encourage p.o. Fluids  Ensure Plus or equivalent 1 can p.o.  t.i.d.     Notify your health care provider if you experience any of the following:  temperature >100.4     Notify your health care provider if you experience any of the following:   Order Comments: Any decline in condition     No dressing needed   Order Comments: Turn every 2 hr  Keep areas clean and dry     Activity as tolerated   Order Comments: Fall, skin, aspiration precautions  Turn q.2 hours       Significant Diagnostic Studies:     Magnesium 1.7    CMP   Recent Labs   Lab 11/09/19  1029 11/10/19  0532   * 135*   K 4.1 3.5   CL 99 103   CO2 22* 20*   * 82   BUN 38* 27   CREATININE 1.1 0.8   CALCIUM 10.5 9.7   PROT 7.1  --    ALBUMIN 4.1  --    BILITOT 0.4  --    ALKPHOS 74  --    AST 27  --    ALT 7*  --    ANIONGAP 13 12   ESTGFRAFRICA 50* >60   EGFRNONAA 44* >60   CBC   Recent Labs   Lab 11/09/19  1029 11/10/19  0532   WBC 11.51 10.23   HGB 10.9* 10.6*   HCT 33.1* 32.2*    257        Medications:  Reconciled Home Medications:      Medication List      START taking these medications    acetaminophen 325 MG tablet  Commonly known as:  TYLENOL  Take 2 tablets (650 mg total) by mouth every 4 (four) hours as needed.     * nitrofurantoin (macrocrystal-monohydrate) 100 MG capsule  Commonly known as:  MACROBID  Take 1 capsule (100 mg total) by mouth every 12 (twelve) hours. for 5 days     * nitrofurantoin (macrocrystal-monohydrate) 100 MG capsule  Commonly known as:  MACROBID  Take 1 capsule (100 mg total) by mouth 2 (two) times daily. for 5 days  Start taking on:  November 11, 2019         * This list has 2 medication(s) that are the same as other medications prescribed for you. Read the directions carefully, and ask your doctor or other care provider to review them with you.            CHANGE how you take these medications    clonazePAM 0.5 MG tablet  Commonly known as:  KLONOPIN  Take 1 tablet (0.5 mg total) by mouth 2 (two) times daily as needed for Anxiety.  What changed:    · when to take  this  · reasons to take this     oxyCODONE-acetaminophen  mg per tablet  Commonly known as:  PERCOCET  Take 1 tablet by mouth 3 (three) times daily as needed for Pain (Severe pain not controlled with Tylenol- do not give with Tylenol).  What changed:    · when to take this  · reasons to take this        CONTINUE taking these medications    albuterol-ipratropium 2.5 mg-0.5 mg/3 mL nebulizer solution  Commonly known as:  DUO-NEB  Take 3 mLs by nebulization every 4 (four) hours as needed for Wheezing or Shortness of Breath. Rescue     aspirin 325 MG tablet  Take 1 tablet (325 mg total) by mouth 2 (two) times daily.     CALCIUM ANTACID 300 mg (750 mg) Chew  Generic drug:  calcium carbonate  Take 1 tablet by mouth 2 (two) times daily.     * carbidopa-levodopa  mg  mg per tablet  Commonly known as:  SINEMET  Take 2 tablets by mouth 2 (two) times daily with meals.     * carbidopa-levodopa  mg  mg Tbsr  Commonly known as:  SINEMET CR  Take 1.5 tablets by mouth every evening.     CENTRUM 3,500-18-0.4 unit-mg-mg Chew  Generic drug:  multivit-iron-min-folic acid  Take 1 tablet by mouth once daily.     ferrous sulfate 325 (65 FE) MG EC tablet  Take 325 mg by mouth once daily.     FISH OIL  1,000 mg Cap  Generic drug:  omega-3 fatty acids-vitamin E  Take 1 capsule by mouth once daily.    Magnesium oxide 400 mg tablet  Take 1 tablet by mouth once daily       omeprazole 40 MG capsule  Commonly known as:  PRILOSEC  Take 40 mg by mouth once daily.     potassium chloride SA 10 MEQ tablet  Commonly known as:  K-DUR,KLOR-CON  Take 10 mEq by mouth once daily.     pravastatin 40 MG tablet  Commonly known as:  PRAVACHOL  Take 40 mg by mouth every evening.     SENNA WITH DOCUSATE SODIUM 8.6-50 mg per tablet  Generic drug:  senna-docusate 8.6-50 mg  Take 1 tablet by mouth once daily.     VITAMIN B-12 1000 MCG tablet  Generic drug:  cyanocobalamin  Take 100 mcg by mouth once daily.     VITAMIN D3 2,000 unit  Tab  Generic drug:  cholecalciferol (vitamin D3)  Take 1 tablet by mouth once daily.         * This list has 2 medication(s) that are the same as other medications prescribed for you. Read the directions carefully, and ask your doctor or other care provider to review them with you.            STOP taking these medications    irbesartan-hydrochlorothiazide 150-12.5 mg per tablet  Commonly known as:  AVALIDE            Indwelling Lines/Drains at time of discharge:   Lines/Drains/Airways     None                 Time spent on the discharge of patient: 68 minutes  Patient was seen and examined on the date of discharge and determined to be suitable for discharge.         BERNIE Otero  Department of Hospital Medicine  [facility]

## 2019-11-10 NOTE — PROGRESS NOTES
Planning for possible discharge today.  Still waiting for pertinent lab results.  Discussed with nurse.

## 2019-11-10 NOTE — PT/OT/SLP PROGRESS
Physical Therapy      Patient Name:  Maricarmen Woodward   MRN:  [id number]    Patient not seen today secondary to patient not being able to stay awake during initial interview  . Will follow-up 11/11/2019.    Omid Palma, PT

## 2019-11-10 NOTE — PROGRESS NOTES
I called and spoke to the patient's nephew, Denny Woodward, and updated him on the patient's status.  He reports patient has not been able to walk and has been chair bound for some time now.  Patient was noted to have a mildly elevated troponin, however her EKG has not shown any significant ST elevation or depression. She has not complained of any chest pain, palpitations or had any signs of acute cardiac issues during her stay.  This was discussed with the nephew.  Patient is 89-year-old bed-bound patient and he is in agreement for no further workup due to the patient's age and multiple comorbidities.  Patient to be discharged to home.  Will consult home health for RN monitoring for any further dehydration.  Patient remains stable at this time.

## 2019-11-10 NOTE — PLAN OF CARE
Pt in bed resting. VSS. No acute distress noted. NS  infusing at  ml/hr. Pt turned throughout shift. Pt medicated for pain. AAO x2. SR on the  monitor. Safety maintained. Bed low and locked, alarm on, call light  in reach. Will continue to monitor

## 2019-11-10 NOTE — CARE UPDATE
11/10/19 0741   Patient Assessment/Suction   Level of Consciousness (AVPU) alert   PRE-TX-O2   O2 Device (Oxygen Therapy) room air   SpO2 99 %   Pulse Oximetry Type Intermittent   $ Pulse Oximetry - Multiple Charge Pulse Oximetry - Multiple   Aerosol Therapy   $ Aerosol Therapy Charges PRN treatment not required

## 2019-11-11 NOTE — PLAN OF CARE
11/11/19 4332   Post-Acute Status   Post-Acute Authorization Home Health/Hospice   Home Health/Hospice Status Referrals Sent

## 2019-11-11 NOTE — PLAN OF CARE
[facility] sent patient information to [facility] through [facility] system for authorization and acceptance to home health services as patient has had previously.  TRACY Kumar

## 2019-11-11 NOTE — PROGRESS NOTES
11/10/19 1835   Discharge Assessment   Assessment Type Discharge Planning Assessment       SW was unable to complete a discharge planning assessment at this time. Pt unable to fully participate with interview and no caregivers present at bedside. Pt will continue to be followed for any continuity of care issues, discharge planning, and emotional support. SW remains available.

## 2019-11-11 NOTE — PLAN OF CARE
11/11/19 1614   Post-Acute Status   Post-Acute Authorization Home Health/Hospice   Home Health/Hospice Status Set-up Complete   Discharge Delays None known at this time       · 11/11/2019 4:17:40 PM [facility]

## 2019-11-13 LAB — BACTERIA UR CULT: ABNORMAL

## 2019-11-15 ENCOUNTER — LAB VISIT (OUTPATIENT)
Dept: LAB | Facility: HOSPITAL | Age: 84
End: 2019-11-15
Attending: INTERNAL MEDICINE
Payer: MEDICARE

## 2019-11-15 DIAGNOSIS — N30.00 ACUTE CYSTITIS: Primary | ICD-10-CM

## 2019-11-15 PROCEDURE — 85025 COMPLETE CBC W/AUTO DIFF WBC: CPT

## 2019-11-15 PROCEDURE — 80053 COMPREHEN METABOLIC PANEL: CPT

## 2019-11-15 PROCEDURE — 83735 ASSAY OF MAGNESIUM: CPT

## 2019-11-16 LAB
ALBUMIN SERPL BCP-MCNC: 3.5 G/DL (ref 3.5–5.2)
ALP SERPL-CCNC: 73 U/L (ref 55–135)
ALT SERPL W/O P-5'-P-CCNC: 6 U/L (ref 10–44)
ANION GAP SERPL CALC-SCNC: 11 MMOL/L (ref 8–16)
AST SERPL-CCNC: 46 U/L (ref 10–40)
BASOPHILS # BLD AUTO: 0.03 K/UL (ref 0–0.2)
BASOPHILS NFR BLD: 0.2 % (ref 0–1.9)
BILIRUB SERPL-MCNC: 0.6 MG/DL (ref 0.1–1)
BUN SERPL-MCNC: 42 MG/DL (ref 10–30)
CALCIUM SERPL-MCNC: 9.8 MG/DL (ref 8.7–10.5)
CHLORIDE SERPL-SCNC: 104 MMOL/L (ref 95–110)
CO2 SERPL-SCNC: 21 MMOL/L (ref 23–29)
CREAT SERPL-MCNC: 1.1 MG/DL (ref 0.5–1.4)
DIFFERENTIAL METHOD: ABNORMAL
EOSINOPHIL # BLD AUTO: 0.2 K/UL (ref 0–0.5)
EOSINOPHIL NFR BLD: 1.7 % (ref 0–8)
ERYTHROCYTE [DISTWIDTH] IN BLOOD BY AUTOMATED COUNT: 14.8 % (ref 11.5–14.5)
EST. GFR  (AFRICAN AMERICAN): 50.4 ML/MIN/1.73 M^2
EST. GFR  (NON AFRICAN AMERICAN): 43.7 ML/MIN/1.73 M^2
GLUCOSE SERPL-MCNC: 114 MG/DL (ref 70–110)
HCT VFR BLD AUTO: 22.2 % (ref 37–48.5)
HGB BLD-MCNC: 7.1 G/DL (ref 12–16)
IMM GRANULOCYTES # BLD AUTO: 0.07 K/UL (ref 0–0.04)
IMM GRANULOCYTES NFR BLD AUTO: 0.5 % (ref 0–0.5)
LYMPHOCYTES # BLD AUTO: 0.8 K/UL (ref 1–4.8)
LYMPHOCYTES NFR BLD: 6 % (ref 18–48)
MAGNESIUM SERPL-MCNC: 2 MG/DL (ref 1.6–2.6)
MCH RBC QN AUTO: 34.5 PG (ref 27–31)
MCHC RBC AUTO-ENTMCNC: 32 G/DL (ref 32–36)
MCV RBC AUTO: 108 FL (ref 82–98)
MONOCYTES # BLD AUTO: 0.5 K/UL (ref 0.3–1)
MONOCYTES NFR BLD: 3.5 % (ref 4–15)
NEUTROPHILS # BLD AUTO: 11.5 K/UL (ref 1.8–7.7)
NEUTROPHILS NFR BLD: 88.1 % (ref 38–73)
NRBC BLD-RTO: 0 /100 WBC
PLATELET # BLD AUTO: 303 K/UL (ref 150–350)
PMV BLD AUTO: 9.8 FL (ref 9.2–12.9)
POTASSIUM SERPL-SCNC: 4.3 MMOL/L (ref 3.5–5.1)
PROT SERPL-MCNC: 6.6 G/DL (ref 6–8.4)
RBC # BLD AUTO: 2.06 M/UL (ref 4–5.4)
SODIUM SERPL-SCNC: 136 MMOL/L (ref 136–145)
WBC # BLD AUTO: 13 K/UL (ref 3.9–12.7)

## 2019-11-19 ENCOUNTER — HOSPITAL ENCOUNTER (INPATIENT)
Facility: HOSPITAL | Age: 84
LOS: 7 days | Discharge: NURSING, REHAB OR LONG-TERM CARE | DRG: 481 | End: 2019-11-26
Attending: EMERGENCY MEDICINE | Admitting: HOSPITALIST
Payer: MEDICARE

## 2019-11-19 DIAGNOSIS — R07.9 CHEST PAIN: ICD-10-CM

## 2019-11-19 DIAGNOSIS — D64.9 ANEMIA REQUIRING TRANSFUSIONS: ICD-10-CM

## 2019-11-19 DIAGNOSIS — S82.131A RIGHT MEDIAL TIBIAL PLATEAU FRACTURE, CLOSED, INITIAL ENCOUNTER: Primary | ICD-10-CM

## 2019-11-19 DIAGNOSIS — M25.561 RIGHT KNEE PAIN: ICD-10-CM

## 2019-11-19 PROBLEM — S72.411A: Status: ACTIVE | Noted: 2019-11-19

## 2019-11-19 PROBLEM — S32.040A COMPRESSION FRACTURE OF L4 LUMBAR VERTEBRA, CLOSED, INITIAL ENCOUNTER: Status: ACTIVE | Noted: 2019-11-19

## 2019-11-19 PROBLEM — T14.8XXA INTRAMUSCULAR HEMATOMA: Status: ACTIVE | Noted: 2019-11-19

## 2019-11-19 PROBLEM — R58 ECCHYMOSIS: Status: ACTIVE | Noted: 2019-11-19

## 2019-11-19 LAB
ABO + RH BLD: NORMAL
ALBUMIN SERPL BCP-MCNC: 3.6 G/DL (ref 3.5–5.2)
ALP SERPL-CCNC: 68 U/L (ref 55–135)
ALT SERPL W/O P-5'-P-CCNC: 9 U/L (ref 10–44)
ANION GAP SERPL CALC-SCNC: 12 MMOL/L (ref 8–16)
APTT BLDCRRT: 31.5 SEC (ref 21–32)
AST SERPL-CCNC: 35 U/L (ref 10–40)
BASOPHILS # BLD AUTO: 0.03 K/UL (ref 0–0.2)
BASOPHILS NFR BLD: 0.2 % (ref 0–1.9)
BILIRUB SERPL-MCNC: 0.9 MG/DL (ref 0.1–1)
BILIRUB UR QL STRIP: NEGATIVE
BLD GP AB SCN CELLS X3 SERPL QL: NORMAL
BLD PROD TYP BPU: NORMAL
BLD PROD TYP BPU: NORMAL
BLOOD UNIT EXPIRATION DATE: NORMAL
BLOOD UNIT EXPIRATION DATE: NORMAL
BLOOD UNIT TYPE CODE: 5100
BLOOD UNIT TYPE CODE: 5100
BLOOD UNIT TYPE: NORMAL
BLOOD UNIT TYPE: NORMAL
BUN SERPL-MCNC: 43 MG/DL (ref 10–30)
CALCIUM SERPL-MCNC: 10 MG/DL (ref 8.7–10.5)
CHLORIDE SERPL-SCNC: 110 MMOL/L (ref 95–110)
CK SERPL-CCNC: 385 U/L (ref 20–180)
CLARITY UR: CLEAR
CO2 SERPL-SCNC: 20 MMOL/L (ref 23–29)
CODING SYSTEM: NORMAL
CODING SYSTEM: NORMAL
COLOR UR: YELLOW
CREAT SERPL-MCNC: 1 MG/DL (ref 0.5–1.4)
DIFFERENTIAL METHOD: ABNORMAL
DISPENSE STATUS: NORMAL
DISPENSE STATUS: NORMAL
EOSINOPHIL # BLD AUTO: 0.3 K/UL (ref 0–0.5)
EOSINOPHIL NFR BLD: 2.2 % (ref 0–8)
ERYTHROCYTE [DISTWIDTH] IN BLOOD BY AUTOMATED COUNT: 16.3 % (ref 11.5–14.5)
EST. GFR  (AFRICAN AMERICAN): 57 ML/MIN/1.73 M^2
EST. GFR  (NON AFRICAN AMERICAN): 49 ML/MIN/1.73 M^2
GLUCOSE SERPL-MCNC: 108 MG/DL (ref 70–110)
GLUCOSE UR QL STRIP: NEGATIVE
HCT VFR BLD AUTO: 21.3 % (ref 37–48.5)
HGB BLD-MCNC: 6.7 G/DL (ref 12–16)
HGB UR QL STRIP: NEGATIVE
IMM GRANULOCYTES # BLD AUTO: 0.11 K/UL (ref 0–0.04)
INR PPP: 0.9 (ref 0.8–1.2)
KETONES UR QL STRIP: NEGATIVE
LEUKOCYTE ESTERASE UR QL STRIP: NEGATIVE
LYMPHOCYTES # BLD AUTO: 2.3 K/UL (ref 1–4.8)
LYMPHOCYTES NFR BLD: 18.1 % (ref 18–48)
MCH RBC QN AUTO: 35.1 PG (ref 27–31)
MCHC RBC AUTO-ENTMCNC: 31.5 G/DL (ref 32–36)
MCV RBC AUTO: 112 FL (ref 82–98)
MONOCYTES # BLD AUTO: 0.5 K/UL (ref 0.3–1)
MONOCYTES NFR BLD: 4 % (ref 4–15)
NEUTROPHILS # BLD AUTO: 9.4 K/UL (ref 1.8–7.7)
NEUTROPHILS NFR BLD: 74.6 % (ref 38–73)
NITRITE UR QL STRIP: NEGATIVE
NRBC BLD-RTO: 0 /100 WBC
NUM UNITS TRANS PACKED RBC: NORMAL
NUM UNITS TRANS PACKED RBC: NORMAL
PH UR STRIP: 7 [PH] (ref 5–8)
PLATELET # BLD AUTO: 383 K/UL (ref 150–350)
PMV BLD AUTO: 9.9 FL (ref 9.2–12.9)
POTASSIUM SERPL-SCNC: 4 MMOL/L (ref 3.5–5.1)
PROT SERPL-MCNC: 7 G/DL (ref 6–8.4)
PROT UR QL STRIP: NEGATIVE
PROTHROMBIN TIME: 9.1 SEC (ref 9–12.5)
RBC # BLD AUTO: 1.91 M/UL (ref 4–5.4)
SODIUM SERPL-SCNC: 142 MMOL/L (ref 136–145)
SP GR UR STRIP: <=1.005 (ref 1–1.03)
URN SPEC COLLECT METH UR: ABNORMAL
UROBILINOGEN UR STRIP-ACNC: NEGATIVE EU/DL
WBC # BLD AUTO: 12.63 K/UL (ref 3.9–12.7)

## 2019-11-19 PROCEDURE — 86901 BLOOD TYPING SEROLOGIC RH(D): CPT

## 2019-11-19 PROCEDURE — 99291 CRITICAL CARE FIRST HOUR: CPT | Mod: 25

## 2019-11-19 PROCEDURE — 63600175 PHARM REV CODE 636 W HCPCS: Performed by: EMERGENCY MEDICINE

## 2019-11-19 PROCEDURE — 85610 PROTHROMBIN TIME: CPT

## 2019-11-19 PROCEDURE — 85025 COMPLETE CBC W/AUTO DIFF WBC: CPT

## 2019-11-19 PROCEDURE — P9016 RBC LEUKOCYTES REDUCED: HCPCS

## 2019-11-19 PROCEDURE — 80053 COMPREHEN METABOLIC PANEL: CPT

## 2019-11-19 PROCEDURE — 83540 ASSAY OF IRON: CPT

## 2019-11-19 PROCEDURE — 36415 COLL VENOUS BLD VENIPUNCTURE: CPT

## 2019-11-19 PROCEDURE — 25000003 PHARM REV CODE 250: Performed by: HOSPITALIST

## 2019-11-19 PROCEDURE — 11000001 HC ACUTE MED/SURG PRIVATE ROOM

## 2019-11-19 PROCEDURE — 86920 COMPATIBILITY TEST SPIN: CPT

## 2019-11-19 PROCEDURE — 96361 HYDRATE IV INFUSION ADD-ON: CPT

## 2019-11-19 PROCEDURE — 36430 TRANSFUSION BLD/BLD COMPNT: CPT

## 2019-11-19 PROCEDURE — 85730 THROMBOPLASTIN TIME PARTIAL: CPT

## 2019-11-19 PROCEDURE — 81003 URINALYSIS AUTO W/O SCOPE: CPT

## 2019-11-19 PROCEDURE — 25000003 PHARM REV CODE 250: Performed by: EMERGENCY MEDICINE

## 2019-11-19 PROCEDURE — 25500020 PHARM REV CODE 255: Performed by: EMERGENCY MEDICINE

## 2019-11-19 PROCEDURE — 96360 HYDRATION IV INFUSION INIT: CPT

## 2019-11-19 PROCEDURE — 83036 HEMOGLOBIN GLYCOSYLATED A1C: CPT

## 2019-11-19 PROCEDURE — 82550 ASSAY OF CK (CPK): CPT

## 2019-11-19 RX ORDER — CLONAZEPAM 0.5 MG/1
0.5 TABLET ORAL 2 TIMES DAILY PRN
Status: DISCONTINUED | OUTPATIENT
Start: 2019-11-19 | End: 2019-11-26

## 2019-11-19 RX ORDER — DICLOFENAC SODIUM 10 MG/G
2 GEL TOPICAL DAILY
Status: DISCONTINUED | OUTPATIENT
Start: 2019-11-19 | End: 2019-11-26 | Stop reason: HOSPADM

## 2019-11-19 RX ORDER — PRAVASTATIN SODIUM 40 MG/1
40 TABLET ORAL NIGHTLY
Status: DISCONTINUED | OUTPATIENT
Start: 2019-11-19 | End: 2019-11-26 | Stop reason: HOSPADM

## 2019-11-19 RX ORDER — CHOLECALCIFEROL (VITAMIN D3) 25 MCG
2000 TABLET ORAL DAILY
Status: DISCONTINUED | OUTPATIENT
Start: 2019-11-20 | End: 2019-11-26 | Stop reason: HOSPADM

## 2019-11-19 RX ORDER — IBUPROFEN 200 MG
24 TABLET ORAL
Status: DISCONTINUED | OUTPATIENT
Start: 2019-11-19 | End: 2019-11-26 | Stop reason: HOSPADM

## 2019-11-19 RX ORDER — FERROUS SULFATE 325(65) MG
325 TABLET, DELAYED RELEASE (ENTERIC COATED) ORAL DAILY
Status: DISCONTINUED | OUTPATIENT
Start: 2019-11-20 | End: 2019-11-26 | Stop reason: HOSPADM

## 2019-11-19 RX ORDER — CHOLECALCIFEROL (VITAMIN D3) 50 MCG
2000 TABLET ORAL DAILY
COMMUNITY
End: 2020-04-18 | Stop reason: CLARIF

## 2019-11-19 RX ORDER — GLUCAGON 1 MG
1 KIT INJECTION
Status: DISCONTINUED | OUTPATIENT
Start: 2019-11-19 | End: 2019-11-26 | Stop reason: HOSPADM

## 2019-11-19 RX ORDER — SODIUM CHLORIDE 0.9 % (FLUSH) 0.9 %
10 SYRINGE (ML) INJECTION
Status: CANCELLED | OUTPATIENT
Start: 2019-11-19

## 2019-11-19 RX ORDER — SODIUM CHLORIDE 0.9 % (FLUSH) 0.9 %
10 SYRINGE (ML) INJECTION EVERY 8 HOURS
Status: DISCONTINUED | OUTPATIENT
Start: 2019-11-19 | End: 2019-11-23

## 2019-11-19 RX ORDER — LANOLIN ALCOHOL/MO/W.PET/CERES
400 CREAM (GRAM) TOPICAL DAILY
Status: DISCONTINUED | OUTPATIENT
Start: 2019-11-20 | End: 2019-11-26 | Stop reason: HOSPADM

## 2019-11-19 RX ORDER — FUROSEMIDE 10 MG/ML
20 INJECTION INTRAMUSCULAR; INTRAVENOUS
Status: CANCELLED | OUTPATIENT
Start: 2019-11-19

## 2019-11-19 RX ORDER — PANTOPRAZOLE SODIUM 40 MG/1
40 TABLET, DELAYED RELEASE ORAL DAILY
Status: DISCONTINUED | OUTPATIENT
Start: 2019-11-20 | End: 2019-11-26 | Stop reason: HOSPADM

## 2019-11-19 RX ORDER — ONDANSETRON 2 MG/ML
4 INJECTION INTRAMUSCULAR; INTRAVENOUS EVERY 8 HOURS PRN
Status: DISCONTINUED | OUTPATIENT
Start: 2019-11-19 | End: 2019-11-26

## 2019-11-19 RX ORDER — CARBIDOPA AND LEVODOPA 50; 200 MG/1; MG/1
1 TABLET, EXTENDED RELEASE ORAL NIGHTLY
Status: DISCONTINUED | OUTPATIENT
Start: 2019-11-19 | End: 2019-11-26 | Stop reason: HOSPADM

## 2019-11-19 RX ORDER — CALCIUM CARBONATE 200(500)MG
500 TABLET,CHEWABLE ORAL 2 TIMES DAILY
Status: DISCONTINUED | OUTPATIENT
Start: 2019-11-19 | End: 2019-11-26 | Stop reason: HOSPADM

## 2019-11-19 RX ORDER — IBUPROFEN 200 MG
16 TABLET ORAL
Status: DISCONTINUED | OUTPATIENT
Start: 2019-11-19 | End: 2019-11-26 | Stop reason: HOSPADM

## 2019-11-19 RX ORDER — CARBIDOPA AND LEVODOPA 50; 200 MG/1; MG/1
1.5 TABLET, EXTENDED RELEASE ORAL NIGHTLY
Status: DISCONTINUED | OUTPATIENT
Start: 2019-11-19 | End: 2019-11-19 | Stop reason: SDUPTHER

## 2019-11-19 RX ORDER — HYDROCODONE BITARTRATE AND ACETAMINOPHEN 5; 325 MG/1; MG/1
1 TABLET ORAL
Status: COMPLETED | OUTPATIENT
Start: 2019-11-19 | End: 2019-11-19

## 2019-11-19 RX ORDER — HYDROCODONE BITARTRATE AND ACETAMINOPHEN 500; 5 MG/1; MG/1
TABLET ORAL
Status: CANCELLED | OUTPATIENT
Start: 2019-11-19

## 2019-11-19 RX ORDER — ACETAMINOPHEN 325 MG/1
650 TABLET ORAL EVERY 6 HOURS PRN
Status: DISCONTINUED | OUTPATIENT
Start: 2019-11-19 | End: 2019-11-26 | Stop reason: HOSPADM

## 2019-11-19 RX ORDER — SODIUM CHLORIDE 0.9 % (FLUSH) 0.9 %
10 SYRINGE (ML) INJECTION
Status: DISCONTINUED | OUTPATIENT
Start: 2019-11-19 | End: 2019-11-23

## 2019-11-19 RX ORDER — CARBIDOPA AND LEVODOPA 25; 100 MG/1; MG/1
2 TABLET ORAL 2 TIMES DAILY WITH MEALS
Status: DISCONTINUED | OUTPATIENT
Start: 2019-11-19 | End: 2019-11-26 | Stop reason: HOSPADM

## 2019-11-19 RX ORDER — POTASSIUM CHLORIDE 750 MG/1
10 TABLET, EXTENDED RELEASE ORAL DAILY
Status: DISCONTINUED | OUTPATIENT
Start: 2019-11-20 | End: 2019-11-26 | Stop reason: HOSPADM

## 2019-11-19 RX ORDER — HYDROCODONE BITARTRATE AND ACETAMINOPHEN 5; 325 MG/1; MG/1
1 TABLET ORAL EVERY 6 HOURS PRN
Status: DISCONTINUED | OUTPATIENT
Start: 2019-11-19 | End: 2019-11-26 | Stop reason: HOSPADM

## 2019-11-19 RX ORDER — IPRATROPIUM BROMIDE AND ALBUTEROL SULFATE 2.5; .5 MG/3ML; MG/3ML
3 SOLUTION RESPIRATORY (INHALATION) EVERY 4 HOURS PRN
Status: DISCONTINUED | OUTPATIENT
Start: 2019-11-19 | End: 2019-11-26

## 2019-11-19 RX ORDER — CARBIDOPA AND LEVODOPA 25; 100 MG/1; MG/1
1 TABLET, EXTENDED RELEASE ORAL NIGHTLY
Status: DISCONTINUED | OUTPATIENT
Start: 2019-11-19 | End: 2019-11-26 | Stop reason: HOSPADM

## 2019-11-19 RX ORDER — ASPIRIN 81 MG/1
81 TABLET ORAL DAILY
COMMUNITY
End: 2019-11-30

## 2019-11-19 RX ORDER — AMOXICILLIN 250 MG
1 CAPSULE ORAL DAILY
Status: DISCONTINUED | OUTPATIENT
Start: 2019-11-20 | End: 2019-11-26 | Stop reason: HOSPADM

## 2019-11-19 RX ORDER — HYDROCODONE BITARTRATE AND ACETAMINOPHEN 500; 5 MG/1; MG/1
TABLET ORAL
Status: DISCONTINUED | OUTPATIENT
Start: 2019-11-19 | End: 2019-11-26

## 2019-11-19 RX ADMIN — CARBIDOPA AND LEVODOPA 1 TABLET: 50; 200 TABLET, EXTENDED RELEASE ORAL at 08:11

## 2019-11-19 RX ADMIN — HYDROCODONE BITARTRATE AND ACETAMINOPHEN 1 TABLET: 5; 325 TABLET ORAL at 11:11

## 2019-11-19 RX ADMIN — PRAVASTATIN SODIUM 40 MG: 40 TABLET ORAL at 08:11

## 2019-11-19 RX ADMIN — CARBIDOPA AND LEVODOPA 2 TABLET: 25; 100 TABLET ORAL at 05:11

## 2019-11-19 RX ADMIN — CALCIUM CARBONATE (ANTACID) CHEW TAB 500 MG 500 MG: 500 CHEW TAB at 08:11

## 2019-11-19 RX ADMIN — ACETAMINOPHEN 650 MG: 325 TABLET ORAL at 08:11

## 2019-11-19 RX ADMIN — HYDROCODONE BITARTRATE AND ACETAMINOPHEN 1 TABLET: 5; 325 TABLET ORAL at 05:11

## 2019-11-19 RX ADMIN — CARBIDOPA AND LEVODOPA 1 TABLET: 25; 100 TABLET, EXTENDED RELEASE ORAL at 08:11

## 2019-11-19 RX ADMIN — IOHEXOL 75 ML: 350 INJECTION, SOLUTION INTRAVENOUS at 12:11

## 2019-11-19 RX ADMIN — CLONAZEPAM 0.5 MG: 0.5 TABLET ORAL at 08:11

## 2019-11-19 RX ADMIN — SODIUM CHLORIDE 1000 ML: 0.9 INJECTION, SOLUTION INTRAVENOUS at 11:11

## 2019-11-19 NOTE — ED NOTES
Pt awake alert oriented reports sent here for abnormal labs, pt denies chest pain or sob, denies n/v/d, pt placed on bp cuff and pulse ox call light in reach bed rails up

## 2019-11-19 NOTE — HPI
The patient is a 89 y.o. female  who presents with abnormal labs. The patient was sent by her caregiver for anemia. Pt had her labs drawn recently which resulted in low H&H.   Patient also c/o right knee pain but denies a fall or injury. Pt is a poor historian.   Evaluation in the emergency room revealed significant bruising across her back chest and abdomen and is significant drop in her hemoglobin and hematocrit requiring transfusion.  CT scan chest abdomen pelvis revealed a new compression fracture  L4,  Superficial contusion along the left chest and abdomen, and a small intramuscular hematoma on the left pectoral area.  CT head is negative for acute bleed.  X-ray have was normal and x-ray of the knee revealed minimally displaced intra-articular fracture of the medial femoral condyle  Patient admitted for blood transfusion and Orthopedic PT and OT evaluation.    PMHx of HTN, arthritis and parkinson's disease. SHx of fracture surgery, joint replacement and hemiathroplasty of left hip.

## 2019-11-19 NOTE — ED PROVIDER NOTES
Encounter Date: 11/19/2019    SCRIBE #1 NOTE: Valerie PARRISH am scribing for, and in the presence of, Dr. Regalado.       History     Chief Complaint   Patient presents with    abnormal lab result     11/19/2019  9:52 AM     The patient is a 89 y.o. female  who presents with abnormal labs. The patient was sent by her caregiver for anemia. Pt had her labs drawn recently which resulted in low H&H. Patient also c/o right knee pain but denies a fall or injury. Pt is a poor historian. PMHx of HTN, arthritis and parkinson's disease. SHx of fracture surgery, joint replacement and hemiathroplasty of left hip.    The history is provided by the patient and the EMS personnel.     Review of patient's allergies indicates:  No Known Allergies  Past Medical History:   Diagnosis Date    Arthritis     Hypertension     Parkinson's disease      Past Surgical History:   Procedure Laterality Date    FRACTURE SURGERY      HEMIARTHROPLASTY OF HIP Left 12/8/2018    Procedure: HEMIARTHROPLASTY, HIP, Terese, peg board, first assist;  Surgeon: Cheng Mccormack MD;  Location: [facility];  Service: Orthopedics;  Laterality: Left;    JOINT REPLACEMENT      right hip replacement     Family History   Problem Relation Age of Onset    No Known Problems Mother      Social History     Tobacco Use    Smoking status: Light Tobacco Smoker     Packs/day: 0.25     Years: 5.00     Pack years: 1.25     Last attempt to quit: 4/17/2009     Years since quitting: 10.5    Smokeless tobacco: Never Used   Substance Use Topics    Alcohol use: No    Drug use: No     Review of Systems   Unable to perform ROS: Other       Physical Exam     Initial Vitals [11/19/19 0928]   BP Pulse Resp Temp SpO2   (!) 154/67 89 16 98 °F (36.7 °C) 100 %      MAP       --         Physical Exam    Nursing note and vitals reviewed.  Constitutional: No distress.   HENT:   Head: Normocephalic and atraumatic. Head is without abrasion, without contusion and without  laceration.   Mouth/Throat: Mucous membranes are normal.   Eyes: EOM are normal. Pupils are equal, round, and reactive to light.   Neck: Normal range of motion.   Cardiovascular: Normal rate, regular rhythm, normal heart sounds, intact distal pulses and normal pulses. Exam reveals no gallop and no friction rub.    No murmur heard.  Pulses:       Dorsalis pedis pulses are 2+ on the right side, and 2+ on the left side.   Pulmonary/Chest: Breath sounds normal. She has no wheezes. She has no rhonchi. She has no rales.   Ecchymosis and bruising to the chest and breasts. Breasts are purple.   Abdominal: Soft. She exhibits no distension. There is no tenderness.   Bruising to the abdomen.   Genitourinary: Rectal exam shows guaiac negative stool. Guaiac negative stool. : Acceptable.  Genitourinary Comments: Stage I sacral decubitus ulcer.   Musculoskeletal: Normal range of motion. She exhibits no tenderness.   Right hip and knee is in a flexed position but patient is able to fully extend it.  Bruise and swelling to the right anterior proximal patella. Arthritic appearing knees bilaterally. Bruising to the left flank. Bruise to the left upper extremity.   Neurological: She is alert and oriented to person, place, and time. She has normal strength. GCS score is 15. GCS eye subscore is 4. GCS verbal subscore is 5. GCS motor subscore is 6.   Skin: Skin is warm and dry. Bruising and ecchymosis noted.   Psychiatric: She has a normal mood and affect.         ED Course   Critical Care  Date/Time: 11/19/2019 4:26 PM  Performed by: Harley Regalado MD  Authorized by: Harley Regalado MD   Direct patient critical care time: 10 minutes  Additional history critical care time: 5 minutes  Ordering / reviewing critical care time: 10 minutes  Documentation critical care time: 10 minutes  Consulting other physicians critical care time: 5 minutes  Consult with family critical care time: 3 minutes  Other critical care time: 2  minutes  Total critical care time (exclusive of procedural time) : 45 minutes  Critical care time was exclusive of separately billable procedures and treating other patients.  Critical care was necessary to treat or prevent imminent or life-threatening deterioration of the following conditions: trauma.  Critical care was time spent personally by me on the following activities: development of treatment plan with patient or surrogate, examination of patient, ordering and performing treatments and interventions, ordering and review of radiographic studies, re-evaluation of patient's condition, review of old charts, ordering and review of laboratory studies, obtaining history from patient or surrogate, evaluation of patient's response to treatment and discussions with consultants.        Labs Reviewed   CBC W/ AUTO DIFFERENTIAL - Abnormal; Notable for the following components:       Result Value    RBC 1.91 (*)     Hemoglobin 6.7 (*)     Hematocrit 21.3 (*)     Mean Corpuscular Volume 112 (*)     Mean Corpuscular Hemoglobin 35.1 (*)     Mean Corpuscular Hemoglobin Conc 31.5 (*)     RDW 16.3 (*)     Platelets 383 (*)     Gran # (ANC) 9.4 (*)     Immature Grans (Abs) 0.11 (*)     Gran% 74.6 (*)     All other components within normal limits   COMPREHENSIVE METABOLIC PANEL - Abnormal; Notable for the following components:    CO2 20 (*)     BUN, Bld 43 (*)     ALT 9 (*)     eGFR if  57 (*)     eGFR if non  49 (*)     All other components within normal limits   CK - Abnormal; Notable for the following components:     (*)     All other components within normal limits   URINALYSIS, REFLEX TO URINE CULTURE - Abnormal; Notable for the following components:    Specific Gravity, UA <=1.005 (*)     All other components within normal limits    Narrative:     Preferred Collection Type->Urine, Clean Catch   PROTIME-INR   APTT   IRON AND TIBC   TYPE & SCREEN          Imaging Results          CT  Cervical Spine Without Contrast (Final result)  Result time 11/19/19 12:55:44    Final result by Faustino Salomon MD (11/19/19 12:55:44)                 Impression:      1. Mild upper cervical malalignment.  No acute fracture.  2. Multilevel degenerative change contributing to areas of bony spinal canal or neural foraminal narrowing as described.  3. Left apical pulmonary nodule. In a low risk patient, no further follow-up is recommended.  In a high-risk patient, 12 month follow-up CT could be considered.  4. Bilateral thyroid nodules or cysts which could be further assessed with elective ultrasound as clinically warranted.      Electronically signed by: Faustino Salomon  Date:    11/19/2019  Time:    12:55             Narrative:    EXAMINATION:  CT CERVICAL SPINE WITHOUT CONTRAST    CLINICAL HISTORY:  C-spine trauma, NEXUS/CCR positive, +risk factor(s);    TECHNIQUE:  Low dose axial images, sagittal and coronal reformations were performed though the cervical spine.  Contrast was not administered.    COMPARISON:  None    FINDINGS:  3 mm anterolisthesis C2 on C3 and 3 mm retrolisthesis C3 on C4. No acute fracture. Vertebral body heights maintained. Mild degenerative change of both temporomandibular joints.  Degenerative disc disease moderate at C3-4 and mild degenerative disc disease at C4-5 through C6-7.  Posterior disc osteophyte contributes to a degree of bony spinal canal stenosis at C3-4 and C5-6.  Uncovertebral spurs contribute to bony neural foraminal narrowing bilaterally at C5-6. Several bilateral thyroid nodules.  Pleuroparenchymal thickening at both lung apices, likely reflective of scar.  4 mm nodule left lung apex series 2, image 287. atherosclerosis.                               CT Chest Abdoment Pelvis With Contrast (Final result)  Result time 11/19/19 13:10:15    Final result by Amrik Jeong MD (11/19/19 13:10:15)                 Impression:      No acute intrathoracic, intra-abdominal, or  intrapelvic injury.    Recent appearing moderate L4 compression fracture, new since at least 07/01/2019.  Osteopenia and multiple chronic lumbar compression fracture status post vertebroplasty.    Moderate superficial soft tissue contusion along the left lateral aspect of the chest and abdomen.  Small left pectoralis intramuscular hematoma.    Severe atherosclerosis.    Small pulmonary nodules.  For multiple solid nodules all <6 mm, Fleischner Society 2017 guidelines recommend no routine follow up for a low risk patient, or follow up with non-contrast chest CT at 12 months after discovery in a high risk patient.    Hepatic cysts, including numerous mildly complex cysts.  Small renal cysts.      Electronically signed by: Amrik Jeong MD  Date:    11/19/2019  Time:    13:10             Narrative:    EXAMINATION:  CT CHEST ABDOMEN PELVIS WITH CONTRAST (XPD)    CLINICAL HISTORY:  Chest-abdomen-pelvis trauma, moderate, blunt;    TECHNIQUE:  Low dose axial images, sagittal and coronal reformations were obtained from the thoracic inlet to the pubic symphysis following the IV administration of 75 mL of Omnipaque 350 No oral contrast was administered.    COMPARISON:  CT abdomen and pelvis 09/16/2015    FINDINGS:  The thoracic aorta is intact.  There is abundant calcification of the thoracic aorta and coronary arteries.  Heavy calcification is present at the origin of the left subclavian artery.  The heart size is normal without pericardial effusion.    There is no pulmonary contusion, laceration, pleural effusion, or pneumothorax.  Parenchymal scarring is present the lung apices.  There are associated calcifications on the right which may reflect an old granulomatous process.  A few small noncalcified pulmonary nodules are present, with a 5 mm left upper lobe nodule representing 1 of the larger nodules (series 4, image 145).    The abdominal aorta is intact, demonstrating heavy calcification.  There is no evidence for  abdominopelvic solid or hollow viscus injury.  There is no free air or free fluid.    Numerous hepatic cysts are present, including a few thinly septated mildly complex cysts.  The largest cyst is present within the left hepatic lobe measures 3.0 cm.  Numerous renal cysts are present bilaterally.    Moderate subcutaneous fat stranding is present along the lateral aspect of the left hemithorax and extending caudally to the left iliac bone, compatible with superficial soft tissue contusion.  There is a small intramuscular hematoma of the left pectoralis musculature, measuring 2.8 cm.  Otherwise no distinct collection is present to indicate significant hematoma formation.  There is no evidence for active extravasation.    The bones are osteopenic.  There is a new, moderate compression fracture of L4, with mild retropulsion.  There is a severe chronic compression fracture of L2 status post vertebroplasty.  There is a moderate compression fracture of L5 status post vertebroplasty.  There is no acute fracture.  Bilateral hip arthroplasties are present.                               CT Head Without Contrast (Final result)  Result time 11/19/19 12:46:43    Final result by Eusebio Harvey MD (11/19/19 12:46:43)                 Impression:      1. There is no acute abnormality.  There is stable generalized volume loss with moderate to marked nonspecific white matter change.  There is no intracranial hemorrhage, mass effect, obvious acute edema or ischemia.  There is no acute skull fracture.  It should be noted that MRI is more sensitive in the detection of subtle or acute nonhemorrhagic ischemic disease.      Electronically signed by: Eusebio Harvey MD  Date:    11/19/2019  Time:    12:46             Narrative:    EXAMINATION:  CT HEAD WITHOUT CONTRAST    CLINICAL HISTORY:  Head trauma, headache;    TECHNIQUE:  Routine unenhanced axial images were obtained through the head.  Sagittal and coronal reformatted images  were created.  The study is reviewed in bone and soft tissue windows.    COMPARISON:  Head CT dated 07/01/2019    FINDINGS:  Intracranial contents: The patient is tilted and rotated in the scanner.  There is no acute intracranial abnormality or definite change in the appearance of the brain compared to the prior study.  There is generalized volume loss with moderate to marked nonspecific white matter change.  These findings may reflect sequelae of chronic small vessel disease.  There is no midline shift or hydrocephalus.  There is no intracranial hemorrhage or mass effect.  The gray-white interface is preserved without obvious acute infarction.  There is no abnormal extra-axial fluid collection.  The basilar cisterns are open.  The cerebellar tonsils are normal position.  The sellar structures are normal.  There are physiologic basal ganglia calcifications.  There is marked atherosclerosis.    Extracranial contents, calvarium, soft tissues: There is no acute skull fracture.  There is trace scattered mucosal thickening in the paranasal sinuses.  The mastoid air cells are clear.                               X-Ray Hip 2 View Right (Final result)  Result time 11/19/19 10:41:08    Final result by Amrik Jeong MD (11/19/19 10:41:08)                 Impression:      No acute osseous abnormality.  Osteopenia, bilateral hip shey arthroplasties, and lower lumbar vertebroplasty.      Electronically signed by: Amrik Jeong MD  Date:    11/19/2019  Time:    10:41             Narrative:    EXAMINATION:  XR HIP 2 VIEW RIGHT    CLINICAL HISTORY:  right hip pain;    TECHNIQUE:  AP view of the pelvis and frog leg lateral view of the right hip were performed.    COMPARISON:  10/22/2019    FINDINGS:  Bilateral hip and knee arthroplasties are present.  Both prosthetic femoral heads are located.  There is no fracture.  The bones are osteopenic.  Vertebroplasty of a lower lumbar vertebrae has been performed.                                X-Ray Knee 3 View Right (Final result)  Result time 11/19/19 10:39:54    Final result by Amrik Jeong MD (11/19/19 10:39:54)                 Impression:      Minimally displaced intra-articular fracture of the medial femoral condyle.      Electronically signed by: Amrik Jeong MD  Date:    11/19/2019  Time:    10:39             Narrative:    EXAMINATION:  XR KNEE 3 VIEW RIGHT    CLINICAL HISTORY:  Pain in right knee    TECHNIQUE:  AP, lateral, and Merchant views of the right knee were performed.    COMPARISON:  None    FINDINGS:  The bones are osteopenic.  There is a fracture of the distal femur extending through the medial femoral condyle from its medial cortex, to the intertrochanteric notch.  Minimal displacement is present.  A small hemarthrosis is present.  Heavy vascular calcifications are present.                                 Medical Decision Making:   History:   Old Medical Records: I decided to obtain old medical records.  Clinical Tests:   Lab Tests: Ordered and Reviewed  Radiological Study: Reviewed and Ordered  Other:   I have discussed this case with another health care provider.       <> Summary of the Discussion: Patient will be admitted to the hospital under Dr. Mike's care.  Patient is a fracture knee.  CT chest abdomen pelvis did not show any signs of acute trauma.  Patient is hematoma.  She will need admission for blood transfusion given significant anemia.  No signs of urinary tract infection.  Hemoccult negative. Dehydration on exam and on labs.  Patient be admitted to the hospitalist.            Scribe Attestation:   Scribe #1: I performed the above scribed service and the documentation accurately describes the services I performed. I attest to the accuracy of the note.    I, Dr. Harley Regalado personally performed the services described in this documentation. All medical record entries made by the scribe were at my direction and in my presence.  I have  reviewed the chart and agree that the record reflects my personal performance and is accurate and complete. Harley Regalado MD.  7:48 PM 11/19/2019    DISCLAIMER: This note was prepared with Dragon NaturallySpeaking voice recognition transcription software. Garbled syntax, mangled pronouns, and other bizarre constructions may be attributed to that software system         ED Course as of Nov 19 1725   Tue Nov 19, 2019   1105 Patient is heme-negative.     [JS]   1136 Patient has a fracture of the knee.  I am concerned about trauma given the bruises on her chest.  I will order a CT chest abdomen pelvis as well as a CT head and cervical spine    [JS]      ED Course User Index  [JS] Harley Regalado MD                Clinical Impression:       ICD-10-CM ICD-9-CM   1. Right knee pain M25.561 719.46   2. Anemia requiring transfusions D64.9 285.9   3. Chest pain R07.9 786.50         Disposition:   Disposition: Admitted  Condition: Stable                     Harley Regalado MD  11/19/19 1949

## 2019-11-20 PROBLEM — L89.321 PRESSURE INJURY OF LEFT BUTTOCK, STAGE 1: Status: ACTIVE | Noted: 2019-11-20

## 2019-11-20 PROBLEM — L89.311 PRESSURE INJURY OF RIGHT BUTTOCK, STAGE 1: Status: ACTIVE | Noted: 2019-11-20

## 2019-11-20 PROBLEM — D62 ANEMIA ASSOCIATED WITH ACUTE BLOOD LOSS: Status: ACTIVE | Noted: 2019-11-19

## 2019-11-20 LAB
ANION GAP SERPL CALC-SCNC: 8 MMOL/L (ref 8–16)
BASOPHILS # BLD AUTO: 0.07 K/UL (ref 0–0.2)
BASOPHILS NFR BLD: 0.6 % (ref 0–1.9)
BUN SERPL-MCNC: 31 MG/DL (ref 10–30)
CALCIUM SERPL-MCNC: 9.4 MG/DL (ref 8.7–10.5)
CHLORIDE SERPL-SCNC: 109 MMOL/L (ref 95–110)
CO2 SERPL-SCNC: 22 MMOL/L (ref 23–29)
CREAT SERPL-MCNC: 0.7 MG/DL (ref 0.5–1.4)
DIFFERENTIAL METHOD: ABNORMAL
EOSINOPHIL # BLD AUTO: 0.4 K/UL (ref 0–0.5)
EOSINOPHIL NFR BLD: 3.6 % (ref 0–8)
ERYTHROCYTE [DISTWIDTH] IN BLOOD BY AUTOMATED COUNT: 22.2 % (ref 11.5–14.5)
EST. GFR  (AFRICAN AMERICAN): >60 ML/MIN/1.73 M^2
EST. GFR  (NON AFRICAN AMERICAN): >60 ML/MIN/1.73 M^2
ESTIMATED AVG GLUCOSE: 85 MG/DL (ref 68–131)
GLUCOSE SERPL-MCNC: 93 MG/DL (ref 70–110)
HBA1C MFR BLD HPLC: 4.6 % (ref 4–5.6)
HCT VFR BLD AUTO: 29.4 % (ref 37–48.5)
HGB BLD-MCNC: 9.9 G/DL (ref 12–16)
IMM GRANULOCYTES # BLD AUTO: 0.1 K/UL (ref 0–0.04)
IRON SERPL-MCNC: 55 UG/DL (ref 30–160)
LYMPHOCYTES # BLD AUTO: 2.4 K/UL (ref 1–4.8)
LYMPHOCYTES NFR BLD: 21.7 % (ref 18–48)
MAGNESIUM SERPL-MCNC: 2 MG/DL (ref 1.6–2.6)
MCH RBC QN AUTO: 32.7 PG (ref 27–31)
MCHC RBC AUTO-ENTMCNC: 33.7 G/DL (ref 32–36)
MCV RBC AUTO: 97 FL (ref 82–98)
MONOCYTES # BLD AUTO: 0.5 K/UL (ref 0.3–1)
MONOCYTES NFR BLD: 4.3 % (ref 4–15)
NEUTROPHILS # BLD AUTO: 7.5 K/UL (ref 1.8–7.7)
NEUTROPHILS NFR BLD: 68.9 % (ref 38–73)
NRBC BLD-RTO: 0 /100 WBC
PHOSPHATE SERPL-MCNC: 2.5 MG/DL (ref 2.7–4.5)
PLATELET # BLD AUTO: 371 K/UL (ref 150–350)
PMV BLD AUTO: 8.8 FL (ref 9.2–12.9)
POTASSIUM SERPL-SCNC: 3.7 MMOL/L (ref 3.5–5.1)
RBC # BLD AUTO: 3.03 M/UL (ref 4–5.4)
SATURATED IRON: 20 % (ref 20–50)
SODIUM SERPL-SCNC: 139 MMOL/L (ref 136–145)
TOTAL IRON BINDING CAPACITY: 272 UG/DL (ref 250–450)
TRANSFERRIN SERPL-MCNC: 184 MG/DL (ref 200–375)
WBC # BLD AUTO: 10.83 K/UL (ref 3.9–12.7)

## 2019-11-20 PROCEDURE — 84100 ASSAY OF PHOSPHORUS: CPT

## 2019-11-20 PROCEDURE — 36430 TRANSFUSION BLD/BLD COMPNT: CPT

## 2019-11-20 PROCEDURE — A4216 STERILE WATER/SALINE, 10 ML: HCPCS | Performed by: HOSPITALIST

## 2019-11-20 PROCEDURE — 11000001 HC ACUTE MED/SURG PRIVATE ROOM

## 2019-11-20 PROCEDURE — 25000003 PHARM REV CODE 250: Performed by: NURSE PRACTITIONER

## 2019-11-20 PROCEDURE — 83735 ASSAY OF MAGNESIUM: CPT

## 2019-11-20 PROCEDURE — 80048 BASIC METABOLIC PNL TOTAL CA: CPT

## 2019-11-20 PROCEDURE — 99900035 HC TECH TIME PER 15 MIN (STAT)

## 2019-11-20 PROCEDURE — 25000003 PHARM REV CODE 250: Performed by: HOSPITALIST

## 2019-11-20 PROCEDURE — 94761 N-INVAS EAR/PLS OXIMETRY MLT: CPT

## 2019-11-20 PROCEDURE — 36415 COLL VENOUS BLD VENIPUNCTURE: CPT

## 2019-11-20 PROCEDURE — 85025 COMPLETE CBC W/AUTO DIFF WBC: CPT

## 2019-11-20 RX ADMIN — Medication 10 ML: at 12:11

## 2019-11-20 RX ADMIN — CLONAZEPAM 0.5 MG: 0.5 TABLET ORAL at 04:11

## 2019-11-20 RX ADMIN — CARBIDOPA AND LEVODOPA 2 TABLET: 25; 100 TABLET ORAL at 09:11

## 2019-11-20 RX ADMIN — DICLOFENAC 2 G: 10 GEL TOPICAL at 09:11

## 2019-11-20 RX ADMIN — ACETAMINOPHEN 650 MG: 325 TABLET ORAL at 09:11

## 2019-11-20 RX ADMIN — Medication 400 MG: at 09:11

## 2019-11-20 RX ADMIN — PRAVASTATIN SODIUM 40 MG: 40 TABLET ORAL at 08:11

## 2019-11-20 RX ADMIN — SENNOSIDES AND DOCUSATE SODIUM 1 TABLET: 8.6; 5 TABLET ORAL at 09:11

## 2019-11-20 RX ADMIN — CARBIDOPA AND LEVODOPA 1 TABLET: 25; 100 TABLET, EXTENDED RELEASE ORAL at 08:11

## 2019-11-20 RX ADMIN — HYDROCODONE BITARTRATE AND ACETAMINOPHEN 1 TABLET: 5; 325 TABLET ORAL at 08:11

## 2019-11-20 RX ADMIN — DICLOFENAC 2 G: 10 GEL TOPICAL at 12:11

## 2019-11-20 RX ADMIN — FERROUS SULFATE TAB EC 325 MG (65 MG FE EQUIVALENT) 325 MG: 325 (65 FE) TABLET DELAYED RESPONSE at 09:11

## 2019-11-20 RX ADMIN — MELATONIN 2000 UNITS: at 09:11

## 2019-11-20 RX ADMIN — Medication 10 ML: at 06:11

## 2019-11-20 RX ADMIN — HYDROCODONE BITARTRATE AND ACETAMINOPHEN 1 TABLET: 5; 325 TABLET ORAL at 06:11

## 2019-11-20 RX ADMIN — CALCIUM CARBONATE (ANTACID) CHEW TAB 500 MG 500 MG: 500 CHEW TAB at 08:11

## 2019-11-20 RX ADMIN — Medication 10 ML: at 09:11

## 2019-11-20 RX ADMIN — CARBIDOPA AND LEVODOPA 1 TABLET: 50; 200 TABLET, EXTENDED RELEASE ORAL at 08:11

## 2019-11-20 RX ADMIN — PANTOPRAZOLE SODIUM 40 MG: 40 TABLET, DELAYED RELEASE ORAL at 09:11

## 2019-11-20 RX ADMIN — POTASSIUM CHLORIDE 10 MEQ: 750 TABLET, EXTENDED RELEASE ORAL at 09:11

## 2019-11-20 RX ADMIN — CARBIDOPA AND LEVODOPA 2 TABLET: 25; 100 TABLET ORAL at 04:11

## 2019-11-20 RX ADMIN — Medication 10 ML: at 02:11

## 2019-11-20 RX ADMIN — CALCIUM CARBONATE (ANTACID) CHEW TAB 500 MG 500 MG: 500 CHEW TAB at 09:11

## 2019-11-20 NOTE — ASSESSMENT & PLAN NOTE
Chronic.  On continue Sinemet.  Possible cause of weakness and falls.  Consult PT and OT.  Consider Neurology consult and MRI.
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
## 2019-11-20 NOTE — PT/OT/SLP PROGRESS
Physical Therapy      Patient Name:  Maricarmen Woodward   MRN:  [id number]    PT orders acknowledged- pt with distal R femur fx-awaiting ortho consult. Please re consult as appropriate.    Lupe Rudolph, PT

## 2019-11-20 NOTE — PLAN OF CARE
Pt resting with eyes closed. She reports pain 6/10 in her knee. Treated with PRN pain medication. 2 units of blood administered per orders. Weight shift assistance provided. Bed in lowest position with wheels locked, and side rails up x2. Safety maintained. Plan of care reviewed and pt verbalized understanding. Call light in reach. No further requests at this time. Will continue to monitor.

## 2019-11-20 NOTE — SUBJECTIVE & OBJECTIVE
Past Medical History:   Diagnosis Date    Arthritis     Hypertension     Parkinson's disease        Past Surgical History:   Procedure Laterality Date    FRACTURE SURGERY      HEMIARTHROPLASTY OF HIP Left 12/8/2018    Procedure: HEMIARTHROPLASTY, HIP, Terese, peg board, first assist;  Surgeon: Cheng Mccormack MD;  Location: [facility];  Service: Orthopedics;  Laterality: Left;    JOINT REPLACEMENT      right hip replacement       Review of patient's allergies indicates:  No Known Allergies    No current facility-administered medications on file prior to encounter.      Current Outpatient Medications on File Prior to Encounter   Medication Sig    acetaminophen (TYLENOL) 325 MG tablet Take 2 tablets (650 mg total) by mouth every 4 (four) hours as needed.    albuterol-ipratropium (DUO-NEB) 2.5 mg-0.5 mg/3 mL nebulizer solution Take 3 mLs by nebulization every 4 (four) hours as needed for Wheezing or Shortness of Breath. Rescue    aspirin (ECOTRIN) 81 MG EC tablet Take 81 mg by mouth once daily.    calcium carbonate (CALCIUM ANTACID) 300 mg (750 mg) Chew Take 1 tablet by mouth 2 (two) times daily.    carbidopa-levodopa  mg (SINEMET)  mg per tablet Take 2 tablets by mouth 2 (two) times daily with meals. Breakfast and dinner    carbidopa-levodopa  mg (SINEMET CR)  mg TbSR Take 1.5 tablets by mouth every evening.    cholecalciferol, vitamin D3, (VITAMIN D3) 2,000 unit Tab Take 2,000 Units by mouth once daily.    clonazePAM (KLONOPIN) 0.5 MG tablet Take 1 tablet (0.5 mg total) by mouth 2 (two) times daily as needed for Anxiety.    cyanocobalamin (VITAMIN B-12) 100 MCG tablet Take 100 mcg by mouth once daily.     ferrous sulfate 325 (65 FE) MG EC tablet Take 325 mg by mouth once daily.    magnesium oxide (MAG-OX) 400 mg (241.3 mg magnesium) tablet Take 1 tablet (400 mg total) by mouth once daily.    multivit-iron-min-folic acid (MULTIVITAMIN-IRON-MINERALS-FOLIC ACID)  3,500-18-0.4 unit-mg-mg Chew Take 1 tablet by mouth once daily.      omega-3 fatty acids-vitamin E (FISH OIL) 1,000 mg Cap Take 1 capsule by mouth once daily.    omeprazole (PRILOSEC) 40 MG capsule Take 40 mg by mouth once daily.    oxyCODONE-acetaminophen (PERCOCET)  mg per tablet Take 1 tablet by mouth 3 (three) times daily as needed for Pain (Severe pain not controlled with Tylenol- do not give with Tylenol).    potassium chloride SA (K-DUR,KLOR-CON) 10 MEQ tablet Take 10 mEq by mouth once daily.      pravastatin (PRAVACHOL) 40 MG tablet Take 40 mg by mouth every evening.    senna-docusate 8.6-50 mg (SENNA WITH DOCUSATE SODIUM) 8.6-50 mg per tablet Take 1 tablet by mouth once daily.    [DISCONTINUED] aspirin 325 MG tablet Take 1 tablet (325 mg total) by mouth 2 (two) times daily.    [DISCONTINUED] cholecalciferol, vitamin D3, (VITAMIN D3) 2,000 unit Tab Take 1 tablet by mouth once daily.      Family History     Problem Relation (Age of Onset)    No Known Problems Mother        Tobacco Use    Smoking status: Light Tobacco Smoker     Packs/day: 0.25     Years: 5.00     Pack years: 1.25     Last attempt to quit: 4/17/2009     Years since quitting: 10.5    Smokeless tobacco: Never Used   Substance and Sexual Activity    Alcohol use: No    Drug use: No    Sexual activity: Never     Partners: Male     Birth control/protection: Abstinence, Post-menopausal     Review of Systems   Unable to perform ROS: Dementia   Constitutional: Positive for unexpected weight change.     Objective:     Vital Signs (Most Recent):  Temp: 96.6 °F (35.9 °C) (11/19/19 1941)  Pulse: 89 (11/19/19 1941)  Resp: 18 (11/19/19 1941)  BP: 134/61 (11/19/19 1941)  SpO2: 97 % (11/19/19 1941) Vital Signs (24h Range):  Temp:  [96.6 °F (35.9 °C)-98.1 °F (36.7 °C)] 96.6 °F (35.9 °C)  Pulse:  [] 89  Resp:  [16-18] 18  SpO2:  [95 %-100 %] 97 %  BP: (134-183)/(61-79) 134/61     Weight: 48.5 kg (106 lb 14.8 oz)  Body mass index is 18.94  kg/m².    Physical Exam   Constitutional: She is oriented to person, place, and time. She appears well-developed and well-nourished. No distress.   HENT:   Head: Normocephalic and atraumatic.   Right Ear: External ear normal.   Left Ear: External ear normal.   Nose: Nose normal.   Mouth/Throat: Oropharynx is clear and moist. No oropharyngeal exudate.   Eyes: Conjunctivae and EOM are normal. Right eye exhibits no discharge. Left eye exhibits no discharge. No scleral icterus.   Neck: Normal range of motion. Neck supple. No thyromegaly present.   Cardiovascular: Normal rate, regular rhythm and intact distal pulses. Exam reveals no gallop and no friction rub.   Murmur heard.  Pulmonary/Chest: Effort normal and breath sounds normal. No respiratory distress. She has no wheezes.   Abdominal: Soft. Bowel sounds are normal. She exhibits no distension and no mass. There is no tenderness. There is no rebound and no guarding.   Genitourinary:   Genitourinary Comments: Genitourinary Comments: Stage I sacral decubitus ulcer   Musculoskeletal: Normal range of motion. She exhibits no edema or tenderness.     .   Musculoskeletal: Normal range of motion. She exhibits no tenderness.   Right hip and knee is in a flexed position but patient is able to fully extend it.  Bruise and swelling to the right anterior proximal patella. Arthritic appearing knees bilaterally. Bruising to the left flank. Bruise to the left upper extremity.    Lymphadenopathy:     She has no cervical adenopathy.   Neurological: She is alert and oriented to person, place, and time. No cranial nerve deficit. Coordination normal.   Skin: Skin is warm and dry. No rash noted. No erythema.   Multiple areas of ecchymosis chest abdomen back to see photos   Psychiatric: She has a normal mood and affect. Her behavior is normal. Judgment and thought content normal.   Nursing note and vitals reviewed.                            CRANIAL NERVES     CN III, IV, VI   Extraocular  motions are normal.        Significant Labs:   BMP:   Recent Labs   Lab 11/19/19  0945         K 4.0      CO2 20*   BUN 43*   CREATININE 1.0   CALCIUM 10.0     CBC:   Recent Labs   Lab 11/19/19  0945   WBC 12.63   HGB 6.7*   HCT 21.3*   *       Significant Imaging: I have reviewed and interpreted all pertinent imaging results/findings within the past 24 hours.
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
## 2019-11-20 NOTE — ASSESSMENT & PLAN NOTE
Noted on CT scan.  Unclear if acute or chronic.  Patient has no focal pain in her back.  Consult PT OT

## 2019-11-20 NOTE — CHAPLAIN
Referred by GIA Guzmán, the  visited this patient to offer emotional support and spiritual care; the patient was asleep, and the  will follow up this afternoon.
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
## 2019-11-20 NOTE — ASSESSMENT & PLAN NOTE
Acute uncontrolled  likely secondary to multiple areas of bruising which appear to be acute  Transfuse  2 units  MCV is quite elevated check B12 and folate levels  INR is normal     Lab Results   Component Value Date    HGB 6.7 (L) 11/19/2019      Lab Results   Component Value Date    HCT 21.3 (L) 11/19/2019       Monitor hgb status

## 2019-11-20 NOTE — PT/OT/SLP PROGRESS
Occupational Therapy      Patient Name:  Maricarmen Woodward   MRN:  [id number]    OT order received and chart reviewed. Pt with R femur fracture and L4 compression fracture and awaiting orthopedic consult.  Please re-consult OT when pt is ready to participate in therapy.    IWLLIS Vargas  11/20/2019

## 2019-11-20 NOTE — PLAN OF CARE
11/20/19 0708   PRE-TX-O2   O2 Device (Oxygen Therapy) room air   SpO2 98 %   Pulse Oximetry Type Intermittent   $ Pulse Oximetry - Multiple Charge Pulse Oximetry - Multiple

## 2019-11-21 ENCOUNTER — ANESTHESIA EVENT (OUTPATIENT)
Dept: SURGERY | Facility: HOSPITAL | Age: 84
DRG: 481 | End: 2019-11-21
Payer: MEDICARE

## 2019-11-21 LAB
BASOPHILS # BLD AUTO: 0.04 K/UL (ref 0–0.2)
BASOPHILS NFR BLD: 0.4 % (ref 0–1.9)
DIFFERENTIAL METHOD: ABNORMAL
EOSINOPHIL # BLD AUTO: 0.4 K/UL (ref 0–0.5)
EOSINOPHIL NFR BLD: 3.9 % (ref 0–8)
ERYTHROCYTE [DISTWIDTH] IN BLOOD BY AUTOMATED COUNT: 22.1 % (ref 11.5–14.5)
HCT VFR BLD AUTO: 30.6 % (ref 37–48.5)
HGB BLD-MCNC: 10.2 G/DL (ref 12–16)
IMM GRANULOCYTES # BLD AUTO: 0.08 K/UL (ref 0–0.04)
LYMPHOCYTES # BLD AUTO: 2.8 K/UL (ref 1–4.8)
LYMPHOCYTES NFR BLD: 26.7 % (ref 18–48)
MCH RBC QN AUTO: 33.3 PG (ref 27–31)
MCHC RBC AUTO-ENTMCNC: 33.3 G/DL (ref 32–36)
MCV RBC AUTO: 100 FL (ref 82–98)
MONOCYTES # BLD AUTO: 0.6 K/UL (ref 0.3–1)
MONOCYTES NFR BLD: 5.7 % (ref 4–15)
NEUTROPHILS # BLD AUTO: 6.5 K/UL (ref 1.8–7.7)
NEUTROPHILS NFR BLD: 62.5 % (ref 38–73)
NRBC BLD-RTO: 0 /100 WBC
PLATELET # BLD AUTO: 378 K/UL (ref 150–350)
PMV BLD AUTO: 9.3 FL (ref 9.2–12.9)
RBC # BLD AUTO: 3.06 M/UL (ref 4–5.4)
WBC # BLD AUTO: 10.33 K/UL (ref 3.9–12.7)

## 2019-11-21 PROCEDURE — 94761 N-INVAS EAR/PLS OXIMETRY MLT: CPT

## 2019-11-21 PROCEDURE — A4216 STERILE WATER/SALINE, 10 ML: HCPCS | Performed by: HOSPITALIST

## 2019-11-21 PROCEDURE — 25000003 PHARM REV CODE 250: Performed by: HOSPITALIST

## 2019-11-21 PROCEDURE — 36415 COLL VENOUS BLD VENIPUNCTURE: CPT

## 2019-11-21 PROCEDURE — 11000001 HC ACUTE MED/SURG PRIVATE ROOM

## 2019-11-21 PROCEDURE — 85025 COMPLETE CBC W/AUTO DIFF WBC: CPT

## 2019-11-21 PROCEDURE — 99900035 HC TECH TIME PER 15 MIN (STAT)

## 2019-11-21 RX ADMIN — SENNOSIDES AND DOCUSATE SODIUM 1 TABLET: 8.6; 5 TABLET ORAL at 09:11

## 2019-11-21 RX ADMIN — CALCIUM CARBONATE (ANTACID) CHEW TAB 500 MG 500 MG: 500 CHEW TAB at 08:11

## 2019-11-21 RX ADMIN — Medication 400 MG: at 09:11

## 2019-11-21 RX ADMIN — POTASSIUM CHLORIDE 10 MEQ: 750 TABLET, EXTENDED RELEASE ORAL at 09:11

## 2019-11-21 RX ADMIN — MELATONIN 2000 UNITS: at 09:11

## 2019-11-21 RX ADMIN — CARBIDOPA AND LEVODOPA 1 TABLET: 50; 200 TABLET, EXTENDED RELEASE ORAL at 08:11

## 2019-11-21 RX ADMIN — Medication 10 ML: at 09:11

## 2019-11-21 RX ADMIN — CALCIUM CARBONATE (ANTACID) CHEW TAB 500 MG 500 MG: 500 CHEW TAB at 09:11

## 2019-11-21 RX ADMIN — PANTOPRAZOLE SODIUM 40 MG: 40 TABLET, DELAYED RELEASE ORAL at 09:11

## 2019-11-21 RX ADMIN — CARBIDOPA AND LEVODOPA 2 TABLET: 25; 100 TABLET ORAL at 05:11

## 2019-11-21 RX ADMIN — CARBIDOPA AND LEVODOPA 2 TABLET: 25; 100 TABLET ORAL at 09:11

## 2019-11-21 RX ADMIN — HYDROCODONE BITARTRATE AND ACETAMINOPHEN 1 TABLET: 5; 325 TABLET ORAL at 08:11

## 2019-11-21 RX ADMIN — Medication 10 ML: at 01:11

## 2019-11-21 RX ADMIN — CARBIDOPA AND LEVODOPA 1 TABLET: 25; 100 TABLET, EXTENDED RELEASE ORAL at 08:11

## 2019-11-21 RX ADMIN — PRAVASTATIN SODIUM 40 MG: 40 TABLET ORAL at 08:11

## 2019-11-21 RX ADMIN — Medication 10 ML: at 10:11

## 2019-11-21 RX ADMIN — Medication 10 ML: at 04:11

## 2019-11-21 RX ADMIN — ACETAMINOPHEN 650 MG: 325 TABLET ORAL at 05:11

## 2019-11-21 RX ADMIN — FERROUS SULFATE TAB EC 325 MG (65 MG FE EQUIVALENT) 325 MG: 325 (65 FE) TABLET DELAYED RESPONSE at 09:11

## 2019-11-21 RX ADMIN — CLONAZEPAM 0.5 MG: 0.5 TABLET ORAL at 11:11

## 2019-11-21 RX ADMIN — HYDROCODONE BITARTRATE AND ACETAMINOPHEN 1 TABLET: 5; 325 TABLET ORAL at 01:11

## 2019-11-21 RX ADMIN — HYDROCODONE BITARTRATE AND ACETAMINOPHEN 1 TABLET: 5; 325 TABLET ORAL at 04:11

## 2019-11-21 NOTE — ASSESSMENT & PLAN NOTE
Patient's anemia is currently uncontrolled.   Current CBC reviewed-   Lab Results   Component Value Date    HGB 9.9 (L) 11/20/2019    HCT 29.4 (L) 11/20/2019     Monitor serial CBC and transfuse if patient becomes hemodynamically unstable, symptomatic or H/H drops below 7/21.

## 2019-11-21 NOTE — PLAN OF CARE
Aware of patient status  Will assume orthopedic care  Awaiting medical optimization/clearance for surgery  Will proceed to the OR for ORIF right distal femur  Provisionally scheduled for tomorrow afternoon  Will examine and consent patient today  NPO at midnight  Pain control per primary team  Place philippe   Bed rest    [facility]

## 2019-11-21 NOTE — ASSESSMENT & PLAN NOTE
Noted on CT scan.  Unclear if acute or chronic.  Patient has no focal pain in her back.  Consulting PT and OT.

## 2019-11-21 NOTE — PLAN OF CARE
11/20/19 1952   PRE-TX-O2   O2 Device (Oxygen Therapy) room air   SpO2 98 %   Pulse Oximetry Type Intermittent

## 2019-11-21 NOTE — CARE UPDATE
11/21/19 0700   Patient Assessment/Suction   Level of Consciousness (AVPU) alert   Respiratory Effort Normal;Unlabored   Expansion/Accessory Muscles/Retractions no use of accessory muscles;no retractions;expansion symmetric   All Lung Fields Breath Sounds diminished   Rhythm/Pattern, Respiratory depth regular;pattern regular;unlabored   Cough Frequency no cough   PRE-TX-O2   O2 Device (Oxygen Therapy) room air   SpO2 97 %   Pulse Oximetry Type Intermittent   $ Pulse Oximetry - Multiple Charge Pulse Oximetry - Multiple   Pulse 88   Resp 18   Aerosol Therapy   $ Aerosol Therapy Charges PRN treatment not required   Respiratory Treatment Status (SVN) PRN treatment not required       Aerosol treatments PRN.
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
## 2019-11-21 NOTE — PLAN OF CARE
POC updated and reviewed. Understanding verbalized. Pt monitored throughout shift. No acute changes noted. All needs provided for. Bed low and locked, call light in reach. Will continue to monitor.
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
## 2019-11-21 NOTE — SUBJECTIVE & OBJECTIVE
Interval History:  No acute complaints.  No major change in status.    Review of Systems   Constitutional: Negative for chills and fever.   Respiratory: Negative for cough and shortness of breath.    Cardiovascular: Negative for chest pain.   Gastrointestinal: Negative for abdominal pain.     Objective:     Vital Signs (Most Recent):  Temp: 97.8 °F (36.6 °C) (11/20/19 1928)  Pulse: 84 (11/20/19 1928)  Resp: 18 (11/20/19 1928)  BP: (!) 150/67 (11/20/19 1928)  SpO2: 98 % (11/20/19 1952) Vital Signs (24h Range):  Temp:  [97.2 °F (36.2 °C)-98.6 °F (37 °C)] 97.8 °F (36.6 °C)  Pulse:  [] 84  Resp:  [18] 18  SpO2:  [96 %-100 %] 98 %  BP: (114-178)/(58-79) 150/67     Weight: 48.5 kg (106 lb 14.8 oz)  Body mass index is 18.94 kg/m².    Intake/Output Summary (Last 24 hours) at 11/20/2019 2159  Last data filed at 11/20/2019 1424  Gross per 24 hour   Intake 337.5 ml   Output --   Net 337.5 ml      Physical Exam   Constitutional: She is oriented to person, place, and time. No distress.   HENT:   Mouth/Throat: Oropharynx is clear and moist.   Eyes: Right eye exhibits no discharge. Left eye exhibits no discharge.   Neck: No JVD present.   Cardiovascular: Normal rate and regular rhythm. Exam reveals no gallop.   Pulmonary/Chest: Effort normal and breath sounds normal.   Abdominal: Soft. Bowel sounds are normal. She exhibits no distension. There is no tenderness.   Musculoskeletal: She exhibits no edema.   Neurological: She is alert and oriented to person, place, and time.   Skin: Skin is warm and dry. No rash noted. She is not diaphoretic.   Ecchymoses on chest, left arm, and right knee.  Stage I ulcers on both buttocks.   Psychiatric: She has a normal mood and affect.       Significant Labs: All pertinent labs within the past 24 hours have been reviewed.    Significant Imaging: I have reviewed all pertinent imaging results/findings within the past 24 hours.

## 2019-11-21 NOTE — PLAN OF CARE
Pt lying in bed watching television, c/o knee pain and uses frequent weight shifts to manage pain along with pain medication , H/H 10.2/30.6 today, awaiting consult from Dr. Mccormack for fracture , VS stable, call light in reach, will continue to monitor

## 2019-11-21 NOTE — CONSULTS
Orthopaedic Surgery History and Physical     History of present illness:   Maricarmen Woodward is a 89 y.o. female who presents with RIGHT knee pain s/p fall.  Had immediate inability to bear weight.  Presented to ED where workup demonstrated a displaced medial condyle fracture.  Admitted for orthopedic intervention.      Allergies:   Review of patient's allergies indicates:  No Known Allergies    Past medical history:   Past Medical History:   Diagnosis Date    Arthritis     Hypertension     Parkinson's disease        Past surgical history:  Past Surgical History:   Procedure Laterality Date    FRACTURE SURGERY      HEMIARTHROPLASTY OF HIP Left 12/8/2018    Procedure: HEMIARTHROPLASTY, HIP, Pierz, peg board, first assist;  Surgeon: Cheng Mccormack MD;  Location: [facility];  Service: Orthopedics;  Laterality: Left;    JOINT REPLACEMENT      right hip replacement       Social history:   Reviewed per EPIC history for tobacco or alcohol use     Medications:    Current Facility-Administered Medications:     0.9%  NaCl infusion (for blood administration), , Intravenous, Q24H PRN, Ev Yang MD    acetaminophen tablet 650 mg, 650 mg, Oral, Q6H PRN, Ev Yang MD, 650 mg at 11/21/19 1731    albuterol-ipratropium 2.5 mg-0.5 mg/3 mL nebulizer solution 3 mL, 3 mL, Nebulization, Q4H PRN, Ev Yang MD    calcium carbonate 200 mg calcium (500 mg) chewable tablet 500 mg, 500 mg, Oral, BID, Ev Yang MD, 500 mg at 11/21/19 0922    carbidopa-levodopa  mg per tablet 2 tablet, 2 tablet, Oral, BID WM, Ev Yang MD, 2 tablet at 11/21/19 1731    carbidopa-levodopa  mg TBSR 1 tablet, 1 tablet, Oral, QHS, 1 tablet at 11/20/19 2042 **AND** carbidopa-levodopa  mg TBSR 1 tablet, 1 tablet, Oral, Nightly, Ev Yang MD, 1 tablet at 11/20/19 2042    clonazePAM tablet 0.5 mg, 0.5 mg, Oral, BID PRN, Ev Yang MD, 0.5 mg at 11/20/19 1618    dextrose 50% injection 12.5  g, 12.5 g, Intravenous, PRN, Ev Yang MD    dextrose 50% injection 25 g, 25 g, Intravenous, PRN, Ev Yang MD    diclofenac sodium 1 % gel 2 g, 2 g, Topical (Top), Daily, Gia Meier, NP, 2 g at 11/20/19 0942    ferrous sulfate EC tablet 325 mg, 325 mg, Oral, Daily, Ev Yang MD, 325 mg at 11/21/19 0922    glucagon (human recombinant) injection 1 mg, 1 mg, Intramuscular, PRN, Ev Yang MD    glucose chewable tablet 16 g, 16 g, Oral, PRN, Ev Yang MD    glucose chewable tablet 24 g, 24 g, Oral, PRN, Ev Yang MD    HYDROcodone-acetaminophen 5-325 mg per tablet 1 tablet, 1 tablet, Oral, Q6H PRN, Ev Yang MD, 1 tablet at 11/21/19 1303    magnesium oxide tablet 400 mg, 400 mg, Oral, Daily, Ev Yang MD, 400 mg at 11/21/19 0922    ondansetron injection 4 mg, 4 mg, Intravenous, Q8H PRN, Ev Yang MD    pantoprazole EC tablet 40 mg, 40 mg, Oral, Daily, Ev Yang MD, 40 mg at 11/21/19 0922    potassium chloride SA CR tablet 10 mEq, 10 mEq, Oral, Daily, Ev Yang MD, 10 mEq at 11/21/19 0922    pravastatin tablet 40 mg, 40 mg, Oral, QHS, Ev Yang MD, 40 mg at 11/20/19 2042    senna-docusate 8.6-50 mg per tablet 1 tablet, 1 tablet, Oral, Daily, Ev Yang MD, 1 tablet at 11/21/19 0922    sodium chloride 0.9% flush 10 mL, 10 mL, Intravenous, Q8H, Ev Yang MD, 10 mL at 11/21/19 1303    sodium chloride 0.9% flush 10 mL, 10 mL, Intravenous, PRN, Ev Yang MD    vitamin D 1000 units tablet 2,000 Units, 2,000 Units, Oral, Daily, Ev Yang MD, 2,000 Units at 11/21/19 8959    Review of systems:  Denies chest pain, palpitations, shortness of breath.   Denies excessive thirst, urination or heat or cold intolerance.   Denies nausea, vomiting, melena or hematochezia.    Denies fever, chills, night sweats, weight loss.    Denies dysuria or hematuria.   Denies history of anxiety or depression.   Denies any skin  abnormalities or rash.   Denies upper or lower extremity paresthesias or lightheadedness.    Denies cough, shortness of breath or hemoptysis.     Physical Exam:   Vitals:    11/21/19 0700 11/21/19 0751 11/21/19 1143 11/21/19 1616   BP:  (!) 144/64 (!) 112/55 (!) 147/62   BP Location:       Patient Position:  Lying     Pulse: 88 86 90 96   Resp: 18 18 18 18   Temp:  98.4 °F (36.9 °C) 98.1 °F (36.7 °C) 97.7 °F (36.5 °C)   TempSrc:  Oral     SpO2: 97% 99% 99% 99%   Weight:  48.2 kg (106 lb 4.2 oz)     Height:         Recent Labs   Lab 11/19/19  0945 11/20/19  0438   CALCIUM 10.0 9.4   PROT 7.0  --     139   K 4.0 3.7   CO2 20* 22*    109   BUN 43* 31*   CREATININE 1.0 0.7     Recent Labs   Lab 11/21/19  0437   WBC 10.33   RBC 3.06*   HGB 10.2*   HCT 30.6*   *     Recent Labs   Lab 11/19/19  1046   INR 0.9   APTT 31.5       Awake/alert/oriented x3, No acute distress, Afebrile, Vital signs stable  Normocephalic, Atraumatic  Heart is beating at normal rate  Good inspiratory effort with unlaboured breathing  Abdomen soft/nondistended/nontender    RIGHT lower extremity  Motor intact L2-S1  Sensation intact L2-S2  2+ popliteal/dorsalis pedis/posterior tibial pulses  <2s CR refill to digits        Imaging:  Radiographs and CT scan demonstrated displaced intracondylar femur fracture    Assessment:   89 y.o. female with RIGHT distal femur fracture    Plan:   Proceed to OR for ORIF RIGHT distal femur  Consent procured from POA  NPO at midnight    Cheng Mccormack MD  [facility]
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
## 2019-11-21 NOTE — PLAN OF CARE
Pt resting with eyes closed. She reports pain 6/10 in her knee. Treated with PRN pain medication. Weight shift assistance provided. Vitals and heart rate and rhythm monitored. Bed in lowest position with wheels locked, and side rails up x2. Safety maintained. Plan of care reviewed and pt verbalized understanding. Call light in reach. No further requests at this time. Will continue to monitor.
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
## 2019-11-22 ENCOUNTER — ANESTHESIA (OUTPATIENT)
Dept: SURGERY | Facility: HOSPITAL | Age: 84
DRG: 481 | End: 2019-11-22
Payer: MEDICARE

## 2019-11-22 PROBLEM — S82.131A RIGHT MEDIAL TIBIAL PLATEAU FRACTURE, CLOSED, INITIAL ENCOUNTER: Status: ACTIVE | Noted: 2019-11-22

## 2019-11-22 PROCEDURE — 71000039 HC RECOVERY, EACH ADD'L HOUR: Performed by: ORTHOPAEDIC SURGERY

## 2019-11-22 PROCEDURE — C1769 GUIDE WIRE: HCPCS | Performed by: ORTHOPAEDIC SURGERY

## 2019-11-22 PROCEDURE — 27201423 OPTIME MED/SURG SUP & DEVICES STERILE SUPPLY: Performed by: ORTHOPAEDIC SURGERY

## 2019-11-22 PROCEDURE — 99900104 DSU ONLY-NO CHARGE-EA ADD'L HR (STAT): Performed by: ORTHOPAEDIC SURGERY

## 2019-11-22 PROCEDURE — D9220A PRA ANESTHESIA: Mod: CRNA,,, | Performed by: NURSE ANESTHETIST, CERTIFIED REGISTERED

## 2019-11-22 PROCEDURE — 71000033 HC RECOVERY, INTIAL HOUR: Performed by: ORTHOPAEDIC SURGERY

## 2019-11-22 PROCEDURE — 99900035 HC TECH TIME PER 15 MIN (STAT)

## 2019-11-22 PROCEDURE — 63600175 PHARM REV CODE 636 W HCPCS: Performed by: HOSPITALIST

## 2019-11-22 PROCEDURE — D9220A PRA ANESTHESIA: Mod: ANES,,, | Performed by: ANESTHESIOLOGY

## 2019-11-22 PROCEDURE — D9220A PRA ANESTHESIA: ICD-10-PCS | Mod: ANES,,, | Performed by: ANESTHESIOLOGY

## 2019-11-22 PROCEDURE — 63600175 PHARM REV CODE 636 W HCPCS: Performed by: ANESTHESIOLOGY

## 2019-11-22 PROCEDURE — A4216 STERILE WATER/SALINE, 10 ML: HCPCS | Performed by: HOSPITALIST

## 2019-11-22 PROCEDURE — D9220A PRA ANESTHESIA: ICD-10-PCS | Mod: CRNA,,, | Performed by: NURSE ANESTHETIST, CERTIFIED REGISTERED

## 2019-11-22 PROCEDURE — 36000710: Performed by: ORTHOPAEDIC SURGERY

## 2019-11-22 PROCEDURE — A4216 STERILE WATER/SALINE, 10 ML: HCPCS | Performed by: ORTHOPAEDIC SURGERY

## 2019-11-22 PROCEDURE — C1713 ANCHOR/SCREW BN/BN,TIS/BN: HCPCS | Performed by: ORTHOPAEDIC SURGERY

## 2019-11-22 PROCEDURE — 25000003 PHARM REV CODE 250: Performed by: ANESTHESIOLOGY

## 2019-11-22 PROCEDURE — 25000003 PHARM REV CODE 250: Performed by: HOSPITALIST

## 2019-11-22 PROCEDURE — 99900103 DSU ONLY-NO CHARGE-INITIAL HR (STAT): Performed by: ORTHOPAEDIC SURGERY

## 2019-11-22 PROCEDURE — 63600175 PHARM REV CODE 636 W HCPCS: Performed by: ORTHOPAEDIC SURGERY

## 2019-11-22 PROCEDURE — 94761 N-INVAS EAR/PLS OXIMETRY MLT: CPT

## 2019-11-22 PROCEDURE — 37000009 HC ANESTHESIA EA ADD 15 MINS: Performed by: ORTHOPAEDIC SURGERY

## 2019-11-22 PROCEDURE — 27200651 HC AIRWAY, LMA: Performed by: NURSE ANESTHETIST, CERTIFIED REGISTERED

## 2019-11-22 PROCEDURE — 36000711: Performed by: ORTHOPAEDIC SURGERY

## 2019-11-22 PROCEDURE — 63600175 PHARM REV CODE 636 W HCPCS: Performed by: NURSE ANESTHETIST, CERTIFIED REGISTERED

## 2019-11-22 PROCEDURE — 11000001 HC ACUTE MED/SURG PRIVATE ROOM

## 2019-11-22 PROCEDURE — 37000008 HC ANESTHESIA 1ST 15 MINUTES: Performed by: ORTHOPAEDIC SURGERY

## 2019-11-22 PROCEDURE — 25000003 PHARM REV CODE 250: Performed by: ORTHOPAEDIC SURGERY

## 2019-11-22 DEVICE — SCREW BONE LOCK VA 5X44MM: Type: IMPLANTABLE DEVICE | Site: LEG | Status: FUNCTIONAL

## 2019-11-22 DEVICE — SCREW VA LOCKING ST 5X70MM: Type: IMPLANTABLE DEVICE | Site: LEG | Status: FUNCTIONAL

## 2019-11-22 DEVICE — SCREW VA LOCKING ST 5X65MM: Type: IMPLANTABLE DEVICE | Site: LEG | Status: FUNCTIONAL

## 2019-11-22 DEVICE — PLATE BONE VA 6H LCP 4.5X159MM: Type: IMPLANTABLE DEVICE | Site: LEG | Status: FUNCTIONAL

## 2019-11-22 DEVICE — SCREW VA LOCKING ST 5X75MM: Type: IMPLANTABLE DEVICE | Site: LEG | Status: FUNCTIONAL

## 2019-11-22 DEVICE — SCREW CORTEX 4.5 40M: Type: IMPLANTABLE DEVICE | Site: LEG | Status: FUNCTIONAL

## 2019-11-22 DEVICE — SCREW BONE LOCK VA 5X42MM: Type: IMPLANTABLE DEVICE | Site: LEG | Status: FUNCTIONAL

## 2019-11-22 DEVICE — SCREW CANN VA LOK 5X65MM: Type: IMPLANTABLE DEVICE | Site: LEG | Status: FUNCTIONAL

## 2019-11-22 RX ORDER — CEFAZOLIN SODIUM 2 G/50ML
2 SOLUTION INTRAVENOUS
Status: COMPLETED | OUTPATIENT
Start: 2019-11-22 | End: 2019-11-22

## 2019-11-22 RX ORDER — HYDROMORPHONE HYDROCHLORIDE 2 MG/ML
0.2 INJECTION, SOLUTION INTRAMUSCULAR; INTRAVENOUS; SUBCUTANEOUS EVERY 5 MIN PRN
Status: DISCONTINUED | OUTPATIENT
Start: 2019-11-22 | End: 2019-11-22 | Stop reason: HOSPADM

## 2019-11-22 RX ORDER — ASPIRIN 325 MG
325 TABLET ORAL 2 TIMES DAILY
Status: DISCONTINUED | OUTPATIENT
Start: 2019-11-22 | End: 2019-11-26 | Stop reason: HOSPADM

## 2019-11-22 RX ORDER — ENOXAPARIN SODIUM 100 MG/ML
40 INJECTION SUBCUTANEOUS EVERY 24 HOURS
Status: DISCONTINUED | OUTPATIENT
Start: 2019-11-22 | End: 2019-11-26 | Stop reason: HOSPADM

## 2019-11-22 RX ORDER — PHENYLEPHRINE HYDROCHLORIDE 10 MG/ML
INJECTION INTRAVENOUS
Status: DISCONTINUED | OUTPATIENT
Start: 2019-11-22 | End: 2019-11-22

## 2019-11-22 RX ORDER — FENTANYL CITRATE 50 UG/ML
25 INJECTION, SOLUTION INTRAMUSCULAR; INTRAVENOUS EVERY 5 MIN PRN
Status: COMPLETED | OUTPATIENT
Start: 2019-11-22 | End: 2019-11-22

## 2019-11-22 RX ORDER — SODIUM CHLORIDE, SODIUM LACTATE, POTASSIUM CHLORIDE, CALCIUM CHLORIDE 600; 310; 30; 20 MG/100ML; MG/100ML; MG/100ML; MG/100ML
INJECTION, SOLUTION INTRAVENOUS CONTINUOUS
Status: DISCONTINUED | OUTPATIENT
Start: 2019-11-22 | End: 2019-11-22

## 2019-11-22 RX ORDER — SODIUM CHLORIDE 0.9 % (FLUSH) 0.9 %
5 SYRINGE (ML) INJECTION
Status: DISCONTINUED | OUTPATIENT
Start: 2019-11-22 | End: 2019-11-23

## 2019-11-22 RX ORDER — ONDANSETRON HYDROCHLORIDE 2 MG/ML
INJECTION, SOLUTION INTRAMUSCULAR; INTRAVENOUS
Status: DISCONTINUED | OUTPATIENT
Start: 2019-11-22 | End: 2019-11-22

## 2019-11-22 RX ORDER — ACETAMINOPHEN 10 MG/ML
INJECTION, SOLUTION INTRAVENOUS
Status: DISCONTINUED | OUTPATIENT
Start: 2019-11-22 | End: 2019-11-22

## 2019-11-22 RX ORDER — DEXAMETHASONE SODIUM PHOSPHATE 4 MG/ML
INJECTION, SOLUTION INTRA-ARTICULAR; INTRALESIONAL; INTRAMUSCULAR; INTRAVENOUS; SOFT TISSUE
Status: DISCONTINUED | OUTPATIENT
Start: 2019-11-22 | End: 2019-11-22

## 2019-11-22 RX ORDER — LIDOCAINE HYDROCHLORIDE 10 MG/ML
1 INJECTION, SOLUTION EPIDURAL; INFILTRATION; INTRACAUDAL; PERINEURAL ONCE
Status: COMPLETED | OUTPATIENT
Start: 2019-11-22 | End: 2019-11-22

## 2019-11-22 RX ORDER — SODIUM CHLORIDE 9 MG/ML
INJECTION, SOLUTION INTRAVENOUS CONTINUOUS
Status: DISCONTINUED | OUTPATIENT
Start: 2019-11-22 | End: 2019-11-22

## 2019-11-22 RX ORDER — MORPHINE SULFATE 4 MG/ML
4 INJECTION, SOLUTION INTRAMUSCULAR; INTRAVENOUS ONCE
Status: DISCONTINUED | OUTPATIENT
Start: 2019-11-22 | End: 2019-11-26

## 2019-11-22 RX ORDER — OXYCODONE HYDROCHLORIDE 5 MG/1
5 TABLET ORAL
Status: DISCONTINUED | OUTPATIENT
Start: 2019-11-22 | End: 2019-11-22 | Stop reason: HOSPADM

## 2019-11-22 RX ORDER — FENTANYL CITRATE 50 UG/ML
INJECTION, SOLUTION INTRAMUSCULAR; INTRAVENOUS
Status: DISCONTINUED | OUTPATIENT
Start: 2019-11-22 | End: 2019-11-22

## 2019-11-22 RX ORDER — SODIUM CHLORIDE 9 MG/ML
INJECTION, SOLUTION INTRAVENOUS CONTINUOUS
Status: DISCONTINUED | OUTPATIENT
Start: 2019-11-22 | End: 2019-11-25

## 2019-11-22 RX ORDER — ACETAMINOPHEN 10 MG/ML
1000 INJECTION, SOLUTION INTRAVENOUS EVERY 12 HOURS
Status: COMPLETED | OUTPATIENT
Start: 2019-11-22 | End: 2019-11-23

## 2019-11-22 RX ORDER — LIDOCAINE HCL/PF 100 MG/5ML
SYRINGE (ML) INTRAVENOUS
Status: DISCONTINUED | OUTPATIENT
Start: 2019-11-22 | End: 2019-11-22

## 2019-11-22 RX ORDER — PROPOFOL 10 MG/ML
VIAL (ML) INTRAVENOUS
Status: DISCONTINUED | OUTPATIENT
Start: 2019-11-22 | End: 2019-11-22

## 2019-11-22 RX ADMIN — CLONAZEPAM 0.5 MG: 0.5 TABLET ORAL at 10:11

## 2019-11-22 RX ADMIN — HYDROCODONE BITARTRATE AND ACETAMINOPHEN 1 TABLET: 5; 325 TABLET ORAL at 07:11

## 2019-11-22 RX ADMIN — FENTANYL CITRATE 25 MCG: 0.05 INJECTION, SOLUTION INTRAMUSCULAR; INTRAVENOUS at 05:11

## 2019-11-22 RX ADMIN — FENTANYL CITRATE 25 MCG: 50 INJECTION, SOLUTION INTRAMUSCULAR; INTRAVENOUS at 04:11

## 2019-11-22 RX ADMIN — CARBIDOPA AND LEVODOPA 1 TABLET: 25; 100 TABLET, EXTENDED RELEASE ORAL at 08:11

## 2019-11-22 RX ADMIN — ONDANSETRON 4 MG: 2 INJECTION, SOLUTION INTRAMUSCULAR; INTRAVENOUS at 03:11

## 2019-11-22 RX ADMIN — CEFAZOLIN SODIUM 2 G: 2 SOLUTION INTRAVENOUS at 03:11

## 2019-11-22 RX ADMIN — CALCIUM CARBONATE (ANTACID) CHEW TAB 500 MG 500 MG: 500 CHEW TAB at 08:11

## 2019-11-22 RX ADMIN — PROPOFOL 60 MG: 10 INJECTION, EMULSION INTRAVENOUS at 03:11

## 2019-11-22 RX ADMIN — PHENYLEPHRINE HYDROCHLORIDE 100 MCG: 10 INJECTION INTRAVENOUS at 03:11

## 2019-11-22 RX ADMIN — FENTANYL CITRATE 25 MCG: 50 INJECTION, SOLUTION INTRAMUSCULAR; INTRAVENOUS at 03:11

## 2019-11-22 RX ADMIN — ENOXAPARIN SODIUM 40 MG: 100 INJECTION SUBCUTANEOUS at 10:11

## 2019-11-22 RX ADMIN — PRAVASTATIN SODIUM 40 MG: 40 TABLET ORAL at 08:11

## 2019-11-22 RX ADMIN — LIDOCAINE HYDROCHLORIDE 10 MG: 10 INJECTION, SOLUTION EPIDURAL; INFILTRATION; INTRACAUDAL; PERINEURAL at 02:11

## 2019-11-22 RX ADMIN — ACETAMINOPHEN 1000 MG: 10 INJECTION, SOLUTION INTRAVENOUS at 08:11

## 2019-11-22 RX ADMIN — PROPOFOL 10 MG: 10 INJECTION, EMULSION INTRAVENOUS at 03:11

## 2019-11-22 RX ADMIN — ACETAMINOPHEN 750 MG: 10 INJECTION, SOLUTION INTRAVENOUS at 03:11

## 2019-11-22 RX ADMIN — HYDROCODONE BITARTRATE AND ACETAMINOPHEN 1 TABLET: 5; 325 TABLET ORAL at 01:11

## 2019-11-22 RX ADMIN — DEXAMETHASONE SODIUM PHOSPHATE 4 MG: 4 INJECTION, SOLUTION INTRAMUSCULAR; INTRAVENOUS at 03:11

## 2019-11-22 RX ADMIN — SODIUM CHLORIDE, SODIUM LACTATE, POTASSIUM CHLORIDE, AND CALCIUM CHLORIDE: .6; .31; .03; .02 INJECTION, SOLUTION INTRAVENOUS at 02:11

## 2019-11-22 RX ADMIN — CARBIDOPA AND LEVODOPA 1 TABLET: 50; 200 TABLET, EXTENDED RELEASE ORAL at 08:11

## 2019-11-22 RX ADMIN — HYDROCODONE BITARTRATE AND ACETAMINOPHEN 1 TABLET: 5; 325 TABLET ORAL at 11:11

## 2019-11-22 RX ADMIN — LIDOCAINE HYDROCHLORIDE 50 MG: 20 INJECTION, SOLUTION INTRAVENOUS at 03:11

## 2019-11-22 RX ADMIN — Medication 10 ML: at 05:11

## 2019-11-22 RX ADMIN — Medication 10 ML: at 10:11

## 2019-11-22 RX ADMIN — SODIUM CHLORIDE: 0.9 INJECTION, SOLUTION INTRAVENOUS at 06:11

## 2019-11-22 NOTE — ASSESSMENT & PLAN NOTE
Patient's anemia is currently uncontrolled.   Current CBC reviewed-   Lab Results   Component Value Date    HGB 10.2 (L) 11/21/2019    HCT 30.6 (L) 11/21/2019     Monitor serial CBC and transfuse if patient becomes hemodynamically unstable, symptomatic or H/H drops below 7/21.
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
## 2019-11-22 NOTE — ASSESSMENT & PLAN NOTE
Noted on x-ray and patient with knee pain. Consulting with orthopedist.  Plan is taking pt to OR tomorrow for repair.

## 2019-11-22 NOTE — PLAN OF CARE
Pt resting with eyes closed. She reports pain 6/10 in her knee. Treated with PRN pain medication and repositioning. Weight shift assistance provided. NPO status maintained since midnight. Bed in lowest position with wheels locked, and side rails up x2. Safety maintained. Plan of care reviewed and pt verbalized understanding. Call light in reach. No further requests at this time. Will continue to monitor.

## 2019-11-22 NOTE — TRANSFER OF CARE
"Anesthesia Transfer of Care Note    Patient: Maricarmen Woodward    Procedure(s) Performed: Procedure(s) (LRB):  ORIF, FRACTURE, FEMUR, Synthes, radiolucent triangle, 1st assist (Right)    Patient location: PACU    Anesthesia Type: general    Transport from OR: Transported from OR on 2-3 L/min O2 by NC with adequate spontaneous ventilation    Post pain: adequate analgesia    Post assessment: no apparent anesthetic complications and tolerated procedure well    Post vital signs: stable    Level of consciousness: sedated and responds to stimulation    Nausea/Vomiting: no nausea/vomiting    Complications: none    Transfer of care protocol was followed      Last vitals:   Visit Vitals  BP (!) 155/67 (BP Location: Left arm, Patient Position: Lying)   Pulse 82   Temp 36.3 °C (97.4 °F)   Resp 19   Ht 5' 3" (1.6 m)   Wt 48.2 kg (106 lb 4.2 oz)   SpO2 96%   Breastfeeding? No   BMI 18.82 kg/m²     "

## 2019-11-22 NOTE — SUBJECTIVE & OBJECTIVE
Interval History:  No acute complaints.  No change in status.  Seen by orthopedist; plan is surgery on the left femur.    Review of Systems   Constitutional: Negative for chills and fever.   Respiratory: Negative for cough and shortness of breath.    Cardiovascular: Negative for chest pain.   Gastrointestinal: Negative for abdominal pain.     Objective:     Vital Signs (Most Recent):  Temp: 98.2 °F (36.8 °C) (11/21/19 1937)  Pulse: 86 (11/21/19 1937)  Resp: 18 (11/21/19 1937)  BP: (!) 103/51 (11/21/19 1937)  SpO2: 98 % (11/21/19 2013) Vital Signs (24h Range):  Temp:  [97.7 °F (36.5 °C)-98.4 °F (36.9 °C)] 98.2 °F (36.8 °C)  Pulse:  [84-96] 86  Resp:  [18] 18  SpO2:  [96 %-99 %] 98 %  BP: (103-148)/(51-65) 103/51     Weight: 48.2 kg (106 lb 4.2 oz)  Body mass index is 18.82 kg/m².    Intake/Output Summary (Last 24 hours) at 11/21/2019 2131  Last data filed at 11/21/2019 1700  Gross per 24 hour   Intake 500 ml   Output --   Net 500 ml      Physical Exam   Constitutional: She is oriented to person, place, and time. No distress.   HENT:   Mouth/Throat: Oropharynx is clear and moist.   Eyes: Right eye exhibits no discharge. Left eye exhibits no discharge.   Neck: No JVD present.   Cardiovascular: Normal rate and regular rhythm. Exam reveals no gallop.   Pulmonary/Chest: Effort normal and breath sounds normal.   Abdominal: Soft. Bowel sounds are normal. She exhibits no distension. There is no tenderness.   Musculoskeletal: She exhibits no edema.   Neurological: She is alert and oriented to person, place, and time.   Skin: Skin is warm and dry. No rash noted. She is not diaphoretic.   Ecchymoses on chest, left arm, and right knee.  Stage I ulcers on both buttocks.   Psychiatric: She has a normal mood and affect.       Significant Labs: All pertinent labs within the past 24 hours have been reviewed.    Significant Imaging: I have reviewed all pertinent imaging results/findings within the past 24 hours.

## 2019-11-22 NOTE — PLAN OF CARE
Plan of care reviewed with pt. TYLER throughout shift. PRN pain medications provided as ordered. NPO. Continue to Monitor Labs. VSS. Continuous telemetry monitor maintained. Safety maintained. Will continue to monitor. No complaints at this time.
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
## 2019-11-22 NOTE — PLAN OF CARE
11/21/19 2013   Patient Assessment/Suction   Level of Consciousness (AVPU) alert   Respiratory Effort Normal;Unlabored   Expansion/Accessory Muscles/Retractions expansion symmetric;no retractions;no use of accessory muscles   All Lung Fields Breath Sounds clear   PRE-TX-O2   O2 Device (Oxygen Therapy) room air   SpO2 98 %   Pulse Oximetry Type Intermittent   Aerosol Therapy   $ Aerosol Therapy Charges PRN treatment not required   Respiratory Treatment Status (SVN) PRN treatment not required

## 2019-11-22 NOTE — ANESTHESIA PREPROCEDURE EVALUATION
11/22/2019  Maricarmen Woodward is a 89 y.o., female.    Pre-op Assessment    I have reviewed the Patient Summary Reports.     I have reviewed the Nursing Notes.   I have reviewed the Medications.     Review of Systems  Anesthesia Hx:  No problems with previous Anesthesia  Denies Family Hx of Anesthesia complications.   Denies Personal Hx of Anesthesia complications.   Social:  Non-Smoker    Hematology/Oncology:         -- Anemia:   Cardiovascular:   Hypertension ECG has been reviewed.    Pulmonary:   Pneumonia    Musculoskeletal:   Left hip fracture Bone Disorders: Fracture , location of femur.     Neurological:   parkinsonism Movement Disorder Dx, Parkinson's Disease       Physical Exam  General:  Well nourished    Airway/Jaw/Neck:  Airway Findings: Mouth Opening: Normal Tongue: Normal  General Airway Assessment: Adult, Good  Mallampati: II  Improves to II with phonation.  TM Distance: 4-6 cm  Jaw/Neck Findings:  Neck ROM: Extension Decreased, Mild      Dental:  Dental Findings: In tact   Chest/Lungs:  Chest/Lungs Findings: Clear to auscultation, Normal Respiratory Rate     Heart/Vascular:  Heart Findings: Rate: Normal  Rhythm: Regular Rhythm  Sounds: Normal  Heart murmur: negative       Mental Status:  Mental Status Findings:  Somnolent, Cooperative         Anesthesia Plan  Type of Anesthesia, risks & benefits discussed:  Anesthesia Type:  general  Patient's Preference:   Intra-op Monitoring Plan: standard ASA monitors  Intra-op Monitoring Plan Comments:   Post Op Pain Control Plan:   Post Op Pain Control Plan Comments:   Induction:   IV  Beta Blocker:  Patient is not currently on a Beta-Blocker (No further documentation required).       Informed Consent: Patient understands risks and agrees with Anesthesia plan.  Questions answered. Anesthesia consent signed with patient.  ASA Score: 3     Day of  Surgery Review of History & Physical:    H&P update referred to the surgeon.     Anesthesia Plan Notes: Informed consent from POA/nephew.        Ready For Surgery From Anesthesia Perspective.

## 2019-11-23 LAB
BASOPHILS # BLD AUTO: 0.01 K/UL (ref 0–0.2)
BASOPHILS NFR BLD: 0.1 % (ref 0–1.9)
DIFFERENTIAL METHOD: ABNORMAL
EOSINOPHIL # BLD AUTO: 0 K/UL (ref 0–0.5)
EOSINOPHIL NFR BLD: 0 % (ref 0–8)
ERYTHROCYTE [DISTWIDTH] IN BLOOD BY AUTOMATED COUNT: 19.4 % (ref 11.5–14.5)
HCT VFR BLD AUTO: 25.4 % (ref 37–48.5)
HGB BLD-MCNC: 8.5 G/DL (ref 12–16)
IMM GRANULOCYTES # BLD AUTO: 0.06 K/UL (ref 0–0.04)
LYMPHOCYTES # BLD AUTO: 1.1 K/UL (ref 1–4.8)
LYMPHOCYTES NFR BLD: 12 % (ref 18–48)
MCH RBC QN AUTO: 34 PG (ref 27–31)
MCHC RBC AUTO-ENTMCNC: 33.5 G/DL (ref 32–36)
MCV RBC AUTO: 102 FL (ref 82–98)
MONOCYTES # BLD AUTO: 0.4 K/UL (ref 0.3–1)
MONOCYTES NFR BLD: 4.8 % (ref 4–15)
NEUTROPHILS # BLD AUTO: 7.6 K/UL (ref 1.8–7.7)
NEUTROPHILS NFR BLD: 82.4 % (ref 38–73)
NRBC BLD-RTO: 0 /100 WBC
PLATELET # BLD AUTO: 364 K/UL (ref 150–350)
PMV BLD AUTO: 9.1 FL (ref 9.2–12.9)
RBC # BLD AUTO: 2.5 M/UL (ref 4–5.4)
WBC # BLD AUTO: 9.2 K/UL (ref 3.9–12.7)

## 2019-11-23 PROCEDURE — A4216 STERILE WATER/SALINE, 10 ML: HCPCS | Performed by: ORTHOPAEDIC SURGERY

## 2019-11-23 PROCEDURE — 85025 COMPLETE CBC W/AUTO DIFF WBC: CPT

## 2019-11-23 PROCEDURE — 63600175 PHARM REV CODE 636 W HCPCS: Performed by: HOSPITALIST

## 2019-11-23 PROCEDURE — 11000001 HC ACUTE MED/SURG PRIVATE ROOM

## 2019-11-23 PROCEDURE — 25000003 PHARM REV CODE 250: Performed by: ORTHOPAEDIC SURGERY

## 2019-11-23 PROCEDURE — 99900035 HC TECH TIME PER 15 MIN (STAT)

## 2019-11-23 PROCEDURE — 25000003 PHARM REV CODE 250: Performed by: NURSE PRACTITIONER

## 2019-11-23 PROCEDURE — 94761 N-INVAS EAR/PLS OXIMETRY MLT: CPT

## 2019-11-23 PROCEDURE — 36415 COLL VENOUS BLD VENIPUNCTURE: CPT

## 2019-11-23 PROCEDURE — 63600175 PHARM REV CODE 636 W HCPCS: Performed by: ORTHOPAEDIC SURGERY

## 2019-11-23 RX ORDER — HYDROMORPHONE HYDROCHLORIDE 2 MG/ML
0.2 INJECTION, SOLUTION INTRAMUSCULAR; INTRAVENOUS; SUBCUTANEOUS EVERY 6 HOURS PRN
Status: DISCONTINUED | OUTPATIENT
Start: 2019-11-23 | End: 2019-11-25

## 2019-11-23 RX ORDER — TALC
6 POWDER (GRAM) TOPICAL NIGHTLY PRN
Status: DISCONTINUED | OUTPATIENT
Start: 2019-11-23 | End: 2019-11-25

## 2019-11-23 RX ADMIN — SODIUM CHLORIDE: 0.9 INJECTION, SOLUTION INTRAVENOUS at 05:11

## 2019-11-23 RX ADMIN — SODIUM CHLORIDE: 0.9 INJECTION, SOLUTION INTRAVENOUS at 11:11

## 2019-11-23 RX ADMIN — Medication 10 ML: at 05:11

## 2019-11-23 RX ADMIN — FERROUS SULFATE TAB EC 325 MG (65 MG FE EQUIVALENT) 325 MG: 325 (65 FE) TABLET DELAYED RESPONSE at 09:11

## 2019-11-23 RX ADMIN — POTASSIUM CHLORIDE 10 MEQ: 750 TABLET, EXTENDED RELEASE ORAL at 09:11

## 2019-11-23 RX ADMIN — MELATONIN 2000 UNITS: at 09:11

## 2019-11-23 RX ADMIN — SODIUM CHLORIDE: 0.9 INJECTION, SOLUTION INTRAVENOUS at 01:11

## 2019-11-23 RX ADMIN — CARBIDOPA AND LEVODOPA 2 TABLET: 25; 100 TABLET ORAL at 04:11

## 2019-11-23 RX ADMIN — Medication 6 MG: at 11:11

## 2019-11-23 RX ADMIN — CARBIDOPA AND LEVODOPA 1 TABLET: 50; 200 TABLET, EXTENDED RELEASE ORAL at 08:11

## 2019-11-23 RX ADMIN — ASPIRIN 325 MG ORAL TABLET 325 MG: 325 PILL ORAL at 09:11

## 2019-11-23 RX ADMIN — CALCIUM CARBONATE (ANTACID) CHEW TAB 500 MG 500 MG: 500 CHEW TAB at 08:11

## 2019-11-23 RX ADMIN — CARBIDOPA AND LEVODOPA 2 TABLET: 25; 100 TABLET ORAL at 09:11

## 2019-11-23 RX ADMIN — Medication 400 MG: at 09:11

## 2019-11-23 RX ADMIN — ENOXAPARIN SODIUM 40 MG: 100 INJECTION SUBCUTANEOUS at 04:11

## 2019-11-23 RX ADMIN — SODIUM CHLORIDE: 0.9 INJECTION, SOLUTION INTRAVENOUS at 10:11

## 2019-11-23 RX ADMIN — PANTOPRAZOLE SODIUM 40 MG: 40 TABLET, DELAYED RELEASE ORAL at 09:11

## 2019-11-23 RX ADMIN — ASPIRIN 325 MG ORAL TABLET 325 MG: 325 PILL ORAL at 08:11

## 2019-11-23 RX ADMIN — ACETAMINOPHEN 1000 MG: 10 INJECTION, SOLUTION INTRAVENOUS at 09:11

## 2019-11-23 RX ADMIN — CALCIUM CARBONATE (ANTACID) CHEW TAB 500 MG 500 MG: 500 CHEW TAB at 09:11

## 2019-11-23 RX ADMIN — CARBIDOPA AND LEVODOPA 1 TABLET: 25; 100 TABLET, EXTENDED RELEASE ORAL at 08:11

## 2019-11-23 RX ADMIN — HYDROMORPHONE HYDROCHLORIDE 0.2 MG: 2 INJECTION, SOLUTION INTRAMUSCULAR; INTRAVENOUS; SUBCUTANEOUS at 10:11

## 2019-11-23 RX ADMIN — ACETAMINOPHEN 650 MG: 325 TABLET ORAL at 05:11

## 2019-11-23 RX ADMIN — SENNOSIDES AND DOCUSATE SODIUM 1 TABLET: 8.6; 5 TABLET ORAL at 09:11

## 2019-11-23 RX ADMIN — PRAVASTATIN SODIUM 40 MG: 40 TABLET ORAL at 08:11

## 2019-11-23 NOTE — ANESTHESIA POSTPROCEDURE EVALUATION
Anesthesia Post Evaluation    Patient: Maricarmen Woodward    Procedure(s) Performed: Procedure(s) (LRB):  ORIF, FRACTURE, FEMUR, Synthes, radiolucent triangle, 1st assist (Right)    Final Anesthesia Type: general    Patient location during evaluation: PACU  Patient participation: Yes- Able to Participate  Level of consciousness: awake and alert, oriented and awake  Post-procedure vital signs: reviewed and stable  Pain management: adequate  Airway patency: patent    PONV status at discharge: No PONV  Anesthetic complications: no      Cardiovascular status: blood pressure returned to baseline and hemodynamically stable  Respiratory status: unassisted, spontaneous ventilation and room air  Hydration status: euvolemic  Follow-up not needed.          Vitals Value Taken Time   /67 11/22/2019  5:57 PM   Temp 36.7 °C (98.1 °F) 11/22/2019  5:57 PM   Pulse 83 11/22/2019  5:57 PM   Resp 16 11/22/2019  5:57 PM   SpO2 98 % 11/22/2019  5:57 PM         Event Time     Out of Recovery 11/22/2019 18:00:00          Pain/King Score: Pain Rating Prior to Med Admin: 3 (11/22/2019  5:45 PM)  Pain Rating Post Med Admin: 2 (11/22/2019  5:45 PM)  King Score: 9 (11/22/2019  5:45 PM)

## 2019-11-23 NOTE — ASSESSMENT & PLAN NOTE
Underwent ORIF by Dr. Siva Mccormack on 11/22.  Continue NWB on right leg.  Daily PT.  Since hemoglobin is stable, I will start her on enoxaparin for VTE prevention.

## 2019-11-23 NOTE — PLAN OF CARE
11/22/19 1941   Patient Assessment/Suction   Level of Consciousness (AVPU) alert   Respiratory Effort Normal;Unlabored   Expansion/Accessory Muscles/Retractions expansion symmetric;no retractions;no use of accessory muscles   PRE-TX-O2   O2 Device (Oxygen Therapy) room air   SpO2 98 %   Pulse Oximetry Type Intermittent   Aerosol Therapy   $ Aerosol Therapy Charges PRN treatment not required   Respiratory Treatment Status (SVN) PRN treatment not required

## 2019-11-23 NOTE — PT/OT/SLP PROGRESS
Physical Therapy      Patient Name:  Maricarmen Woodward   MRN:  [id number]    Patient not seen today secondary to refused in a.m. and at 1:00 p.m due to pain, despite encouragement.  Nurse informed. Will follow-up 11/24.    Medhat De La Rosa, PT

## 2019-11-23 NOTE — HOSPITAL COURSE
Pt was admitted.  Imaging showed fractures of L4 and distal right femur.  Analgesics were given.  Orthopedist was consulted.  Pt went to OR for ORIF of right distal femur fracture.Underwent ORIF by Dr. Siva Mccormack on 11/22. Continue NWB on right leg.  Daily PT./OT    case management consulted for skilled placement and pt accepted day of dc, pain was well controlled.       Pt required blood transfusion from the extensive ecchymoses.  Her HGB stayed stable afterwards.  She was put on enoxaparin for VTE prevention.=hgb trended down slightly and remained stable . Iron supplements and vit c started and cbc to  be monitored at SNF.     Parkinsons stable and routine medications continued.   At dc pt alert, 0x3, nad. Lungs clear, cor rrr, abd-soft, nontender  Right leg in ace-dressing c/d/i
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
## 2019-11-23 NOTE — SUBJECTIVE & OBJECTIVE
Interval History:  Underwent surgery today for the femur fracture.  Stable.  No new issues.    Review of Systems   Constitutional: Negative for chills and fever.   Respiratory: Negative for cough and shortness of breath.    Cardiovascular: Negative for chest pain.   Gastrointestinal: Negative for abdominal pain.     Objective:     Vital Signs (Most Recent):  Temp: 97 °F (36.1 °C) (11/22/19 1926)  Pulse: 94 (11/22/19 1926)  Resp: 17 (11/22/19 1926)  BP: (!) 140/63 (11/22/19 1926)  SpO2: 98 % (11/22/19 1941) Vital Signs (24h Range):  Temp:  [96 °F (35.6 °C)-98.6 °F (37 °C)] 97 °F (36.1 °C)  Pulse:  [72-94] 94  Resp:  [13-20] 17  SpO2:  [86 %-100 %] 98 %  BP: (120-189)/(57-79) 140/63     Weight: 48.2 kg (106 lb 4.2 oz)  Body mass index is 18.82 kg/m².    Intake/Output Summary (Last 24 hours) at 11/22/2019 2149  Last data filed at 11/22/2019 1730  Gross per 24 hour   Intake 1150 ml   Output 450 ml   Net 700 ml      Physical Exam   Constitutional: She is oriented to person, place, and time. No distress.   HENT:   Mouth/Throat: Oropharynx is clear and moist.   Eyes: Right eye exhibits no discharge. Left eye exhibits no discharge.   Neck: No JVD present.   Cardiovascular: Normal rate and regular rhythm. Exam reveals no gallop.   Pulmonary/Chest: Effort normal and breath sounds normal.   Abdominal: Soft. Bowel sounds are normal. She exhibits no distension. There is no tenderness.   Musculoskeletal: She exhibits no edema.   Surgical dressing applied at right lower femur area.   Neurological: She is alert and oriented to person, place, and time.   Skin: Skin is warm and dry. No rash noted. She is not diaphoretic.   Ecchymoses on chest, left arm, and right knee.  Stage I ulcers on both buttocks.   Psychiatric: She has a normal mood and affect.       Significant Labs: All pertinent labs within the past 24 hours have been reviewed.    Significant Imaging: I have reviewed all pertinent imaging results/findings within the past 24  hours.

## 2019-11-23 NOTE — ASSESSMENT & PLAN NOTE
Underwent ORIF by Dr. Siva Mccormack on 11/22.  Continue NWB on right leg.  Daily PT.  Since hemoglobin is stable,  Started  her on enoxaparin for VTE prevention.
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
## 2019-11-23 NOTE — ASSESSMENT & PLAN NOTE
Patient's anemia is currently controlled.   Current CBC reviewed-   Lab Results   Component Value Date    HGB 10.2 (L) 11/21/2019    HCT 30.6 (L) 11/21/2019     Monitor serial CBC and transfuse if patient becomes hemodynamically unstable, symptomatic or H/H drops below 7/21.

## 2019-11-23 NOTE — PT/OT/SLP PROGRESS
Occupational Therapy      Patient Name:  Maricarmen Woodward   MRN:  [id number]    Attempted OT evaluation today. Pt refused participation 2/2 R LE pain. Nurse notified. Will follow-up.    Castro Rondon, OT  11/23/2019

## 2019-11-23 NOTE — ASSESSMENT & PLAN NOTE
Patient's anemia is currently controlled.  And stable post op   Current CBC reviewed-   Lab Results   Component Value Date    HGB 8.5 (L) 11/23/2019    HCT 25.4 (L) 11/23/2019     Monitor serial CBC and transfuse if patient becomes hemodynamically unstable, symptomatic or H/H drops below 7/21.

## 2019-11-23 NOTE — SUBJECTIVE & OBJECTIVE
Interval History:   Pt seen/examined-reporting pain right knee-pod1 nails/plate -pain not improved with iv tylenol  Jenkins out   No sob -no other complaints       Review of Systems   Constitutional: Negative for chills and fever.   Respiratory: Negative for cough and shortness of breath.    Cardiovascular: Negative for chest pain.   Gastrointestinal: Negative for abdominal pain.   Musculoskeletal: Positive for gait problem (not wt bearing 2/2 knee fx ) and joint swelling.     Objective:     Vital Signs (Most Recent):  Temp: 96.6 °F (35.9 °C) (11/23/19 0742)  Pulse: 75 (11/23/19 0742)  Resp: 12 (11/23/19 0742)  BP: (!) 131/59 (11/23/19 0742)  SpO2: 99 % (11/23/19 0742) Vital Signs (24h Range):  Temp:  [96 °F (35.6 °C)-98.6 °F (37 °C)] 96.6 °F (35.9 °C)  Pulse:  [72-94] 75  Resp:  [12-20] 12  SpO2:  [86 %-100 %] 99 %  BP: (120-189)/(58-79) 131/59     Weight: 48.2 kg (106 lb 4.2 oz)  Body mass index is 18.82 kg/m².    Intake/Output Summary (Last 24 hours) at 11/23/2019 1029  Last data filed at 11/23/2019 0838  Gross per 24 hour   Intake 3067.5 ml   Output 950 ml   Net 2117.5 ml      Physical Exam   Constitutional: She is oriented to person, place, and time. No distress.   HENT:   Mouth/Throat: Oropharynx is clear and moist.   Eyes: Right eye exhibits no discharge. Left eye exhibits no discharge.   Neck: No JVD present.   Cardiovascular: Normal rate and regular rhythm. Exam reveals no gallop.   Pulmonary/Chest: Effort normal and breath sounds normal.   Abdominal: Soft. Bowel sounds are normal. She exhibits no distension. There is no tenderness.   Musculoskeletal: She exhibits no edema.   Surgical dressing applied at right lower femur area.   Neurological: She is alert and oriented to person, place, and time.   Skin: Skin is warm and dry. No rash noted. She is not diaphoretic.   Ecchymoses on chest, left arm, and right knee.  Stage I ulcers on both buttocks.   Psychiatric: She has a normal mood and affect.        Significant Labs:   BMP: No results for input(s): GLU, NA, K, CL, CO2, BUN, CREATININE, CALCIUM, MG in the last 48 hours.  CBC:   Recent Labs   Lab 11/23/19  0510   WBC 9.20   HGB 8.5*   HCT 25.4*   *       Significant Imaging: reviewed knee flouri and post op

## 2019-11-23 NOTE — ASSESSMENT & PLAN NOTE
Noted on CT scan.  No intervention required.  Stable.  Safe to start pt on anticoagulation for VTE prevention.
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
## 2019-11-23 NOTE — PLAN OF CARE
Pt alert and oriented . Incont b and b.  Turned frequently. Dressing intact to knee. Call light in reach

## 2019-11-23 NOTE — PLAN OF CARE
Non weight bearing on right leg,turn and reposition,neurovascular and neuro checks Q 4 hours,daily Pt, monitor H&H and lab results,discontinue philippe,reinforce dressing on right leg,PRN pain meds given,NAD noted,safety maintained,bed low with wheels locked,call light in reach,will continue to monitor.

## 2019-11-24 PROCEDURE — G8982 BODY POS GOAL STATUS: HCPCS | Mod: CL

## 2019-11-24 PROCEDURE — 25000003 PHARM REV CODE 250: Performed by: ORTHOPAEDIC SURGERY

## 2019-11-24 PROCEDURE — 63600175 PHARM REV CODE 636 W HCPCS: Performed by: ORTHOPAEDIC SURGERY

## 2019-11-24 PROCEDURE — 63600175 PHARM REV CODE 636 W HCPCS: Performed by: HOSPITALIST

## 2019-11-24 PROCEDURE — 11000001 HC ACUTE MED/SURG PRIVATE ROOM

## 2019-11-24 PROCEDURE — 99900035 HC TECH TIME PER 15 MIN (STAT)

## 2019-11-24 PROCEDURE — 97161 PT EVAL LOW COMPLEX 20 MIN: CPT

## 2019-11-24 PROCEDURE — 94761 N-INVAS EAR/PLS OXIMETRY MLT: CPT

## 2019-11-24 PROCEDURE — 25000003 PHARM REV CODE 250: Performed by: NURSE PRACTITIONER

## 2019-11-24 PROCEDURE — G8981 BODY POS CURRENT STATUS: HCPCS | Mod: CN

## 2019-11-24 RX ADMIN — CALCIUM CARBONATE (ANTACID) CHEW TAB 500 MG 500 MG: 500 CHEW TAB at 08:11

## 2019-11-24 RX ADMIN — ACETAMINOPHEN 650 MG: 325 TABLET ORAL at 12:11

## 2019-11-24 RX ADMIN — HYDROCODONE BITARTRATE AND ACETAMINOPHEN 1 TABLET: 5; 325 TABLET ORAL at 08:11

## 2019-11-24 RX ADMIN — PANTOPRAZOLE SODIUM 40 MG: 40 TABLET, DELAYED RELEASE ORAL at 08:11

## 2019-11-24 RX ADMIN — FERROUS SULFATE TAB EC 325 MG (65 MG FE EQUIVALENT) 325 MG: 325 (65 FE) TABLET DELAYED RESPONSE at 08:11

## 2019-11-24 RX ADMIN — CARBIDOPA AND LEVODOPA 2 TABLET: 25; 100 TABLET ORAL at 08:11

## 2019-11-24 RX ADMIN — CARBIDOPA AND LEVODOPA 1 TABLET: 25; 100 TABLET, EXTENDED RELEASE ORAL at 08:11

## 2019-11-24 RX ADMIN — PRAVASTATIN SODIUM 40 MG: 40 TABLET ORAL at 08:11

## 2019-11-24 RX ADMIN — MELATONIN 2000 UNITS: at 08:11

## 2019-11-24 RX ADMIN — ASPIRIN 325 MG ORAL TABLET 325 MG: 325 PILL ORAL at 08:11

## 2019-11-24 RX ADMIN — Medication 6 MG: at 08:11

## 2019-11-24 RX ADMIN — SODIUM CHLORIDE: 0.9 INJECTION, SOLUTION INTRAVENOUS at 06:11

## 2019-11-24 RX ADMIN — CLONAZEPAM 0.5 MG: 0.5 TABLET ORAL at 08:11

## 2019-11-24 RX ADMIN — CARBIDOPA AND LEVODOPA 1 TABLET: 50; 200 TABLET, EXTENDED RELEASE ORAL at 08:11

## 2019-11-24 RX ADMIN — POTASSIUM CHLORIDE 10 MEQ: 750 TABLET, EXTENDED RELEASE ORAL at 08:11

## 2019-11-24 RX ADMIN — SODIUM CHLORIDE: 0.9 INJECTION, SOLUTION INTRAVENOUS at 02:11

## 2019-11-24 RX ADMIN — Medication 400 MG: at 08:11

## 2019-11-24 RX ADMIN — CARBIDOPA AND LEVODOPA 2 TABLET: 25; 100 TABLET ORAL at 04:11

## 2019-11-24 RX ADMIN — SENNOSIDES AND DOCUSATE SODIUM 1 TABLET: 8.6; 5 TABLET ORAL at 08:11

## 2019-11-24 NOTE — PT/OT/SLP EVAL
Physical Therapy Evaluation    Patient Name:  Maricarmen Woodward   MRN:  [id number]    Recommendations:     Discharge Recommendations:      Discharge Equipment Recommendations: other (see comments)(TBD)   Barriers to discharge: Total assist    Assessment:     Maricarmen Woodward is a 89 y.o. female admitted with a medical diagnosis of Closed fracture of condyle of right femur.  She presents with the following impairments/functional limitations:  orthopedic precautions, impaired cognition, impaired balance, impaired functional mobilty, decreased lower extremity function, weakness  .    Rehab Prognosis: Fair (for goals established) patient would benefit from acute skilled PT services to address these deficits and reach maximum level of function.    Recent Surgery: Procedure(s) (LRB):  ORIF, FRACTURE, FEMUR, Synthes, radiolucent triangle, 1st assist (Right) 2 Days Post-Op    Plan:     During this hospitalization, patient to be seen 6 x/week to address the identified rehab impairments via therapeutic activities, therapeutic exercises and progress toward the following goals:    · Plan of Care Expires:  11/30/19    Subjective     Chief Complaint: can't move leg  Patient/Family Comments/goals: none stated, but agrees to work with PT with some encouragement    Living Environment:  Home with sitter (per patient)  Prior to admission, patients level of function was limited functional mobility x ~ 1 year (per patient).  Equipment used at home:  .  DME owned (not currently used): unknown. .    Objective:     Communicated with nurse (Ricardo) prior to session.  Patient found supine with SCD, bed alarm, peripheral IV  upon PT entry to room.    General Precautions: Standard, fall   Orthopedic Precautions:RLE non weight bearing   Braces: N/A     Exams:  · RLE ROM: N/T due to pain  · RLE Strength: poor (2-/5)  · LLE ROM: WFL  · LLE Strength: poor+ (2+/5)    Functional Mobility:  · Bed Mobility:     · Rolling Left:  maximal  assistance and total assistance  · Supine to Sit: maximal assistance and total assistance  · Sit to Supine: maximal assistance and total assistance  · Balance: SIT: poor+ (2+/5)      Therapeutic Activities and Exercises:   attempted supine LE arom but poor carryout  Pt sat at eob x ~ 4 min with min/mod (initially) assist and cues    AM-PAC 6 CLICK MOBILITY  Total Score:8     Patient left supine with all lines intact, bed alarm on, nurse present and SCDs on, heels floated.    GOALS:   Multidisciplinary Problems     Physical Therapy Goals        Problem: Physical Therapy Goal    Goal Priority Disciplines Outcome Goal Variances Interventions   Physical Therapy Goal     PT, PT/OT Ongoing, Progressing     Description:  Goals to be met by: 2019     Patient will increase functional independence with mobility by performin). Supine to sit with MInimal Assistance  2). Sit to supine with MInimal Assistance  3). Rolling to Left and Right with Minimal Assistance.  4). Bed to chair transfer with Moderate Assistance  5). Sitting at edge of bed x > 10 minutes with Stand-by Assistance                      History:     Past Medical History:   Diagnosis Date    Arthritis     Hypertension     Parkinson's disease        Past Surgical History:   Procedure Laterality Date    FRACTURE SURGERY      HEMIARTHROPLASTY OF HIP Left 2018    Procedure: HEMIARTHROPLASTY, HIP, Terese, peg board, first assist;  Surgeon: Cheng Mccormack MD;  [facility]: [facility];  Service: Orthopedics;  Laterality: Left;    JOINT REPLACEMENT      right hip replacement       Time Tracking:     PT Received On: 19  PT Start Time: 1055     PT Stop Time: 1120  PT Total Time (min): 25 min     Billable Minutes: Evaluation 25      Medhat De La Rosa, PT  2019

## 2019-11-24 NOTE — PLAN OF CARE
Problem: Physical Therapy Goal  Goal: Physical Therapy Goal  Description  Goals to be met by: 2019     Patient will increase functional independence with mobility by performin). Supine to sit with MInimal Assistance  2). Sit to supine with MInimal Assistance  3). Rolling to Left and Right with Minimal Assistance.  4). Bed to chair transfer with Moderate Assistance  5). Sitting at edge of bed x > 10 minutes with Stand-by Assistance     Outcome: Ongoing, Progressing

## 2019-11-24 NOTE — PLAN OF CARE
POC reviewed and updated,non weight bearing on right leg,turn and reposition,neurovascular and neuro checks Q 4 hours,daily Pt, monitor H&H and lab results,reinforce dressing on right leg,PRN pain meds given,NAD noted,safety maintained,bed low with wheels locked,call light in reach,will continue to monitor

## 2019-11-24 NOTE — PLAN OF CARE
11/23/19 1943   Patient Assessment/Suction   Level of Consciousness (AVPU) alert   Respiratory Effort Normal;Unlabored   Expansion/Accessory Muscles/Retractions expansion symmetric;no retractions;no use of accessory muscles   All Lung Fields Breath Sounds clear   PRE-TX-O2   O2 Device (Oxygen Therapy) room air   SpO2 98 %   Pulse Oximetry Type Intermittent   Aerosol Therapy   $ Aerosol Therapy Charges PRN treatment not required   Respiratory Treatment Status (SVN) PRN treatment not required

## 2019-11-24 NOTE — PLAN OF CARE
POC reviewed and updated,non weight bearing on right leg,turn and reposition,neurovascular and neuro checks Q 4 hours,daily Pt, monitor H&H and lab results,reinforce dressing on right leg,PRN pain meds given,NAD noted,safety maintained,bed low with wheels locked,call light in reach,will continue to monitor.

## 2019-11-25 PROBLEM — E83.39 HYPOPHOSPHATEMIA: Status: ACTIVE | Noted: 2019-11-25

## 2019-11-25 LAB
ALBUMIN SERPL BCP-MCNC: 2.6 G/DL (ref 3.5–5.2)
ANION GAP SERPL CALC-SCNC: 9 MMOL/L (ref 8–16)
BASOPHILS # BLD AUTO: 0.06 K/UL (ref 0–0.2)
BASOPHILS NFR BLD: 0.7 % (ref 0–1.9)
BUN SERPL-MCNC: 12 MG/DL (ref 10–30)
CALCIUM SERPL-MCNC: 8.7 MG/DL (ref 8.7–10.5)
CHLORIDE SERPL-SCNC: 111 MMOL/L (ref 95–110)
CO2 SERPL-SCNC: 19 MMOL/L (ref 23–29)
CREAT SERPL-MCNC: 0.6 MG/DL (ref 0.5–1.4)
DIFFERENTIAL METHOD: ABNORMAL
EOSINOPHIL # BLD AUTO: 0.4 K/UL (ref 0–0.5)
EOSINOPHIL NFR BLD: 4.3 % (ref 0–8)
ERYTHROCYTE [DISTWIDTH] IN BLOOD BY AUTOMATED COUNT: 19.1 % (ref 11.5–14.5)
EST. GFR  (AFRICAN AMERICAN): >60 ML/MIN/1.73 M^2
EST. GFR  (NON AFRICAN AMERICAN): >60 ML/MIN/1.73 M^2
GLUCOSE SERPL-MCNC: 85 MG/DL (ref 70–110)
HCT VFR BLD AUTO: 28.6 % (ref 37–48.5)
HGB BLD-MCNC: 8.8 G/DL (ref 12–16)
IMM GRANULOCYTES # BLD AUTO: 0.06 K/UL (ref 0–0.04)
LYMPHOCYTES # BLD AUTO: 1.3 K/UL (ref 1–4.8)
LYMPHOCYTES NFR BLD: 15.9 % (ref 18–48)
MCH RBC QN AUTO: 32.8 PG (ref 27–31)
MCHC RBC AUTO-ENTMCNC: 30.8 G/DL (ref 32–36)
MCV RBC AUTO: 107 FL (ref 82–98)
MONOCYTES # BLD AUTO: 0.5 K/UL (ref 0.3–1)
MONOCYTES NFR BLD: 5.7 % (ref 4–15)
NEUTROPHILS # BLD AUTO: 6.1 K/UL (ref 1.8–7.7)
NEUTROPHILS NFR BLD: 72.7 % (ref 38–73)
NRBC BLD-RTO: 0 /100 WBC
PHOSPHATE SERPL-MCNC: 2.1 MG/DL (ref 2.7–4.5)
PLATELET # BLD AUTO: 342 K/UL (ref 150–350)
PMV BLD AUTO: 9.4 FL (ref 9.2–12.9)
POTASSIUM SERPL-SCNC: 4.2 MMOL/L (ref 3.5–5.1)
RBC # BLD AUTO: 2.68 M/UL (ref 4–5.4)
SODIUM SERPL-SCNC: 139 MMOL/L (ref 136–145)
WBC # BLD AUTO: 8.43 K/UL (ref 3.9–12.7)

## 2019-11-25 PROCEDURE — 97530 THERAPEUTIC ACTIVITIES: CPT

## 2019-11-25 PROCEDURE — 97110 THERAPEUTIC EXERCISES: CPT

## 2019-11-25 PROCEDURE — 86580 TB INTRADERMAL TEST: CPT | Performed by: HOSPITALIST

## 2019-11-25 PROCEDURE — G8987 SELF CARE CURRENT STATUS: HCPCS | Mod: CL

## 2019-11-25 PROCEDURE — G8988 SELF CARE GOAL STATUS: HCPCS | Mod: CK

## 2019-11-25 PROCEDURE — 30200315 PPD INTRADERMAL TEST REV CODE 302: Performed by: HOSPITALIST

## 2019-11-25 PROCEDURE — 94761 N-INVAS EAR/PLS OXIMETRY MLT: CPT

## 2019-11-25 PROCEDURE — 63600175 PHARM REV CODE 636 W HCPCS: Performed by: HOSPITALIST

## 2019-11-25 PROCEDURE — 25000003 PHARM REV CODE 250: Performed by: ORTHOPAEDIC SURGERY

## 2019-11-25 PROCEDURE — 97535 SELF CARE MNGMENT TRAINING: CPT

## 2019-11-25 PROCEDURE — 97166 OT EVAL MOD COMPLEX 45 MIN: CPT

## 2019-11-25 PROCEDURE — 36415 COLL VENOUS BLD VENIPUNCTURE: CPT

## 2019-11-25 PROCEDURE — 85025 COMPLETE CBC W/AUTO DIFF WBC: CPT

## 2019-11-25 PROCEDURE — 80069 RENAL FUNCTION PANEL: CPT

## 2019-11-25 PROCEDURE — 11000001 HC ACUTE MED/SURG PRIVATE ROOM

## 2019-11-25 PROCEDURE — 25000003 PHARM REV CODE 250: Performed by: HOSPITALIST

## 2019-11-25 RX ORDER — POLYETHYLENE GLYCOL 3350 17 G/17G
17 POWDER, FOR SOLUTION ORAL DAILY
Status: DISCONTINUED | OUTPATIENT
Start: 2019-11-25 | End: 2019-11-26 | Stop reason: HOSPADM

## 2019-11-25 RX ORDER — SODIUM,POTASSIUM PHOSPHATES 280-250MG
1 POWDER IN PACKET (EA) ORAL 2 TIMES DAILY
Status: DISCONTINUED | OUTPATIENT
Start: 2019-11-25 | End: 2019-11-26 | Stop reason: HOSPADM

## 2019-11-25 RX ADMIN — ENOXAPARIN SODIUM 40 MG: 100 INJECTION SUBCUTANEOUS at 06:11

## 2019-11-25 RX ADMIN — TUBERCULIN PURIFIED PROTEIN DERIVATIVE 5 UNITS: 5 INJECTION, SOLUTION INTRADERMAL at 11:11

## 2019-11-25 RX ADMIN — Medication 400 MG: at 08:11

## 2019-11-25 RX ADMIN — POLYETHYLENE GLYCOL (3350) 17 G: 17 POWDER, FOR SOLUTION ORAL at 11:11

## 2019-11-25 RX ADMIN — CARBIDOPA AND LEVODOPA 2 TABLET: 25; 100 TABLET ORAL at 08:11

## 2019-11-25 RX ADMIN — CARBIDOPA AND LEVODOPA 1 TABLET: 50; 200 TABLET, EXTENDED RELEASE ORAL at 09:11

## 2019-11-25 RX ADMIN — ACETAMINOPHEN 650 MG: 325 TABLET ORAL at 09:11

## 2019-11-25 RX ADMIN — SENNOSIDES AND DOCUSATE SODIUM 1 TABLET: 8.6; 5 TABLET ORAL at 08:11

## 2019-11-25 RX ADMIN — HYDROMORPHONE HYDROCHLORIDE 0.2 MG: 2 INJECTION, SOLUTION INTRAMUSCULAR; INTRAVENOUS; SUBCUTANEOUS at 12:11

## 2019-11-25 RX ADMIN — DICLOFENAC 2 G: 10 GEL TOPICAL at 08:11

## 2019-11-25 RX ADMIN — POTASSIUM & SODIUM PHOSPHATES POWDER PACK 280-160-250 MG 1 PACKET: 280-160-250 PACK at 09:11

## 2019-11-25 RX ADMIN — CALCIUM CARBONATE (ANTACID) CHEW TAB 500 MG 500 MG: 500 CHEW TAB at 08:11

## 2019-11-25 RX ADMIN — ASPIRIN 325 MG ORAL TABLET 325 MG: 325 PILL ORAL at 09:11

## 2019-11-25 RX ADMIN — POTASSIUM & SODIUM PHOSPHATES POWDER PACK 280-160-250 MG 1 PACKET: 280-160-250 PACK at 11:11

## 2019-11-25 RX ADMIN — MELATONIN 2000 UNITS: at 08:11

## 2019-11-25 RX ADMIN — POTASSIUM CHLORIDE 10 MEQ: 750 TABLET, EXTENDED RELEASE ORAL at 08:11

## 2019-11-25 RX ADMIN — CARBIDOPA AND LEVODOPA 2 TABLET: 25; 100 TABLET ORAL at 06:11

## 2019-11-25 RX ADMIN — FERROUS SULFATE TAB EC 325 MG (65 MG FE EQUIVALENT) 325 MG: 325 (65 FE) TABLET DELAYED RESPONSE at 08:11

## 2019-11-25 RX ADMIN — CLONAZEPAM 0.5 MG: 0.5 TABLET ORAL at 09:11

## 2019-11-25 RX ADMIN — CALCIUM CARBONATE (ANTACID) CHEW TAB 500 MG 500 MG: 500 CHEW TAB at 09:11

## 2019-11-25 RX ADMIN — ASPIRIN 325 MG ORAL TABLET 325 MG: 325 PILL ORAL at 08:11

## 2019-11-25 RX ADMIN — PANTOPRAZOLE SODIUM 40 MG: 40 TABLET, DELAYED RELEASE ORAL at 08:11

## 2019-11-25 RX ADMIN — PRAVASTATIN SODIUM 40 MG: 40 TABLET ORAL at 09:11

## 2019-11-25 RX ADMIN — ACETAMINOPHEN 650 MG: 325 TABLET ORAL at 01:11

## 2019-11-25 RX ADMIN — ACETAMINOPHEN 650 MG: 325 TABLET ORAL at 05:11

## 2019-11-25 RX ADMIN — CARBIDOPA AND LEVODOPA 1 TABLET: 25; 100 TABLET, EXTENDED RELEASE ORAL at 09:11

## 2019-11-25 NOTE — PLAN OF CARE
I spoke to the pts nephew Denny [phone] regarding SNF placement and he agreed to send the referral to all 4 [facility] locations to see who has an available bed. I sent the referral to all 4 [facility] locations. CM following. Marsha Guzmán LCSW     11/25/19 8036   Post-Acute Status   Post-Acute Authorization Placement   Post-Acute Placement Status Referrals Sent

## 2019-11-25 NOTE — PLAN OF CARE
· 11/25/2019 2:15:15 PM Accepted  Sirisha [email or web address]@[email or web address]  · 11/25/2019 1:17:57 PM Note: Accepted medically, checking financials and calling fam now.  Sirisha [email or web address]  · 11/25/2019 11:58:24 AM Under Review: Remote - Local Review  Sirisha [email or web address]  · 11/25/2019 11:05:45 AM New: Please review for SNF. Thanks!  Marsha Guzmán  [facility]. Marsha Guzmán, OMAYRA     11/25/19 1420   Post-Acute Status   Post-Acute Authorization Placement   Post-Acute Placement Status Pending Post-Acute Medical Review

## 2019-11-25 NOTE — PLAN OF CARE
11/24/19 2019   Patient Assessment/Suction   Level of Consciousness (AVPU) alert   Respiratory Effort Normal;Unlabored   Expansion/Accessory Muscles/Retractions expansion symmetric;no retractions;no use of accessory muscles   PRE-TX-O2   O2 Device (Oxygen Therapy) room air   SpO2 98 %   Pulse Oximetry Type Intermittent   Aerosol Therapy   $ Aerosol Therapy Charges PRN treatment not required   Respiratory Treatment Status (SVN) PRN treatment not required

## 2019-11-25 NOTE — PT/OT/SLP PROGRESS
Physical Therapy Treatment    Patient Name:  Maricarmen Woodward   MRN:  [id number]    Recommendations:     Discharge Recommendations:      Discharge Equipment Recommendations: other (see comments)(TBD)   Barriers to discharge: Patient with live in [facility] who was caring for patient for last 6 years so discharge should not be a problem    Assessment:     Maricarmen Woodward is a 89 y.o. female admitted with a medical diagnosis of Closed fracture of condyle of right femur.  She presents with the following impairments/functional limitations:  weakness, impaired endurance, impaired balance, impaired functional mobilty, gait instability, pain, orthopedic precautions, decreased lower extremity function .  Patient reluctantly agreeable to PT treatment this morning due to max pain in right LE.  Patient transferred supine to sit with max assist and attempted sit to stand with RW.  Patient able to stand with max assist x 2 using a RW but was unable to maintain NWB on right LE so returned to sitting and then supine.  Patient mobility limited by pain reported in right LE and left shoulder.    Rehab Prognosis: Fair; patient would benefit from acute skilled PT services to address these deficits and reach maximum level of function.    Recent Surgery: Procedure(s) (LRB):  ORIF, FRACTURE, FEMUR, Synthes, radiolucent triangle, 1st assist (Right) 3 Days Post-Op    Plan:     During this hospitalization, patient to be seen 6 x/week to address the identified rehab impairments via therapeutic activities, therapeutic exercises and progress toward the following goals:    · Plan of Care Expires:  11/30/19    Subjective     Chief Complaint: pain  Patient/Family Comments/goals: go home  Pain/Comfort:  · Pain Rating 1: 9/10  · Location - Side 1: Right  · Location - Orientation 1: lower  · Location 1: leg  · Pain Addressed 1: Cessation of Activity, Reposition  · Pain Rating Post-Intervention 1: 7/10  · Pain Rating 2: 6/10  · Location - Side 2:  Right  · Location - Orientation 2: upper  · Location 2: shoulder  · Pain Addressed 2: Cessation of Activity, Reposition  · Pain Rating Post-Intervention 2: 1/10      Objective:     Communicated with nurse Phillips prior to session.  Patient found supine with peripheral IV upon PT entry to room.     General Precautions: Standard, fall   Orthopedic Precautions:RLE non weight bearing   Braces:       Functional Mobility:  · Bed Mobility:     · Rolling Right: maximal assistance  · Supine to Sit: maximal assistance  · Sit to Supine: maximal assistance  · Transfers:     · Sit to Stand:  total assistance with rolling walker      AM-PAC 6 CLICK MOBILITY  Turning over in bed (including adjusting bedclothes, sheets and blankets)?: 2  Sitting down on and standing up from a chair with arms (e.g., wheelchair, bedside commode, etc.): 2  Moving from lying on back to sitting on the side of the bed?: 2  Moving to and from a bed to a chair (including a wheelchair)?: 2  Need to walk in hospital room?: 1  Climbing 3-5 steps with a railing?: 1  Basic Mobility Total Score: 10       Therapeutic Activities and Exercises:   transfer training supine to sit with max assist, sit to stand with dependence  Siting balance EOB with intermittent min assist to maintain balance    Patient left supine with call button in reach and sitter present..    GOALS:   Multidisciplinary Problems     Physical Therapy Goals        Problem: Physical Therapy Goal    Goal Priority Disciplines Outcome Goal Variances Interventions   Physical Therapy Goal     PT, PT/OT Ongoing, Progressing     Description:  Goals to be met by: 2019     Patient will increase functional independence with mobility by performin). Supine to sit with MInimal Assistance  2). Sit to supine with MInimal Assistance  3). Rolling to Left and Right with Minimal Assistance.  4). Bed to chair transfer with Moderate Assistance  5). Sitting at edge of bed x > 10 minutes with Stand-by  Assistance                      Time Tracking:     PT Received On: 11/25/19  PT Start Time: 1125     PT Stop Time: 1142  PT Total Time (min): 17 min     Billable Minutes: Therapeutic Activity 17    Treatment Type: Treatment  PT/PTA: PT     PTA Visit Number: 0     Chris Megilligan, PT  11/25/2019

## 2019-11-25 NOTE — PLAN OF CARE
POC reviewed, Cardiac monitored SR, VSS, PRN pain and anxiety medication given with mild to moderate relief, incontinent and weight shifting care provided, fall and safety precautions maintained, bed in low position, call light within reach, SR up x 2, instructed to call for assistance, resting between care, will continue to monitor and round.
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
## 2019-11-25 NOTE — PT/OT/SLP EVAL
Occupational Therapy   Evaluation    Name: Maricarmen Woodward  MRN: [id number]  Admitting Diagnosis:  Closed fracture of condyle of right femur 3 Days Post-Op    Recommendations:     Discharge Recommendations: nursing facility, basic, other (see comments), home with home health, home health PT, 24 hour assistance (sitter)  Discharge Equipment Recommendations:  none  Barriers to discharge:        Assessment:     Maricarmen Woodward is a 89 y.o. female with a medical diagnosis of Closed fracture of condyle of right femur.  She presents with deficits in functional status due to the listed impairments, impacting ADLs and functional mobility. Pt was alert and able to follow all simple commands but with some confusion and poor recall noted. She performed self feeding and grooming tasks with set-up and supervision in supported sitting and was able to perform B UE AROM exercises when given ongoing encouragement and cues with fair activity tolerance noted.   Pt began to c/o R leg pain with attempts to move her leg and OT was unable to use bed controls to transfer pt to the EOB. Pt reported that she has a 24 hr sitter who provides significant assistance for self care tasks and transfers at home. Recommend OT treatment to maximize endurance, safety & independence with ADL's & functional mobility.   Performance deficits affecting function: weakness, impaired self care skills, impaired endurance, impaired functional mobilty, orthopedic precautions, decreased lower extremity function, pain, gait instability, impaired cognition, decreased safety awareness, decreased coordination, impaired balance.  Pt will need 24 hour assistance and could benefit from therapy to increased her independence with functional mobility and self care tasks.    Rehab Prognosis: Fair; patient would benefit from acute skilled OT services to address these deficits and reach maximum level of function.       Plan:     Patient to be seen 3 x/week to address  "the above listed problems via self-care/home management, therapeutic activities, therapeutic exercises  · Plan of Care Expires: 12/09/19  · Plan of Care Reviewed with: patient    Subjective     Chief Complaint: " Don't move my leg!"  Patient/Family Comments/goals: " I have two people who help me bathe and dress at home."    Occupational Profile:  Living Environment: Pt lives alone but has 24 hr sitters.  Previous level of function: Wheelchair bound for mobility, assist for transfers and most ADL's.  Roles and Routines: " I watch TV."  Equipment Used at Home:  wheelchair, walker, rolling, bedside commode, shower chair  Assistance upon Discharge: Patient will need 24 hour assistance and supervision for safety.      Pain/Comfort:  Pain Rating 1: 0/10  Location - Side 1: Right  Location 1: leg  Pain Addressed 1: Cessation of Activity(c/o pain only with movement)    Patients cultural, spiritual, Caodaism conflicts given the current situation:      Objective:     Communicated with: Jacqueline nurse prior to session.  Patient found HOB elevated with peripheral IV, telemetry, bed alarm, SCD upon OT entry to room.    General Precautions: Standard, fall   Orthopedic Precautions:RLE non weight bearing   Braces: N/A     Occupational Performance:    Bed Mobility:    · Pt was leaning to her L side with HOB at 60* and was able to use her R UE to reposition her upper body to midline with minimal assist and cues for technique.    Activities of Daily Living:  · Feeding:  supervision and set-up to eat her breakfast with HOB at 60*  · Grooming: supervision and set-up to wash face and comb hair with HOB at 60*    · Upper Body Dressing: total assistance to don and fasten gown in supported sitting  · Lower Body Dressing: dependence to don socks bed level  · Toileting: dependence with pt wearing a brief    Cognitive/Visual Perceptual:  Cognitive/Psychosocial Skills:     -       Oriented to: Person and Place   -       Follows " Commands/attention:Follows one-step commands  -       Communication: clear/fluent  -       Memory: Impaired STM and Poor immediate recall  -       Safety awareness/insight to disability: impaired   -       Mood/Affect/Coping skills/emotional control: Cooperative and mildly confused  Visual/Perceptual:      -Intact      Physical Exam:  Postural examination/scapula alignment:    -       Rounded shoulders  -       Forward head  -       Lateral weight shift of hips  Skin integrity: mild ecchymosis noted on upper L arm and shoulder  Edema:  None noted  Dominant hand:    -       right  Upper Extremity Range of Motion:     -       Right Upper Extremity: WFL  -       Left Upper Extremity: WFL  Upper Extremity Strength:    -       Right Upper Extremity: 4/5  -       Left Upper Extremity: 4/5   Strength:    -       Right Upper Extremity: 4/5  -       Left Upper Extremity: 4/5  Fine Motor Coordination:    -       Intact    AMPAC 6 Click ADL:  AMPAC Total Score: 14    Treatment & Education:  OT oriented pt to time, place & situation as well as how to use call button and bed controls.  OT ed pt on OT role & POC as well as discharge recommendations.  OT ed pt & family on keeping HOB raised and sitting up in chair as pt will tolerate to counteract effects of bedrest.  Pt performed B UE AROM therapeutic exercise x 2 sets of 10 reps each shoulder flexion, horizontal abduction/adduction and boxing punches with rest breaks taken between sets.  OT ed pt on fall risk and strongly advised pt to call for help for all OOB mobility.   Education:    Patient left HOB elevated with all lines intact, call button in reach, bed alarm on and Jacqueline nurse notified    GOALS:   Multidisciplinary Problems     Occupational Therapy Goals        Problem: Occupational Therapy Goal    Goal Priority Disciplines Outcome Interventions   Occupational Therapy Goal     OT, PT/OT Ongoing, Progressing    Description:  Goals to be met by: 12/9/19     Patient  will increase functional independence with ADLs by performing:    Feeding with Set-up Assistance only.  UE Dressing with Set-up Assistance and Moderate Assistance.  Grooming while seated with Modified [place] with toiletries in reach.  Sitting at edge of bed x5 minutes with Contact Guard Assistance and use of upper extremity support.  Upper extremity exercise program x15 reps per handout, with assistance as needed.                      History:     Past Medical History:   Diagnosis Date    Arthritis     Hypertension     Parkinson's disease          Past Surgical History:   Procedure Laterality Date    FRACTURE SURGERY      HEMIARTHROPLASTY OF HIP Left 12/8/2018    Procedure: HEMIARTHROPLASTY, HIP, [place], peg board, first assist;  Surgeon: Cheng Mccormack MD;  Location: [facility];  Service: Orthopedics;  Laterality: Left;    JOINT REPLACEMENT      right hip replacement       Time Tracking:     OT Date of Treatment: 11/25/19  OT Start Time: 0916  OT Stop Time: 0949  OT Total Time (min): 33 min    Billable Minutes:Evaluation 10  Self Care/Home Management 10  Therapeutic Exercise 13    WILLIS Vargas  11/25/2019

## 2019-11-25 NOTE — SUBJECTIVE & OBJECTIVE
Interval History: resting-pain better per nursing      Review of Systems   Unable to perform ROS: Other     Objective:     Vital Signs (Most Recent):  Temp: 98.8 °F (37.1 °C) (11/24/19 1639)  Pulse: 100 (11/24/19 1639)  Resp: 16 (11/24/19 1639)  BP: (!) 148/66 (11/24/19 1639)  SpO2: 96 % (11/24/19 1639) Vital Signs (24h Range):  Temp:  [97.5 °F (36.4 °C)-98.8 °F (37.1 °C)] 98.8 °F (37.1 °C)  Pulse:  [] 100  Resp:  [16-18] 16  SpO2:  [96 %-99 %] 96 %  BP: (135-153)/(55-96) 148/66     Weight: 48.2 kg (106 lb 4.2 oz)  Body mass index is 18.82 kg/m².    Intake/Output Summary (Last 24 hours) at 11/24/2019 1852  Last data filed at 11/24/2019 1800  Gross per 24 hour   Intake 720 ml   Output --   Net 720 ml      Physical Exam   Constitutional: She is oriented to person, place, and time. No distress.   HENT:   Mouth/Throat: Oropharynx is clear and moist.   Eyes: Right eye exhibits no discharge. Left eye exhibits no discharge.   Neck: No JVD present.   Cardiovascular: Normal rate and regular rhythm. Exam reveals no gallop.   Pulmonary/Chest: Effort normal and breath sounds normal.   Abdominal: Soft. Bowel sounds are normal. She exhibits no distension. There is no tenderness.   Musculoskeletal: She exhibits no edema.   Surgical dressing applied at right lower femur area.   Neurological: She is alert and oriented to person, place, and time.   Skin: Skin is warm and dry. No rash noted. She is not diaphoretic.   Ecchymoses on chest, left arm, and right knee.  Stage I ulcers on both buttocks.       Significant Labs:   BMP: No results for input(s): GLU, NA, K, CL, CO2, BUN, CREATININE, CALCIUM, MG in the last 48 hours.  CBC:   Recent Labs   Lab 11/23/19  0510   WBC 9.20   HGB 8.5*   HCT 25.4*   *       Significant Imaging: I have reviewed and interpreted all pertinent imaging results/findings within the past 24 hours.

## 2019-11-26 VITALS
SYSTOLIC BLOOD PRESSURE: 180 MMHG | BODY MASS INDEX: 18.82 KG/M2 | HEART RATE: 93 BPM | DIASTOLIC BLOOD PRESSURE: 69 MMHG | OXYGEN SATURATION: 98 % | TEMPERATURE: 99 F | RESPIRATION RATE: 16 BRPM | WEIGHT: 106.25 LBS | HEIGHT: 63 IN

## 2019-11-26 LAB
ABO + RH BLD: NORMAL
ALBUMIN SERPL BCP-MCNC: 2.3 G/DL (ref 3.5–5.2)
ANION GAP SERPL CALC-SCNC: 8 MMOL/L (ref 8–16)
BASOPHILS # BLD AUTO: 0.05 K/UL (ref 0–0.2)
BASOPHILS NFR BLD: 0.5 % (ref 0–1.9)
BLD GP AB SCN CELLS X3 SERPL QL: NORMAL
BLD PROD TYP BPU: NORMAL
BLOOD UNIT EXPIRATION DATE: NORMAL
BLOOD UNIT TYPE CODE: 5100
BLOOD UNIT TYPE: NORMAL
BUN SERPL-MCNC: 12 MG/DL (ref 10–30)
CALCIUM SERPL-MCNC: 8.3 MG/DL (ref 8.7–10.5)
CHLORIDE SERPL-SCNC: 109 MMOL/L (ref 95–110)
CO2 SERPL-SCNC: 21 MMOL/L (ref 23–29)
CODING SYSTEM: NORMAL
CREAT SERPL-MCNC: 0.7 MG/DL (ref 0.5–1.4)
DIFFERENTIAL METHOD: ABNORMAL
DISPENSE STATUS: NORMAL
EOSINOPHIL # BLD AUTO: 0.3 K/UL (ref 0–0.5)
EOSINOPHIL NFR BLD: 3.2 % (ref 0–8)
ERYTHROCYTE [DISTWIDTH] IN BLOOD BY AUTOMATED COUNT: 18.6 % (ref 11.5–14.5)
EST. GFR  (AFRICAN AMERICAN): >60 ML/MIN/1.73 M^2
EST. GFR  (NON AFRICAN AMERICAN): >60 ML/MIN/1.73 M^2
GLUCOSE SERPL-MCNC: 90 MG/DL (ref 70–110)
HCT VFR BLD AUTO: 23.4 % (ref 37–48.5)
HGB BLD-MCNC: 7.5 G/DL (ref 12–16)
IMM GRANULOCYTES # BLD AUTO: 0.04 K/UL (ref 0–0.04)
LYMPHOCYTES # BLD AUTO: 1.7 K/UL (ref 1–4.8)
LYMPHOCYTES NFR BLD: 16.9 % (ref 18–48)
MCH RBC QN AUTO: 33 PG (ref 27–31)
MCHC RBC AUTO-ENTMCNC: 32.1 G/DL (ref 32–36)
MCV RBC AUTO: 103 FL (ref 82–98)
MONOCYTES # BLD AUTO: 0.7 K/UL (ref 0.3–1)
MONOCYTES NFR BLD: 6.6 % (ref 4–15)
NEUTROPHILS # BLD AUTO: 7.2 K/UL (ref 1.8–7.7)
NEUTROPHILS NFR BLD: 72.4 % (ref 38–73)
NRBC BLD-RTO: 0 /100 WBC
NUM UNITS TRANS PACKED RBC: NORMAL
PHOSPHATE SERPL-MCNC: 2.8 MG/DL (ref 2.7–4.5)
PLATELET # BLD AUTO: 334 K/UL (ref 150–350)
PMV BLD AUTO: 9.7 FL (ref 9.2–12.9)
POTASSIUM SERPL-SCNC: 4 MMOL/L (ref 3.5–5.1)
RBC # BLD AUTO: 2.27 M/UL (ref 4–5.4)
SODIUM SERPL-SCNC: 138 MMOL/L (ref 136–145)
WBC # BLD AUTO: 9.91 K/UL (ref 3.9–12.7)

## 2019-11-26 PROCEDURE — 80069 RENAL FUNCTION PANEL: CPT

## 2019-11-26 PROCEDURE — 85025 COMPLETE CBC W/AUTO DIFF WBC: CPT

## 2019-11-26 PROCEDURE — 25000003 PHARM REV CODE 250: Performed by: ORTHOPAEDIC SURGERY

## 2019-11-26 PROCEDURE — 94761 N-INVAS EAR/PLS OXIMETRY MLT: CPT

## 2019-11-26 PROCEDURE — 97530 THERAPEUTIC ACTIVITIES: CPT

## 2019-11-26 PROCEDURE — 99900035 HC TECH TIME PER 15 MIN (STAT)

## 2019-11-26 PROCEDURE — 25000003 PHARM REV CODE 250: Performed by: HOSPITALIST

## 2019-11-26 PROCEDURE — P9016 RBC LEUKOCYTES REDUCED: HCPCS

## 2019-11-26 PROCEDURE — 86901 BLOOD TYPING SEROLOGIC RH(D): CPT

## 2019-11-26 PROCEDURE — 36415 COLL VENOUS BLD VENIPUNCTURE: CPT

## 2019-11-26 PROCEDURE — 97535 SELF CARE MNGMENT TRAINING: CPT

## 2019-11-26 PROCEDURE — 86920 COMPATIBILITY TEST SPIN: CPT

## 2019-11-26 RX ORDER — FUROSEMIDE 40 MG/1
40 TABLET ORAL ONCE
Status: COMPLETED | OUTPATIENT
Start: 2019-11-26 | End: 2019-11-26

## 2019-11-26 RX ORDER — HYDROCODONE BITARTRATE AND ACETAMINOPHEN 5; 325 MG/1; MG/1
1 TABLET ORAL EVERY 6 HOURS PRN
Qty: 15 TABLET | Refills: 0 | Status: SHIPPED | OUTPATIENT
Start: 2019-11-26 | End: 2019-11-26

## 2019-11-26 RX ORDER — POLYETHYLENE GLYCOL 3350 17 G/17G
17 POWDER, FOR SOLUTION ORAL DAILY
Qty: 30 PACKET | Refills: 0 | Status: ON HOLD | OUTPATIENT
Start: 2019-11-26 | End: 2019-12-03 | Stop reason: HOSPADM

## 2019-11-26 RX ORDER — CLONAZEPAM 0.5 MG/1
0.5 TABLET ORAL 2 TIMES DAILY PRN
Qty: 10 TABLET | Refills: 0 | Status: SHIPPED | OUTPATIENT
Start: 2019-11-26 | End: 2019-11-26 | Stop reason: SDUPTHER

## 2019-11-26 RX ORDER — SODIUM CHLORIDE 0.9 % (FLUSH) 0.9 %
10 SYRINGE (ML) INJECTION
Status: DISCONTINUED | OUTPATIENT
Start: 2019-11-26 | End: 2019-11-26 | Stop reason: HOSPADM

## 2019-11-26 RX ORDER — CLONAZEPAM 0.5 MG/1
0.5 TABLET ORAL 2 TIMES DAILY PRN
Qty: 10 TABLET | Refills: 0 | Status: SHIPPED | OUTPATIENT
Start: 2019-11-26 | End: 2020-04-18 | Stop reason: CLARIF

## 2019-11-26 RX ORDER — HYDROCODONE BITARTRATE AND ACETAMINOPHEN 5; 325 MG/1; MG/1
1 TABLET ORAL EVERY 6 HOURS PRN
Qty: 15 TABLET | Refills: 0 | Status: SHIPPED | OUTPATIENT
Start: 2019-11-26 | End: 2020-04-18 | Stop reason: CLARIF

## 2019-11-26 RX ADMIN — CALCIUM CARBONATE (ANTACID) CHEW TAB 500 MG 500 MG: 500 CHEW TAB at 07:11

## 2019-11-26 RX ADMIN — FUROSEMIDE 40 MG: 40 TABLET ORAL at 03:11

## 2019-11-26 RX ADMIN — POTASSIUM CHLORIDE 10 MEQ: 750 TABLET, EXTENDED RELEASE ORAL at 07:11

## 2019-11-26 RX ADMIN — POLYETHYLENE GLYCOL (3350) 17 G: 17 POWDER, FOR SOLUTION ORAL at 07:11

## 2019-11-26 RX ADMIN — Medication 400 MG: at 07:11

## 2019-11-26 RX ADMIN — DICLOFENAC 2 G: 10 GEL TOPICAL at 07:11

## 2019-11-26 RX ADMIN — FERROUS SULFATE TAB EC 325 MG (65 MG FE EQUIVALENT) 325 MG: 325 (65 FE) TABLET DELAYED RESPONSE at 07:11

## 2019-11-26 RX ADMIN — PANTOPRAZOLE SODIUM 40 MG: 40 TABLET, DELAYED RELEASE ORAL at 07:11

## 2019-11-26 RX ADMIN — SENNOSIDES AND DOCUSATE SODIUM 1 TABLET: 8.6; 5 TABLET ORAL at 07:11

## 2019-11-26 RX ADMIN — CARBIDOPA AND LEVODOPA 2 TABLET: 25; 100 TABLET ORAL at 07:11

## 2019-11-26 RX ADMIN — ASPIRIN 325 MG ORAL TABLET 325 MG: 325 PILL ORAL at 07:11

## 2019-11-26 RX ADMIN — MELATONIN 2000 UNITS: at 07:11

## 2019-11-26 RX ADMIN — POTASSIUM & SODIUM PHOSPHATES POWDER PACK 280-160-250 MG 1 PACKET: 280-160-250 PACK at 07:11

## 2019-11-26 RX ADMIN — ACETAMINOPHEN 650 MG: 325 TABLET ORAL at 02:11

## 2019-11-26 NOTE — PLAN OF CARE
Problem: Occupational Therapy Goal  Goal: Occupational Therapy Goal  Description  Goals to be met by: 12/9/19     Patient will increase functional independence with ADLs by performing:    Feeding with Set-up Assistance.  UE Dressing with Set-up Assistance and Moderate Assistance.  Grooming while seated with Modified [place] with toiletries in reach.  Sitting at edge of bed x5 minutes with Contact Guard Assistance and use of upper extremity support.  Upper extremity exercise program x15 reps per handout, with assistance as needed.     Outcome: Ongoing, Progressing

## 2019-11-26 NOTE — PLAN OF CARE
POC reviewed with patient and patient's family, understanding verbalized. AAOX4. PRN pain medication given as ordered (see MAR) for complaints of pain. Turned q 2 for bedbound status and limited mobility. Room air. Regular diet. VS stable throughout shift. Safety maintained. Will continue to monitor.

## 2019-11-26 NOTE — PT/OT/SLP PROGRESS
Occupational Therapy   Treatment    Name: Maricarmen Woodward  MRN: [id number]  Admitting Diagnosis:  Closed fracture of condyle of right femur  4 Days Post-Op    Recommendations:     Discharge Recommendations: nursing facility, basic, other (see comments), home with home health, home health PT  Discharge Equipment Recommendations:  none  Barriers to discharge:       Assessment:     Maricarmen Woodward is a 89 y.o. female with a medical diagnosis of Closed fracture of condyle of right femur.  She presents with a sense of humor, delayed verbal response to questions, and needing increased time to perform tasks 2* pain and weakness of aging. Performance deficits affecting function are weakness, impaired endurance, impaired self care skills, impaired functional mobilty, impaired balance, decreased upper extremity function, decreased lower extremity function, pain, decreased ROM, orthopedic precautions.     Rehab Prognosis:  Good; patient would benefit from acute skilled OT services to address these deficits and reach maximum level of function.       Plan:     Patient to be seen 3 x/week to address the above listed problems via self-care/home management, therapeutic activities, therapeutic exercises  · Plan of Care Expires: 12/09/19  · Plan of Care Reviewed with: patient    Subjective     Pain/Comfort:  · Pain Rating 1: (stated she has pain but could not quantify)  · Location - Side 1: Right  · Location - Orientation 1: generalized  · Location 1: knee  · Pain Addressed 1: Reposition, Distraction    Objective:     Communicated with: Jacqueline RAMOS prior to session.  Patient found HOB elevated with visitor present with telemetry, SCD, peripheral IV upon OT entry to room.    General Precautions: Standard, fall   Orthopedic Precautions:RLE non weight bearing   Braces:       Occupational Performance:     Bed Mobility:    · Patient completed Scooting/Bridging with Total assistance despite verbal and tactile cues given to push  through heels to propel her self toward HOB.   · Patient completed Supine to Sit with moderate assistance and hand rail and hand held assist and verbal cues for sequencing and tactile cues and assist at R and L glut/hips to facilitate sitting.  · Patient completed Sit to Supine with moderate assistance and bed rail and leg lift and step by step verbal cues to enable initiation and cooperation of pt through out task. When she understood what was going to happen she was completely cooperative and willing to attempt it.  · Pt sat EOB x 10 min for oral care.    Activities of Daily Living:  · Grooming: minimum assistance with complete set up on tray table at EOB and with toothette and toothpaste as pt does not have teeth and gums are sensative to toothbrush.    Treatment & Education:  DUNN ed pt on role of OT to increase function and participation in daily activities including but not limited to daily hygiene activities. Pt v/u.  DUNN ed pt of role of therapy in her recovery and her role in her recovery. Pt v/u and stated that this was going to be hard.  DUNN ed on hip precautions of no adduction, and positioned pillow folded between thigh/ knees while supine to demo this precaution. She kept the pillow there and v/u.     Patient left HOB elevated with all lines intact, call button in reach, bed alarm on, RN Jacqueline notified and phlebotomist presentEducation:      GOALS:   Multidisciplinary Problems     Occupational Therapy Goals        Problem: Occupational Therapy Goal    Goal Priority Disciplines Outcome Interventions   Occupational Therapy Goal     OT, PT/OT Ongoing, Progressing    Description:  Goals to be met by: 12/9/19     Patient will increase functional independence with ADLs by performing:    Feeding with Set-up Assistance.  UE Dressing with Set-up Assistance and Moderate Assistance.  Grooming while seated with Modified [place] with toiletries in reach.  Sitting at edge of bed x5 minutes with Contact Guard  Assistance and use of upper extremity support.  Upper extremity exercise program x15 reps per handout, with assistance as needed.                      Time Tracking:     OT Date of Treatment: 11/26/19  OT Start Time: 0950  OT Stop Time: 1015  OT Total Time (min): 25 min    Billable Minutes:Self Care/Home Management 25 min    MIRNA Peres  11/26/2019

## 2019-11-26 NOTE — PLAN OF CARE
I completed the LOCET assessment with Nicolle at [phone] opt 3. I faxed the signed and completed PASRR to [facility] at [phone]. CM will follow to obtain pts emailed 142. Marsha Guzmán LCSW     I attached the completed PASRR and TB signs and symptoms to [facility] and sent them to [facility].  is awaiting medicine reconciliation on discharge orders before sending them also.  I called the pts nephew Denny at [phone] and left a detailed message for him to return my call. Marsha Guzmán LCSW    Pt is pending 142 for SNF admit.      11/26/19 0930   Post-Acute Status   Post-Acute Authorization Placement

## 2019-11-26 NOTE — PLAN OF CARE
Date Status/Notes Created By   · 11/26/2019 9:54:52 AM Note: Family here doing paperwork, will be ready when she is.  Sirisha [email or web address]  · 11/26/2019 9:25:51 AM Note: Discharging today, thanks  Marsha Guzmán  · 11/25/2019 3:36:08 PM Note: Not sure how she had surgery without one but just had one ordered. I also sent the PPD  Marsha Guzmán  · 11/25/2019 2:55:33 PM Note: Don't see a current cxr for this stay  Sirisha [email or web address]  · 11/25/2019 2:21:51 PM Note: Thanks , patients herb [place] [phone] will likely be completing paperwork  Marsha Guzmán  · 11/25/2019 2:15:15 PM Accepted  Sirisha [email or web address]@PAC  · 11/25/2019 1:17:57 PM Note: Accepted medically, checking financials and calling fam now.  Sirisha [email or web address]  · 11/25/2019 11:58:24 AM Under Review: Remote - Local Review  Sirisha [email or web address]  · 11/25/2019 11:05:45 AM New: Please review for SNF. Thanks!  Marsha Jama is currently completing paperwork. Per Dr. Yang, no narcotic prescriptions needed. Marsha Guzmán, OMAYRA     11/26/19 0956   Post-Acute Status   Post-Acute Authorization Placement   Post-Acute Placement Status Awaiting Clarification Orders
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
## 2019-11-26 NOTE — PLAN OF CARE
CM sent AVS with requested changes and needed scripts via [employer]. Pending 142. CM following.    Referral History  Date Status/Notes Created By   · 11/26/2019 3:32:58 PM Note: I just sent corrected AVS and added ortho follow up. CXR reviewed by Dr. Yang- added one time Lasix before she leaves and said she does not have pneumonia.  Karina Marlena  · 11/26/2019 2:54:18 PM Note: ortho f/u?  Sirisha [email or web address]         11/26/19 1533   Post-Acute Status   Post-Acute Authorization Placement   Post-Acute Placement Status Pending Post-Acute Clinical Review

## 2019-11-26 NOTE — PLAN OF CARE
NAD noted. POC reviewed w pt and they verbalize understanding.  Bed in low position, side rails up X2, bed alarm on, wheels locked, call light in reach. Will continue to monitor.

## 2019-11-26 NOTE — PLAN OF CARE
"I called [facility] 234 beds at the [facility] at [phone] to check on the status of the 142 requested at 9:30 am. I explained that I sent 3 at one time and have only received the third one . Per Dena, it depends on which worker receives the fax. She checked the social security number and stated ," It has not been processed yet." CM following. Marsha Guzmán, [facility]     11/26/19 1321   Post-Acute Status   Post-Acute Authorization Placement     "

## 2019-11-26 NOTE — PLAN OF CARE
Date Status/Notes Created By   · 11/26/2019 1:45:46 PM Note: I sent discharge orders for review. Last thing we need is 142- should get today. Thanks!  Marsha Guzmán  · 11/26/2019 10:01:03 AM Completed  Marsha Guzmán     I sent discharge orders to [facility] for review. CM following. Marsha Guzmán, [facility]     11/26/19 1345   Post-Acute Status   Post-Acute Authorization Placement   Post-Acute Placement Status Pending Post-Acute Medical Review

## 2019-11-26 NOTE — PLAN OF CARE
11/26/19 0809   Patient Assessment/Suction   Level of Consciousness (AVPU) alert   Respiratory Effort Unlabored   Expansion/Accessory Muscles/Retractions no retractions;no use of accessory muscles   All Lung Fields Breath Sounds diminished;clear   Rhythm/Pattern, Respiratory no shortness of breath reported   PRE-TX-O2   O2 Device (Oxygen Therapy) room air   SpO2 97 %   Pulse Oximetry Type Intermittent   $ Pulse Oximetry - Multiple Charge Pulse Oximetry - Multiple   Pulse 93   Resp 18   Aerosol Therapy   $ Aerosol Therapy Charges PRN treatment not required   Respiratory Treatment Status (SVN) PRN treatment not required

## 2019-11-26 NOTE — ASSESSMENT & PLAN NOTE
Noted on CT scan.  Unclear if acute or chronic.  Patient has no focal pain in her back.  Consulted PT and OT.-recommendation for skilled has she has additional femur fracture

## 2019-11-26 NOTE — PT/OT/SLP PROGRESS
Physical Therapy Treatment    Patient Name:  Maricarmen Woodward   MRN:  [id number]    Recommendations:     Discharge Recommendations:      Discharge Equipment Recommendations: other (see comments)(TBD)   Barriers to discharge: Patient will still require assist upon DC but has a full time sitter who lives with the patient.    Assessment:     Maricarmen Woodward is a 89 y.o. female admitted with a medical diagnosis of Closed fracture of condyle of right femur.  She presents with the following impairments/functional limitations:  weakness, impaired endurance, impaired balance, pain, impaired functional mobilty, gait instability, decreased lower extremity function .  Patient reluctantly agreeable to PT for transfer training but did C/O max pain in right LE and bilateral shoulder during all of treatment.  Patient transferred supine to sit with total assist and EOB to chair also with total assist and NWB right LE.  Sitter was not present during treatment today but sitter should be training on future treatment for safe WC transfers.      Rehab Prognosis: Fair; patient would benefit from acute skilled PT services to address these deficits and reach maximum level of function.    Recent Surgery: Procedure(s) (LRB):  ORIF, FRACTURE, FEMUR, Synthes, radiolucent triangle, 1st assist (Right) 4 Days Post-Op    Plan:     During this hospitalization, patient to be seen 6 x/week to address the identified rehab impairments via gait training, therapeutic activities, therapeutic exercises and progress toward the following goals:    · Plan of Care Expires:  11/30/19    Subjective     Chief Complaint: pain  Patient/Family Comments/goals: go home  Pain/Comfort:  · Pain Rating 1: 5/10  · Location - Side 1: Bilateral  · Location - Orientation 1: upper  · Location 1: shoulder  · Pain Addressed 1: Cessation of Activity, Reposition  · Pain Rating Post-Intervention 1: 5/10  · Pain Rating 2: 6/10  · Location - Side 2: Right  · Location -  Orientation 2: lower  · Location 2: leg  · Pain Addressed 2: Cessation of Activity, Reposition  · Pain Rating Post-Intervention 2: 3/10      Objective:     Communicated with nurse Phillips prior to session.  Patient found supine with peripheral IV upon PT entry to room.     General Precautions: Standard, fall   Orthopedic Precautions:RLE non weight bearing   Braces:       Functional Mobility:  · Bed Mobility:     · Supine to Sit: total assistance  · Sit to Supine: total assistance  · Transfers:     · Bed to Chair: total assistance with  no AD  using  Stand Pivot      AM-PAC 6 CLICK MOBILITY  Turning over in bed (including adjusting bedclothes, sheets and blankets)?: 2  Sitting down on and standing up from a chair with arms (e.g., wheelchair, bedside commode, etc.): 2  Moving from lying on back to sitting on the side of the bed?: 2  Moving to and from a bed to a chair (including a wheelchair)?: 2  Need to walk in hospital room?: 1  Climbing 3-5 steps with a railing?: 1  Basic Mobility Total Score: 10       Therapeutic Activities and Exercises:   transfer training supine to/from sit with total assist and EOB to/from chair with total assist and NWB right LE.    Patient left supine with call button in reach and bed alarm on..    GOALS:   Multidisciplinary Problems     Physical Therapy Goals        Problem: Physical Therapy Goal    Goal Priority Disciplines Outcome Goal Variances Interventions   Physical Therapy Goal     PT, PT/OT Ongoing, Progressing     Description:  Goals to be met by: 2019     Patient will increase functional independence with mobility by performin). Supine to sit with MInimal Assistance  2). Sit to supine with MInimal Assistance  3). Rolling to Left and Right with Minimal Assistance.  4). Bed to chair transfer with Moderate Assistance  5). Sitting at edge of bed x > 10 minutes with Stand-by Assistance                      Time Tracking:     PT Received On: 19  PT Start Time: 08      PT Stop Time: 0905  PT Total Time (min): 14 min     Billable Minutes: Therapeutic Activity 14    Treatment Type: Treatment  PT/PTA: PT     PTA Visit Number: 0     Chris Megilligan, PT  11/26/2019

## 2019-11-26 NOTE — DISCHARGE INSTRUCTIONS
[facility] Facility Transfer Orders        [facility]    Diagnoses:   Active Hospital Problems    Diagnosis  POA    *Closed fracture of condyle of right femur [S72.411A]  Yes    Hypophosphatemia [E83.39]  No    Pressure injury of right buttock, stage 1 [L89.311]  Yes    Pressure injury of left buttock, stage 1 [L89.321]  Yes    Anemia associated with acute blood loss [D62]  Yes    Compression fracture of L4 lumbar vertebra, closed, initial encounter [S32.040A]  Yes    Intramuscular hematoma [T14.8XXA]  Yes    Ecchymosis [R58]  Yes     Multiple areas of chest abdomen back arm.      Parkinson's disease [G20]  Yes    Essential hypertension [I10]  Yes      Resolved Hospital Problems   No resolved problems to display.     Allergies: Review of patient's allergies indicates:  No Known Allergies    Code Status: full     Vitals: Every shift       Diet: cardiac diet    Activity: Weight bearing status: non weight bearing: right leg    Nursing Precautions: Aspiration , Fall, Seizure and Pressure ulcer prevention    Bed/Surface: Low Air Loss    Consults: PT to evaluate and treat- 5 times a week, OT to evaluate and treat- 5 times a week and Nutrition to evaluate and recommend diet    Oxygen: room air    Dialysis: Patient is not on dialysis.     Labs: cbc, bmp mag weekly x 2 weeks    Pending Diagnostic Studies:     None        Imaging: na    Miscellaneous Care:   na    IV Access: na     Medications: Discontinue all previous medication orders, if any. See new list below.  Current Discharge Medication List      START taking these medications    Details   HYDROcodone-acetaminophen (NORCO) 5-325 mg per tablet Take 1 tablet by mouth every 6 (six) hours as needed.  Qty: 15 tablet, Refills: 0    Comments: Quantity prescribed more than 7 day supply? No      polyethylene glycol (GLYCOLAX) 17 gram PwPk Take 17 g by mouth once daily.  Qty: 30 packet, Refills: 0         CONTINUE these  medications which have CHANGED    Details   clonazePAM (KLONOPIN) 0.5 MG tablet Take 1 tablet (0.5 mg total) by mouth 2 (two) times daily as needed for Anxiety.  Qty: 10 tablet, Refills: 0         CONTINUE these medications which have NOT CHANGED    Details   acetaminophen (TYLENOL) 325 MG tablet Take 2 tablets (650 mg total) by mouth every 4 (four) hours as needed.  Refills: 0      albuterol-ipratropium (DUO-NEB) 2.5 mg-0.5 mg/3 mL nebulizer solution Take 3 mLs by nebulization every 4 (four) hours as needed for Wheezing or Shortness of Breath. Rescue  Qty: 1 Box, Refills: 0      aspirin (ECOTRIN) 81 MG EC tablet Take 81 mg by mouth once daily.      calcium carbonate (CALCIUM ANTACID) 300 mg (750 mg) Chew Take 1 tablet by mouth 2 (two) times daily.      carbidopa-levodopa  mg (SINEMET)  mg per tablet Take 2 tablets by mouth 2 (two) times daily with meals. Breakfast and dinner      carbidopa-levodopa  mg (SINEMET CR)  mg TbSR Take 1.5 tablets by mouth every evening.      cholecalciferol, vitamin D3, (VITAMIN D3) 2,000 unit Tab Take 2,000 Units by mouth once daily.      cyanocobalamin (VITAMIN B-12) 100 MCG tablet Take 100 mcg by mouth once daily.       ferrous sulfate 325 (65 FE) MG EC tablet Take 325 mg by mouth once daily.      magnesium oxide (MAG-OX) 400 mg (241.3 mg magnesium) tablet Take 1 tablet (400 mg total) by mouth once daily.  Refills: 0      multivit-iron-min-folic acid (MULTIVITAMIN-IRON-MINERALS-FOLIC ACID) 3,500-18-0.4 unit-mg-mg Chew Take 1 tablet by mouth once daily.        omega-3 fatty acids-vitamin E (FISH OIL) 1,000 mg Cap Take 1 capsule by mouth once daily.      omeprazole (PRILOSEC) 40 MG capsule Take 40 mg by mouth once daily.      potassium chloride SA (K-DUR,KLOR-CON) 10 MEQ tablet Take 10 mEq by mouth once daily.        pravastatin (PRAVACHOL) 40 MG tablet Take 40 mg by mouth every evening.      senna-docusate 8.6-50 mg (SENNA WITH DOCUSATE SODIUM) 8.6-50 mg per  tablet Take 1 tablet by mouth once daily.         STOP taking these medications       oxyCODONE-acetaminophen (PERCOCET)  mg per tablet Comments:   Reason for Stopping:             Follow up:   Follow-up Information     [facility]    Specialties:  SNF Agency, Allergy, Allergy  Why:  SNF, Nursing Home  Contact information:  [address] [place] [address] DR Lidia GREEN [id number]  [phone]

## 2019-11-26 NOTE — SUBJECTIVE & OBJECTIVE
Interval History:   Trouble with constipation otherwise no new complaints  Continue to await skilled nursing placement    Review of Systems   Cardiovascular: Negative for chest pain and leg swelling.   Gastrointestinal: Positive for constipation. Negative for diarrhea.   Genitourinary: Negative for difficulty urinating and dysuria.   Neurological: Positive for tremors and weakness.     Objective:     Vital Signs (Most Recent):  Temp: 98.8 °F (37.1 °C) (11/25/19 2135)  Pulse: 100 (11/25/19 2135)  Resp: 18 (11/25/19 2135)  BP: (!) 147/63 (11/25/19 2135)  SpO2: 95 % (11/25/19 2135) Vital Signs (24h Range):  Temp:  [96.6 °F (35.9 °C)-98.9 °F (37.2 °C)] 98.8 °F (37.1 °C)  Pulse:  [] 100  Resp:  [16-18] 18  SpO2:  [94 %-98 %] 95 %  BP: (138-166)/(60-82) 147/63     Weight: 48.2 kg (106 lb 4.2 oz)  Body mass index is 18.82 kg/m².    Intake/Output Summary (Last 24 hours) at 11/25/2019 2203  Last data filed at 11/25/2019 0200  Gross per 24 hour   Intake 480 ml   Output --   Net 480 ml      Physical Exam   Constitutional: She is oriented to person, place, and time. No distress.   HENT:   Mouth/Throat: Oropharynx is clear and moist.   Eyes: Right eye exhibits no discharge. Left eye exhibits no discharge.   Neck: No JVD present.   Cardiovascular: Normal rate and regular rhythm. Exam reveals no gallop.   Pulmonary/Chest: Effort normal and breath sounds normal.   Abdominal: Soft. Bowel sounds are normal. She exhibits no distension. There is no tenderness.   Musculoskeletal: She exhibits no edema.   Surgical dressing applied at right lower femur area.   Neurological: She is alert and oriented to person, place, and time.   Skin: Skin is warm and dry. No rash noted. She is not diaphoretic.   Ecchymoses on chest, left arm, and right knee.  Stage I ulcers on both buttocks.   Psychiatric: She has a normal mood and affect.   Alert cooperative       Significant Labs:   BMP:   Recent Labs   Lab 11/25/19  0442   GLU 85      K  4.2   *   CO2 19*   BUN 12   CREATININE 0.6   CALCIUM 8.7     CBC:   Recent Labs   Lab 11/25/19  0442   WBC 8.43   HGB 8.8*   HCT 28.6*          Significant Imaging: I have reviewed and interpreted all pertinent imaging results/findings within the past 24 hours.

## 2019-11-27 ENCOUNTER — PATIENT OUTREACH (OUTPATIENT)
Dept: ADMINISTRATIVE | Facility: CLINIC | Age: 84
End: 2019-11-27

## 2019-11-27 NOTE — TELEPHONE ENCOUNTER
SNF Clinical Update log emailed to Daniel [facility] of [facility].

## 2019-11-29 NOTE — DISCHARGE SUMMARY
[facility] Hospital Medicine  Discharge Summary      Patient Name: Maricarmen Woodward  MRN: [id number]  Admission Date: 11/19/2019  Hospital Length of Stay: 7 days  Discharge Date and Time: 11/26/2019  4:24 PM  Attending Physician: No att. providers found   Discharging Provider: Ev Yang MD  Primary Care Provider: Haris Brambila MD      HPI:   The patient is a 89 y.o. female  who presents with abnormal labs. The patient was sent by her caregiver for anemia. Pt had her labs drawn recently which resulted in low H&H.   Patient also c/o right knee pain but denies a fall or injury. Pt is a poor historian.   Evaluation in the emergency room revealed significant bruising across her back chest and abdomen and is significant drop in her hemoglobin and hematocrit requiring transfusion.  CT scan chest abdomen pelvis revealed a new compression fracture  L4,  Superficial contusion along the left chest and abdomen, and a small intramuscular hematoma on the left pectoral area.  CT head is negative for acute bleed.  X-ray have was normal and x-ray of the knee revealed minimally displaced intra-articular fracture of the medial femoral condyle  Patient admitted for blood transfusion and Orthopedic PT and OT evaluation.    PMHx of HTN, arthritis and parkinson's disease. SHx of fracture surgery, joint replacement and hemiathroplasty of left hip.    Procedure(s) (LRB):  ORIF, FRACTURE, FEMUR, Synthes, radiolucent triangle, 1st assist (Right)      Hospital Course:   Pt was admitted.  Imaging showed fractures of L4 and distal right femur.  Analgesics were given.  Orthopedist was consulted.  Pt went to OR for ORIF of right distal femur fracture.Underwent ORIF by Dr. Siva Mccormack on 11/22. Continue NWB on right leg.  Daily PT./OT    case management consulted for skilled placement and pt accepted day of dc, pain was well controlled.       Pt required blood transfusion from the extensive ecchymoses.  Her HGB  stayed stable afterwards.  She was put on enoxaparin for VTE prevention.=hgb trended down slightly and remained stable . Iron supplements and vit c started and cbc to  be monitored at SNF.     Parkinsons stable and routine medications continued.   At dc pt alert, 0x3, nad. Lungs clear, cor rrr, abd-soft, nontender  Right leg in ace-dressing c/d/i           Consults:   Consults (From admission, onward)        Status Ordering Provider     Inpatient consult to Orthopedic Surgery  Once     Provider:  Dario Rosado MD    Completed CASSIUS MOURA     Inpatient consult to Skilled Nursing  Once     Provider:  (Not yet assigned)    Completed HAYES SOTELO     Inpatient consult to Social Work/Case Management  Once     Provider:  (Not yet assigned)    Completed CASSIUS MOURA     Inpatient consult to Social Work/Case Management  Once     Provider:  (Not yet assigned)    Completed CASSIUS MOURA     IP consult case management/social work  Once     Provider:  (Not yet assigned)    Completed HAYES SOTELO     IP consult to case management  Once     Provider:  (Not yet assigned)    Completed CASSIUS MOURA          No new Assessment & Plan notes have been filed under this hospital service since the last note was generated.  Service: Hospital Medicine    Final Active Diagnoses:    Diagnosis Date Noted POA    PRINCIPAL PROBLEM:  Closed fracture of condyle of right femur [S72.411A] 11/19/2019 Yes    Hypophosphatemia [E83.39] 11/25/2019 No    Pressure injury of right buttock, stage 1 [L89.311] 11/20/2019 Yes    Pressure injury of left buttock, stage 1 [L89.321] 11/20/2019 Yes    Anemia associated with acute blood loss [D62] 11/19/2019 Yes    Compression fracture of L4 lumbar vertebra, closed, initial encounter [S32.040A] 11/19/2019 Yes    Intramuscular hematoma [T14.8XXA] 11/19/2019 Yes    Ecchymosis [R58] 11/19/2019 Yes    Parkinson's disease [G20] 01/13/2017 Yes    Essential hypertension  [I10] 01/13/2017 Yes      Problems Resolved During this Admission:       Discharged Condition: fair    Disposition: Skilled Nursing Facility    Follow Up:  Follow-up Information     [facility]    Specialties:  SNF Agency, Allergy, Allergy  Why:  SNF, Nursing Home  Contact information:  [address] [place] [address] [address] [place] [address]  [phone]             Cheng Mccormack MD On 12/10/2019.    Specialty:  Orthopedic Surgery  Why:  2:30 PM for POST OP appt  Contact information:  [address] [place] [address]  [address]  [facility]  [address] [address]  [phone]                 Patient Instructions:   No discharge procedures on file.    Significant Diagnostic Studies:   Results for ALANIS DAIGLE (MRN [id number]) as of 11/29/2019 09:14   Ref. Range 11/26/2019 04:35   WBC Latest Ref Range: 3.90 - 12.70 K/uL 9.91   RBC Latest Ref Range: 4.00 - 5.40 M/uL 2.27 (L)   Hemoglobin Latest Ref Range: 12.0 - 16.0 g/dL 7.5 (L)   Hematocrit Latest Ref Range: 37.0 - 48.5 % 23.4 (L)   MCV Latest Ref Range: 82 - 98 fL 103 (H)   MCH Latest Ref Range: 27.0 - 31.0 pg 33.0 (H)   MCHC Latest Ref Range: 32.0 - 36.0 g/dL 32.1   RDW Latest Ref Range: 11.5 - 14.5 % 18.6 (H)   Platelets Latest Ref Range: 150 - 350 K/uL 334   Results for ALANIS DAIGLE (MRN [id number]) as of 11/29/2019 09:14   Ref. Range 11/26/2019 04:35   Sodium Latest Ref Range: 136 - 145 mmol/L 138   Potassium Latest Ref Range: 3.5 - 5.1 mmol/L 4.0   Chloride Latest Ref Range: 95 - 110 mmol/L 109   CO2 Latest Ref Range: 23 - 29 mmol/L 21 (L)   Anion Gap Latest Ref Range: 8 - 16 mmol/L 8   BUN, Bld Latest Ref Range: 10 - 30 mg/dL 12   Creatinine Latest Ref Range: 0.5 - 1.4 mg/dL 0.7   eGFR if non African American Latest Ref Range: >60 mL/min/1.73 m^2 >60   eGFR if  Latest Ref Range: >60 mL/min/1.73 m^2 >60   Glucose Latest Ref Range: 70 - 110 mg/dL 90   Calcium Latest Ref Range: 8.7 - 10.5 mg/dL 8.3 (L)   Phosphorus  Latest Ref Range: 2.7 - 4.5 mg/dL 2.8   Albumin Latest Ref Range: 3.5 - 5.2 g/dL 2.3 (L)   ORIF the right distal femur with good alignment.      Electronically signed by: Amrik Jeong MD  Date: 11/22/2019  Time: 17:49    Pending Diagnostic Studies:     None         Medications:  Reconciled Home Medications:      Medication List      START taking these medications    clonazePAM 0.5 MG tablet  Commonly known as:  KLONOPIN  Take 1 tablet (0.5 mg total) by mouth 2 (two) times daily as needed for Anxiety.     HYDROcodone-acetaminophen 5-325 mg per tablet  Commonly known as:  NORCO  Take 1 tablet by mouth every 6 (six) hours as needed.     polyethylene glycol 17 gram Pwpk  Commonly known as:  GLYCOLAX  Take 17 g by mouth once daily.        CONTINUE taking these medications    acetaminophen 325 MG tablet  Commonly known as:  TYLENOL  Take 2 tablets (650 mg total) by mouth every 4 (four) hours as needed.     albuterol-ipratropium 2.5 mg-0.5 mg/3 mL nebulizer solution  Commonly known as:  DUO-NEB  Take 3 mLs by nebulization every 4 (four) hours as needed for Wheezing or Shortness of Breath. Rescue     aspirin 81 MG EC tablet  Commonly known as:  ECOTRIN  Take 81 mg by mouth once daily.     Calcium Antacid 300 mg (750 mg) Chew  Generic drug:  calcium carbonate  Take 1 tablet by mouth 2 (two) times daily.     * carbidopa-levodopa  mg  mg per tablet  Commonly known as:  SINEMET  Take 2 tablets by mouth 2 (two) times daily with meals. Breakfast and dinner     * carbidopa-levodopa  mg  mg Tbsr  Commonly known as:  SINEMET CR  Take 1.5 tablets by mouth every evening.     Centrum 3,500-18-0.4 unit-mg-mg Chew  Generic drug:  multivit-iron-min-folic acid  Take 1 tablet by mouth once daily.     ferrous sulfate 325 (65 FE) MG EC tablet  Take 325 mg by mouth once daily.     Fish Oil 1,000 mg Cap  Generic drug:  omega-3 fatty acids-vitamin E  Take 1 capsule by mouth once daily.     magnesium oxide 400 mg  (241.3 mg magnesium) tablet  Commonly known as:  MAG-OX  Take 1 tablet (400 mg total) by mouth once daily.     omeprazole 40 MG capsule  Commonly known as:  PRILOSEC  Take 40 mg by mouth once daily.     potassium chloride SA 10 MEQ tablet  Commonly known as:  K-DUR,KLOR-CON  Take 10 mEq by mouth once daily.     pravastatin 40 MG tablet  Commonly known as:  PRAVACHOL  Take 40 mg by mouth every evening.     Senna with Docusate Sodium 8.6-50 mg per tablet  Generic drug:  senna-docusate 8.6-50 mg  Take 1 tablet by mouth once daily.     Vitamin B-12 100 MCG tablet  Generic drug:  cyanocobalamin  Take 100 mcg by mouth once daily.     Vitamin D3 2,000 unit Tab  Generic drug:  cholecalciferol (vitamin D3)  Take 2,000 Units by mouth once daily.         * This list has 2 medication(s) that are the same as other medications prescribed for you. Read the directions carefully, and ask your doctor or other care provider to review them with you.            STOP taking these medications    oxyCODONE-acetaminophen  mg per tablet  Commonly known as:  Percocet            Indwelling Lines/Drains at time of discharge:   Lines/Drains/Airways     None                 Time spent on the discharge of patient: 35 minutes  Patient was seen and examined on the date of discharge and determined to be suitable for discharge.         Ev Yang MD  Department of Hospital Medicine  [facility]

## 2019-11-30 ENCOUNTER — HOSPITAL ENCOUNTER (INPATIENT)
Facility: HOSPITAL | Age: 84
LOS: 3 days | Discharge: NURSING, REHAB OR LONG-TERM CARE | DRG: 378 | End: 2019-12-03
Attending: EMERGENCY MEDICINE | Admitting: INTERNAL MEDICINE
Payer: MEDICARE

## 2019-11-30 DIAGNOSIS — K92.2 GASTROINTESTINAL HEMORRHAGE, UNSPECIFIED GASTROINTESTINAL HEMORRHAGE TYPE: ICD-10-CM

## 2019-11-30 DIAGNOSIS — K92.2 LOWER GI BLEEDING: Primary | ICD-10-CM

## 2019-11-30 DIAGNOSIS — D64.9 ANEMIA, UNSPECIFIED TYPE: ICD-10-CM

## 2019-11-30 DIAGNOSIS — I49.3 PVC (PREMATURE VENTRICULAR CONTRACTION): ICD-10-CM

## 2019-11-30 PROBLEM — Z87.81 S/P ORIF (OPEN REDUCTION INTERNAL FIXATION) FRACTURE: Status: ACTIVE | Noted: 2019-11-30

## 2019-11-30 PROBLEM — Z98.890 S/P ORIF (OPEN REDUCTION INTERNAL FIXATION) FRACTURE: Status: ACTIVE | Noted: 2019-11-30

## 2019-11-30 LAB
ABO + RH BLD: NORMAL
ALBUMIN SERPL BCP-MCNC: 2.8 G/DL (ref 3.5–5.2)
ALP SERPL-CCNC: 71 U/L (ref 55–135)
ALT SERPL W/O P-5'-P-CCNC: <5 U/L (ref 10–44)
ANION GAP SERPL CALC-SCNC: 6 MMOL/L (ref 8–16)
APTT BLDCRRT: 37 SEC (ref 21–32)
AST SERPL-CCNC: 15 U/L (ref 10–40)
BASOPHILS # BLD AUTO: 0.03 K/UL (ref 0–0.2)
BASOPHILS # BLD AUTO: 0.03 K/UL (ref 0–0.2)
BASOPHILS # BLD AUTO: 0.04 K/UL (ref 0–0.2)
BASOPHILS NFR BLD: 0.3 % (ref 0–1.9)
BILIRUB SERPL-MCNC: 1.2 MG/DL (ref 0.1–1)
BLD GP AB SCN CELLS X3 SERPL QL: NORMAL
BNP SERPL-MCNC: 301 PG/ML (ref 0–99)
BUN SERPL-MCNC: 21 MG/DL (ref 10–30)
CALCIUM SERPL-MCNC: 8.8 MG/DL (ref 8.7–10.5)
CHLORIDE SERPL-SCNC: 106 MMOL/L (ref 95–110)
CO2 SERPL-SCNC: 26 MMOL/L (ref 23–29)
CREAT SERPL-MCNC: 0.7 MG/DL (ref 0.5–1.4)
DIFFERENTIAL METHOD: ABNORMAL
EOSINOPHIL # BLD AUTO: 0 K/UL (ref 0–0.5)
EOSINOPHIL # BLD AUTO: 0 K/UL (ref 0–0.5)
EOSINOPHIL # BLD AUTO: 0.1 K/UL (ref 0–0.5)
EOSINOPHIL NFR BLD: 0 % (ref 0–8)
EOSINOPHIL NFR BLD: 0.4 % (ref 0–8)
EOSINOPHIL NFR BLD: 0.5 % (ref 0–8)
ERYTHROCYTE [DISTWIDTH] IN BLOOD BY AUTOMATED COUNT: 17.2 % (ref 11.5–14.5)
ERYTHROCYTE [DISTWIDTH] IN BLOOD BY AUTOMATED COUNT: 17.5 % (ref 11.5–14.5)
ERYTHROCYTE [DISTWIDTH] IN BLOOD BY AUTOMATED COUNT: 17.7 % (ref 11.5–14.5)
EST. GFR  (AFRICAN AMERICAN): >60 ML/MIN/1.73 M^2
EST. GFR  (NON AFRICAN AMERICAN): >60 ML/MIN/1.73 M^2
GLUCOSE SERPL-MCNC: 112 MG/DL (ref 70–110)
HCT VFR BLD AUTO: 20.4 % (ref 37–48.5)
HCT VFR BLD AUTO: 22.3 % (ref 37–48.5)
HCT VFR BLD AUTO: 26 % (ref 37–48.5)
HGB BLD-MCNC: 6.5 G/DL (ref 12–16)
HGB BLD-MCNC: 7.3 G/DL (ref 12–16)
HGB BLD-MCNC: 8.2 G/DL (ref 12–16)
IMM GRANULOCYTES # BLD AUTO: 0.04 K/UL (ref 0–0.04)
IMM GRANULOCYTES # BLD AUTO: 0.04 K/UL (ref 0–0.04)
IMM GRANULOCYTES # BLD AUTO: 0.06 K/UL (ref 0–0.04)
INR PPP: 1 (ref 0.8–1.2)
LYMPHOCYTES # BLD AUTO: 0.7 K/UL (ref 1–4.8)
LYMPHOCYTES # BLD AUTO: 1.1 K/UL (ref 1–4.8)
LYMPHOCYTES # BLD AUTO: 1.2 K/UL (ref 1–4.8)
LYMPHOCYTES NFR BLD: 10.2 % (ref 18–48)
LYMPHOCYTES NFR BLD: 12.5 % (ref 18–48)
LYMPHOCYTES NFR BLD: 6.3 % (ref 18–48)
MCH RBC QN AUTO: 32 PG (ref 27–31)
MCH RBC QN AUTO: 32.3 PG (ref 27–31)
MCH RBC QN AUTO: 33 PG (ref 27–31)
MCHC RBC AUTO-ENTMCNC: 31.5 G/DL (ref 32–36)
MCHC RBC AUTO-ENTMCNC: 31.9 G/DL (ref 32–36)
MCHC RBC AUTO-ENTMCNC: 32.7 G/DL (ref 32–36)
MCV RBC AUTO: 101 FL (ref 82–98)
MCV RBC AUTO: 102 FL (ref 82–98)
MCV RBC AUTO: 102 FL (ref 82–98)
MONOCYTES # BLD AUTO: 0.4 K/UL (ref 0.3–1)
MONOCYTES # BLD AUTO: 0.6 K/UL (ref 0.3–1)
MONOCYTES # BLD AUTO: 0.6 K/UL (ref 0.3–1)
MONOCYTES NFR BLD: 3.6 % (ref 4–15)
MONOCYTES NFR BLD: 5.3 % (ref 4–15)
MONOCYTES NFR BLD: 6.7 % (ref 4–15)
NEUTROPHILS # BLD AUTO: 7.2 K/UL (ref 1.8–7.7)
NEUTROPHILS # BLD AUTO: 9.3 K/UL (ref 1.8–7.7)
NEUTROPHILS # BLD AUTO: 9.8 K/UL (ref 1.8–7.7)
NEUTROPHILS NFR BLD: 79.7 % (ref 38–73)
NEUTROPHILS NFR BLD: 83.2 % (ref 38–73)
NEUTROPHILS NFR BLD: 89.4 % (ref 38–73)
NRBC BLD-RTO: 0 /100 WBC
PLATELET # BLD AUTO: 272 K/UL (ref 150–350)
PLATELET # BLD AUTO: 322 K/UL (ref 150–350)
PLATELET # BLD AUTO: 367 K/UL (ref 150–350)
PMV BLD AUTO: 9.1 FL (ref 9.2–12.9)
PMV BLD AUTO: 9.2 FL (ref 9.2–12.9)
PMV BLD AUTO: 9.5 FL (ref 9.2–12.9)
POTASSIUM SERPL-SCNC: 4.3 MMOL/L (ref 3.5–5.1)
PROT SERPL-MCNC: 5.6 G/DL (ref 6–8.4)
PROTHROMBIN TIME: 10 SEC (ref 9–12.5)
RBC # BLD AUTO: 2.01 M/UL (ref 4–5.4)
RBC # BLD AUTO: 2.21 M/UL (ref 4–5.4)
RBC # BLD AUTO: 2.56 M/UL (ref 4–5.4)
SODIUM SERPL-SCNC: 138 MMOL/L (ref 136–145)
TROPONIN I SERPL DL<=0.01 NG/ML-MCNC: 0.14 NG/ML (ref 0–0.03)
TSH SERPL DL<=0.005 MIU/L-ACNC: 1.28 UIU/ML (ref 0.4–4)
WBC # BLD AUTO: 10.44 K/UL (ref 3.9–12.7)
WBC # BLD AUTO: 11.77 K/UL (ref 3.9–12.7)
WBC # BLD AUTO: 9.1 K/UL (ref 3.9–12.7)

## 2019-11-30 PROCEDURE — 86850 RBC ANTIBODY SCREEN: CPT

## 2019-11-30 PROCEDURE — 83880 ASSAY OF NATRIURETIC PEPTIDE: CPT

## 2019-11-30 PROCEDURE — C9113 INJ PANTOPRAZOLE SODIUM, VIA: HCPCS | Performed by: INTERNAL MEDICINE

## 2019-11-30 PROCEDURE — 25000003 PHARM REV CODE 250: Performed by: INTERNAL MEDICINE

## 2019-11-30 PROCEDURE — 99223 PR INITIAL HOSPITAL CARE,LEVL III: ICD-10-PCS | Mod: ,,, | Performed by: INTERNAL MEDICINE

## 2019-11-30 PROCEDURE — 93005 ELECTROCARDIOGRAM TRACING: CPT

## 2019-11-30 PROCEDURE — 80053 COMPREHEN METABOLIC PANEL: CPT

## 2019-11-30 PROCEDURE — 25000003 PHARM REV CODE 250: Performed by: NURSE PRACTITIONER

## 2019-11-30 PROCEDURE — 63600175 PHARM REV CODE 636 W HCPCS: Performed by: EMERGENCY MEDICINE

## 2019-11-30 PROCEDURE — 12000002 HC ACUTE/MED SURGE SEMI-PRIVATE ROOM

## 2019-11-30 PROCEDURE — 85730 THROMBOPLASTIN TIME PARTIAL: CPT

## 2019-11-30 PROCEDURE — 86920 COMPATIBILITY TEST SPIN: CPT

## 2019-11-30 PROCEDURE — 99900035 HC TECH TIME PER 15 MIN (STAT)

## 2019-11-30 PROCEDURE — 84443 ASSAY THYROID STIM HORMONE: CPT

## 2019-11-30 PROCEDURE — 63600175 PHARM REV CODE 636 W HCPCS: Performed by: INTERNAL MEDICINE

## 2019-11-30 PROCEDURE — 84484 ASSAY OF TROPONIN QUANT: CPT

## 2019-11-30 PROCEDURE — 85025 COMPLETE CBC W/AUTO DIFF WBC: CPT | Mod: 91

## 2019-11-30 PROCEDURE — 99223 1ST HOSP IP/OBS HIGH 75: CPT | Mod: ,,, | Performed by: INTERNAL MEDICINE

## 2019-11-30 PROCEDURE — 99285 EMERGENCY DEPT VISIT HI MDM: CPT | Mod: 25

## 2019-11-30 PROCEDURE — 94761 N-INVAS EAR/PLS OXIMETRY MLT: CPT

## 2019-11-30 PROCEDURE — 85610 PROTHROMBIN TIME: CPT

## 2019-11-30 PROCEDURE — 36415 COLL VENOUS BLD VENIPUNCTURE: CPT

## 2019-11-30 RX ORDER — CARBIDOPA AND LEVODOPA 25; 100 MG/1; MG/1
2 TABLET ORAL 2 TIMES DAILY WITH MEALS
Status: DISCONTINUED | OUTPATIENT
Start: 2019-11-30 | End: 2019-12-03 | Stop reason: HOSPADM

## 2019-11-30 RX ORDER — PANTOPRAZOLE SODIUM 40 MG/10ML
40 INJECTION, POWDER, LYOPHILIZED, FOR SOLUTION INTRAVENOUS 2 TIMES DAILY
Status: DISCONTINUED | OUTPATIENT
Start: 2019-11-30 | End: 2019-12-01

## 2019-11-30 RX ORDER — HYDROCODONE BITARTRATE AND ACETAMINOPHEN 5; 325 MG/1; MG/1
1 TABLET ORAL EVERY 6 HOURS PRN
Status: DISCONTINUED | OUTPATIENT
Start: 2019-11-30 | End: 2019-12-03 | Stop reason: HOSPADM

## 2019-11-30 RX ORDER — SODIUM CHLORIDE 0.9 % (FLUSH) 0.9 %
10 SYRINGE (ML) INJECTION
Status: DISCONTINUED | OUTPATIENT
Start: 2019-11-30 | End: 2019-12-03 | Stop reason: HOSPADM

## 2019-11-30 RX ORDER — METOPROLOL TARTRATE 25 MG/1
12.5 TABLET ORAL 2 TIMES DAILY
Status: DISCONTINUED | OUTPATIENT
Start: 2019-11-30 | End: 2019-12-02

## 2019-11-30 RX ORDER — ONDANSETRON 2 MG/ML
4 INJECTION INTRAMUSCULAR; INTRAVENOUS EVERY 8 HOURS PRN
Status: DISCONTINUED | OUTPATIENT
Start: 2019-11-30 | End: 2019-12-03 | Stop reason: HOSPADM

## 2019-11-30 RX ORDER — ACETAMINOPHEN 325 MG/1
650 TABLET ORAL EVERY 6 HOURS PRN
Status: DISCONTINUED | OUTPATIENT
Start: 2019-11-30 | End: 2019-12-03 | Stop reason: HOSPADM

## 2019-11-30 RX ORDER — DEXTROSE MONOHYDRATE AND SODIUM CHLORIDE 5; .9 G/100ML; G/100ML
INJECTION, SOLUTION INTRAVENOUS CONTINUOUS
Status: DISCONTINUED | OUTPATIENT
Start: 2019-11-30 | End: 2019-11-30

## 2019-11-30 RX ORDER — DEXTROSE MONOHYDRATE AND SODIUM CHLORIDE 5; .9 G/100ML; G/100ML
INJECTION, SOLUTION INTRAVENOUS CONTINUOUS
Status: DISCONTINUED | OUTPATIENT
Start: 2019-11-30 | End: 2019-12-03 | Stop reason: HOSPADM

## 2019-11-30 RX ORDER — IPRATROPIUM BROMIDE AND ALBUTEROL SULFATE 2.5; .5 MG/3ML; MG/3ML
3 SOLUTION RESPIRATORY (INHALATION) EVERY 4 HOURS PRN
Status: DISCONTINUED | OUTPATIENT
Start: 2019-11-30 | End: 2019-12-03 | Stop reason: HOSPADM

## 2019-11-30 RX ORDER — CLONAZEPAM 0.5 MG/1
0.5 TABLET ORAL 2 TIMES DAILY PRN
Status: DISCONTINUED | OUTPATIENT
Start: 2019-11-30 | End: 2019-12-03 | Stop reason: HOSPADM

## 2019-11-30 RX ADMIN — PANTOPRAZOLE SODIUM 40 MG: 40 INJECTION, POWDER, LYOPHILIZED, FOR SOLUTION INTRAVENOUS at 08:11

## 2019-11-30 RX ADMIN — CARBIDOPA AND LEVODOPA 2 TABLET: 25; 100 TABLET ORAL at 02:11

## 2019-11-30 RX ADMIN — HYDROCODONE BITARTRATE AND ACETAMINOPHEN 1 TABLET: 5; 325 TABLET ORAL at 02:11

## 2019-11-30 RX ADMIN — DEXTROSE AND SODIUM CHLORIDE: 5; .9 INJECTION, SOLUTION INTRAVENOUS at 01:11

## 2019-11-30 RX ADMIN — PANTOPRAZOLE SODIUM 40 MG: 40 INJECTION, POWDER, LYOPHILIZED, FOR SOLUTION INTRAVENOUS at 02:11

## 2019-11-30 RX ADMIN — METOPROLOL TARTRATE 12.5 MG: 25 TABLET, FILM COATED ORAL at 06:11

## 2019-11-30 RX ADMIN — CLONAZEPAM 0.5 MG: 0.5 TABLET ORAL at 02:11

## 2019-11-30 RX ADMIN — SODIUM CHLORIDE 1000 ML: 0.9 INJECTION, SOLUTION INTRAVENOUS at 08:11

## 2019-11-30 RX ADMIN — ACETAMINOPHEN 650 MG: 325 TABLET ORAL at 10:11

## 2019-11-30 NOTE — ED PROVIDER NOTES
Encounter Date: 11/30/2019    SCRIBE #1 NOTE: Blu PARRISH , chago scribing for, and in the presence of, Dr. Regalado.       History     Chief Complaint   Patient presents with    Rectal Bleeding     Upper and Lower Gi bleeding, tarry with bright red rectally       Time seen by provider: 7:54 AM on 11/30/2019    Maricarmen Woodward is a 89 y.o. female who presents to the ED via EMS with an onset of rectal bleeding, specifically in the upper and lower GI. EMS reports the patient was found with soiled diapers with blood and bruises everywhere, specifically around the breast area. She denies any stomach pain. PMHx includes HTN, Arthritis, and Parkinson's disease. SHx includes Fracture surgery, joint replacement, ORIF femur fracture, and Hemiarthroplasty of hip. NKDA.     The history is provided by the patient and the EMS personnel.     Review of patient's allergies indicates:  No Known Allergies  Past Medical History:   Diagnosis Date    Arthritis     Hypertension     Parkinson's disease      Past Surgical History:   Procedure Laterality Date    FRACTURE SURGERY      HEMIARTHROPLASTY OF HIP Left 12/8/2018    Procedure: HEMIARTHROPLASTY, HIP, Terese, peg board, first assist;  Surgeon: Cheng Mccormack MD;  Location: [facility] OR;  Service: Orthopedics;  Laterality: Left;    JOINT REPLACEMENT      right hip replacement    ORIF FEMUR FRACTURE Right 11/22/2019    Procedure: ORIF, FRACTURE, FEMUR, Synthes, radiolucent triangle, 1st assist;  Surgeon: Cheng Mccormack MD;  Location: [facility] OR;  Service: Orthopedics;  Laterality: Right;     Family History   Problem Relation Age of Onset    No Known Problems Mother      Social History     Tobacco Use    Smoking status: Light Tobacco Smoker     Packs/day: 0.25     Years: 5.00     Pack years: 1.25     Last attempt to quit: 4/17/2009     Years since quitting: 10.6    Smokeless tobacco: Never Used   Substance Use Topics    Alcohol use: No    Drug use: No      Review of Systems   Constitutional: Negative for fever.   HENT: Negative for sore throat.    Respiratory: Negative for shortness of breath.    Cardiovascular: Negative for chest pain.   Gastrointestinal: Positive for anal bleeding and blood in stool. Negative for abdominal pain and nausea.   Genitourinary: Negative for dysuria.   Musculoskeletal: Negative for back pain.   Skin: Negative for rash.        + bruising around breast area.    Neurological: Negative for weakness.   Hematological: Does not bruise/bleed easily.       Physical Exam     Initial Vitals [11/30/19 0801]   BP Pulse Resp Temp SpO2   (!) 162/67 97 18 98.6 °F (37 °C) 98 %      MAP       --         Physical Exam    Nursing note and vitals reviewed.  Constitutional: She appears well-developed.   Gross hematochezia to lower rectum.   HENT:   Head: Normocephalic and atraumatic.   Dry tongue.   Eyes: EOM are normal. Pupils are equal, round, and reactive to light.   Neck: Neck supple.   Cardiovascular: Normal rate, regular rhythm, normal heart sounds and intact distal pulses. Exam reveals no gallop and no friction rub.    No murmur heard.  Pulmonary/Chest: Breath sounds normal. No respiratory distress. She has no decreased breath sounds. She has no wheezes. She has no rhonchi. She has no rales.   Abdominal: Soft. Bowel sounds are normal. She exhibits no distension. There is no tenderness.   No significant tenderness   Musculoskeletal: Normal range of motion.   Neurological: She is alert and oriented to person, place, and time.   Skin: Skin is warm and dry. Bruising (bilateral breast area and LLQ) noted.   Psychiatric: She has a normal mood and affect.         ED Course   Procedures  Labs Reviewed   CBC W/ AUTO DIFFERENTIAL   COMPREHENSIVE METABOLIC PANEL   PROTIME-INR   APTT   TYPE & SCREEN          Imaging Results    None          Medical Decision Making:   History:   Old Medical Records: I decided to obtain old medical records.  Clinical Tests:   Lab  Tests: Ordered and Reviewed  Apparently the patient did have knee surgery and was on Lovenox while in the hospital.  It is unclear whether not she was on while at the nursing home.  Thankfully she is not more anemic than she was in the past.  Her vital signs are stable. She needs to be admitted for observation is likely and monitoring her H&H.            Scribe Attestation:   Scribe #1: I performed the above scribed service and the documentation accurately describes the services I performed. I attest to the accuracy of the note.    I, Dr. Harley Regalado personally performed the services described in this documentation. All medical record entries made by the scribe were at my direction and in my presence.  I have reviewed the chart and agree that the record reflects my personal performance and is accurate and complete. Harley Regalado MD.  9:23 AM 11/30/2019    DISCLAIMER: This note was prepared with Dragon NaturallySpeaking voice recognition transcription software. Garbled syntax, mangled pronouns, and other bizarre constructions may be attributed to that software system         ED Course as of Nov 30 0922   Sat Nov 30, 2019 0922 No signsOf external hemorrhoids on exam.  I spoke with the gastroenterologist who recommends admission and will likely do a colonoscopy.  No left lower quadrant abdominal tenderness at this time.    [JS]      ED Course User Index  [JS] Harley Regalado MD                Clinical Impression:       ICD-10-CM ICD-9-CM   1. Lower GI bleeding K92.2 578.9                             Harley Regalado MD  11/30/19 0978

## 2019-11-30 NOTE — CONSULTS
Subjective:       Maricarmen Woodward is a 89 y.o. female with h/o PUD and diverticulosis, HTN, Arthritis, and Parkinson's disease, SHx includes Fracture surgery, joint replacement, ORIF femur fracture, and Hemiarthroplasty of hip, was admitted for rectal bleeding.  Consult requested for PVCs-trigeminy.  Pt reported palpitation, denies cp or sob. No fever or chill.  H/h low.  Bp elevated.  She told me that she's from [facility].    Past Medical History:   Diagnosis Date    Arthritis     Hypertension     Parkinson's disease      Family History   Problem Relation Age of Onset    No Known Problems Mother      No current facility-administered medications on file prior to encounter.      Current Outpatient Medications on File Prior to Encounter   Medication Sig Dispense Refill    acetaminophen (TYLENOL) 325 MG tablet Take 2 tablets (650 mg total) by mouth every 4 (four) hours as needed.  0    albuterol-ipratropium (DUO-NEB) 2.5 mg-0.5 mg/3 mL nebulizer solution Take 3 mLs by nebulization every 4 (four) hours as needed for Wheezing or Shortness of Breath. Rescue 1 Box 0    calcium carbonate (CALCIUM ANTACID) 300 mg (750 mg) Chew Take 1 tablet by mouth 2 (two) times daily.      carbidopa-levodopa  mg (SINEMET)  mg per tablet Take 2 tablets by mouth 2 (two) times daily with meals. Breakfast and dinner      carbidopa-levodopa  mg (SINEMET CR)  mg TbSR Take 1.5 tablets by mouth every evening.      cholecalciferol, vitamin D3, (VITAMIN D3) 2,000 unit Tab Take 2,000 Units by mouth once daily.      clonazePAM (KLONOPIN) 0.5 MG tablet Take 1 tablet (0.5 mg total) by mouth 2 (two) times daily as needed for Anxiety. 10 tablet 0    cyanocobalamin (VITAMIN B-12) 100 MCG tablet Take 100 mcg by mouth once daily.       ferrous sulfate 325 (65 FE) MG EC tablet Take 325 mg by mouth once daily.      HYDROcodone-acetaminophen (NORCO) 5-325 mg per tablet Take 1 tablet by mouth every 6 (six) hours  as needed. 15 tablet 0    magnesium oxide (MAG-OX) 400 mg (241.3 mg magnesium) tablet Take 1 tablet (400 mg total) by mouth once daily.  0    multivit-iron-min-folic acid (MULTIVITAMIN-IRON-MINERALS-FOLIC ACID) 3,500-18-0.4 unit-mg-mg Chew Take 1 tablet by mouth once daily.        omega-3 fatty acids-vitamin E (FISH OIL) 1,000 mg Cap Take 1 capsule by mouth once daily.      omeprazole (PRILOSEC) 40 MG capsule Take 40 mg by mouth once daily.      polyethylene glycol (GLYCOLAX) 17 gram PwPk Take 17 g by mouth once daily. 30 packet 0    potassium chloride SA (K-DUR,KLOR-CON) 10 MEQ tablet Take 10 mEq by mouth once daily.        pravastatin (PRAVACHOL) 40 MG tablet Take 40 mg by mouth every evening.      senna-docusate 8.6-50 mg (SENNA WITH DOCUSATE SODIUM) 8.6-50 mg per tablet Take 1 tablet by mouth once daily.      [DISCONTINUED] aspirin (ECOTRIN) 81 MG EC tablet Take 81 mg by mouth once daily.       Review of Systems  12 systems reviewed, negative except mentioned in HPI.     Objective:     Vital Signs (Most Recent):  Temp: 98.6 °F (37 °C) (11/30/19 1619)  Pulse: 79 (11/30/19 1619)  Resp: 18 (11/30/19 1619)  BP: (!) 195/78 (11/30/19 1619)  SpO2: 95 % (11/30/19 1619) Vital Signs (24h Range):  Temp:  [98 °F (36.7 °C)-98.6 °F (37 °C)] 98.6 °F (37 °C)  Pulse:  [] 79  Resp:  [18-19] 18  SpO2:  [94 %-100 %] 95 %  BP: (149-195)/() 195/78       Physical Exam  Gen: Lying on bed, lethagic  Skin: warm and dry  Lymph: no lymphadenopathy detected  HEENT: NC/AT  Neck: supple, no JVD/bruit  Chest: no deformity, equal movement b/l  Lung: CTA b/l  Heart: occasional irregular, S1/S2 +, no M/G/R  Abd: BS+, S, NT/ND  Ext: no deformity, no pretibial edema  Pulse: b/l radial 2+  Neuro: AAOx3    Cardiographics  ECG 11/20/19: sinus with frequent PVCs.  Echocardiogram pending.    Imaging  Chest x-ray 11/26/19: Prominence of the pulmonary vasculature and mild perihilar infiltrates suggesting either mild pulmonary edema or  atypical pneumonia.  Trace left pleural effusion.  Atherosclerosis.    Lab Review   Lab Results   Component Value Date    WBC 11.77 11/30/2019    HGB 8.2 (L) 11/30/2019    HCT 26.0 (L) 11/30/2019     (H) 11/30/2019     (H) 11/30/2019     Lab Results   Component Value Date     11/30/2019    K 4.3 11/30/2019     11/30/2019    CO2 26 11/30/2019    BUN 21 11/30/2019    CREATININE 0.7 11/30/2019    CALCIUM 8.8 11/30/2019    ANIONGAP 6 (L) 11/30/2019    ESTGFRAFRICA >60 11/30/2019    EGFRNONAA >60 11/30/2019      Assessment:   PVCs-trigeminy  GI bleeding, h/o PUD and diverticulosis  Anemia  HTN  Parkinson  Advanced age   Plan:   Continue monitor; track Trop, BNP, TSH.  Add Metoprolol 12.5mg po bid.  Transfuse if needed.  Supportive care.

## 2019-11-30 NOTE — ASSESSMENT & PLAN NOTE
= GI Bleed =  Acute,moderate EBLoss, Lower, without evidence hemodynamic instability   Hematochezia,Guaic positive, stool content/quality before and during.  Suspected causes:   Upper GI: Peptic Ulcer Disease (H. Pylori vs. NSAID), Gastritis, Variceal Bleed, MW Tear, AVM, less likely malignancy  Lower GI: Diverticulosis, AVM, malignancy, hemorrhoids    - At least 2x IV access, 18 gauge or larger  - IVF resuscitation to maintain MAP>65  - H&H monitoring q6h  - Blood Consent.  Transfuse PRN Hgb<8    (Threshold of 7 may actually be non-inferior and even superior according to PMID:[id number])    (Massive Transfusion Protocol option or empiric FFP+Platelets if needing more than 2-4 units PRBC)  - Will continue PPI bid    - NPO for pending endoscopy midnight  - GI Consult for EGD/Colonoscopy      If massive lower GI bleed noted , will get CT angio to isolate source and IR Consult for embolization)    - Helicobacter pylori Ab + stool Ag.  If either positive, treat  - Hold NSAIDs, steroids, ASA

## 2019-11-30 NOTE — CARE UPDATE
11/30/19 1134   Patient Assessment/Suction   Level of Consciousness (AVPU) alert   Respiratory Effort Unlabored;Normal   Expansion/Accessory Muscles/Retractions no use of accessory muscles;expansion symmetric;no retractions   All Lung Fields Breath Sounds diminished   Rhythm/Pattern, Respiratory depth regular;pattern regular;unlabored   Cough Frequency no cough   PRE-TX-O2   O2 Device (Oxygen Therapy) room air   SpO2 95 %   Pulse Oximetry Type Intermittent   $ Pulse Oximetry - Multiple Charge Pulse Oximetry - Multiple   Pulse 99   Resp 18   Aerosol Therapy   $ Aerosol Therapy Charges PRN treatment not required   Respiratory Treatment Status (SVN) PRN treatment not required       Aerosol treatments PRN.

## 2019-11-30 NOTE — SUBJECTIVE & OBJECTIVE
Past Medical History:   Diagnosis Date    Arthritis     Hypertension     Parkinson's disease        Past Surgical History:   Procedure Laterality Date    FRACTURE SURGERY      HEMIARTHROPLASTY OF HIP Left 12/8/2018    Procedure: HEMIARTHROPLASTY, HIP, Terese, peg board, first assist;  Surgeon: Cheng Mccormack MD;  Location: [facility];  Service: Orthopedics;  Laterality: Left;    JOINT REPLACEMENT      right hip replacement    ORIF FEMUR FRACTURE Right 11/22/2019    Procedure: ORIF, FRACTURE, FEMUR, Synthes, radiolucent triangle, 1st assist;  Surgeon: Cheng Mccormack MD;  Location: [facility];  Service: Orthopedics;  Laterality: Right;       Review of patient's allergies indicates:  No Known Allergies    No current facility-administered medications on file prior to encounter.      Current Outpatient Medications on File Prior to Encounter   Medication Sig    acetaminophen (TYLENOL) 325 MG tablet Take 2 tablets (650 mg total) by mouth every 4 (four) hours as needed.    albuterol-ipratropium (DUO-NEB) 2.5 mg-0.5 mg/3 mL nebulizer solution Take 3 mLs by nebulization every 4 (four) hours as needed for Wheezing or Shortness of Breath. Rescue    aspirin (ECOTRIN) 81 MG EC tablet Take 81 mg by mouth once daily.    calcium carbonate (CALCIUM ANTACID) 300 mg (750 mg) Chew Take 1 tablet by mouth 2 (two) times daily.    carbidopa-levodopa  mg (SINEMET)  mg per tablet Take 2 tablets by mouth 2 (two) times daily with meals. Breakfast and dinner    carbidopa-levodopa  mg (SINEMET CR)  mg TbSR Take 1.5 tablets by mouth every evening.    cholecalciferol, vitamin D3, (VITAMIN D3) 2,000 unit Tab Take 2,000 Units by mouth once daily.    clonazePAM (KLONOPIN) 0.5 MG tablet Take 1 tablet (0.5 mg total) by mouth 2 (two) times daily as needed for Anxiety.    cyanocobalamin (VITAMIN B-12) 100 MCG tablet Take 100 mcg by mouth once daily.     ferrous sulfate 325 (65 FE) MG EC tablet Take 325  mg by mouth once daily.    HYDROcodone-acetaminophen (NORCO) 5-325 mg per tablet Take 1 tablet by mouth every 6 (six) hours as needed.    magnesium oxide (MAG-OX) 400 mg (241.3 mg magnesium) tablet Take 1 tablet (400 mg total) by mouth once daily.    multivit-iron-min-folic acid (MULTIVITAMIN-IRON-MINERALS-FOLIC ACID) 3,500-18-0.4 unit-mg-mg Chew Take 1 tablet by mouth once daily.      omega-3 fatty acids-vitamin E (FISH OIL) 1,000 mg Cap Take 1 capsule by mouth once daily.    omeprazole (PRILOSEC) 40 MG capsule Take 40 mg by mouth once daily.    polyethylene glycol (GLYCOLAX) 17 gram PwPk Take 17 g by mouth once daily.    potassium chloride SA (K-DUR,KLOR-CON) 10 MEQ tablet Take 10 mEq by mouth once daily.      pravastatin (PRAVACHOL) 40 MG tablet Take 40 mg by mouth every evening.    senna-docusate 8.6-50 mg (SENNA WITH DOCUSATE SODIUM) 8.6-50 mg per tablet Take 1 tablet by mouth once daily.     Family History     Problem Relation (Age of Onset)    No Known Problems Mother        Tobacco Use    Smoking status: Light Tobacco Smoker     Packs/day: 0.25     Years: 5.00     Pack years: 1.25     Last attempt to quit: 4/17/2009     Years since quitting: 10.6    Smokeless tobacco: Never Used   Substance and Sexual Activity    Alcohol use: No    Drug use: No    Sexual activity: Never     Partners: Male     Birth control/protection: Abstinence, Post-menopausal     Review of Systems   Constitutional: Negative for appetite change, chills, fatigue and fever.   HENT: Negative for congestion, hearing loss, rhinorrhea, sore throat, trouble swallowing and voice change.    Respiratory: Negative for cough, chest tightness, shortness of breath and wheezing.    Cardiovascular: Negative for chest pain, palpitations and leg swelling.   Gastrointestinal: Negative for abdominal pain, blood in stool, diarrhea, nausea and vomiting.   Genitourinary: Negative for difficulty urinating, frequency, hematuria and urgency.    Musculoskeletal: Negative for back pain, joint swelling and neck stiffness.   Skin: Negative for pallor and rash.   Neurological: Negative for tremors, seizures, syncope, speech difficulty, weakness, numbness and headaches.   Hematological: Negative for adenopathy.   Psychiatric/Behavioral: Negative for agitation, behavioral problems, confusion and sleep disturbance.     Objective:     Vital Signs (Most Recent):  Temp: 98.6 °F (37 °C) (11/30/19 0801)  Pulse: 96 (11/30/19 0832)  Resp: 18 (11/30/19 0801)  BP: (!) 153/63 (11/30/19 0832)  SpO2: 100 % (11/30/19 0832) Vital Signs (24h Range):  Temp:  [98.6 °F (37 °C)] 98.6 °F (37 °C)  Pulse:  [93-98] 96  Resp:  [18] 18  SpO2:  [98 %-100 %] 100 %  BP: (149-162)/() 153/63     Weight: 52.8 kg (116 lb 6.4 oz)  Body mass index is 22.73 kg/m².    Physical Exam   Constitutional: She is oriented to person, place, and time. She appears well-developed and well-nourished. No distress.   HENT:   Head: Normocephalic and atraumatic.   Right Ear: External ear normal.   Left Ear: External ear normal.   Nose: Nose normal.   Mouth/Throat: Oropharynx is clear and moist.   Eyes: Pupils are equal, round, and reactive to light. Conjunctivae and EOM are normal. Right eye exhibits no discharge. Left eye exhibits no discharge. No scleral icterus.   Neck: Normal range of motion. Neck supple. No thyromegaly present.   Cardiovascular: Normal rate, regular rhythm, normal heart sounds and intact distal pulses.   No murmur heard.  Pulmonary/Chest: Effort normal and breath sounds normal. No stridor. No respiratory distress. She has no wheezes. She has no rales.   Abdominal: Soft. Bowel sounds are normal. She exhibits no distension. There is no tenderness.   Musculoskeletal: She exhibits no edema or tenderness.   Stage I sacral decubitus ulcer   Musculoskeletal: Normal range of motion. She exhibits no edema or tenderness.     .   Musculoskeletal: Normal range of motion. She exhibits no  tenderness.   Right hip and knee is in a flexed position but patient is able to fully extend it.  Bruise and swelling to the right anterior proximal patella. Arthritic appearing knees bilaterally. Bruising to the left flank. Bruise to the left upper extremity.    Lymphadenopathy:     She has no cervical adenopathy.   Neurological: She is alert and oriented to person, place, and time. No cranial nerve deficit. Coordination normal.   Skin: Skin is warm and dry. No rash noted. No erythema.   Multiple areas of ecchymosis chest abdomen back to see photos    Lymphadenopathy:     She has no cervical adenopathy.   Neurological: She is alert and oriented to person, place, and time. No cranial nerve deficit or sensory deficit. She exhibits normal muscle tone. Coordination normal.   Skin: Skin is dry. Capillary refill takes less than 2 seconds. She is not diaphoretic. There is erythema.   Psychiatric: She has a normal mood and affect. Her behavior is normal. Judgment and thought content normal.   Nursing note and vitals reviewed.        CRANIAL NERVES     CN III, IV, VI   Pupils are equal, round, and reactive to light.  Extraocular motions are normal.        Significant Labs:   CBC:   Recent Labs   Lab 11/30/19  0858   WBC 11.77   HGB 8.2*   HCT 26.0*   *     BMP  Lab Results   Component Value Date     11/30/2019    K 4.3 11/30/2019     11/30/2019    CO2 26 11/30/2019    BUN 21 11/30/2019    CREATININE 0.7 11/30/2019    CALCIUM 8.8 11/30/2019    ANIONGAP 6 (L) 11/30/2019    ESTGFRAFRICA >60 11/30/2019    EGFRNONAA >60 11/30/2019     Significant Imaging: I have reviewed all pertinent imaging results/findings within the past 24 hours.

## 2019-11-30 NOTE — CONSULTS
[facility]     CC: Hematochezia    HPI 89 y.o. female with history of PUD and diverticulosis who is admitted with acute, painless, moderate volume  Hematochezia  not associated with hemodynamic instablity or drop in blood counts (she is hypertensive). She has recent knee injury and is s/p ORIF last week with post-op anemia and has been taking Ibuprofen as well ? Receiving Lovenox at NH. Prior records reviewed, notable for EGD in 2012 performed by Dr. Pizano with large antral ulcer (healing on subsequent endoscopy, though to be due to Mobic). Colonoscopy in 2012 notable for sigmoid diverticulosis and prominent hemorrhoids.     Past Medical History:   Diagnosis Date    Arthritis     Hypertension     Parkinson's disease        Past Surgical History:   Procedure Laterality Date    FRACTURE SURGERY      HEMIARTHROPLASTY OF HIP Left 12/8/2018    Procedure: HEMIARTHROPLASTY, HIP, Terese, peg board, first assist;  Surgeon: Cheng Mccormack MD;  Location: [facility] OR;  Service: Orthopedics;  Laterality: Left;    JOINT REPLACEMENT      right hip replacement    ORIF FEMUR FRACTURE Right 11/22/2019    Procedure: ORIF, FRACTURE, FEMUR, Synthes, radiolucent triangle, 1st assist;  Surgeon: Cheng Mccormack MD;  Location: [facility] OR;  Service: Orthopedics;  Laterality: Right;       Social History     Tobacco Use    Smoking status: Light Tobacco Smoker     Packs/day: 0.25     Years: 5.00     Pack years: 1.25     Last attempt to quit: 4/17/2009     Years since quitting: 10.6    Smokeless tobacco: Never Used   Substance Use Topics    Alcohol use: No    Drug use: No       Family History   Problem Relation Age of Onset    No Known Problems Mother        Review of Systems  General ROS: negative for - chills, fever or weight loss  Psychological ROS: negative for - hallucination, depression or suicidal ideation  Ophthalmic ROS: negative for - blurry vision, photophobia or eye pain  ENT ROS: negative for -  epistaxis, sore throat or rhinorrhea  Respiratory ROS: no cough, shortness of breath, or wheezing  Cardiovascular ROS: no chest pain or dyspnea on exertion  Gastrointestinal ROS: + bloody stools, no abdominal pain, no nausea, no vomiting  Genito-Urinary ROS: no dysuria, trouble voiding, or hematuria  Musculoskeletal ROS: positive for - arthralgia, myalgia, weakness  Neurological ROS: no syncope or seizures; no ataxia  Dermatological ROS: negative for pruritis, rash and jaundice    Physical Examination  BP (!) 174/70   Pulse 100   Temp 98 °F (36.7 °C)   Resp 19   Ht 5' (1.524 m)   Wt 52.8 kg (116 lb 6.4 oz)   SpO2 (!) 94%   BMI 22.73 kg/m²   General appearance: alert, cooperative, no distress  HENT: Normocephalic, atraumatic, neck symmetrical, no nasal discharge   Eyes: conjunctivae/corneas clear, PERRL, EOM's intact, sclera anicteric  Lungs: clear to auscultation bilaterally, no dullness to percussion bilaterally, symmetric expansion, breathing unlabored  Heart: regular rate and rhythm without rub; no displacement of the PMI   Abdomen: soft, nontender,nondistended, BS active ,no masses   Extremities: some swelling or right leg; no clubbing, cyanosis, or edema of left leg; right leg and knee wrapped in ace bandage  Integument: Skin color, texture, turgor normal; no rashes; hair distrubution normal, no jaundice, multiple ecchymosis  Neurologic: Alert and oriented X 3, diffuse weakness, no nystagmus  Psychiatric: no pressured speech; normal affect; no evidence of impaired cognition, no anxiety/depression     Labs:  Lab Results   Component Value Date    WBC 11.77 11/30/2019    HGB 8.2 (L) 11/30/2019    HCT 26.0 (L) 11/30/2019     (H) 11/30/2019     (H) 11/30/2019       -11/26- hemoglobin - 7.5, hct- 23.4    Imaging:  CT chest/abdomen 11/19/19 was independently visualized and reviewed by me and showed lumbar compression fractures, osteopenia, soft tissue contusion left abdomen, severe  atherosclerosis, small pulmonary nodules, numerous hepatic cysts    Assessment:   89 y.o. female with history of PUD and diverticulosis who is admitted with acute hematochezia not associated with hemodynamic instablity or drop in blood counts. She has recent knee injury and is s/p ORIF with post-op anemia and has been taking Ibuprofen as well ? Receiving Lovenox at NH.      Plan:  -Ok for clear liquids now and NPO at midnight  -PPI BID  -CBC every 8 hours  -EGD in AM if patient has further bloody bowel movements   -Hold anticoagulation   -Please call with questions, active bleeding or hemodynamic instability     Heidi Gallagher MD  [facility]  [address], [address]  [place] [address]  Office: [phone]  Fax: [phone]

## 2019-11-30 NOTE — ED NOTES
Here for eval of hematochezia. Pt aware of rectal bleeding. Denies any other complaints this visit. AAOx3. ACE to Rt knee (recent non-repaired patellar fx)

## 2019-11-30 NOTE — H&P
[facility] Medicine  History & Physical    Patient Name: Maricarmen Woodward  MRN: [id number]  Admission Date: 11/30/2019  Attending Physician: Lizbet Khan MD   Primary Care Provider: Haris Brambila MD         Patient information was obtained from patient and ER records.     Subjective:     Principal Problem:<principal problem not specified>    Chief Complaint:   Chief Complaint   Patient presents with    Rectal Bleeding     Upper and Lower Gi bleeding, tarry with bright red rectally        HPI: Ms Woodward is a 89 y.o. female with PmHx of PUD and diverticulosis, HTN, Arthritis, and Parkinson's disease. SHx includes Fracture surgery, joint replacement, ORIF femur fracture, and Hemiarthroplasty of hip  presented to the ED via EMS with an sudden onset of rectal bleeding. Patient GI bleed is painless, moderate volume.   Has been hemodynamically stable. Patient is poor historian and most hx obtained from med records. No drop in cbc noted.     Pt was admitted for recent knee injury and  Imaging showed fractures of L4 and distal right femur.  Analgesics were given.  Orthopedist was consulted.  Pt went to OR for ORIF of right distal femur fracture.Underwent ORIF by Dr. Siva Mccormack on 11/22. Continue NWB on right leg. Patient has been taking Ibuprofen. Has been receiving Lovenox at NH. Prior records reviewed, notable for EGD in 2012 performed by Dr. Pizano with large antral ulcer (healing on subsequent endoscopy, though to be due to Mobic). Colonoscopy in 2012 notable for sigmoid diverticulosis and prominent hemorrhoids.  EMS reports the patient was found with soiled diapers with blood and bruises everywhere, specifically around the breast area. She denies any stomach pain. PMHx includes       Last admission, Pt required blood transfusion from the extensive ecchymoses.  Her HGB stayed stable afterwards.            Past Medical History:   Diagnosis Date    Arthritis      Hypertension     Parkinson's disease        Past Surgical History:   Procedure Laterality Date    FRACTURE SURGERY      HEMIARTHROPLASTY OF HIP Left 12/8/2018    Procedure: HEMIARTHROPLASTY, HIP, Terese, peg board, first assist;  Surgeon: Cheng Mccormack MD;  Location: [facility];  Service: Orthopedics;  Laterality: Left;    JOINT REPLACEMENT      right hip replacement    ORIF FEMUR FRACTURE Right 11/22/2019    Procedure: ORIF, FRACTURE, FEMUR, Synthes, radiolucent triangle, 1st assist;  Surgeon: Cheng Mccormack MD;  Location: [facility] OR;  Service: Orthopedics;  Laterality: Right;       Review of patient's allergies indicates:  No Known Allergies    No current facility-administered medications on file prior to encounter.      Current Outpatient Medications on File Prior to Encounter   Medication Sig    acetaminophen (TYLENOL) 325 MG tablet Take 2 tablets (650 mg total) by mouth every 4 (four) hours as needed.    albuterol-ipratropium (DUO-NEB) 2.5 mg-0.5 mg/3 mL nebulizer solution Take 3 mLs by nebulization every 4 (four) hours as needed for Wheezing or Shortness of Breath. Rescue    aspirin (ECOTRIN) 81 MG EC tablet Take 81 mg by mouth once daily.    calcium carbonate (CALCIUM ANTACID) 300 mg (750 mg) Chew Take 1 tablet by mouth 2 (two) times daily.    carbidopa-levodopa  mg (SINEMET)  mg per tablet Take 2 tablets by mouth 2 (two) times daily with meals. Breakfast and dinner    carbidopa-levodopa  mg (SINEMET CR)  mg TbSR Take 1.5 tablets by mouth every evening.    cholecalciferol, vitamin D3, (VITAMIN D3) 2,000 unit Tab Take 2,000 Units by mouth once daily.    clonazePAM (KLONOPIN) 0.5 MG tablet Take 1 tablet (0.5 mg total) by mouth 2 (two) times daily as needed for Anxiety.    cyanocobalamin (VITAMIN B-12) 100 MCG tablet Take 100 mcg by mouth once daily.     ferrous sulfate 325 (65 FE) MG EC tablet Take 325 mg by mouth once daily.    HYDROcodone-acetaminophen  (NORCO) 5-325 mg per tablet Take 1 tablet by mouth every 6 (six) hours as needed.    magnesium oxide (MAG-OX) 400 mg (241.3 mg magnesium) tablet Take 1 tablet (400 mg total) by mouth once daily.    multivit-iron-min-folic acid (MULTIVITAMIN-IRON-MINERALS-FOLIC ACID) 3,500-18-0.4 unit-mg-mg Chew Take 1 tablet by mouth once daily.      omega-3 fatty acids-vitamin E (FISH OIL) 1,000 mg Cap Take 1 capsule by mouth once daily.    omeprazole (PRILOSEC) 40 MG capsule Take 40 mg by mouth once daily.    polyethylene glycol (GLYCOLAX) 17 gram PwPk Take 17 g by mouth once daily.    potassium chloride SA (K-DUR,KLOR-CON) 10 MEQ tablet Take 10 mEq by mouth once daily.      pravastatin (PRAVACHOL) 40 MG tablet Take 40 mg by mouth every evening.    senna-docusate 8.6-50 mg (SENNA WITH DOCUSATE SODIUM) 8.6-50 mg per tablet Take 1 tablet by mouth once daily.     Family History     Problem Relation (Age of Onset)    No Known Problems Mother        Tobacco Use    Smoking status: Light Tobacco Smoker     Packs/day: 0.25     Years: 5.00     Pack years: 1.25     Last attempt to quit: 4/17/2009     Years since quitting: 10.6    Smokeless tobacco: Never Used   Substance and Sexual Activity    Alcohol use: No    Drug use: No    Sexual activity: Never     Partners: Male     Birth control/protection: Abstinence, Post-menopausal     Review of Systems   Constitutional: Negative for appetite change, chills, fatigue and fever.   HENT: Negative for congestion, hearing loss, rhinorrhea, sore throat, trouble swallowing and voice change.    Respiratory: Negative for cough, chest tightness, shortness of breath and wheezing.    Cardiovascular: Negative for chest pain, palpitations and leg swelling.   Gastrointestinal: Negative for abdominal pain, blood in stool, diarrhea, nausea and vomiting.   Genitourinary: Negative for difficulty urinating, frequency, hematuria and urgency.   Musculoskeletal: Negative for back pain, joint swelling and  neck stiffness.   Skin: Negative for pallor and rash.   Neurological: Negative for tremors, seizures, syncope, speech difficulty, weakness, numbness and headaches.   Hematological: Negative for adenopathy.   Psychiatric/Behavioral: Negative for agitation, behavioral problems, confusion and sleep disturbance.     Objective:     Vital Signs (Most Recent):  Temp: 98.6 °F (37 °C) (11/30/19 0801)  Pulse: 96 (11/30/19 0832)  Resp: 18 (11/30/19 0801)  BP: (!) 153/63 (11/30/19 0832)  SpO2: 100 % (11/30/19 0832) Vital Signs (24h Range):  Temp:  [98.6 °F (37 °C)] 98.6 °F (37 °C)  Pulse:  [93-98] 96  Resp:  [18] 18  SpO2:  [98 %-100 %] 100 %  BP: (149-162)/() 153/63     Weight: 52.8 kg (116 lb 6.4 oz)  Body mass index is 22.73 kg/m².    Physical Exam   Constitutional: She is oriented to person, place, and time. She appears well-developed and well-nourished. No distress.   HENT:   Head: Normocephalic and atraumatic.   Right Ear: External ear normal.   Left Ear: External ear normal.   Nose: Nose normal.   Mouth/Throat: Oropharynx is clear and moist.   Eyes: Pupils are equal, round, and reactive to light. Conjunctivae and EOM are normal. Right eye exhibits no discharge. Left eye exhibits no discharge. No scleral icterus.   Neck: Normal range of motion. Neck supple. No thyromegaly present.   Cardiovascular: Normal rate, regular rhythm, normal heart sounds and intact distal pulses.   No murmur heard.  Pulmonary/Chest: Effort normal and breath sounds normal. No stridor. No respiratory distress. She has no wheezes. She has no rales.   Abdominal: Soft. Bowel sounds are normal. She exhibits no distension. There is no tenderness.   Musculoskeletal: She exhibits no edema or tenderness.   Stage I sacral decubitus ulcer   Musculoskeletal: Normal range of motion. She exhibits no edema or tenderness.     .   Musculoskeletal: Normal range of motion. She exhibits no tenderness.   Right hip and knee is in a flexed position but patient is  able to fully extend it.  Bruise and swelling to the right anterior proximal patella. Arthritic appearing knees bilaterally. Bruising to the left flank. Bruise to the left upper extremity.    Lymphadenopathy:     She has no cervical adenopathy.   Neurological: She is alert and oriented to person, place, and time. No cranial nerve deficit. Coordination normal.   Skin: Skin is warm and dry. No rash noted. No erythema.   Multiple areas of ecchymosis chest abdomen back to see photos    Lymphadenopathy:     She has no cervical adenopathy.   Neurological: She is alert and oriented to person, place, and time. No cranial nerve deficit or sensory deficit. She exhibits normal muscle tone. Coordination normal.   Skin: Skin is dry. Capillary refill takes less than 2 seconds. She is not diaphoretic. There is erythema.   Psychiatric: She has a normal mood and affect. Her behavior is normal. Judgment and thought content normal.   Nursing note and vitals reviewed.        CRANIAL NERVES     CN III, IV, VI   Pupils are equal, round, and reactive to light.  Extraocular motions are normal.        Significant Labs:   CBC:   Recent Labs   Lab 11/30/19  0858   WBC 11.77   HGB 8.2*   HCT 26.0*   *     BMP  Lab Results   Component Value Date     11/30/2019    K 4.3 11/30/2019     11/30/2019    CO2 26 11/30/2019    BUN 21 11/30/2019    CREATININE 0.7 11/30/2019    CALCIUM 8.8 11/30/2019    ANIONGAP 6 (L) 11/30/2019    ESTGFRAFRICA >60 11/30/2019    EGFRNONAA >60 11/30/2019     Significant Imaging: I have reviewed all pertinent imaging results/findings within the past 24 hours.    Assessment/Plan:     S/P ORIF (open reduction internal fixation) fracture  Hx noted      GI bleed  = GI Bleed =  Acute,moderate EBLoss, Lower, without evidence hemodynamic instability   Hematochezia,Guaic positive, stool content/quality before and during.  Suspected causes:   Upper GI: Peptic Ulcer Disease (H. Pylori vs. NSAID), Gastritis,  Variceal Bleed, MW Tear, AVM, less likely malignancy  Lower GI: Diverticulosis, AVM, malignancy, hemorrhoids    - At least 2x IV access, 18 gauge or larger  - IVF resuscitation to maintain MAP>65  - H&H monitoring q6h  - Blood Consent.  Transfuse PRN Hgb<8    (Threshold of 7 may actually be non-inferior and even superior according to [id number])    (Massive Transfusion Protocol option or empiric FFP+Platelets if needing more than 2-4 units PRBC)  - Will continue PPI bid    - NPO for pending endoscopy midnight  - GI Consult for EGD/Colonoscopy      If massive lower GI bleed noted , will get CT angio to isolate source and IR Consult for embolization)    - Helicobacter pylori Ab + stool Ag.  If either positive, treat  - Hold NSAIDs, steroids, ASA            Anemia  Patient's anemia is currently controlled. S/p 0 units of PRBCs.   Current CBC reviewed-   Lab Results   Component Value Date    HGB 8.2 (L) 11/30/2019    HCT 26.0 (L) 11/30/2019     Monitor serial CBC and transfuse if patient becomes hemodynamically unstable, symptomatic or H/H drops below 7/21.       Parkinson's disease  Will restart home med      Essential hypertension  Will restart home med        VTE Risk Mitigation (From admission, onward)         Ordered     Place sequential compression device  Until discontinued      11/30/19 0933     IP VTE HIGH RISK PATIENT  Once      11/30/19 0933                   Lizbet Khan MD  Department of Hospital Medicine   [facility]

## 2019-11-30 NOTE — HPI
Ms Woodward is a 89 y.o. female with PmHx of PUD and diverticulosis, HTN, Arthritis, and Parkinson's disease. SHx includes Fracture surgery, joint replacement, ORIF femur fracture, and Hemiarthroplasty of hip  presented to the ED via EMS with an sudden onset of rectal bleeding. Patient GI bleed is painless, moderate volume.   Has been hemodynamically stable. Patient is poor historian and most hx obtained from med records. No drop in cbc noted.     Pt was admitted for recent knee injury and  Imaging showed fractures of L4 and distal right femur.  Analgesics were given.  Orthopedist was consulted.  Pt went to OR for ORIF of right distal femur fracture.Underwent ORIF by Dr. Siva Mccormack on 11/22. Continue NWB on right leg. Patient has been taking Ibuprofen. Has been receiving Lovenox at NH. Prior records reviewed, notable for EGD in 2012 performed by Dr. Pizano with large antral ulcer (healing on subsequent endoscopy, though to be due to Mobic). Colonoscopy in 2012 notable for sigmoid diverticulosis and prominent hemorrhoids.  EMS reports the patient was found with soiled diapers with blood and bruises everywhere, specifically around the breast area. She denies any stomach pain. PMHx includes      Last admission, Pt required blood transfusion from the extensive ecchymoses.  Her HGB stayed stable afterwards.

## 2019-11-30 NOTE — ASSESSMENT & PLAN NOTE
Patient's anemia is currently controlled. S/p 0 units of PRBCs.   Current CBC reviewed-   Lab Results   Component Value Date    HGB 8.2 (L) 11/30/2019    HCT 26.0 (L) 11/30/2019     Monitor serial CBC and transfuse if patient becomes hemodynamically unstable, symptomatic or H/H drops below 7/21.

## 2019-12-01 ENCOUNTER — ANESTHESIA (OUTPATIENT)
Dept: ENDOSCOPY | Facility: HOSPITAL | Age: 84
DRG: 378 | End: 2019-12-01
Payer: MEDICARE

## 2019-12-01 ENCOUNTER — ANESTHESIA EVENT (OUTPATIENT)
Dept: ENDOSCOPY | Facility: HOSPITAL | Age: 84
DRG: 378 | End: 2019-12-01
Payer: MEDICARE

## 2019-12-01 LAB
ANION GAP SERPL CALC-SCNC: 9 MMOL/L (ref 8–16)
AORTIC ROOT ANNULUS: 2.92 CM
AORTIC VALVE CUSP SEPERATION: 1.77 CM
AV INDEX (PROSTH): 1.04
AV MEAN GRADIENT: 3 MMHG
AV PEAK GRADIENT: 5 MMHG
AV VALVE AREA: 3.17 CM2
AV VELOCITY RATIO: 0.81
BASOPHILS # BLD AUTO: 0.05 K/UL (ref 0–0.2)
BASOPHILS NFR BLD: 0.6 % (ref 0–1.9)
BLD PROD TYP BPU: NORMAL
BLD PROD TYP BPU: NORMAL
BLOOD UNIT EXPIRATION DATE: NORMAL
BLOOD UNIT EXPIRATION DATE: NORMAL
BLOOD UNIT TYPE CODE: 5100
BLOOD UNIT TYPE CODE: 5100
BLOOD UNIT TYPE: NORMAL
BLOOD UNIT TYPE: NORMAL
BSA FOR ECHO PROCEDURE: 1.48 M2
BUN SERPL-MCNC: 18 MG/DL (ref 10–30)
CALCIUM SERPL-MCNC: 8.4 MG/DL (ref 8.7–10.5)
CHLORIDE SERPL-SCNC: 109 MMOL/L (ref 95–110)
CO2 SERPL-SCNC: 20 MMOL/L (ref 23–29)
CODING SYSTEM: NORMAL
CODING SYSTEM: NORMAL
CREAT SERPL-MCNC: 0.7 MG/DL (ref 0.5–1.4)
CV ECHO LV RWT: 0.4 CM
DIFFERENTIAL METHOD: ABNORMAL
DISPENSE STATUS: NORMAL
DISPENSE STATUS: NORMAL
DOP CALC AO PEAK VEL: 1.13 M/S
DOP CALC AO VTI: 22.87 CM
DOP CALC LVOT AREA: 3 CM2
DOP CALC LVOT DIAMETER: 1.97 CM
DOP CALC LVOT PEAK VEL: 0.92 M/S
DOP CALC LVOT STROKE VOLUME: 72.54 CM3
DOP CALCLVOT PEAK VEL VTI: 23.81 CM
E WAVE DECELERATION TIME: 210.55 MSEC
E/A RATIO: 1.01
E/E' RATIO: 18 M/S
ECHO LV POSTERIOR WALL: 0.83 CM (ref 0.6–1.1)
EOSINOPHIL # BLD AUTO: 0 K/UL (ref 0–0.5)
EOSINOPHIL NFR BLD: 0.2 % (ref 0–8)
ERYTHROCYTE [DISTWIDTH] IN BLOOD BY AUTOMATED COUNT: 19.7 % (ref 11.5–14.5)
EST. GFR  (AFRICAN AMERICAN): >60 ML/MIN/1.73 M^2
EST. GFR  (NON AFRICAN AMERICAN): >60 ML/MIN/1.73 M^2
FRACTIONAL SHORTENING: 28 % (ref 28–44)
GLUCOSE SERPL-MCNC: 126 MG/DL (ref 70–110)
HCT VFR BLD AUTO: 29.1 % (ref 37–48.5)
HGB BLD-MCNC: 9.6 G/DL (ref 12–16)
IMM GRANULOCYTES # BLD AUTO: 0.03 K/UL (ref 0–0.04)
INTERVENTRICULAR SEPTUM: 0.84 CM (ref 0.6–1.1)
IVRT: 0.11 MSEC
LEFT ATRIUM SIZE: 4.69 CM
LEFT INTERNAL DIMENSION IN SYSTOLE: 2.94 CM (ref 2.1–4)
LEFT VENTRICLE MASS INDEX: 70 G/M2
LEFT VENTRICULAR INTERNAL DIMENSION IN DIASTOLE: 4.1 CM (ref 3.5–6)
LEFT VENTRICULAR MASS: 103.08 G
LV LATERAL E/E' RATIO: 21.6 M/S
LV SEPTAL E/E' RATIO: 15.43 M/S
LYMPHOCYTES # BLD AUTO: 1 K/UL (ref 1–4.8)
LYMPHOCYTES NFR BLD: 12.1 % (ref 18–48)
MAGNESIUM SERPL-MCNC: 1.9 MG/DL (ref 1.6–2.6)
MCH RBC QN AUTO: 31 PG (ref 27–31)
MCHC RBC AUTO-ENTMCNC: 33 G/DL (ref 32–36)
MCV RBC AUTO: 94 FL (ref 82–98)
MONOCYTES # BLD AUTO: 0.5 K/UL (ref 0.3–1)
MONOCYTES NFR BLD: 5.9 % (ref 4–15)
MV A" WAVE DURATION": 111 MSEC
MV MEAN GRADIENT: 2 MMHG
MV PEAK A VEL: 1.07 M/S
MV PEAK E VEL: 1.08 M/S
MV STENOSIS PRESSURE HALF TIME: 57 MS
MV VALVE AREA P 1/2 METHOD: 3.86 CM2
NEUTROPHILS # BLD AUTO: 6.5 K/UL (ref 1.8–7.7)
NEUTROPHILS NFR BLD: 80.8 % (ref 38–73)
NRBC BLD-RTO: 1 /100 WBC
NUM UNITS TRANS PACKED RBC: NORMAL
NUM UNITS TRANS PACKED RBC: NORMAL
PHOSPHATE SERPL-MCNC: 2.8 MG/DL (ref 2.7–4.5)
PISA TR MAX VEL: 2.73 M/S
PLATELET # BLD AUTO: 290 K/UL (ref 150–350)
PMV BLD AUTO: 10.6 FL (ref 9.2–12.9)
POCT GLUCOSE: 117 MG/DL (ref 70–110)
POCT GLUCOSE: 99 MG/DL (ref 70–110)
POTASSIUM SERPL-SCNC: 4.2 MMOL/L (ref 3.5–5.1)
PULM VEIN A" WAVE DURATION": 115 MSEC
PV PEAK VELOCITY: 0.89 CM/S
RBC # BLD AUTO: 3.1 M/UL (ref 4–5.4)
RIGHT VENTRICULAR END-DIASTOLIC DIMENSION: 2.4 CM
SODIUM SERPL-SCNC: 138 MMOL/L (ref 136–145)
TDI LATERAL: 0.05 M/S
TDI SEPTAL: 0.07 M/S
TDI: 0.06 M/S
TR MAX PG: 30 MMHG
TRICUSPID ANNULAR PLANE SYSTOLIC EXCURSION: 1.7 CM
TROPONIN I SERPL DL<=0.01 NG/ML-MCNC: 0.06 NG/ML (ref 0–0.03)
TROPONIN I SERPL DL<=0.01 NG/ML-MCNC: 0.06 NG/ML (ref 0–0.03)
WBC # BLD AUTO: 8.1 K/UL (ref 3.9–12.7)

## 2019-12-01 PROCEDURE — 25000003 PHARM REV CODE 250: Performed by: INTERNAL MEDICINE

## 2019-12-01 PROCEDURE — D9220A PRA ANESTHESIA: ICD-10-PCS | Mod: CRNA,,, | Performed by: NURSE ANESTHETIST, CERTIFIED REGISTERED

## 2019-12-01 PROCEDURE — 43235 EGD DIAGNOSTIC BRUSH WASH: CPT | Performed by: INTERNAL MEDICINE

## 2019-12-01 PROCEDURE — 43235 EGD DIAGNOSTIC BRUSH WASH: CPT | Mod: ,,, | Performed by: INTERNAL MEDICINE

## 2019-12-01 PROCEDURE — D9220A PRA ANESTHESIA: ICD-10-PCS | Mod: ANES,,, | Performed by: ANESTHESIOLOGY

## 2019-12-01 PROCEDURE — 63600175 PHARM REV CODE 636 W HCPCS: Performed by: INTERNAL MEDICINE

## 2019-12-01 PROCEDURE — 85025 COMPLETE CBC W/AUTO DIFF WBC: CPT

## 2019-12-01 PROCEDURE — 37000008 HC ANESTHESIA 1ST 15 MINUTES: Performed by: INTERNAL MEDICINE

## 2019-12-01 PROCEDURE — 12000002 HC ACUTE/MED SURGE SEMI-PRIVATE ROOM

## 2019-12-01 PROCEDURE — 36415 COLL VENOUS BLD VENIPUNCTURE: CPT

## 2019-12-01 PROCEDURE — 36430 TRANSFUSION BLD/BLD COMPNT: CPT

## 2019-12-01 PROCEDURE — 99900035 HC TECH TIME PER 15 MIN (STAT)

## 2019-12-01 PROCEDURE — P9016 RBC LEUKOCYTES REDUCED: HCPCS

## 2019-12-01 PROCEDURE — 84484 ASSAY OF TROPONIN QUANT: CPT

## 2019-12-01 PROCEDURE — 43235 PR EGD, FLEX, DIAGNOSTIC: ICD-10-PCS | Mod: ,,, | Performed by: INTERNAL MEDICINE

## 2019-12-01 PROCEDURE — 80048 BASIC METABOLIC PNL TOTAL CA: CPT

## 2019-12-01 PROCEDURE — 94761 N-INVAS EAR/PLS OXIMETRY MLT: CPT

## 2019-12-01 PROCEDURE — 93005 ELECTROCARDIOGRAM TRACING: CPT

## 2019-12-01 PROCEDURE — D9220A PRA ANESTHESIA: Mod: ANES,,, | Performed by: ANESTHESIOLOGY

## 2019-12-01 PROCEDURE — D9220A PRA ANESTHESIA: Mod: CRNA,,, | Performed by: NURSE ANESTHETIST, CERTIFIED REGISTERED

## 2019-12-01 PROCEDURE — 83735 ASSAY OF MAGNESIUM: CPT

## 2019-12-01 PROCEDURE — 63600175 PHARM REV CODE 636 W HCPCS: Performed by: NURSE ANESTHETIST, CERTIFIED REGISTERED

## 2019-12-01 PROCEDURE — 84100 ASSAY OF PHOSPHORUS: CPT

## 2019-12-01 RX ORDER — LIDOCAINE HCL/PF 100 MG/5ML
SYRINGE (ML) INTRAVENOUS
Status: DISCONTINUED | OUTPATIENT
Start: 2019-12-01 | End: 2019-12-01

## 2019-12-01 RX ORDER — PROPOFOL 10 MG/ML
VIAL (ML) INTRAVENOUS
Status: DISCONTINUED | OUTPATIENT
Start: 2019-12-01 | End: 2019-12-01

## 2019-12-01 RX ORDER — PANTOPRAZOLE SODIUM 40 MG/10ML
40 INJECTION, POWDER, LYOPHILIZED, FOR SOLUTION INTRAVENOUS DAILY
Status: DISCONTINUED | OUTPATIENT
Start: 2019-12-02 | End: 2019-12-03 | Stop reason: HOSPADM

## 2019-12-01 RX ORDER — POLYETHYLENE GLYCOL 3350, SODIUM CHLORIDE, SODIUM BICARBONATE, POTASSIUM CHLORIDE 420; 11.2; 5.72; 1.48 G/4L; G/4L; G/4L; G/4L
4000 POWDER, FOR SOLUTION ORAL ONCE
Status: COMPLETED | OUTPATIENT
Start: 2019-12-01 | End: 2019-12-01

## 2019-12-01 RX ORDER — SODIUM CHLORIDE 9 MG/ML
INJECTION, SOLUTION INTRAVENOUS CONTINUOUS
Status: DISCONTINUED | OUTPATIENT
Start: 2019-12-01 | End: 2019-12-01

## 2019-12-01 RX ORDER — PHENYLEPHRINE HYDROCHLORIDE 10 MG/ML
INJECTION INTRAVENOUS
Status: DISCONTINUED | OUTPATIENT
Start: 2019-12-01 | End: 2019-12-01

## 2019-12-01 RX ORDER — HYDROCODONE BITARTRATE AND ACETAMINOPHEN 500; 5 MG/1; MG/1
TABLET ORAL
Status: DISCONTINUED | OUTPATIENT
Start: 2019-12-01 | End: 2019-12-03 | Stop reason: HOSPADM

## 2019-12-01 RX ORDER — ATORVASTATIN CALCIUM 10 MG/1
10 TABLET, FILM COATED ORAL NIGHTLY
Status: DISCONTINUED | OUTPATIENT
Start: 2019-12-01 | End: 2019-12-03 | Stop reason: HOSPADM

## 2019-12-01 RX ADMIN — ONDANSETRON 4 MG: 2 INJECTION INTRAMUSCULAR; INTRAVENOUS at 08:12

## 2019-12-01 RX ADMIN — LIDOCAINE HYDROCHLORIDE 100 MG: 20 INJECTION, SOLUTION INTRAVENOUS at 10:12

## 2019-12-01 RX ADMIN — PHENYLEPHRINE HYDROCHLORIDE 100 MCG: 10 INJECTION INTRAVENOUS at 10:12

## 2019-12-01 RX ADMIN — PROPOFOL 20 MG: 10 INJECTION, EMULSION INTRAVENOUS at 10:12

## 2019-12-01 RX ADMIN — DEXTROSE AND SODIUM CHLORIDE: 5; .9 INJECTION, SOLUTION INTRAVENOUS at 05:12

## 2019-12-01 RX ADMIN — METOPROLOL TARTRATE 12.5 MG: 25 TABLET, FILM COATED ORAL at 11:12

## 2019-12-01 RX ADMIN — HYDROCODONE BITARTRATE AND ACETAMINOPHEN 1 TABLET: 5; 325 TABLET ORAL at 11:12

## 2019-12-01 RX ADMIN — CARBIDOPA AND LEVODOPA 2 TABLET: 25; 100 TABLET ORAL at 08:12

## 2019-12-01 RX ADMIN — SODIUM CHLORIDE: 0.9 INJECTION, SOLUTION INTRAVENOUS at 10:12

## 2019-12-01 RX ADMIN — ATORVASTATIN CALCIUM 10 MG: 10 TABLET, FILM COATED ORAL at 08:12

## 2019-12-01 RX ADMIN — CARBIDOPA AND LEVODOPA 2 TABLET: 25; 100 TABLET ORAL at 11:12

## 2019-12-01 RX ADMIN — ONDANSETRON 4 MG: 2 INJECTION INTRAMUSCULAR; INTRAVENOUS at 05:12

## 2019-12-01 RX ADMIN — POLYETHYLENE GLYCOL 3350, SODIUM CHLORIDE, SODIUM BICARBONATE AND POTASSIUM CHLORIDE 4000 ML: KIT ORAL at 04:12

## 2019-12-01 RX ADMIN — HYDROCODONE BITARTRATE AND ACETAMINOPHEN 1 TABLET: 5; 325 TABLET ORAL at 08:12

## 2019-12-01 RX ADMIN — METOPROLOL TARTRATE 12.5 MG: 25 TABLET, FILM COATED ORAL at 08:12

## 2019-12-01 NOTE — SUBJECTIVE & OBJECTIVE
Interval History: There was drop in HnH last night. Transfused 2 units of PRBCs overnight    Review of Systems   Constitutional: Negative for appetite change, chills, fatigue and fever.   HENT: Negative for congestion, hearing loss, rhinorrhea, sore throat, trouble swallowing and voice change.    Respiratory: Negative for cough, chest tightness, shortness of breath and wheezing.    Cardiovascular: Negative for chest pain, palpitations and leg swelling.   Gastrointestinal: Positive for abdominal pain and blood in stool. Negative for diarrhea, nausea and vomiting.   Genitourinary: Negative for difficulty urinating, frequency, hematuria and urgency.   Musculoskeletal: Negative for back pain, joint swelling and neck stiffness.   Skin: Negative for pallor and rash.   Neurological: Negative for tremors, seizures, syncope, speech difficulty, weakness, numbness and headaches.   Hematological: Negative for adenopathy.   Psychiatric/Behavioral: Negative for agitation, behavioral problems, confusion and sleep disturbance.     Objective:     Vital Signs (Most Recent):  Temp: 97.9 °F (36.6 °C) (12/01/19 1030)  Pulse: 87 (12/01/19 1105)  Resp: 16 (12/01/19 1105)  BP: 122/60 (12/01/19 1105)  SpO2: 99 % (12/01/19 1105) Vital Signs (24h Range):  Temp:  [97.9 °F (36.6 °C)-98.8 °F (37.1 °C)] 97.9 °F (36.6 °C)  Pulse:  [79-93] 87  Resp:  [13-18] 16  SpO2:  [95 %-100 %] 99 %  BP: (121-195)/(54-82) 122/60     Weight: 52 kg (114 lb 10.2 oz)  Body mass index is 22.39 kg/m².    Intake/Output Summary (Last 24 hours) at 12/1/2019 1138  Last data filed at 12/1/2019 1055  Gross per 24 hour   Intake 450 ml   Output --   Net 450 ml      Physical Exam   Constitutional: She is oriented to person, place, and time. She appears well-developed and well-nourished. No distress.   HENT:   Head: Normocephalic and atraumatic.   Right Ear: External ear normal.   Left Ear: External ear normal.   Nose: Nose normal.   Mouth/Throat: Oropharynx is clear and  moist.   Eyes: Pupils are equal, round, and reactive to light. Conjunctivae and EOM are normal. Right eye exhibits no discharge. Left eye exhibits no discharge. No scleral icterus.   Neck: Normal range of motion. Neck supple. No thyromegaly present.   Cardiovascular: Normal rate, regular rhythm, normal heart sounds and intact distal pulses.   No murmur heard.  Pulmonary/Chest: Effort normal and breath sounds normal. No stridor. No respiratory distress. She has no wheezes. She has no rales.   Abdominal: Soft. Bowel sounds are normal. She exhibits no distension. There is tenderness.   Musculoskeletal: She exhibits no edema or tenderness.   Stage I sacral decubitus ulcer   Musculoskeletal: Normal range of motion. She exhibits no edema or tenderness.   Musculoskeletal: Normal range of motion. She exhibits no tenderness.   Right hip and knee is in a flexed position but patient is able to fully extend it.  Bruise and swelling to the right anterior proximal patella. Arthritic appearing knees bilaterally. Bruising to the left flank. Bruise to the left upper extremity.    Lymphadenopathy:     She has no cervical adenopathy.   Neurological: She is alert and oriented to person, place, and time. No cranial nerve deficit. Coordination normal.   Skin: Skin is warm and dry. No rash noted. No erythema.   Multiple areas of ecchymosis chest abdomen back to see photos    Lymphadenopathy:     She has no cervical adenopathy.   Neurological: She is alert and oriented to person, place, and time. No cranial nerve deficit or sensory deficit. She exhibits normal muscle tone. Coordination normal.   Skin: Skin is dry. Capillary refill takes less than 2 seconds. She is not diaphoretic. There is erythema.   Psychiatric: She has a normal mood and affect. Her behavior is normal. Judgment and thought content normal.   Nursing note and vitals reviewed.      Significant Labs:   BMP:   Recent Labs   Lab 11/30/19  0858   *      K 4.3   CL  106   CO2 26   BUN 21   CREATININE 0.7   CALCIUM 8.8     CBC:   Recent Labs   Lab 11/30/19  0858 11/30/19  1647 11/30/19  2330   WBC 11.77 10.44 9.10   HGB 8.2* 7.3* 6.5*   HCT 26.0* 22.3* 20.4*   * [phone]       Significant Imaging: I have reviewed all pertinent imaging results/findings within the past 24 hours.

## 2019-12-01 NOTE — [employer] PATIENT INSTRUCTIONS
Discharge Summary/Instructions after an Endoscopic Procedure  Patient Name: Maricarmen Woodward  Patient MRN: [id number]  Patient YOB: 1927 Sunday, December 01, 2019  Heidi Gallagher MD  RESTRICTIONS:  During your procedure today, you received medications for sedation.  These   medications may affect your judgment, balance and coordination.  Therefore,   for 24 hours, you have the following restrictions:   - DO NOT drive a car, operate machinery, make legal/financial decisions,   sign important papers or drink alcohol.    ACTIVITY:  Today: no heavy lifting, straining or running due to procedural   sedation/anesthesia.  The following day: return to full activity including work.  DIET:  Eat and drink normally unless instructed otherwise.     TREATMENT FOR COMMON SIDE EFFECTS:  - Mild abdominal pain, nausea, belching, bloating or excessive gas:  rest,   eat lightly and use a heating pad.  - Sore Throat: treat with throat lozenges and/or gargle with warm salt   water.  - Because air was used during the procedure, expelling large amounts of air   from your rectum or belching is normal.  - If a bowel prep was taken, you may not have a bowel movement for 1-3 days.    This is normal.  SYMPTOMS TO WATCH FOR AND REPORT TO YOUR PHYSICIAN:  1. Abdominal pain or bloating, other than gas cramps.  2. Chest pain.  3. Back pain.  4. Signs of infection such as: chills or fever occurring within 24 hours   after the procedure.  5. Rectal bleeding, which would show as bright red, maroon, or black stools.   (A tablespoon of blood from the rectum is not serious, especially if   hemorrhoids are present.)  6. Vomiting.  7. Weakness or dizziness.  GO DIRECTLY TO THE NEAREST EMERGENCY ROOM IF YOU HAVE ANY OF THE FOLLOWING:      Difficulty breathing              Chills and/or fever over 101 F   Persistent vomiting and/or vomiting blood   Severe abdominal pain   Severe chest pain   Black, tarry stools   Bleeding- more  than one tablespoon   Any other symptom or condition that you feel may need urgent attention  Your doctor recommends these additional instructions:  If any biopsies were taken, your doctors clinic will contact you in 1 to 2   weeks with any results.  - Return patient to hospital [facility] for ongoing care.   - Clear liquid diet today. NPO at midnight.   -Prep tonight for colonoscopy tomorrow  -Post-transfusion H&H then every 8 hours  - PPI daily  For questions, problems or results please call your physician - Heidi Gallagher MD at Work:  [phone].  [facility], EMERGENCY ROOM PHONE NUMBER: [phone]  IF A COMPLICATION OR EMERGENCY SITUATION ARISES AND YOU ARE UNABLE TO REACH   YOUR PHYSICIAN - GO DIRECTLY TO THE EMERGENCY ROOM.  Heidi Gallagher MD  12/1/2019 10:52:58 AM  This report has been verified and signed electronically.  [employer]

## 2019-12-01 NOTE — ASSESSMENT & PLAN NOTE
Patient's anemia is currently controlled. S/p 2 units of PRBCs.   ML from GI loss  Current CBC reviewed-   Lab Results   Component Value Date    HGB 6.5 (L) 11/30/2019    HCT 20.4 (L) 11/30/2019     Monitor serial CBC and transfuse if patient becomes hemodynamically unstable, symptomatic or H/H drops below 7/21.

## 2019-12-01 NOTE — ANESTHESIA POSTPROCEDURE EVALUATION
Anesthesia Post Evaluation    Patient: Maricarmen IMLLER Trace    Procedure(s) Performed: Procedure(s) (LRB):  EGD (ESOPHAGOGASTRODUODENOSCOPY) (N/A)    Final Anesthesia Type: general    Patient location during evaluation: PACU  Patient participation: Yes- Able to Participate  Level of consciousness: sedated and awake  Post-procedure vital signs: reviewed and stable  Pain management: adequate  Airway patency: patent    PONV status at discharge: No PONV  Anesthetic complications: no      Cardiovascular status: blood pressure returned to baseline  Respiratory status: spontaneous ventilation  Hydration status: euvolemic  Follow-up not needed.          Vitals Value Taken Time   /61 12/1/2019 10:50 AM   Temp 36.6 °C (97.9 °F) 12/1/2019 10:30 AM   Pulse 93 12/1/2019 10:50 AM   Resp 16 12/1/2019 10:50 AM   SpO2 100 % 12/1/2019 10:50 AM         No case tracking events are documented in the log.      Pain/King Score: Pain Rating Prior to Med Admin: 4 (11/30/2019 10:14 PM)  Pain Rating Post Med Admin: 0 (11/30/2019 11:14 PM)

## 2019-12-01 NOTE — ASSESSMENT & PLAN NOTE
= GI Bleed =  Acute,moderate EBLoss, Lower, without evidence hemodynamic instability  Scheduled for EGD this am and colonoscopy tomorrow   Hematochezia + melena noted   Guaic positive, stool content/quality before and during.  Suspected causes:    Upper GI: Peptic Ulcer Disease (H. Pylori vs. NSAID), Gastritis, Variceal Bleed, MW Tear, AVM, less likely malignancy  Lower GI: Diverticulosis, AVM, malignancy, hemorrhoids    - At least 2x IV access, 18 gauge or larger  - IVF resuscitation to maintain MAP>65  - H&H monitoring q8h  - Blood Consent.  Transfuse PRN Hgb<8    (Threshold of 7 may actually be non-inferior and even superior according to [id number])    (Massive Transfusion Protocol option or empiric FFP+Platelets if needing more than 2-4 units PRBC)  - Will continue PPI bid    - NPO for pending endoscopy   - appreciate GI Consult awaiting  EGD/Colonoscopy      If massive lower GI bleed noted , will get CT angio to isolate source and IR Consult for embolization)    - Helicobacter pylori Ab + stool Ag.  If either positive, treat  - Hold NSAIDs, steroids, ASA

## 2019-12-01 NOTE — ANESTHESIA PREPROCEDURE EVALUATION
12/01/2019  Maricarmen Woodward is a 89 y.o., female.    Pre-op Assessment    I have reviewed the Patient Summary Reports.     I have reviewed the Nursing Notes.   I have reviewed the Medications.     Review of Systems  Anesthesia Hx:  No problems with previous Anesthesia  Denies Family Hx of Anesthesia complications.   Denies Personal Hx of Anesthesia complications.   Social:  Non-Smoker    Hematology/Oncology:         -- Anemia: Hematology Comments: Hgb 6.5 last night, Two units tranfused and one being transfused throughout procedure.   Cardiovascular:   Hypertension ECG has been reviewed.    Pulmonary:   Pneumonia    Hepatic/GI:   GI bleed   Musculoskeletal:   Left hip fracture - recent repair Bone Disorders: Fracture , location of femur.     Neurological:   parkinsonism Movement Disorder Dx, Parkinson's Disease       Physical Exam  General:  Well nourished    Airway/Jaw/Neck:  Airway Findings: Mouth Opening: Normal Tongue: Normal  General Airway Assessment: Adult, Good  Mallampati: II  Improves to II with phonation.  TM Distance: 4-6 cm  Jaw/Neck Findings:  Neck ROM: Extension Decreased, Mild      Dental:  Dental Findings: In tact   Chest/Lungs:  Chest/Lungs Findings: Clear to auscultation, Normal Respiratory Rate     Heart/Vascular:  Heart Findings: Rate: Normal  Rhythm: Regular Rhythm  Sounds: Normal  Heart murmur: negative       Mental Status:  Mental Status Findings:  Somnolent, Cooperative         Anesthesia Plan  Type of Anesthesia, risks & benefits discussed:  Anesthesia Type:  general  Patient's Preference:   Intra-op Monitoring Plan: standard ASA monitors  Intra-op Monitoring Plan Comments:   Post Op Pain Control Plan:   Post Op Pain Control Plan Comments:   Induction:   IV  Beta Blocker:  Patient is not currently on a Beta-Blocker (No further documentation required).       Informed Consent:  Patient understands risks and agrees with Anesthesia plan.  Questions answered. Anesthesia consent signed with patient.  ASA Score: 3  emergent   Day of Surgery Review of History & Physical:    H&P update referred to the surgeon.     Anesthesia Plan Notes: Informed consent from POA/nephew.        Ready For Surgery From Anesthesia Perspective.

## 2019-12-01 NOTE — PROGRESS NOTES
[facility]  Cardiology  Progress Note    Patient Name: Maricarmen Woodward  MRN: [id number]  Admission Date: 11/30/2019  Hospital Length of Stay: 1 days  Code Status: Full Code   Attending Physician: Lizbet Khan MD   Primary Care Physician: Haris Brambila MD  Expected Discharge Date:   Principal Problem:GI bleed    Subjective:     Hospital Course: 89 y.o. female with h/o PUD and diverticulosis, HTN, Arthritis, and Parkinson's disease, SHx includes Fracture surgery, joint replacement, ORIF femur fracture, and Hemiarthroplasty of hip, was admitted for rectal bleeding.  Consult requested for PVCs-trigeminy.    Interval History: Had EGD AM, no active bleeding found. PVCs seem less frequent. Pt denies cp or sob. Hungry and wants to eat.    Review of Systems  Objective:     Vital Signs (Most Recent):  Temp: 98.2 °F (36.8 °C) (12/01/19 1201)  Pulse: 81 (12/01/19 1201)  Resp: 18 (12/01/19 1201)  BP: (!) 143/59 (12/01/19 1201)  SpO2: 97 % (12/01/19 1201) Vital Signs (24h Range):  Temp:  [97.9 °F (36.6 °C)-98.8 °F (37.1 °C)] 98.2 °F (36.8 °C)  Pulse:  [79-93] 81  Resp:  [13-18] 18  SpO2:  [95 %-100 %] 97 %  BP: (121-195)/(54-82) 143/59   Weight: 52 kg (114 lb 10.2 oz)  Body mass index is 22.39 kg/m².  SpO2: 97 %  O2 Device (Oxygen Therapy): room air    Intake/Output Summary (Last 24 hours) at 12/1/2019 1248  Last data filed at 12/1/2019 1055  Gross per 24 hour   Intake 450 ml   Output --   Net 450 ml     Lines/Drains/Airways     Peripheral Intravenous Line                 Peripheral IV - Single Lumen 11/30/19 0751 20 G Right Antecubital 1 day         Peripheral IV - Single Lumen 11/30/19 0826 20 G Right Hand 1 day               Physical Exam  Gen: Lying on bed, lethagic  Skin: warm and dry  Lymph: no lymphadenopathy detected  HEENT: NC/AT  Neck: supple, no JVD/bruit  Chest: no deformity, equal movement b/l  Lung: CTA b/l  Heart: Regular, S1/S2 +, no M/G/R  Abd: BS+, S, NT/ND  Ext: no pretibial  edema  Pulse: b/l radial 2+  Neuro: AA     Cardiographics  ECG 12/1/19: sinus with PVC, 78 bpm, low voltage on precordial leads, poor R progression.  ECG 11/30/19: sinus with frequent PVCs.  Echocardiogram 12/1/19:   · Normal left ventricular size, wall motion and systolic function. The estimated ejection fraction is 60%. Grade I diastolic dysfunction consistent with impaired relaxation.  · Normal right ventricular systolic function.  · Mild tricuspid regurgitation.     Imaging  Chest x-ray 11/26/19: Prominence of the pulmonary vasculature and mild perihilar infiltrates suggesting either mild pulmonary edema or atypical pneumonia.  Trace left pleural effusion.  Atherosclerosis.    Lab Results   Component Value Date    WBC 9.10 11/30/2019    HGB 6.5 (L) 11/30/2019    HCT 20.4 (L) 11/30/2019     (H) 11/30/2019     11/30/2019     BMP  Lab Results   Component Value Date     11/30/2019    K 4.3 11/30/2019     11/30/2019    CO2 26 11/30/2019    BUN 21 11/30/2019    CREATININE 0.7 11/30/2019    CALCIUM 8.8 11/30/2019    ANIONGAP 6 (L) 11/30/2019    ESTGFRAFRICA >60 11/30/2019    EGFRNONAA >60 11/30/2019   Results for ALANIS DAIGLE (MRN [id number]) as of 12/1/2019 15:56   Ref. Range 11/30/2019 17:33 11/30/2019 18:15 12/1/2019 14:23   BNP Latest Ref Range: 0 - 99 pg/mL 301 (H)     Troponin I Latest Ref Range: 0 - 0.026 ng/mL  0.136 (H) 0.061 (H)     Lab Results   Component Value Date    TSH 1.281 11/30/2019       Assessment:   PVCs-trigeminy  Elevated Trop: 2ndary from anemia?  GI bleeding, h/o PUD and diverticulosis  Anemia-severe  HTN  Parkinson  Advanced age   Plan:   Continue monitor; track Trop till treading down. Repeat ECG did not showed significant abnormality. Conservative management likely the best choice for pt considering her age.  Continue Metoprolol 12.5mg po bid.  Add lipitor 10mg/d.  Transfuse if needed.  Supportive care.  GI plan colonoscope tomorrow.    Karlos Loyola,  MD  Cardiology  [facility]

## 2019-12-01 NOTE — CARE UPDATE
12/01/19 0911   Patient Assessment/Suction   Level of Consciousness (AVPU) alert   Respiratory Effort Normal;Unlabored   Expansion/Accessory Muscles/Retractions no use of accessory muscles;no retractions;expansion symmetric   All Lung Fields Breath Sounds diminished   Rhythm/Pattern, Respiratory depth regular;unlabored;pattern regular   Cough Frequency no cough   PRE-TX-O2   O2 Device (Oxygen Therapy) room air   SpO2 96 %   Pulse Oximetry Type Intermittent   $ Pulse Oximetry - Multiple Charge Pulse Oximetry - Multiple   Pulse 88   Resp 16   Aerosol Therapy   $ Aerosol Therapy Charges PRN treatment not required   Respiratory Treatment Status (SVN) PRN treatment not required       Aerosol treatments PRN.
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
## 2019-12-01 NOTE — ASSESSMENT & PLAN NOTE
Will restart home med  Will monitor if the bleeding continues and hemodynamic unstable will consider holding meds

## 2019-12-01 NOTE — TRANSFER OF CARE
Anesthesia Transfer of Care Note    Patient: Maricarmen MILLER Trace    Procedure(s) Performed: Procedure(s) (LRB):  EGD (ESOPHAGOGASTRODUODENOSCOPY) (N/A)    Patient location: GI    Anesthesia Type: general    Transport from OR: Transported from OR on room air with adequate spontaneous ventilation    Post pain: adequate analgesia    Post assessment: no apparent anesthetic complications and tolerated procedure well    Post vital signs: stable    Level of consciousness: awake, alert and oriented    Nausea/Vomiting: no nausea/vomiting    Complications: none    Transfer of care protocol was followed      Last vitals:   Visit Vitals  /61 (BP Location: Left arm, Patient Position: Lying)   Pulse 93   Temp 36.6 °C (97.9 °F) (Skin)   Resp 16   Ht 5' (1.524 m)   Wt 52 kg (114 lb 10.2 oz)   SpO2 100%   Breastfeeding? No   BMI 22.39 kg/m²

## 2019-12-01 NOTE — CARE UPDATE
11/30/19 2002   Patient Assessment/Suction   Level of Consciousness (AVPU) alert   Respiratory Effort Normal;Unlabored   Expansion/Accessory Muscles/Retractions no use of accessory muscles;no retractions;expansion symmetric   All Lung Fields Breath Sounds diminished   PRE-TX-O2   O2 Device (Oxygen Therapy) room air   SpO2 98 %   Pulse Oximetry Type Intermittent   $ Pulse Oximetry - Multiple Charge Pulse Oximetry - Multiple   Pulse 84   Resp 18

## 2019-12-02 LAB
ANION GAP SERPL CALC-SCNC: 6 MMOL/L (ref 8–16)
BASOPHILS # BLD AUTO: 0.05 K/UL (ref 0–0.2)
BASOPHILS NFR BLD: 0.6 % (ref 0–1.9)
BUN SERPL-MCNC: 15 MG/DL (ref 10–30)
CALCIUM SERPL-MCNC: 8.6 MG/DL (ref 8.7–10.5)
CHLORIDE SERPL-SCNC: 108 MMOL/L (ref 95–110)
CO2 SERPL-SCNC: 23 MMOL/L (ref 23–29)
CREAT SERPL-MCNC: 0.7 MG/DL (ref 0.5–1.4)
DIFFERENTIAL METHOD: ABNORMAL
EOSINOPHIL # BLD AUTO: 0.1 K/UL (ref 0–0.5)
EOSINOPHIL NFR BLD: 1.7 % (ref 0–8)
ERYTHROCYTE [DISTWIDTH] IN BLOOD BY AUTOMATED COUNT: 19.2 % (ref 11.5–14.5)
EST. GFR  (AFRICAN AMERICAN): >60 ML/MIN/1.73 M^2
EST. GFR  (NON AFRICAN AMERICAN): >60 ML/MIN/1.73 M^2
GLUCOSE SERPL-MCNC: 98 MG/DL (ref 70–110)
HCT VFR BLD AUTO: 26.7 % (ref 37–48.5)
HCT VFR BLD AUTO: 29.1 % (ref 37–48.5)
HGB BLD-MCNC: 8.9 G/DL (ref 12–16)
HGB BLD-MCNC: 9.6 G/DL (ref 12–16)
IMM GRANULOCYTES # BLD AUTO: 0.03 K/UL (ref 0–0.04)
LYMPHOCYTES # BLD AUTO: 1.2 K/UL (ref 1–4.8)
LYMPHOCYTES NFR BLD: 15.5 % (ref 18–48)
MAGNESIUM SERPL-MCNC: 1.8 MG/DL (ref 1.6–2.6)
MCH RBC QN AUTO: 31.2 PG (ref 27–31)
MCHC RBC AUTO-ENTMCNC: 33 G/DL (ref 32–36)
MCV RBC AUTO: 95 FL (ref 82–98)
MONOCYTES # BLD AUTO: 0.5 K/UL (ref 0.3–1)
MONOCYTES NFR BLD: 6.4 % (ref 4–15)
NEUTROPHILS # BLD AUTO: 5.9 K/UL (ref 1.8–7.7)
NEUTROPHILS NFR BLD: 75.4 % (ref 38–73)
NRBC BLD-RTO: 0 /100 WBC
PHOSPHATE SERPL-MCNC: 2.6 MG/DL (ref 2.7–4.5)
PLATELET # BLD AUTO: 296 K/UL (ref 150–350)
PMV BLD AUTO: 9.9 FL (ref 9.2–12.9)
POTASSIUM SERPL-SCNC: 3.9 MMOL/L (ref 3.5–5.1)
RBC # BLD AUTO: 3.08 M/UL (ref 4–5.4)
SODIUM SERPL-SCNC: 137 MMOL/L (ref 136–145)
WBC # BLD AUTO: 7.82 K/UL (ref 3.9–12.7)

## 2019-12-02 PROCEDURE — 12000002 HC ACUTE/MED SURGE SEMI-PRIVATE ROOM

## 2019-12-02 PROCEDURE — 85018 HEMOGLOBIN: CPT

## 2019-12-02 PROCEDURE — 94761 N-INVAS EAR/PLS OXIMETRY MLT: CPT

## 2019-12-02 PROCEDURE — 80048 BASIC METABOLIC PNL TOTAL CA: CPT

## 2019-12-02 PROCEDURE — 83735 ASSAY OF MAGNESIUM: CPT

## 2019-12-02 PROCEDURE — 99232 PR SUBSEQUENT HOSPITAL CARE,LEVL II: ICD-10-PCS | Mod: ,,, | Performed by: INTERNAL MEDICINE

## 2019-12-02 PROCEDURE — 84100 ASSAY OF PHOSPHORUS: CPT

## 2019-12-02 PROCEDURE — 25000003 PHARM REV CODE 250: Performed by: INTERNAL MEDICINE

## 2019-12-02 PROCEDURE — 63600175 PHARM REV CODE 636 W HCPCS: Performed by: INTERNAL MEDICINE

## 2019-12-02 PROCEDURE — C9113 INJ PANTOPRAZOLE SODIUM, VIA: HCPCS | Performed by: INTERNAL MEDICINE

## 2019-12-02 PROCEDURE — 85014 HEMATOCRIT: CPT

## 2019-12-02 PROCEDURE — 85025 COMPLETE CBC W/AUTO DIFF WBC: CPT

## 2019-12-02 PROCEDURE — 99900035 HC TECH TIME PER 15 MIN (STAT)

## 2019-12-02 PROCEDURE — 36415 COLL VENOUS BLD VENIPUNCTURE: CPT

## 2019-12-02 PROCEDURE — 99232 SBSQ HOSP IP/OBS MODERATE 35: CPT | Mod: ,,, | Performed by: INTERNAL MEDICINE

## 2019-12-02 RX ORDER — METOPROLOL TARTRATE 25 MG/1
25 TABLET, FILM COATED ORAL 2 TIMES DAILY
Status: DISCONTINUED | OUTPATIENT
Start: 2019-12-02 | End: 2019-12-03 | Stop reason: HOSPADM

## 2019-12-02 RX ADMIN — ATORVASTATIN CALCIUM 10 MG: 10 TABLET, FILM COATED ORAL at 08:12

## 2019-12-02 RX ADMIN — CLONAZEPAM 0.5 MG: 0.5 TABLET ORAL at 08:12

## 2019-12-02 RX ADMIN — CARBIDOPA AND LEVODOPA 2 TABLET: 25; 100 TABLET ORAL at 08:12

## 2019-12-02 RX ADMIN — HYDROCODONE BITARTRATE AND ACETAMINOPHEN 1 TABLET: 5; 325 TABLET ORAL at 03:12

## 2019-12-02 RX ADMIN — HYDROCODONE BITARTRATE AND ACETAMINOPHEN 1 TABLET: 5; 325 TABLET ORAL at 08:12

## 2019-12-02 RX ADMIN — CARBIDOPA AND LEVODOPA 2 TABLET: 25; 100 TABLET ORAL at 03:12

## 2019-12-02 RX ADMIN — PANTOPRAZOLE SODIUM 40 MG: 40 INJECTION, POWDER, LYOPHILIZED, FOR SOLUTION INTRAVENOUS at 08:12

## 2019-12-02 RX ADMIN — METOPROLOL TARTRATE 25 MG: 25 TABLET ORAL at 08:12

## 2019-12-02 RX ADMIN — METOPROLOL TARTRATE 12.5 MG: 25 TABLET, FILM COATED ORAL at 08:12

## 2019-12-02 RX ADMIN — HYDROCODONE BITARTRATE AND ACETAMINOPHEN 1 TABLET: 5; 325 TABLET ORAL at 04:12

## 2019-12-02 NOTE — NURSING
Pt with large bloody BM. Large clots noted in brief. Dr. Orellana notified. Stated he will be by to round on pt.

## 2019-12-02 NOTE — NURSING
Pt with medium black tarry stool x1. Bright red blood noted coming from rectum. RACHEL Lewis in Endo notified.

## 2019-12-02 NOTE — SUBJECTIVE & OBJECTIVE
Interval History: Had dark stools this morning , unable to get colonoscopy as she was not able to complete her bowel prep    Review of Systems   Constitutional: Negative for appetite change, chills, fatigue and fever.   HENT: Negative for congestion, hearing loss, rhinorrhea, sore throat, trouble swallowing and voice change.    Respiratory: Negative for cough, chest tightness, shortness of breath and wheezing.    Cardiovascular: Negative for chest pain, palpitations and leg swelling.   Gastrointestinal: Positive for abdominal pain and blood in stool. Negative for diarrhea, nausea and vomiting.   Genitourinary: Negative for difficulty urinating, frequency, hematuria and urgency.   Musculoskeletal: Negative for back pain, joint swelling and neck stiffness.   Skin: Negative for pallor and rash.   Neurological: Negative for tremors, seizures, syncope, speech difficulty, weakness, numbness and headaches.   Hematological: Negative for adenopathy.   Psychiatric/Behavioral: Negative for agitation, behavioral problems, confusion and sleep disturbance.     Objective:     Vital Signs (Most Recent):  Temp: 97 °F (36.1 °C) (12/02/19 1538)  Pulse: 83 (12/02/19 1538)  Resp: 17 (12/02/19 1538)  BP: (!) 143/69 (12/02/19 1538)  SpO2: 96 % (12/02/19 1538) Vital Signs (24h Range):  Temp:  [97 °F (36.1 °C)-98.1 °F (36.7 °C)] 97 °F (36.1 °C)  Pulse:  [69-84] 83  Resp:  [16-18] 17  SpO2:  [94 %-97 %] 96 %  BP: (138-147)/(61-69) 143/69     Weight: 51.7 kg (114 lb)  Body mass index is 22.26 kg/m².  No intake or output data in the 24 hours ending 12/02/19 1738   Physical Exam   Constitutional: She is oriented to person, place, and time. She appears well-developed and well-nourished. No distress.   HENT:   Head: Normocephalic and atraumatic.   Right Ear: External ear normal.   Left Ear: External ear normal.   Nose: Nose normal.   Mouth/Throat: Oropharynx is clear and moist.   Eyes: Pupils are equal, round, and reactive to light. Conjunctivae  and EOM are normal. Right eye exhibits no discharge. Left eye exhibits no discharge. No scleral icterus.   Neck: Normal range of motion. Neck supple. No thyromegaly present.   Cardiovascular: Normal rate, regular rhythm, normal heart sounds and intact distal pulses.   No murmur heard.  Pulmonary/Chest: Effort normal and breath sounds normal. No stridor. No respiratory distress. She has no wheezes. She has no rales.   Abdominal: Soft. Bowel sounds are normal. She exhibits no distension. There is tenderness.   Musculoskeletal: She exhibits no edema or tenderness.   Stage I sacral decubitus ulcer   Musculoskeletal: Normal range of motion. She exhibits no edema or tenderness.   Musculoskeletal: Normal range of motion. She exhibits no tenderness.   Right hip and knee is in a flexed position but patient is able to fully extend it.  Bruise and swelling to the right anterior proximal patella. Arthritic appearing knees bilaterally. Bruising to the left flank. Bruise to the left upper extremity.    Lymphadenopathy:     She has no cervical adenopathy.   Neurological: She is alert and oriented to person, place, and time. No cranial nerve deficit. Coordination normal.   Skin: Skin is warm and dry. No rash noted. No erythema.   Multiple areas of ecchymosis chest abdomen back to see photos    Lymphadenopathy:     She has no cervical adenopathy.   Neurological: She is alert and oriented to person, place, and time. No cranial nerve deficit or sensory deficit. She exhibits normal muscle tone. Coordination normal.   Skin: Skin is dry. Capillary refill takes less than 2 seconds. She is not diaphoretic. There is erythema.   Psychiatric: She has a normal mood and affect. Her behavior is normal. Judgment and thought content normal.   Nursing note and vitals reviewed.      Significant Labs:   BMP:   Recent Labs   Lab 12/02/19  0527   GLU 98      K 3.9      CO2 23   BUN 15   CREATININE 0.7   CALCIUM 8.6*   MG 1.8     CBC:    Recent Labs   Lab 11/30/19  2330 12/01/19  1423 12/02/19  0526   WBC 9.10 8.10 7.82   HGB 6.5* 9.6* 9.6*   HCT 20.4* 29.1* 29.1*    290 296       Significant Imaging: I have reviewed all pertinent imaging results/findings within the past 24 hours.

## 2019-12-02 NOTE — ASSESSMENT & PLAN NOTE
Patient's anemia is currently controlled. S/p 2 units of PRBCs.   ML from GI loss  Current CBC reviewed-   Lab Results   Component Value Date    HGB 9.6 (L) 12/02/2019    HCT 29.1 (L) 12/02/2019     Monitor serial CBC and transfuse if patient becomes hemodynamically unstable, symptomatic or H/H drops below 7/21.

## 2019-12-02 NOTE — CARE UPDATE
12/01/19 2000   Patient Assessment/Suction   Level of Consciousness (AVPU) alert   Respiratory Effort Unlabored   Rhythm/Pattern, Respiratory no shortness of breath reported   Cough Frequency no cough   PRE-TX-O2   O2 Device (Oxygen Therapy) room air   SpO2 95 %   Pulse Oximetry Type Intermittent   $ Pulse Oximetry - Multiple Charge Pulse Oximetry - Multiple   Aerosol Therapy   $ Aerosol Therapy Charges PRN treatment not required   Respiratory Treatment Status (SVN) PRN treatment not required

## 2019-12-02 NOTE — PROGRESS NOTES
[facility] [place]  Cardiology  Progress Note    Patient Name: Maricarmen Woodward  MRN: [id number]  Admission Date: 11/30/2019  Hospital Length of Stay: 2 days  Code Status: Full Code   Attending Physician: Lizbet Khan MD   Primary Care Physician: Haris Brambila MD  Expected Discharge Date:   Principal Problem:GI bleed    Subjective:     Hospital Course: 89 y.o. female with h/o PUD and diverticulosis, HTN, Arthritis, and Parkinson's disease, SHx includes Fracture surgery, joint replacement, ORIF femur fracture, and Hemiarthroplasty of hip, was admitted for rectal bleeding.  Consult requested for PVCs-trigeminy.    Interval History: Unable to tolerate oral prep for colonoscope. PVCs monitored. Pt denies cp or sob.     Review of Systems  Objective:     Vital Signs (Most Recent):  Temp: 97 °F (36.1 °C) (12/02/19 1538)  Pulse: 83 (12/02/19 1538)  Resp: 17 (12/02/19 1538)  BP: (!) 143/69 (12/02/19 1538)  SpO2: 96 % (12/02/19 1538) Vital Signs (24h Range):  Temp:  [97 °F (36.1 °C)-98.1 °F (36.7 °C)] 97 °F (36.1 °C)  Pulse:  [69-84] 83  Resp:  [16-18] 17  SpO2:  [94 %-97 %] 96 %  BP: (138-147)/(61-69) 143/69   Weight: 51.7 kg (114 lb)  Body mass index is 22.26 kg/m².  SpO2: 96 %  O2 Device (Oxygen Therapy): room air    Intake/Output Summary (Last 24 hours) at 12/2/2019 1555  Last data filed at 12/1/2019 1700  Gross per 24 hour   Intake 600 ml   Output --   Net 600 ml     Lines/Drains/Airways     Peripheral Intravenous Line                 Peripheral IV - Single Lumen 12/01/19 1920 20 G Posterior;Right Forearm less than 1 day               Physical Exam  Gen: Lying on bed, lethargic and cachectic  Skin: warm and dry  Lymph: no lymphadenopathy detected  HEENT: NC/AT  Neck: supple, no JVD/bruit  Chest: no deformity, equal movement b/l  Lung: CTA b/l  Heart: Regular, S1/S2 +, no M/G/R  Abd: BS+, S, NT/ND  Ext: no pretibial edema  Pulse: b/l radial 2+  Neuro: Awake     Cardiographics  ECG 12/1/19: sinus  with PVC, 78 bpm, low voltage on precordial leads, poor R progression.  ECG 11/30/19: sinus with frequent PVCs.  Echocardiogram 12/1/19:   · Normal left ventricular size, wall motion and systolic function. The estimated ejection fraction is 60%. Grade I diastolic dysfunction consistent with impaired relaxation.  · Normal right ventricular systolic function.  · Mild tricuspid regurgitation.     Imaging  Chest x-ray 11/26/19: Prominence of the pulmonary vasculature and mild perihilar infiltrates suggesting either mild pulmonary edema or atypical pneumonia.  Trace left pleural effusion.  Atherosclerosis.    Lab Results   Component Value Date    WBC 7.82 12/02/2019    HGB 9.6 (L) 12/02/2019    HCT 29.1 (L) 12/02/2019    MCV 95 12/02/2019     12/02/2019     BMP  Lab Results   Component Value Date     12/02/2019    K 3.9 12/02/2019     12/02/2019    CO2 23 12/02/2019    BUN 15 12/02/2019    CREATININE 0.7 12/02/2019    CALCIUM 8.6 (L) 12/02/2019    ANIONGAP 6 (L) 12/02/2019    ESTGFRAFRICA >60 12/02/2019    EGFRNONAA >60 12/02/2019   Results for ALANIS DAIGLE (MRN [id number]) as of 12/1/2019 15:56   Ref. Range 11/30/2019 17:33 11/30/2019 18:15 12/1/2019 14:23   BNP Latest Ref Range: 0 - 99 pg/mL 301 (H)     Troponin I Latest Ref Range: 0 - 0.026 ng/mL  0.136 (H) 0.061 (H)     Lab Results   Component Value Date    TSH 1.281 11/30/2019       Assessment:   PVCs-trigeminy  Elevated Trop: 2ndary from anemia?  GI bleeding, h/o PUD and diverticulosis  Anemia-severe  HTN  Parkinson  Advanced age   Plan:   Continue monitor.Repeat ECG did not showed significant abnormality. Conservative management likely the best choice for pt considering her age.  Increase Metoprolol 25mg po bid.  Continue lipitor 10mg/d.  Transfuse if needed.  Supportive care.    Karlos Loyola MD  Cardiology  [facility]
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
## 2019-12-02 NOTE — ASSESSMENT & PLAN NOTE
= GI Bleed =  Acute,moderate EBLoss, Lower, without evidence hemodynamic instability   EGD done: normal, no active bleeding or old blood  Possible colonoscopy tomorrow, was cancelled today due to inadequate bowel prep   Hematochezia + melena noted   Guaic positive, stool content/quality before and during.  Suspected causes:    Upper GI: Less likely due to normal EGD, cannot r/o small intestine bleed at this point  Lower GI: Diverticulosis, AVM, malignancy, hemorrhoids    - At least 2x IV access, 18 gauge or larger  - IVF resuscitation to maintain MAP>65  - H&H monitoring q8h  - Blood Consent.  Transfuse PRN Hgb<8    (Threshold of 7 may actually be non-inferior and even superior according to PMID:[id number])    (Massive Transfusion Protocol option or empiric FFP+Platelets if needing more than 2-4 units PRBC)  - Will continue PPI bid    - NPO midnight and bowel prep for possible colonoscopy tomorrow  Will monitor hnh   - appreciate GI Consult awaiting Colonoscopy      If massive lower GI bleed noted , will get CT angio to isolate source and IR Consult for embolization)  - Hold NSAIDs, steroids, ASA

## 2019-12-03 VITALS
HEART RATE: 79 BPM | BODY MASS INDEX: 22.38 KG/M2 | OXYGEN SATURATION: 97 % | WEIGHT: 114 LBS | SYSTOLIC BLOOD PRESSURE: 163 MMHG | TEMPERATURE: 97 F | DIASTOLIC BLOOD PRESSURE: 70 MMHG | RESPIRATION RATE: 18 BRPM | HEIGHT: 60 IN

## 2019-12-03 LAB
ANION GAP SERPL CALC-SCNC: 6 MMOL/L (ref 8–16)
BASOPHILS # BLD AUTO: 0.04 K/UL (ref 0–0.2)
BASOPHILS NFR BLD: 0.4 % (ref 0–1.9)
BUN SERPL-MCNC: 12 MG/DL (ref 10–30)
CALCIUM SERPL-MCNC: 8.3 MG/DL (ref 8.7–10.5)
CHLORIDE SERPL-SCNC: 107 MMOL/L (ref 95–110)
CO2 SERPL-SCNC: 22 MMOL/L (ref 23–29)
CREAT SERPL-MCNC: 0.6 MG/DL (ref 0.5–1.4)
DIFFERENTIAL METHOD: ABNORMAL
EOSINOPHIL # BLD AUTO: 0.2 K/UL (ref 0–0.5)
EOSINOPHIL NFR BLD: 1.7 % (ref 0–8)
ERYTHROCYTE [DISTWIDTH] IN BLOOD BY AUTOMATED COUNT: 18.2 % (ref 11.5–14.5)
EST. GFR  (AFRICAN AMERICAN): >60 ML/MIN/1.73 M^2
EST. GFR  (NON AFRICAN AMERICAN): >60 ML/MIN/1.73 M^2
GLUCOSE SERPL-MCNC: 103 MG/DL (ref 70–110)
HCT VFR BLD AUTO: 28.2 % (ref 37–48.5)
HCT VFR BLD AUTO: 28.8 % (ref 37–48.5)
HGB BLD-MCNC: 9.2 G/DL (ref 12–16)
HGB BLD-MCNC: 9.5 G/DL (ref 12–16)
IMM GRANULOCYTES # BLD AUTO: 0.06 K/UL (ref 0–0.04)
LYMPHOCYTES # BLD AUTO: 1.1 K/UL (ref 1–4.8)
LYMPHOCYTES NFR BLD: 12.7 % (ref 18–48)
MAGNESIUM SERPL-MCNC: 1.8 MG/DL (ref 1.6–2.6)
MCH RBC QN AUTO: 31.7 PG (ref 27–31)
MCHC RBC AUTO-ENTMCNC: 33 G/DL (ref 32–36)
MCV RBC AUTO: 96 FL (ref 82–98)
MONOCYTES # BLD AUTO: 0.6 K/UL (ref 0.3–1)
MONOCYTES NFR BLD: 6.2 % (ref 4–15)
NEUTROPHILS # BLD AUTO: 7 K/UL (ref 1.8–7.7)
NEUTROPHILS NFR BLD: 78.3 % (ref 38–73)
NRBC BLD-RTO: 0 /100 WBC
PHOSPHATE SERPL-MCNC: 2.9 MG/DL (ref 2.7–4.5)
PLATELET # BLD AUTO: 299 K/UL (ref 150–350)
PMV BLD AUTO: 9.6 FL (ref 9.2–12.9)
POTASSIUM SERPL-SCNC: 3.8 MMOL/L (ref 3.5–5.1)
RBC # BLD AUTO: 3 M/UL (ref 4–5.4)
SODIUM SERPL-SCNC: 135 MMOL/L (ref 136–145)
WBC # BLD AUTO: 8.91 K/UL (ref 3.9–12.7)

## 2019-12-03 PROCEDURE — 99900035 HC TECH TIME PER 15 MIN (STAT)

## 2019-12-03 PROCEDURE — 36415 COLL VENOUS BLD VENIPUNCTURE: CPT

## 2019-12-03 PROCEDURE — 80048 BASIC METABOLIC PNL TOTAL CA: CPT

## 2019-12-03 PROCEDURE — 85014 HEMATOCRIT: CPT

## 2019-12-03 PROCEDURE — 99232 PR SUBSEQUENT HOSPITAL CARE,LEVL II: ICD-10-PCS | Mod: ,,, | Performed by: INTERNAL MEDICINE

## 2019-12-03 PROCEDURE — 99232 SBSQ HOSP IP/OBS MODERATE 35: CPT | Mod: ,,, | Performed by: INTERNAL MEDICINE

## 2019-12-03 PROCEDURE — 83735 ASSAY OF MAGNESIUM: CPT

## 2019-12-03 PROCEDURE — 25000003 PHARM REV CODE 250: Performed by: INTERNAL MEDICINE

## 2019-12-03 PROCEDURE — 94761 N-INVAS EAR/PLS OXIMETRY MLT: CPT

## 2019-12-03 PROCEDURE — 84100 ASSAY OF PHOSPHORUS: CPT

## 2019-12-03 PROCEDURE — 85025 COMPLETE CBC W/AUTO DIFF WBC: CPT

## 2019-12-03 PROCEDURE — C9113 INJ PANTOPRAZOLE SODIUM, VIA: HCPCS | Performed by: INTERNAL MEDICINE

## 2019-12-03 PROCEDURE — 85018 HEMOGLOBIN: CPT

## 2019-12-03 PROCEDURE — 63600175 PHARM REV CODE 636 W HCPCS: Performed by: INTERNAL MEDICINE

## 2019-12-03 RX ORDER — FAMOTIDINE 20 MG/1
20 TABLET, FILM COATED ORAL DAILY
Status: DISCONTINUED | OUTPATIENT
Start: 2019-12-04 | End: 2019-12-03 | Stop reason: HOSPADM

## 2019-12-03 RX ORDER — METOPROLOL TARTRATE 25 MG/1
25 TABLET, FILM COATED ORAL 2 TIMES DAILY
Qty: 60 TABLET | Refills: 11 | Status: SHIPPED | OUTPATIENT
Start: 2019-12-03 | End: 2020-04-18 | Stop reason: CLARIF

## 2019-12-03 RX ADMIN — PANTOPRAZOLE SODIUM 40 MG: 40 INJECTION, POWDER, LYOPHILIZED, FOR SOLUTION INTRAVENOUS at 09:12

## 2019-12-03 RX ADMIN — CARBIDOPA AND LEVODOPA 2 TABLET: 25; 100 TABLET ORAL at 09:12

## 2019-12-03 RX ADMIN — METOPROLOL TARTRATE 25 MG: 25 TABLET ORAL at 09:12

## 2019-12-03 NOTE — NURSING
here to transfer pt.IV access removed, catheter intact, patient tolerated well, gauze and tape applied to site. Telemetry removed and returned to monitor room. Educated patient on discharge instructions regarding new/change/continued/discontinued medications, follow-up appointments, diet, activity, patient states understanding, no further questions needing answered.
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
## 2019-12-03 NOTE — NURSING
Results for ALANIS DAIGLE (MRN [id number]) as of 12/2/2019 18:25   Ref. Range 12/2/2019 17:34   Hemoglobin Latest Ref Range: 12.0 - 16.0 g/dL 8.9 (L)   Hematocrit Latest Ref Range: 37.0 - 48.5 % 26.7 (L)     Dr. Orellana notified. Ordered to continue clear liquids for now. Will monitor for further signs of bleeding

## 2019-12-03 NOTE — HOSPITAL COURSE
Patient was admitted for GI bleed. GI was consulted. EGD was done first that was negative. GI recommended colonoscopy but the patient refused bowel prep including enema. Patient's H/H on arrival was 6.5/20.4. Was transfused 2 units of blood. Post transfusion, Patient's HnH was found to stable and as patient refused bowel prep for colonoscopy, colonoscopy was not performed. Gi cleared the patient for discharge as H/H has been stable  Cardiology was consulted  for PVCs-trigeminy. Pt reported palpitation,no  cp or sob. No fever or chills. Cardiology started the patient on metoprolol. Currently stable for discharge back to nursing home

## 2019-12-03 NOTE — DISCHARGE INSTRUCTIONS
Thank you for choosing [facility] for your medical care. The primary doctor who is taking care of you at the time of your discharge is Lizbet Khan MD.     You were admitted to the hospital with GI bleed.     Please note your discharge instructions, including diet/activity restrictions, follow-up appointments, and medication changes.  If you have any questions about your medical issues, prescriptions, or any other questions, please feel free to contact the [facility] Medicine Dept at [phone] and we will help.    If you are previously with Home health, outpatient PT/OT or under a therapy program, you are cleared to return to those programs.    Please direct all long term medication refills and follow up to your primary care provider, Haris Brambila MD. Thank you again for letting us take care of your health care needs.    Please note the following discharge instructions per your discharging physician-  Follow up with your Primary doctor in 1 week time to repeat CBC

## 2019-12-03 NOTE — PROGRESS NOTES
[facility]  Cardiology  Progress Note    Patient Name: Maricarmen Woodward  MRN: [id number]  Admission Date: 11/30/2019  Hospital Length of Stay: 3 days  Code Status: Full Code   Attending Physician: Lizbet Khan MD   Primary Care Physician: Haris Brambila MD  Expected Discharge Date:   Principal Problem:GI bleed    Subjective:     Hospital Course: 89 y.o. female with h/o PUD and diverticulosis, HTN, Arthritis, and Parkinson's disease, SHx includes Fracture surgery, joint replacement, ORIF femur fracture, and Hemiarthroplasty of hip, was admitted for rectal bleeding.  Consult requested for PVCs-trigeminy.    Interval History: PVCs monitored, especially when pt's awake. Pt denies cp or sob. Lethargic.    Review of Systems  Objective:     Vital Signs (Most Recent):  Temp: 96.3 °F (35.7 °C) (12/03/19 0805)  Pulse: 81 (12/03/19 0805)  Resp: 14 (12/03/19 0805)  BP: (!) 170/69 (12/03/19 0805)  SpO2: 95 % (12/03/19 0825) Vital Signs (24h Range):  Temp:  [96.2 °F (35.7 °C)-98.7 °F (37.1 °C)] 96.3 °F (35.7 °C)  Pulse:  [65-83] 81  Resp:  [14-18] 14  SpO2:  [95 %-99 %] 95 %  BP: (135-170)/(59-69) 170/69   Weight: 51.7 kg (114 lb)  Body mass index is 22.26 kg/m².  SpO2: 95 %  O2 Device (Oxygen Therapy): room air    Intake/Output Summary (Last 24 hours) at 12/3/2019 1047  Last data filed at 12/3/2019 0200  Gross per 24 hour   Intake 2980 ml   Output --   Net 2980 ml     Lines/Drains/Airways     Peripheral Intravenous Line                 Peripheral IV - Single Lumen 12/01/19 1920 20 G Posterior;Right Forearm 1 day               Physical Exam  Gen: Lying on bed, lethargic and cachectic  Skin: warm and dry  Lymph: no lymphadenopathy detected  HEENT: NC/AT  Neck: supple, no JVD/bruit  Chest: no deformity, equal movement b/l  Lung: CTA b/l  Heart: Regular, S1/S2 +, no M/G/R  Abd: BS+, S, NT/ND  Ext: no pretibial edema  Pulse: b/l radial 2+  Neuro: n/a     Cardiographics  ECG 12/1/19: sinus with PVC, 78  bpm, low voltage on precordial leads, poor R progression.  ECG 11/30/19: sinus with frequent PVCs.  Echocardiogram 12/1/19:   · Normal left ventricular size, wall motion and systolic function. The estimated ejection fraction is 60%. Grade I diastolic dysfunction consistent with impaired relaxation.  · Normal right ventricular systolic function.  · Mild tricuspid regurgitation.     Imaging  Chest x-ray 11/26/19: Prominence of the pulmonary vasculature and mild perihilar infiltrates suggesting either mild pulmonary edema or atypical pneumonia.  Trace left pleural effusion.  Atherosclerosis.    Lab Results   Component Value Date    WBC 8.91 12/03/2019    HGB 9.5 (L) 12/03/2019    HCT 28.8 (L) 12/03/2019    MCV 96 12/03/2019     12/03/2019     BMP  Lab Results   Component Value Date     (L) 12/03/2019    K 3.8 12/03/2019     12/03/2019    CO2 22 (L) 12/03/2019    BUN 12 12/03/2019    CREATININE 0.6 12/03/2019    CALCIUM 8.3 (L) 12/03/2019    ANIONGAP 6 (L) 12/03/2019    ESTGFRAFRICA >60 12/03/2019    EGFRNONAA >60 12/03/2019   Results for ALANIS DAIGLE (MRN [id number]) as of 12/1/2019 15:56   Ref. Range 11/30/2019 17:33 11/30/2019 18:15 12/1/2019 14:23   BNP Latest Ref Range: 0 - 99 pg/mL 301 (H)     Troponin I Latest Ref Range: 0 - 0.026 ng/mL  0.136 (H) 0.061 (H)     Lab Results   Component Value Date    TSH 1.281 11/30/2019       Assessment:   PVCs-trigeminy  Elevated Trop: 2ndary from anemia?  GI bleeding, h/o PUD and diverticulosis  Anemia-severe  HTN  Parkinson  Advanced age   Plan:   Continue monitor.Repeat ECG did not showed significant abnormality. Conservative management likely the best choice for pt considering her age and overall health condition.  Continue Metoprolol 25mg po bid.  Continue lipitor 10mg/d.  Transfuse if needed.  Supportive care.  Discussed with Dr Khan.    Karlos Loyola MD  Cardiology  [facility]

## 2019-12-04 ENCOUNTER — TELEPHONE (OUTPATIENT)
Dept: MEDSURG UNIT | Facility: HOSPITAL | Age: 84
End: 2019-12-04

## 2019-12-04 NOTE — PROGRESS NOTES
[facility] Note    CC: Hematochezia    HPI 89 y.o. female with history of PUD and diverticulosis who is admitted with acute, painless, moderate volume  Hematochezia  not associated with hemodynamic instablity or drop in blood counts (she is hypertensive). She has recent knee injury and is s/p ORIF last week with post-op anemia and has been taking Ibuprofen as well ? Receiving Lovenox at NH. Prior records reviewed, notable for EGD in 2012 performed by Dr. Pizano with large antral ulcer (healing on subsequent endoscopy, though to be due to Mobic). Colonoscopy in 2012 notable for sigmoid diverticulosis and prominent hemorrhoids.     INTERVAL HISTORY:  Since yesterday, patient has done well per nursing and H/H remained stable and even somewhat improved.  No appearance of ongoing bleeding.  Patient is tolerating diet per nursing.  No further intervention recommended at this time given patient's advanced age and the desires of the patient and her POA to avoid procedures if not urgently indicated.      Past Medical History:   Diagnosis Date    Arthritis     Hypertension     Parkinson's disease          Review of Systems  General ROS: negative for - chills, fever or weight loss  Cardiovascular ROS: no chest pain or dyspnea on exertion  Gastrointestinal ROS: no further bleeding    Physical Examination  BP (!) 163/70   Pulse 79   Temp 97.2 °F (36.2 °C)   Resp 18   Ht 5' (1.524 m)   Wt 51.7 kg (114 lb)   SpO2 97%   Breastfeeding? No   BMI 22.26 kg/m²   General appearance: alert, cooperative, no distress  HENT: Normocephalic, atraumatic, neck symmetrical, no nasal discharge, sclera anicteric   Lungs: clear to auscultation bilaterally, symmetric chest wall expansion bilaterally  Heart: regular rate and rhythm without rub; no displacement of the PMI   Abdomen: soft, NT  Extremities: extremities symmetric; no clubbing, cyanosis, or edema  Neurologic: Alert and oriented X 3, no sensory or motor neurologic  deficits      Labs:  Lab Results   Component Value Date    WBC 7.82 12/02/2019    HGB 8.9 (L) 12/02/2019    HCT 26.7 (L) 12/02/2019    MCV 95 12/02/2019     12/02/2019         CMP  Sodium   Date Value Ref Range Status   12/03/2019 135 (L) 136 - 145 mmol/L Final     Potassium   Date Value Ref Range Status   12/03/2019 3.8 3.5 - 5.1 mmol/L Final     Chloride   Date Value Ref Range Status   12/03/2019 107 95 - 110 mmol/L Final     CO2   Date Value Ref Range Status   12/03/2019 22 (L) 23 - 29 mmol/L Final     Glucose   Date Value Ref Range Status   12/03/2019 103 70 - 110 mg/dL Final     BUN, Bld   Date Value Ref Range Status   12/03/2019 12 10 - 30 mg/dL Final     Creatinine   Date Value Ref Range Status   12/03/2019 0.6 0.5 - 1.4 mg/dL Final   06/07/2013 0.9 0.5 - 1.4 mg/dL Final     Calcium   Date Value Ref Range Status   12/03/2019 8.3 (L) 8.7 - 10.5 mg/dL Final   06/07/2013 9.3 8.7 - 10.5 mg/dL Final     Total Protein   Date Value Ref Range Status   11/30/2019 5.6 (L) 6.0 - 8.4 g/dL Final     Albumin   Date Value Ref Range Status   11/30/2019 2.8 (L) 3.5 - 5.2 g/dL Final     Total Bilirubin   Date Value Ref Range Status   11/30/2019 1.2 (H) 0.1 - 1.0 mg/dL Final     Comment:     For infants and newborns, interpretation of results should be based  on gestational age, weight and in agreement with clinical  observations.  Premature Infant recommended reference ranges:  Up to 24 hours.............<8.0 mg/dL  Up to 48 hours............<12.0 mg/dL  3-5 days..................<15.0 mg/dL  6-29 days.................<15.0 mg/dL       Alkaline Phosphatase   Date Value Ref Range Status   11/30/2019 71 55 - 135 U/L Final   04/17/2012 53 23 - 119 UNIT/L Final     AST   Date Value Ref Range Status   11/30/2019 15 10 - 40 U/L Final   04/17/2012 11 10 - 30 UNIT/L Final     ALT   Date Value Ref Range Status   11/30/2019 <5 (L) 10 - 44 U/L Final     Anion Gap   Date Value Ref Range Status   12/03/2019 6 (L) 8 - 16 mmol/L Final    06/07/2013 11 5 - 15 meq/L Final     eGFR if    Date Value Ref Range Status   12/03/2019 >60 >60 mL/min/1.73 m^2 Final     eGFR if non    Date Value Ref Range Status   12/03/2019 >60 >60 mL/min/1.73 m^2 Final     Comment:     Calculation used to obtain the estimated glomerular filtration  rate (eGFR) is the CKD-EPI equation.            Assessment:   1.  History of PUD  2.  Diverticulosis  3.  Hematochezia - resolved  4.  Anemia - improved      Plan:  1.  Diet as tolerated  2.  OK to discharge to nursing facility from GI perspective.  If she has rebleeding, consideration can be given to colonoscopy at that time, however, patient and POA wish to defer unless urgently needed given her advanced age and frailty.    3.  GI to sign off.  Call for questions.    Jony Powers MD  [facility]  [address], [address]  [place] [address]  Office: [phone]  Fax: [phone]

## 2019-12-05 NOTE — PHYSICIAN QUERY
PT Name: Maricarmen Woodward  MR #: [id number]     Physician Query Form - Etiology of Condition Clarification      CDS/: Diana Rajput RN, CDS               Contact information: [email or web address]                                                                                                                         [phone]. [phone]  This form is a permanent document in the medical record.     Query Date: December 5, 2019    By submitting this query, we are merely seeking further clarification of documentation.  Please utilize your independent clinical judgment when addressing the question(s) below.     The medical record contains the following:    Findings Supporting Clinical Information Location in Medical Record   PRINCIPAL PROBLEM: GI bleed  89 y.o. female with history of PUD and diverticulosis who is admitted with acute, painless, moderate volume  Hematochezia      She has recent knee injury and is s/p ORIF last week with post-op anemia and has been taking Ibuprofen    No signsOf external hemorrhoids on exam    Prior records reviewed, notable for EGD in 2012 performed by Dr. Pizano with large antral ulcer (healing on subsequent endoscopy, though to be due to Mobic). Colonoscopy in 2012 notable for sigmoid diverticulosis and prominent hemorrhoids.     Pt with large bloody BM. Large clots noted in brief.     Impression:  - Normal esophagus.                       - Normal stomach.                       - Duodenal mucosal lymphangiectasia.                       - No specimens collected.                       -Continued hematochezia associated                            with anemia, no source found on upper                         endoscopy. Diverticulosis of                         colon on lower endoscopy 2012.    Suspected causes:    Upper GI: Less likely due to normal EGD, cannot r/o small intestine bleed at this point  Lower GI: Diverticulosis, AVM, malignancy, hemorrhoids    patient refused  bowel prep and H/H remained stable post transfusion. She states that she would like to avoid scope if possible.  Case was discussed with her nephew who is POA and he states that if she really needs colonoscopy he will consent for her.  I related to him that, given her advanced age and known history of hemorrhoids and diverticulosis, that conservative management for now is reasonable. GI Consult 11/30        GI Consult 11/30      ED Provider Note     GI Consult 11/30            RN Note 12/2    EGD Report 12/1                  HM PN 12/2            GI PN 12/2               Please document your best medical opinion regarding the suspected etiology of __ GI bleed___ for which treatment is/was directed.     Provider use only      [   ] Diverticulosis, please further specify as           [   ] Diverticulosis of small intestine           [   ] Diverticulosis of large intestine           [   ] Diverticulosis of both small and large intestine           [   ] Other, please specify           [   ] Clinically undetermined extent of diverticulosis       [   ] Internal hemorrhoids       [   ] Other suspected etiology, please specify ____________       [ x ] Clinically Undetermined     Please document in your progress notes daily for the duration of treatment, until resolved, and include in your discharge summary.

## 2019-12-05 NOTE — PHYSICIAN QUERY
"PT Name: Maricarmen Woodward  MR #: [id number]    Physician Query Form - Hematology Clarification      CDS/: Diana Rajput RN, CDS               Contact information: [email or web address]                                                                                                                        [phone]. [phone]    This form is a permanent document in the medical record.      Query Date: December 5, 2019    By submitting this query, we are merely seeking further clarification of documentation. Please utilize your independent clinical judgment when addressing the question(s) below.    The Medical record contains the following:   Indicators  Supporting Clinical Findings Location in Medical Record   x "Anemia" documented Anemia  Patient's anemia is currently controlled. S/p 2 units of PRBCs.   ML from GI loss    PN 12/2   x H & H = 8.2/26.0 --> 7.3/22.3 --> 6.5/20.4 --> 9.6/29.1 Labs 11/30- 12/2    BP =                     HR=     x "GI bleeding" documented Principal Problem:GI bleed    Pt with large bloody BM. Large clots noted in brief  PN 12/1    RN Note 12/2    Acute bleeding (Non GI site)     x Transfusion(s) There was drop in HnH last night.Transfused 2 units of PRBCs overnight  PN 12/1    Treatment:      Other:        Provider, please specify a diagnosis associated with the above clinical findings.    [X  ] Acute blood loss anemia     [  ] Other Hematological Diagnosis (please specify):     [  ] Clinically Undetermined       Please document in your progress notes daily for the duration of treatment, until resolved, and include in your discharge summary.                                                                                                      "

## 2019-12-06 ENCOUNTER — PATIENT OUTREACH (OUTPATIENT)
Dept: ADMINISTRATIVE | Facility: CLINIC | Age: 84
End: 2019-12-06

## 2019-12-16 ENCOUNTER — PATIENT OUTREACH (OUTPATIENT)
Dept: ADMINISTRATIVE | Facility: CLINIC | Age: 84
End: 2019-12-16

## 2019-12-21 ENCOUNTER — HOSPITAL ENCOUNTER (EMERGENCY)
Facility: HOSPITAL | Age: 84
Discharge: HOME | End: 2019-12-21
Attending: EMERGENCY MEDICINE
Payer: MEDICARE

## 2019-12-21 VITALS
HEIGHT: 60 IN | OXYGEN SATURATION: 99 % | BODY MASS INDEX: 22.38 KG/M2 | RESPIRATION RATE: 20 BRPM | DIASTOLIC BLOOD PRESSURE: 65 MMHG | TEMPERATURE: 99 F | WEIGHT: 114 LBS | SYSTOLIC BLOOD PRESSURE: 152 MMHG | HEART RATE: 100 BPM
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
## 2019-12-21 PROCEDURE — 25000003 PHARM REV CODE 250: Performed by: EMERGENCY MEDICINE

## 2019-12-21 PROCEDURE — 90715 TDAP VACCINE 7 YRS/> IM: CPT | Performed by: EMERGENCY MEDICINE

## 2019-12-21 PROCEDURE — 99284 EMERGENCY DEPT VISIT MOD MDM: CPT | Mod: 25

## 2019-12-21 PROCEDURE — 90471 IMMUNIZATION ADMIN: CPT | Performed by: EMERGENCY MEDICINE

## 2019-12-21 PROCEDURE — 63600175 PHARM REV CODE 636 W HCPCS: Performed by: EMERGENCY MEDICINE

## 2019-12-21 RX ORDER — ACETAMINOPHEN 500 MG
1000 TABLET ORAL
Status: COMPLETED | OUTPATIENT
Start: 2019-12-21 | End: 2019-12-21

## 2019-12-21 RX ADMIN — CLOSTRIDIUM TETANI TOXOID ANTIGEN (FORMALDEHYDE INACTIVATED), CORYNEBACTERIUM DIPHTHERIAE TOXOID ANTIGEN (FORMALDEHYDE INACTIVATED), BORDETELLA PERTUSSIS TOXOID ANTIGEN (GLUTARALDEHYDE INACTIVATED), BORDETELLA PERTUSSIS FILAMENTOUS HEMAGGLUTININ ANTIGEN (FORMALDEHYDE INACTIVATED), BORDETELLA PERTUSSIS PERTACTIN ANTIGEN, AND BORDETELLA PERTUSSIS FIMBRIAE 2/3 ANTIGEN 0.5 ML: 5; 2; 2.5; 5; 3; 5 INJECTION, SUSPENSION INTRAMUSCULAR at 06:12

## 2019-12-21 RX ADMIN — ACETAMINOPHEN 1000 MG: 500 TABLET ORAL at 06:12

## 2019-12-21 NOTE — PROVIDER PROGRESS NOTES - EMERGENCY DEPT.
Encounter Date: 12/21/2019    ED Physician Progress Notes        Physician Note:   89-year-old female presented for evaluation status post fall.  The patient was handed over to me at the change of shift by Dr. Butcher pending CT scan and x-ray results.  The patient's CT scan of her head and x-rays showed no acute abnormalities per Radiology.  The patient is stable for discharge back to her nursing home.  She is to otherwise follow up with her PCP for further care.

## 2019-12-21 NOTE — ED PROVIDER NOTES
Encounter Date: 12/21/2019       History     Chief Complaint   Patient presents with    Fall     fell out of wheelchair at [facility]     This patient is a 89-year-old nonambulatory female with Parkinson's, hypertension and arthritis presenting from [facility].  Accompanying report states that she was placed in her wheelchair this morning but was found on the floor with an abrasion on her forehead shortly after this.  Patient is localizing pain to the elbow, right upper thigh and right hip.  She has also localizing some pain to the frontal forehead.  She denies difficulty breathing, chest pain, neck pain, open wounds not localized to the forehead.  She does not know when her last tetanus update was.  She is a limited historian.        Review of patient's allergies indicates:  No Known Allergies  Past Medical History:   Diagnosis Date    Arthritis     Hypertension     Parkinson's disease      Past Surgical History:   Procedure Laterality Date    ESOPHAGOGASTRODUODENOSCOPY N/A 12/1/2019    Procedure: EGD (ESOPHAGOGASTRODUODENOSCOPY);  Surgeon: Heidi Gallagher MD;  Location: [facility];  Service: Endoscopy;  Laterality: N/A;    FRACTURE SURGERY      HEMIARTHROPLASTY OF HIP Left 12/8/2018    Procedure: HEMIARTHROPLASTY, HIP, [place], peg board, first assist;  Surgeon: Cheng Mccormack MD;  Location: [facility] OR;  Service: Orthopedics;  Laterality: Left;    JOINT REPLACEMENT      right hip replacement    ORIF FEMUR FRACTURE Right 11/22/2019    Procedure: ORIF, FRACTURE, FEMUR, Synthes, radiolucent triangle, 1st assist;  Surgeon: Cheng Mccormack MD;  Location: [facility] OR;  Service: Orthopedics;  Laterality: Right;     Family History   Problem Relation Age of Onset    No Known Problems Mother      Social History     Tobacco Use    Smoking status: Light Tobacco Smoker     Packs/day: 0.25     Years: 5.00     Pack years: 1.25     Last attempt to quit: 4/17/2009     Years since quitting:  10.6    Smokeless tobacco: Never Used   Substance Use Topics    Alcohol use: No    Drug use: No     Review of Systems   Constitutional: Negative for fever.   HENT: Negative for congestion.    Respiratory: Negative for shortness of breath.    Cardiovascular: Negative for chest pain.   Gastrointestinal: Negative for abdominal pain.   Musculoskeletal: Positive for arthralgias.   Skin: Positive for wound.   Neurological: Positive for headaches. Negative for weakness.   Psychiatric/Behavioral: Negative for confusion.       Physical Exam     Initial Vitals [12/21/19 0536]   BP Pulse Resp Temp SpO2   (!) 152/65 100 20 99.2 °F (37.3 °C) 99 %      MAP       --         Physical Exam    Nursing note and vitals reviewed.  Constitutional: No distress.   HENT:   Head: Normocephalic.   Mouth/Throat: Oropharynx is clear and moist.   Forehead abrasion without underlying hematoma   Eyes: Conjunctivae and EOM are normal.   Neck: Normal range of motion. Neck supple.   Cardiovascular: Normal rate and regular rhythm.   Pulmonary/Chest: Breath sounds normal. No respiratory distress. She has no wheezes.   Abdominal: She exhibits no distension. There is no tenderness.   Musculoskeletal: Normal range of motion. She exhibits tenderness. She exhibits no edema.   Tenderness localized to the right lateral hip, right proximal femur, right elbow without noted gross deformity or shortening, generalized bilateral lower extremity atrophy   Neurological: She is alert. GCS score is 15. GCS eye subscore is 4. GCS verbal subscore is 5. GCS motor subscore is 6.   Skin: Skin is warm and dry. There is pallor.   Psychiatric: She has a normal mood and affect. Her behavior is normal. Thought content normal.         ED Course   Procedures  Labs Reviewed - No data to display       Imaging Results          X-Ray Elbow Complete Right (In process)                X-Ray Hips Bilateral 2 View Incl AP Pelvis (In process)                CT Head Without Contrast (In  process)                  Medical Decision Making:   ED Management:  Pt interviewed and assessed emergently. Radiographs and CT analyses of complaints areas obtained. Tet updated and pt provided tylenol. Case turned over to oncoming ED physician for final Dx, treatment and disposition.                                 Clinical Impression:       ICD-10-CM ICD-9-CM   1. Fall, initial encounter W19.XXXA E888.9   2. Fall from wheelchair, initial encounter W05.0XXA E884.3         Disposition:   Disposition: Discharged  Condition: Stable                     Jayson Butcher MD  12/23/19 [id number]

## 2019-12-21 NOTE — ED NOTES
Patient identifiers for Maricarmen Woodward checked and correct.  LOC:  Maricarmen Woodward is awake, alert, and aware of environment with an appropriate affect. She is oriented x 3 and speaking appropriately.  APPEARANCE:  She is resting comfortably and in no acute distress. She is clean and well groomed, patient's clothing is properly fastened.  SKIN:  The skin is warm and dry. She has normal skin turgor and moist mucus membranes. Abrasion noted to forehead   MUSCULOSKELETAL:  She is moving all extremities well, no obvious deformities noted. Pulses intact. Pt endorses Rt elbow pain  RESPIRATORY:  Airway is open and patent. Respirations are spontaneous and non-labored with normal effort and rate.  CARDIAC:  She has a normal rate and rhythm. No peripheral edema noted. Capillary refill < 3 seconds.  ABDOMEN:  No distention noted.  Soft and non-tender upon palpation.  NEUROLOGICAL:  PERRL. Facial expression is symmetrical. Hand grasps are equal bilaterally. Normal sensation in all extremities when touched with finger.  Allergies reported:  Review of patient's allergies indicates:  No Known Allergies  OTHER NOTES:

## 2020-01-08 NOTE — TELEPHONE ENCOUNTER
Patient update received via email per Lashae Terrazas RN at [facility].      Clinical status:  Requires  a. Injections  b. IV  c. Nebulizers, 02 evaluation sats  d. Enteral feedings new or recent change  e. Care of new colostomy or teaching  f. Frequent suctioning, trach and /or vent needs  g. Frequent irrigation, replacement of urinary cath, complex suprapubic  h. Treatment stg 3/ or 4 pressure ulcers, complex wound care  i. Nursing evaluation due to unstable and complex medical condition  j. Nursing rehab teaching e.g. bowel and bladder training, adaptive care    PT/OT/ST    *Transfer mobility (eg, from bed to chair)   Independent (patient can perform an activity safely and independently without any devices, physical assistance, or supervision)  Modified independent (patient requires the use of an assistive device or extra time to complete an activity, but is otherwise independent)  Contact guard assistance (patient can perform an activity independently, but needs constant physical touch to steady or guide to ensure safe performance)  Supervision (patient can perform an activity, but supervision is provided to address safety concerns)  Minimum assistance (patient can perform most of an activity (ie, approximately 75%), but requires limited assistance from one person for safe completion)  Moderate assistance (patient can perform about half the effort of an activity, but requires extensive assistance from one or more persons for safe performance of the other half)  Maximum assistance (patient has difficulty performing an activity, but can offer some assistance (eg, approximately 25% of effort) and is dependent on one or more people to assist for safe completion)  Dependent or unable (patient is unable to perform an activity or depends on 2 or more people to complete it)    *Ambulation inside (eg, within room, hallways, to toilet) _____Ft  Independent  Modified independent  Contact guard  assistance  Supervision  Minimum assistance  Moderate assistance  Maximum assistance  Dependent or unable    *Ambulation outside (eg, getting in or out of buildings, walking on uneven surfaces) ______Ft  Independent  Modified independent  Contact guard assistance  Supervision  Minimum assistance  Moderate assistance  Maximum assistance  Dependent or unable    *Toileting self-management:   Independent  Modified independent  Contact guard assistance  Supervision  Minimum assistance  Moderate assistance  Maximum assistance  Dependent or unable    *Nourishment self-management (ie, ability to feed self)   Independent  Modified independent  Supervision  Minimum assistance  Moderate assistance  Maximum assistance  Dependent or unable    *Personal care self-management (eg, dressing, bathing, brushing teeth, washing hair)   Independent  Modified independent  Contact guard assistance  Supervision  Minimum assistance  Moderate assistance  Maximum assistance  Dependent or unable    Medication support (ie, preparing/taking all prescribed and over-the-counter medications reliably and safely, including correct dosage at correct times)   Not applicable  Independent  Modified independent  Supervision  Minimum assistance  Moderate assistance  Maximum assistance    ADL adaptive devices self-management (ie, ability to manage putting on and/or removing braces, splints, and other adaptive devices)   Not applicable  Independent  Modified independent  Supervision  Minimum assistance  Moderate assistance  Maximum assistance  Dependent or unable          For Extensions  - Unanticipated functional problems impacting safe completion of therapy  - Multiple comorbidities or frailty impairing functional improvement  - Cognitive impairment requiring additional intervention and education  - Communication impairment requiring additional intervention and education  - Assessment or care unmanageable at lower level of care  - Expect brief to moderate  stay extension.    X    IQ completed for extension    Criteria met    X    Criteria not met    X    SNF notified of IQ results      Barriers to progress:        New treatments:        Projected length of stay/ expected discharge:        Discharge Disposition:          Recommendations:

## 2020-01-13 ENCOUNTER — PATIENT OUTREACH (OUTPATIENT)
Dept: ADMINISTRATIVE | Facility: CLINIC | Age: 85
End: 2020-01-13

## 2020-01-15 NOTE — TELEPHONE ENCOUNTER
Patient update received per Lashae Terrazas RN at [facility], via email.    Plan is to discharge home with HH and sitters on 1/16/20.

## 2020-01-17 ENCOUNTER — PATIENT OUTREACH (OUTPATIENT)
Dept: ADMINISTRATIVE | Facility: CLINIC | Age: 85
End: 2020-01-17

## 2020-01-23 RX ORDER — UREA 10 %
220 LOTION (ML) TOPICAL DAILY
COMMUNITY

## 2020-01-23 RX ORDER — TALC
9 POWDER (GRAM) TOPICAL NIGHTLY PRN
COMMUNITY
End: 2020-04-18 | Stop reason: CLARIF

## 2020-01-23 RX ORDER — ASCORBIC ACID 500 MG
500 TABLET ORAL 2 TIMES DAILY
COMMUNITY

## 2020-01-23 NOTE — TELEPHONE ENCOUNTER
I see that I am listed as PCP for this patient.  I have yet to actually see her in person in clinic.  She has a new patient visit scheduled for March 3rd.

## 2020-01-24 ENCOUNTER — PATIENT OUTREACH (OUTPATIENT)
Dept: ADMINISTRATIVE | Facility: CLINIC | Age: 85
End: 2020-01-24

## 2020-01-24 DIAGNOSIS — M25.552 BILATERAL HIP PAIN: Primary | ICD-10-CM

## 2020-01-24 DIAGNOSIS — M25.551 BILATERAL HIP PAIN: Primary | ICD-10-CM

## 2020-01-24 NOTE — PATIENT INSTRUCTIONS
When You Have Gastrointestinal (GI) Bleeding    Blood in your vomit or stool can be a sign of gastrointestinal (GI) bleeding. GI bleeding can be scary. But the cause may not be serious. You should always see a doctor if GI bleeding occurs.  The GI tract  The GI tract is the path through which food travels in the body. Food passes from the mouth down the esophagus (the tube from the mouth to the stomach). Food begins to break down in the stomach. It then moves through the duodenum, the first part of the small intestine. Nutrients are absorbed as food travels through the small intestine. What is left passes into the colon (large intestine) as waste. The colon removes water from the waste. Waste continues from the colon to the rectum (where stool is stored). Waste then leaves the body through the anus.  Causes of GI bleeding  GI bleeding can be caused by many different problems. Some of the more common causes include:  · Swollen veins in the anus (hemorrhoids)  · Swollen veins in the esophagus (varices)  · Sore on the lining of the GI tract (ulcer)  · Cuts or scrapes in the mouth or throat  · Infection caused by germs such as bacteria or parasites  · Food allergies, such as milk allergy in young children  · Medicines  · Inflammation of the GI tract (gastritis or esophagitis)  · Colitis (Crohn's disease or ulcerative colitis)  · Cancer (tumors or polyps)  · Abnormal pouches in the colon (diverticula)  · Tears in the esophagus or anus  · Nosebleed  · Abnormal blood vessels in the GI tract (angiodysplasia)  Diagnosing the cause of blood in stool  If blood is coming out in your stool, you may have a lower GI tract problem or a very fast upper GI tract bleed. Bleeding from the GI tract can be bright red. Or it may look dark and tarry. Tests may also find blood in your stool that cant be seen with the eye (occult blood). To find out the cause, tests that may be ordered include:  · Blood tests. A blood sample is taken and  sent to a lab for exam.  · Hemoccult test. Checks a stool sample for blood.  · Stool culture. Checks a stool sample for bacteria or parasites.  · X-ray, ultrasound, or CT scan. Imaging tests that take pictures of the digestive tract.  · Colonoscopy or sigmoidoscopy. This test uses a flexible tube with a tiny camera. The tube is inserted through your anus into your rectum to see the inside of your colon. Your provider can also take a tiny tissue sample (biopsy) and treat a bleeding source  Diagnosing the cause of blood in vomit  If you are vomiting blood or something that looks like coffee grounds, you may have an upper GI tract problem. To find the cause, tests that may be done include:  · Upper Endoscopy. A flexible tube with a tiny camera is inserted through your mouth and throat to see inside your upper GI tract. This lets your provider take a tiny tissue sample (biopsy) and treat a bleeding source.  · Nasogastric lavage. This can tell if you have upper GI or lower GI bleeding.  · X-ray, ultrasound, or CT scan. Imaging tests that take pictures of your digestive tract.  · Upper GI series. X-rays of the upper part of your GI tract taken from inside your body.  · Enteroscopy. This sends a flexible tube or a small, swallowed capsule camera into your small intestine.  When to call your healthcare provider  Call your healthcare provider right away if you have any of the following:  · Bleeding from your mouth or anus that can't be stopped  · Fever of 100.4°F (38.0°) or higher  · Bleeding along with feeling lightheaded or dizzy  · Signs of fluid loss (dehydration). These include a dry, sticky mouth, decreased urine output; and very dark urine.  · Belly (abdominal) pain   Date Last Reviewed: 7/1/2016  © 7825-5819 [employer]. [address] [place] [address], [place] [address]. All rights reserved. This information is not intended as a substitute for professional medical care. Always follow your healthcare  professional's instructions.

## 2020-01-28 ENCOUNTER — CARE AT HOME (OUTPATIENT)
Dept: HOME HEALTH SERVICES | Facility: CLINIC | Age: 85
End: 2020-01-28
Payer: MEDICARE

## 2020-01-28 VITALS
BODY MASS INDEX: 24.41 KG/M2 | OXYGEN SATURATION: 94 % | HEART RATE: 80 BPM | RESPIRATION RATE: 16 BRPM | WEIGHT: 125 LBS | SYSTOLIC BLOOD PRESSURE: 102 MMHG | DIASTOLIC BLOOD PRESSURE: 68 MMHG | TEMPERATURE: 98 F

## 2020-01-28 DIAGNOSIS — L89.313 PRESSURE ULCER OF RIGHT BUTTOCK, STAGE 3: ICD-10-CM

## 2020-01-28 DIAGNOSIS — G20.A1 PARKINSON'S DISEASE: ICD-10-CM

## 2020-01-28 DIAGNOSIS — R54 FRAIL ELDERLY: ICD-10-CM

## 2020-01-28 DIAGNOSIS — Z74.09 IMPAIRED MOBILITY AND ADLS: ICD-10-CM

## 2020-01-28 DIAGNOSIS — Z78.9 IMPAIRED MOBILITY AND ADLS: ICD-10-CM

## 2020-01-28 DIAGNOSIS — R53.81 DEBILITY: Primary | ICD-10-CM

## 2020-01-28 PROBLEM — L89.323 PRESSURE INJURY OF LEFT BUTTOCK, STAGE 3: Status: ACTIVE | Noted: 2020-01-28

## 2020-01-28 PROCEDURE — 99349 PR HOME VISIT,ESTAB PATIENT,LEVEL III: ICD-10-PCS | Mod: S$GLB,,, | Performed by: NURSE PRACTITIONER

## 2020-01-28 PROCEDURE — 99349 HOME/RES VST EST MOD MDM 40: CPT | Mod: S$GLB,,, | Performed by: NURSE PRACTITIONER

## 2020-01-28 PROCEDURE — 1159F PR MEDICATION LIST DOCUMENTED IN MEDICAL RECORD: ICD-10-PCS | Mod: S$GLB,,, | Performed by: NURSE PRACTITIONER

## 2020-01-28 PROCEDURE — 1125F PR PAIN SEVERITY QUANTIFIED, PAIN PRESENT: ICD-10-PCS | Mod: S$GLB,,, | Performed by: NURSE PRACTITIONER

## 2020-01-28 PROCEDURE — 1159F MED LIST DOCD IN RCRD: CPT | Mod: S$GLB,,, | Performed by: NURSE PRACTITIONER

## 2020-01-28 PROCEDURE — 1125F AMNT PAIN NOTED PAIN PRSNT: CPT | Mod: S$GLB,,, | Performed by: NURSE PRACTITIONER

## 2020-01-28 RX ORDER — TIZANIDINE 4 MG/1
4 TABLET ORAL EVERY 6 HOURS PRN
COMMUNITY

## 2020-01-28 RX ORDER — HYDROCHLOROTHIAZIDE 12.5 MG/1
12.5 TABLET ORAL DAILY
COMMUNITY
End: 2020-04-18 | Stop reason: CLARIF

## 2020-01-28 RX ORDER — NAPROXEN SODIUM 220 MG/1
81 TABLET, FILM COATED ORAL DAILY
COMMUNITY

## 2020-01-28 RX ORDER — OXYCODONE AND ACETAMINOPHEN TABLETS 10; 300 MG/1; MG/1
1 TABLET ORAL EVERY 4 HOURS PRN
COMMUNITY
End: 2020-04-18 | Stop reason: CLARIF

## 2020-01-28 RX ORDER — IRBESARTAN AND HYDROCHLOROTHIAZIDE 150; 12.5 MG/1; MG/1
1 TABLET, FILM COATED ORAL DAILY
COMMUNITY

## 2020-01-28 NOTE — PROGRESS NOTES
"[facility]  Medical Home Visit    Visit Date: 1/28/2020  Encounter Provider: Gladys Cates NP  PCP:  Faustino Mccord PA-C    Subjective:      Patient ID: Maricarmen Woodward is a 89 y.o. female.    Consult Requested By:  Faustino Mccord  Reason for Consult:  Recent SNF discharge 1/16/2020    Maricarmen is being seen at home due to physical and transportation limitations    Chief Complaint: Follow-up, recent discharge from SNF-was at [facility] for 2 months, decubitus ulcer buttocks    Maricarmen Woodward is a 89 year old female with a PMHX of parkinsons disease, hypertension, osteoarthritis and numerous hospitalizations since July 2019. She was discharged from [facility] on 1/20/2020 after spending 2 months there following a hospitalization for a lower GI bleed in November 2019. She is , has no children, has a nephew that lives across the street from her and she has a  named Anastasia who was present today. Anastasia appears to have significant health issues herself.    With this visit today, Maricarmen is found lying in bed in the fetal position. She is alert and oriented x 3 and appears weak, frail and reports that she has "butt pain" from where a right buttock stage 3 pressure ulcer is noted, which is reported to have appeared in November 2019. She also has a small ulceration on the left buttock. She is not ambulatory and can barely stand per Anastasia. The patient does not participate much today in conversation. Her appetite is reported to be "good" but she appears frail and thin. She requires assistance with all ADLs except for feeding herself.    Anastasia reports that she managed, with help from a neighbor, to load the patient up in a car and take her to see Dr. Brambila on 1/22/2020. She stated that Dr. Brambila was going to try and arrange for transportation to have patient seen at the wound clinic in the future. Anastasia reports she is not sure she can get the patient " into a car again because it was very taxing on everyone to do it. Patient has a new PCP appointment on 3/3/2020.    [facility] is coming to the home to perform wound care per Anastasia. Anastasia states she does not know how to change the dressings or manage the wound. She also states that she cannot bend over the bed to help the patient because the bed is too low-hospital bed will be ordered. Additionally the patient needs more assistance than she is receiving for bathing-will reach out to home health for an aide.        Review of Systems   Constitutional: Positive for activity change, appetite change and fatigue. Negative for fever.   HENT: Negative.    Eyes: Negative.    Respiratory: Negative.    Cardiovascular: Negative.    Gastrointestinal: Negative.    Endocrine: Negative.    Genitourinary: Negative.         + urinary incontinence   Musculoskeletal: Positive for arthralgias and gait problem.   Skin: Positive for pallor and wound.   Allergic/Immunologic: Negative.    Neurological: Positive for dizziness, tremors and weakness. Negative for seizures, speech difficulty and headaches.   Hematological: Bruises/bleeds easily.   Psychiatric/Behavioral: Negative.        Assessments:  · Environmental: clean, adequate lighting and temperature  · Functional Status: dependent but can feed self, non-ambulatory, can barely stand  · Safety: no issues identified  · Nutritional: adequate  · Home Health/DME/Supplies: [facility], wheelchair-patient has a  named Anastasia who seems to have health and mobility issues herself. Anastasia states she doesn't know how to put a dressing on patient's pressure ulcer and can barely bend down to help her in bed. Will order hospital bed for patient. Currently Anastasia and a neighbor from across the street bathe patient. Anastasia is asking for an aide from Home Health to help with bathing patient a few times a week.    Objective:   Physical Exam   Constitutional: She is  oriented to person, place, and time.   Thin, frail, weak, elderly,  female   HENT:   Head: Normocephalic and atraumatic.   Right Ear: External ear normal.   Left Ear: External ear normal.   Nose: Nose normal.   Mouth/Throat: Oropharynx is clear and moist.   Eyes: Pupils are equal, round, and reactive to light. Conjunctivae and EOM are normal.   Neck: Normal range of motion. Neck supple.   Cardiovascular: Normal rate, regular rhythm, normal heart sounds and intact distal pulses.   Pulmonary/Chest: Effort normal and breath sounds normal.   Abdominal: Soft. Bowel sounds are normal. She exhibits no mass. There is no tenderness.   Musculoskeletal: She exhibits no edema, tenderness or deformity.   Generalized weakness   Neurological: She is alert and oriented to person, place, and time.   Skin: Skin is warm and dry. Capillary refill takes 2 to 3 seconds. There is pallor.   Psychiatric: She has a normal mood and affect. Her behavior is normal. Judgment and thought content normal.   Slightly withdrawn           Vitals:    01/28/20 1127   BP: 102/68   Pulse: 80   Resp: 16   Temp: 97.6 °F (36.4 °C)   TempSrc: Temporal   SpO2: (!) 94%   Weight: 56.7 kg (125 lb)   PainSc:   3   PainLoc: Buttocks     Body mass index is 24.41 kg/m².    Assessment:     1. Debility    2. Parkinson's disease    3. Pressure ulcer of right buttock, stage 3    4. Impaired mobility and ADLs    5. Frail elderly        Plan:     Ethical / Legal: Advance Care Planning   · Surrogate decision maker:  Name Denny Woodward, Relationship: nephew  · Code Status:  Full code  · [place]:  Full treatment/on file in Epic  · Other advance directive:  none, Capacity to make medical decisions: intact, Conflict: none       Maricarmen was seen today for follow-up.    Diagnoses and all orders for this visit:    Debility    Parkinson's disease    Pressure ulcer of right buttock, stage 3  Home Health performing wound care. Caregivers need more education on  this.    Impaired mobility and ADLs    Frail elderly    Instructions:  Take all medications as prescribed.  Keep all follow up appointments.  Pressure ulcer wound care per Home Health.  Fall precautions and safety advised.  Hospital bed with low air loss mattress ordered.  Consider Palliative Care in the future.  Home Health Aide for bathing assistance.    Were controlled substances prescribed?  No    Follow Up Appointments:   Future Appointments   Date Time Provider Department Center   3/3/2020  2:20 PM Faustino Mccord PA-C [facility]   Consent obtained for treatment on visit today.     Signature:  Gladys Cates NP

## 2020-01-30 ENCOUNTER — HOSPITAL ENCOUNTER (EMERGENCY)
Facility: HOSPITAL | Age: 85
Discharge: HOME | End: 2020-01-30
Attending: EMERGENCY MEDICINE
Payer: MEDICARE

## 2020-01-30 VITALS
OXYGEN SATURATION: 96 % | HEART RATE: 77 BPM | DIASTOLIC BLOOD PRESSURE: 62 MMHG | BODY MASS INDEX: 24.41 KG/M2 | WEIGHT: 125 LBS | SYSTOLIC BLOOD PRESSURE: 134 MMHG | TEMPERATURE: 98 F | RESPIRATION RATE: 16 BRPM

## 2020-01-30 DIAGNOSIS — L89.152 PRESSURE INJURY OF SACRAL REGION, STAGE 2: ICD-10-CM

## 2020-01-30 DIAGNOSIS — M25.552 LEFT HIP PAIN: ICD-10-CM

## 2020-01-30 DIAGNOSIS — L89.221 PRESSURE INJURY OF LEFT HIP, STAGE 1: Primary | ICD-10-CM

## 2020-01-30 PROCEDURE — 99283 EMERGENCY DEPT VISIT LOW MDM: CPT | Mod: 25

## 2020-01-30 NOTE — ED PROVIDER NOTES
"Encounter Date: 1/30/2020    SCRIBE #1 NOTE: I, Tyson Orona, am scribing for, and in the presence of, Timmy Eason MD.       History     Chief Complaint   Patient presents with    Hip Injury     HH nurse reports pt's left hip appears to be out of place; pt's spouse denies falls; pt does c/o "side pain"       Time seen by provider: 1:16 PM on 01/30/2020    Maricarmen Woodward is a 89 y.o. female who presents to the ED with an onset of pain to the left hip and concerns from her home marly nurse for the joint being "out of place" prior to arrival. Per her daughter, she is typically ambulatory by wheelchair and and favors her left side when laying down. She denies any recent falls or injury. PMHx of arthritis, Parkinson's, and sacral ulcer. PSHx of hemiarthroplasty of the left hip and ORIF of the right femur. NKDA.    The history is provided by the patient and a relative.     Review of patient's allergies indicates:  No Known Allergies  Past Medical History:   Diagnosis Date    Arthritis     Hypertension     Parkinson's disease      Past Surgical History:   Procedure Laterality Date    ESOPHAGOGASTRODUODENOSCOPY N/A 12/1/2019    Procedure: EGD (ESOPHAGOGASTRODUODENOSCOPY);  Surgeon: Heidi Gallagher MD;  Location: [facility];  Service: Endoscopy;  Laterality: N/A;    FRACTURE SURGERY      HEMIARTHROPLASTY OF HIP Left 12/8/2018    Procedure: HEMIARTHROPLASTY, HIP, [place], peg board, first assist;  Surgeon: Cheng Mccormack MD;  Location: [facility];  Service: Orthopedics;  Laterality: Left;    JOINT REPLACEMENT      right hip replacement    ORIF FEMUR FRACTURE Right 11/22/2019    Procedure: ORIF, FRACTURE, FEMUR, Synthes, radiolucent triangle, 1st assist;  Surgeon: Cheng Mccormack MD;  Location: [facility];  Service: Orthopedics;  Laterality: Right;     Family History   Problem Relation Age of Onset    No Known Problems Mother      Social History     Tobacco Use    Smoking status: Light " Tobacco Smoker     Packs/day: 0.25     Years: 5.00     Pack years: 1.25     Last attempt to quit: 4/17/2009     Years since quitting: 10.7    Smokeless tobacco: Never Used   Substance Use Topics    Alcohol use: No    Drug use: No     Review of Systems   Constitutional: Negative for fever.   HENT: Negative for sore throat.    Eyes: Negative for visual disturbance.   Respiratory: Negative for shortness of breath.    Cardiovascular: Negative for chest pain.   Gastrointestinal: Negative for nausea.   Genitourinary: Negative for dysuria.   Musculoskeletal: Positive for arthralgias. Negative for back pain and joint swelling.   Skin: Negative for rash.   Neurological: Negative for weakness.   Hematological: Does not bruise/bleed easily.       Physical Exam     Initial Vitals [01/30/20 1247]   BP Pulse Resp Temp SpO2   (!) 138/59 75 16 97.6 °F (36.4 °C) 96 %      MAP       --         Physical Exam    Nursing note and vitals reviewed.  Constitutional: She appears well-developed and well-nourished.  Non-toxic appearance. No distress.   HENT:   Head: Normocephalic and atraumatic.   Eyes: EOM are normal. Pupils are equal, round, and reactive to light.   Neck: Normal range of motion. Neck supple. No neck rigidity. No JVD present.   Cardiovascular: Normal rate, regular rhythm, normal heart sounds and intact distal pulses. Exam reveals no gallop and no friction rub.    No murmur heard.  Pulmonary/Chest: Breath sounds normal. She has no wheezes. She has no rhonchi. She has no rales.   Abdominal: Soft. Bowel sounds are normal. She exhibits no distension. There is no tenderness. There is no rebound and no guarding.   Musculoskeletal: Normal range of motion.        Left hip: She exhibits no bony tenderness, no swelling and no laceration.   Contractures of the bilateral lower extremities.   Neurological: She is alert and oriented to person, place, and time. She has normal strength and normal reflexes. No cranial nerve deficit or  sensory deficit. She exhibits normal muscle tone. Coordination normal. GCS eye subscore is 4. GCS verbal subscore is 5. GCS motor subscore is 6.   Skin: Skin is warm and dry.   There is a stage 2 sacral decubitus ulcer on the right sacrum and buttock with good pink granulations tissue. No surrounding erythema or foul odor. Stage 1 pressure ulcer over the left greater trochanter.   Psychiatric: She has a normal mood and affect. Her speech is normal and behavior is normal. She is not actively hallucinating.         ED Course   Procedures  Labs Reviewed - No data to display       Imaging Results          X-Ray Hip 2 View Left (Final result)  Result time 01/30/20 13:59:30    Final result by Harman Madrid MD (01/30/20 13:59:30)                 Narrative:    EXAMINATION:  XR HIP 2 VIEW LEFT    CLINICAL HISTORY:  Pain in left hip    TECHNIQUE:  AP view of the pelvis and frog leg lateral view of the left hip were performed.    COMPARISON:  December 21, 2019    FINDINGS:  Bilateral bipolar hip prostheses are in place.  There is prominent diffuse demineralization.  Extensive vascular calcifications are noted.  There is evidence of prior lower lumbar vertebroplasty and compression fracture.      Electronically signed by: Harman Madrid MD  Date:    01/30/2020  Time:    13:59                               Medical Decision Making:   History:   Old Medical Records: I decided to obtain old medical records.  Initial Assessment:   Patient is a 89-year-old woman that is nonambulatory and wheelchair bound presents emergency department for evaluation of prominence of the left hip and for re-evaluation of her sacral decubitus ulcers.  Her sacral decubitus ulcers appear oral to be healing well without evidence of infection with Mepilex in place.  She does have prominence of the left hip with some thickening of the skin and stage I decubitus ulcer formation.  X-rays obtained show no acute abnormality, no underlying fractures.  I  believe this is reactive  hypertrophy of the skin from continued pressure and she has began to develop a stage I ulcer.  Recommend returning patient more often and following up with home health/wound care.  Patient to follow up closely with her PCP.  Return precautions discussed.  She is discharged improved in no acute distress.  Clinical Tests:   Radiological Study: Reviewed and Ordered            Scribe Attestation:   Scribe #1: I performed the above scribed service and the documentation accurately describes the services I performed. I attest to the accuracy of the note.     I, Jenaro Lau, personally performed the services described in this documentation. All medical record entries made by the scribe were at my direction and in my presence.  I have reviewed the chart and agree that the record reflects my personal performance and is accurate and complete. Timmy Eason MD.                      Clinical Impression:       ICD-10-CM ICD-9-CM   1. Pressure injury of left hip, stage 1 L89.221 707.04     707.21   2. Left hip pain M25.552 719.45   3. Pressure injury of sacral region, stage 2 L89.152 707.03     707.22         Disposition:   Disposition: Discharged  Condition: Stable                     Timmy Eason MD  01/30/20 2347

## 2020-01-30 NOTE — ED NOTES
D/C pending. Attempted to call Catina aguilar @ number provided on face sheet for arrangements [phone]. No answer, rolled over to voice mail & left message to return call.

## 2020-01-30 NOTE — ED NOTES
"Made contact with family member, Denny (#[phone])--"leaving work, I'm gonna try to make contact with the sitter and if she can't come, I'll be there within the hour"  "

## 2020-01-30 NOTE — ED NOTES
Pt has offered up home number @ this time (#[phone])--no answer, was assumed that sitter might be there. Suggested call family member, Denny, @ this time

## 2020-02-11 ENCOUNTER — CARE AT HOME (OUTPATIENT)
Dept: HOME HEALTH SERVICES | Facility: CLINIC | Age: 85
End: 2020-02-11
Payer: MEDICARE

## 2020-02-11 VITALS
HEART RATE: 88 BPM | RESPIRATION RATE: 16 BRPM | OXYGEN SATURATION: 98 % | TEMPERATURE: 99 F | SYSTOLIC BLOOD PRESSURE: 122 MMHG | DIASTOLIC BLOOD PRESSURE: 68 MMHG

## 2020-02-11 DIAGNOSIS — Z78.9 IMPAIRED MOBILITY AND ADLS: ICD-10-CM

## 2020-02-11 DIAGNOSIS — M24.562 CONTRACTURES INVOLVING BOTH KNEES: ICD-10-CM

## 2020-02-11 DIAGNOSIS — R54 FRAIL ELDERLY: ICD-10-CM

## 2020-02-11 DIAGNOSIS — L89.150 UNSTAGEABLE PRESSURE ULCER OF SACRAL REGION: ICD-10-CM

## 2020-02-11 DIAGNOSIS — M24.561 CONTRACTURES INVOLVING BOTH KNEES: ICD-10-CM

## 2020-02-11 DIAGNOSIS — Z74.01 BEDBOUND: ICD-10-CM

## 2020-02-11 DIAGNOSIS — R53.81 DEBILITY: ICD-10-CM

## 2020-02-11 DIAGNOSIS — L89.222: Primary | ICD-10-CM

## 2020-02-11 DIAGNOSIS — Z74.09 IMPAIRED MOBILITY AND ADLS: ICD-10-CM

## 2020-02-11 PROCEDURE — 99349 PR HOME VISIT,ESTAB PATIENT,LEVEL III: ICD-10-PCS | Mod: S$GLB,,, | Performed by: NURSE PRACTITIONER

## 2020-02-11 PROCEDURE — 99349 HOME/RES VST EST MOD MDM 40: CPT | Mod: S$GLB,,, | Performed by: NURSE PRACTITIONER

## 2020-02-11 NOTE — Clinical Note
I believe this will be a new patient to you and she will be seeing you 3/3/2020. I did a second home visit yesterday, first one was 1/28/20. Her sacral decubitus is worse, now with eschar and her left trochanteric decubitus is also worse. I have ordered labs, not really confident she is getting adequate nutrition. She saw Dr. Brambila 1/22/2020 and there was mention of referral to wound care clinic-I will call his office to check on the status. But they are switching PCP to you. I have ordered HH to come twice a week for wound care, as they were only coming once a week.Just wanted to update you.Thank you.Gladys Cates FNPOchsner Care [phone]

## 2020-02-12 ENCOUNTER — LAB VISIT (OUTPATIENT)
Dept: LAB | Facility: HOSPITAL | Age: 85
End: 2020-02-12
Attending: INTERNAL MEDICINE
Payer: MEDICARE

## 2020-02-12 DIAGNOSIS — R79.89 ABNORMAL CBC: ICD-10-CM

## 2020-02-12 DIAGNOSIS — S72.421D CLOSED DISPLACED FRACTURE OF LATERAL CONDYLE OF RIGHT FEMUR WITH ROUTINE HEALING: Primary | ICD-10-CM

## 2020-02-12 PROCEDURE — 85025 COMPLETE CBC W/AUTO DIFF WBC: CPT

## 2020-02-12 PROCEDURE — 85652 RBC SED RATE AUTOMATED: CPT

## 2020-02-12 PROCEDURE — 80053 COMPREHEN METABOLIC PANEL: CPT

## 2020-02-12 NOTE — PATIENT INSTRUCTIONS
Preventing Pressure Sores (Pressure Ulcers)  Pressure sores can develop quickly, even in healthy skin. Thats why taking steps to prevent them is so important. Taking pressure off your skin is the first step. That means changing positions often, supporting your body, and avoiding rubbing and sliding. Keeping your skin clean, eating well, and stretching your joints and muscles can also help prevent pressure sores. Be sure to check your skin daily, too.  Change positions often  Changing positions often allows blood to get to your skin and keep the tissue healthy.  In a chair  · Shift weight from side to side at least once an hour--every 15 minutes if possible.  · Ask about pads and cushions that can reduce pressure on your skin.  In bed  · Change positions at least every 2 hours, more often if possible.  · Use lightweight sheets and blankets to reduce pressure from above.  · Ask about special pads and mattresses that spread pressure over a larger area of your body.  Support your body  Supporting your body spreads pressure over a larger area.  In a chair  · Lightly cushion your back and buttocks. Dont use doughnut-type cushions. They can cut off the blood supply to your skin.  · Lightly pad the footrest on your wheelchair.  In bed  · When lying on your back, put pillows under your lower calves and ankles. Keep your elbows slightly bent.  · When lying on your side, put pillows behind your back, between your legs, and between your ankles. Keep elbows and knees slightly bent.  Avoid rubbing and sliding  Rubbing (friction) and sliding (shear) cause the skin to break down more easily.  In a chair  · Keep your feet on a footrest, so your thighs are horizontal. This keeps your buttocks from sliding forward.  · Support your shoulder blades and back with a pillow.  In bed  · Keep your sheets smooth, dry, and free of crumbs. Use a sheepskin pad to prevent rubbing.  · Keep your feet and head slightly raised to avoid sliding.  When on bed rest, dont raise your head more than 30 degrees, except when needed for some medical conditions or for eating.   Keep your skin clean  Keeping your skin clean and dry also helps prevent pressure sores.  · Keep your skin free of sweat, urine, or wound drainage.  · Apply protective creams and use absorbent pads if you don't have bladder or bowel control.  · Check your skin twice a day for signs of breakdown.  Eat healthy and move around  If you are in a bed or a wheelchair most or all of the time, you need to:  · Eat enough calories to stay at a stable weight.  · Get plenty of protein, vitamins, and iron, and drink lots of fluids each day.  · Get out of your bed or chair as much as possible.  Check your skin twice a day  Do skin checks each day as part of your daily routine. Skin breakdown starts with slight changes, but can progress very quickly.   · Look for redness, bruises, cuts, and other irritations, especially over bony areas.  · Look for scabbing, blistering, or open areas on the surface of your skin. These are more serious and must be treated right away.     Date Last Reviewed: 8/13/2015  © 4788-5084 [employer]. [address] [place] [id number]. All rights reserved. This information is not intended as a substitute for professional medical care. Always follow your healthcare professional's instructions.        Pressure Ulcers: Common Sites  Bony prominences are the areas of bone that are close to the skin's surface. These areas are most susceptible to pressure ulcers because they have the least amount of cushioning. Which pressure points are vulnerable for a particular patient depend on the position in which most of that patients time is spent.  Pressure points to remember     On the back       Sitting      On the side   Date Last Reviewed: 1/24/2016  © 1077-9722 [employer]. [address] [place] [id number]. All rights reserved. This information is  not intended as a substitute for professional medical care. Always follow your healthcare professional's instructions.        Wound Care  Taking proper care of your wound will help it heal. Your healthcare provider may show you how to clean and dress the wound. He or she will also explain how to tell if the wound is healing normally. If you are unsure of how to take care of the wound, be sure to clarify what dressing to use and how often you should change the bandages. Here are the basic steps.     A wound that's not healing normally may be dark in color or have white streaks.   Wash your hands  Tips for washing your hands include:  · Use liquid soap and lather for 2 minutes. Scrub between your fingers and under your nails.  · Rinse with warm water, keeping your fingers pointing down.  · Use a paper towel to dry your hands and to turn off the faucet.  Remove the used dressing  Here are suggestions for removing the dressing:  · If dressing changes cause you pain, be sure to take your pain medicine as prescribed by your healthcare provider 30 minutes before dressing changes.  · Set up your supplies.  · Put on disposable gloves if youre dressing a wound for someone else or your wound is infected.  · Loosen the tape by pulling gently toward the wound.  · Gently take off the old dressing. If the dressing is stuck to the wound, moisten it with saline (if available) or clean water.  · If you have a drain or tube in the wound, be careful not to pull on it.  · Remove the dressing 1 layer at a time and put it in a plastic bag.  · Remove your gloves.  Inspect and dress the wound  Check the wound carefully:  · Each time you change the dressing, inspect the wound carefully to be sure its healing normally by making sure your wound appears to be pink and moist, and is free of infection.    · Wash your hands again. Put on a new pair of gloves.  · Clean and dress the wound as directed by your healthcare provider or nurse. Do not  put anything in the wound that is not prescribed or directed by your healthcare provider. If you have a drain or tube, be careful not to pull on it. Make sure to secure the drain or tube as well.  · Put all supplies in a plastic bag. Seal the bag and put it in the trash.  · Be sure to wash your hands again.  Call your healthcare provider  Call your healthcare provider if you see any of the following signs of a problem:  · Bleeding that soaks the dressing  · Pink fluid weeping from the wound  · Increased drainage or drainage that is yellow, yellow-green, or foul-smelling  · Increased swelling or pain, or redness or swelling in the skin around the wound  · A change in the color of the wound, or if streaks develop in a direction away from the wound  · The area between any stitches opens up  · An increase in the size of the wound  · A fever of 100.4°F (38°C) or higher, or as directed by your healthcare provider  · Chills, increased fatigue, or a loss of appetite      Date Last Reviewed: 7/30/2015 © 2000-2017 [employer]. [address] [place] [address], [place] [address]. All rights reserved. This information is not intended as a substitute for professional medical care. Always follow your healthcare professional's instructions.

## 2020-02-12 NOTE — PROGRESS NOTES
"[facility]  Medical Home Visit    Visit Date: 2/11/2020  Encounter Provider: Gladys Cates NP  PCP:  Faustino Mccord PA-C    Subjective:      Patient ID: Maricarmen Woodward is a 89 y.o. female.    Consult Requested By:  No ref. provider found  Reason for Consult:  Wound follow up    Maricarmen is being seen at home due to limited physical mobility, bedbound      Chief Complaint: Follow-up    Maricarmen Woodward is a 89 year old female with a PMHX of parkinsons disease, hypertension, osteoarthritis and numerous hospitalizations since July 2019. She was discharged from [facility] on 1/20/2020 after spending 2 months there following a hospitalization for a lower GI bleed in November 2019. She is , has no children, has a nephew that lives across the street from her and she has a  named Anastasia who was present today. Anastasia appears to have significant health issues herself.     With this visit today, Maricarmen is found lying in a recliner chair on her left side with her knees drawn up to her chest. Both lower extremities are contracted at the knees. She is sleeping and appears very frail and thin. Her caregiver, Anastasia, is present today. Anastasia reports that she is not sure if Home Health is still seeing the patient because "I haven't seen anybody in awhile" and she could not offer any specific information. She does report that the patient's sores on her buttocks and left hip "are horribile".     The patient was sent to the ER on 1/30/2020 by Home Health RN because her hips appeared to be abnormal. Patient was seen in the ER and sent back home after xray of hips revealed no acute issues.     Once awake Maricarmen is alert and oriented x 3 and appears weak, frail and reports that she has "butt pain" from a pressure ulcer is which is reported to have appeared in November 2019. She is not ambulatory and can barely stand per Anastasia. The patient does not participate much today in " "conversation.     Maricarmen is dependent on all ADLs but is able to feed herself per Anastasia. Anastasia states "she eats ok" but is unable to state how much of each meal she actually eats. No current supplementation is being given as far as Ensure/Boost. The patient appears very thin. Anastasia reports that Maricarmen has had the "sore on her butt since November 2019" and it has not gotten any better, only worse over the last few weeks since she has been home from [facility] (discharged 1/16/2020 after a 2 month stay at [facility]).     I saw this patient on 1/28/2020 as a medical follow up visit post discharge from SNF and ordered a hospital bed for her with a low air loss mattress. The hospital bed was delivered and is sitting in the living room not being used until the patient's nephew and HPOA can get there to "set it up and move furniture". Anastasia reports that she picks patient up out of bed and places her in a wheelchair and then puts her in a recliner chair for most of the day. The patient reports she is most comfortable there. However, she favors lying on her left side and her left trochanter ulceration has worsened since my last visit with her.     Anastasia reports that she managed, with help from a neighbor, to load the patient up in a car and take her to see Dr. Brambila on 1/22/2020. She stated that Dr. Brambila was going to try and arrange for transportation to have patient seen at the wound clinic in the future. Anastasia reports she is not sure she can get the patient into a car again because it was very taxing on everyone to do it. Patient has a new PCP appointment on 3/3/2020.                 Review of Systems   Constitutional: Positive for activity change, appetite change and fatigue. Negative for fever.   HENT: Negative.    Eyes: Negative.    Respiratory: Negative.    Cardiovascular: Negative.    Gastrointestinal: Negative.    Endocrine: Negative.    Genitourinary: Negative.         + urinary incontinence " "  Musculoskeletal: Positive for arthralgias and gait problem.   Skin: Positive for pallor and wounds.   Allergic/Immunologic: Negative.    Neurological: Positive for dizziness, tremors and weakness. Negative for seizures, speech difficulty and headaches.   Hematological: Bruises/bleeds easily.   Psychiatric/Behavioral: Negative.      Assessments:  · Environmental: clean, adequate lighting and temperature, no foul odors  · Functional Status: dependent but can feed self, non-ambulatory, no longer standing, bed bound/recliner chair bound  · Safety: no issues identified  · Nutritional: seems adequate but not observable today  · Home Health/DME/Supplies: Anastasia states that she is not sure if patient is still getting Home Health. I called [facility] and they said patient is getting once a week visits from , wheelchair-patient has a  named Anastasia who seems to have health and mobility issues herself. Anastasia states she doesn't know how to put a dressing on patient's pressure ulcer and can barely bend down to help her in bed. Hospital bed present in home but patient not using because waiting on nephew to come move furniture around-I offered to move furniture today to get patient into hospital bed but was told no, have to wait for herb Baldwin. Low air low mattress was denied per Anastasia because "Chiffone's wounds aren't bad enough per DME company". Currently Anastasia and a neighbor from across the street bathe patient. Anastasia is asking for an aide from Home Health to help with bathing patient a few times a week.      Objective:   Physical Exam   Constitutional: She is oriented to person, place, and time.   She is oriented to person, place, and time.   Thin, frail, weak, elderly,  female      HENT:   Head: Normocephalic and atraumatic.   Right Ear: External ear normal.   Left Ear: External ear normal.   Nose: Nose normal.   Mouth/Throat: Oropharynx is clear and moist.   Very [place]   Eyes: " Pupils are equal, round, and reactive to light. Conjunctivae and EOM are normal.   Neck: Normal range of motion. Neck supple.   Cardiovascular: Normal rate, regular rhythm, normal heart sounds and intact distal pulses.   Pulmonary/Chest: Effort normal and breath sounds normal. No respiratory distress. She has no wheezes.   Abdominal: Soft. Bowel sounds are normal. She exhibits no distension and no mass. There is no tenderness.   Musculoskeletal:   Contractures at knees, profound bony prominences, generalized weakness   Neurological: She is alert and oriented to person, place, and time.   Skin: Skin is warm and dry. Capillary refill takes 2 to 3 seconds. Ecchymosis (scattered) and petechiae (scattered) noted.        #1: Sacral decubitus ulceration, unstageable, with 3 small areas of eschar in wound bed along with erythema and mild slough, no active bleeding, draining mild amount of serous drainage, + macerated periwound, mild odor.  Unable to photograph due to wifi connectivity issues during visit. Wound is larger than when seen 1/28/2020 home visit.  Difficult to measure due to patient being contracted at knees and lying in a recliner chair.    #2: Left trochanteric decubitus ulceration, Stage 2, easier to visualize than sacral decubitus, measured as 5cm x 4cm, larger than when seen on 1/28/2020 home visit. Ulcer has large ruptured blister draining mild amount of serous fluid, wound bed and periwound macerated and erythematous, mild odor.   Psychiatric: She has a normal mood and affect. Her behavior is normal. Judgment and thought content normal.       Vitals:    02/11/20 1502   BP: 122/68   Pulse: 88   Resp: 16   Temp: 98.6 °F (37 °C)   TempSrc: Temporal   SpO2: 98%   PainSc:   3   PainLoc: Buttocks     There is no height or weight on file to calculate BMI.    Assessment:     1. 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 Unstageable pressure ulcer of sacral region    3. Impaired mobility and  ADLs    4. Debility    5. Frail elderly    6. Contractures involving both knees    7. Bedbound        Plan:     Ethical / Legal: Advance Care Planning    · · Surrogate decision maker:  Name Denny Woodward, Relationship: nephew  · · Code Status:  Full code  · · [place]:  Full treatment/on file in Epic  · · Other advance directive:  none, Capacity to make medical decisions: intact, Conflict: none  ·        Luis Fffone was seen today for follow-up.    Diagnoses and all orders for this visit:    Pressure injury of trochanteric region of left hip, stage 2  Offloading discussed. Protein intake encouraged. Home Health for Wound Care twice a week and teach caregiver how to do wound care. Meticulous Incontinence hygiene discussed.  Discuss care with Denny MAKI.   Unstageable pressure ulcer of sacral region  Offloading discussed. Protein intake encouraged. Home Health for Wound Care twice a week and teach caregiver how to do wound care. Meticulous Incontinence hygiene discussed.  Discuss care with Denny MAKI.   Impaired mobility and ADLs    Debility    Frail elderly    Contractures involving both knees    Bedbound    I will reach out to Dr. Brambila's (patient saw 1/22/2020) office staff to see the status of Wound Care Center referral.     [facility] contact information left with patient and Anastasia today.  I have called GLYNN Baldwin at [phone] to discuss worsening wounds and hospital bed situation, but had to leave a message. Awaiting return call.    Were controlled substances prescribed?  No    Follow Up Appointments:   Future Appointments   Date Time Provider Department Center   3/3/2020  2:20 PM Faustino Mccord PA-C [facility]   Consent for treatment obtained from patient on visit today.     Signature:  Gladys Cates NP

## 2020-02-13 ENCOUNTER — HOSPITAL ENCOUNTER (INPATIENT)
Facility: HOSPITAL | Age: 85
LOS: 3 days | Discharge: HOME HEALTH | DRG: 377 | End: 2020-02-17
Attending: EMERGENCY MEDICINE | Admitting: INTERNAL MEDICINE
Payer: MEDICARE

## 2020-02-13 DIAGNOSIS — N30.00 ACUTE CYSTITIS WITHOUT HEMATURIA: ICD-10-CM

## 2020-02-13 DIAGNOSIS — L89.92: ICD-10-CM

## 2020-02-13 DIAGNOSIS — N39.0 URINARY TRACT INFECTION WITHOUT HEMATURIA, SITE UNSPECIFIED: ICD-10-CM

## 2020-02-13 DIAGNOSIS — I10 ESSENTIAL HYPERTENSION: ICD-10-CM

## 2020-02-13 DIAGNOSIS — L89.152 PRESSURE INJURY OF SACRAL REGION, STAGE 2: Primary | ICD-10-CM

## 2020-02-13 DIAGNOSIS — R07.9 CHEST PAIN: ICD-10-CM

## 2020-02-13 DIAGNOSIS — L89.222: ICD-10-CM

## 2020-02-13 DIAGNOSIS — R54 FRAIL ELDERLY: ICD-10-CM

## 2020-02-13 DIAGNOSIS — K92.1 MELENA: ICD-10-CM

## 2020-02-13 DIAGNOSIS — D64.9 ANEMIA, UNSPECIFIED TYPE: ICD-10-CM

## 2020-02-13 DIAGNOSIS — L89.310 PRESSURE INJURY OF RIGHT BUTTOCK, UNSTAGEABLE: ICD-10-CM

## 2020-02-13 DIAGNOSIS — G20.A1 PARKINSON'S DISEASE: ICD-10-CM

## 2020-02-13 DIAGNOSIS — K92.2 GI BLEED: ICD-10-CM

## 2020-02-13 DIAGNOSIS — E43 SEVERE MALNUTRITION: ICD-10-CM

## 2020-02-13 DIAGNOSIS — K44.9 HIATAL HERNIA: ICD-10-CM

## 2020-02-13 DIAGNOSIS — L08.9: ICD-10-CM

## 2020-02-13 LAB
ALBUMIN SERPL BCP-MCNC: 3.5 G/DL (ref 3.5–5.2)
ALBUMIN SERPL BCP-MCNC: 3.7 G/DL (ref 3.5–5.2)
ALP SERPL-CCNC: 96 U/L (ref 55–135)
ALP SERPL-CCNC: 97 U/L (ref 55–135)
ALT SERPL W/O P-5'-P-CCNC: 5 U/L (ref 10–44)
ALT SERPL W/O P-5'-P-CCNC: <5 U/L (ref 10–44)
ANION GAP SERPL CALC-SCNC: 11 MMOL/L (ref 8–16)
ANION GAP SERPL CALC-SCNC: 11 MMOL/L (ref 8–16)
AST SERPL-CCNC: 14 U/L (ref 10–40)
AST SERPL-CCNC: 14 U/L (ref 10–40)
BACTERIA #/AREA URNS HPF: ABNORMAL /HPF
BASOPHILS # BLD AUTO: 0.05 K/UL (ref 0–0.2)
BASOPHILS # BLD AUTO: 0.06 K/UL (ref 0–0.2)
BASOPHILS NFR BLD: 0.4 % (ref 0–1.9)
BASOPHILS NFR BLD: 0.6 % (ref 0–1.9)
BILIRUB SERPL-MCNC: 0.3 MG/DL (ref 0.1–1)
BILIRUB SERPL-MCNC: 0.4 MG/DL (ref 0.1–1)
BILIRUB UR QL STRIP: NEGATIVE
BUN SERPL-MCNC: 31 MG/DL (ref 10–30)
BUN SERPL-MCNC: 31 MG/DL (ref 10–30)
CALCIUM SERPL-MCNC: 10.4 MG/DL (ref 8.7–10.5)
CALCIUM SERPL-MCNC: 9.9 MG/DL (ref 8.7–10.5)
CHLORIDE SERPL-SCNC: 103 MMOL/L (ref 95–110)
CHLORIDE SERPL-SCNC: 106 MMOL/L (ref 95–110)
CLARITY UR: ABNORMAL
CO2 SERPL-SCNC: 25 MMOL/L (ref 23–29)
CO2 SERPL-SCNC: 27 MMOL/L (ref 23–29)
COLOR UR: YELLOW
CREAT SERPL-MCNC: 0.8 MG/DL (ref 0.5–1.4)
CREAT SERPL-MCNC: 0.9 MG/DL (ref 0.5–1.4)
CRP SERPL-MCNC: 12.1 MG/L (ref 0–8.2)
DIFFERENTIAL METHOD: ABNORMAL
DIFFERENTIAL METHOD: ABNORMAL
EOSINOPHIL # BLD AUTO: 0.2 K/UL (ref 0–0.5)
EOSINOPHIL # BLD AUTO: 0.2 K/UL (ref 0–0.5)
EOSINOPHIL NFR BLD: 1.3 % (ref 0–8)
EOSINOPHIL NFR BLD: 2.3 % (ref 0–8)
ERYTHROCYTE [DISTWIDTH] IN BLOOD BY AUTOMATED COUNT: 14.7 % (ref 11.5–14.5)
ERYTHROCYTE [DISTWIDTH] IN BLOOD BY AUTOMATED COUNT: 14.8 % (ref 11.5–14.5)
ERYTHROCYTE [SEDIMENTATION RATE] IN BLOOD BY WESTERGREN METHOD: 70 MM/HR (ref 0–36)
EST. GFR  (AFRICAN AMERICAN): >60 ML/MIN/1.73 M^2
EST. GFR  (AFRICAN AMERICAN): >60 ML/MIN/1.73 M^2
EST. GFR  (NON AFRICAN AMERICAN): 56 ML/MIN/1.73 M^2
EST. GFR  (NON AFRICAN AMERICAN): >60 ML/MIN/1.73 M^2
GLUCOSE SERPL-MCNC: 119 MG/DL (ref 70–110)
GLUCOSE SERPL-MCNC: 86 MG/DL (ref 70–110)
GLUCOSE UR QL STRIP: NEGATIVE
HCT VFR BLD AUTO: 37 % (ref 37–48.5)
HCT VFR BLD AUTO: 39 % (ref 37–48.5)
HGB BLD-MCNC: 11.1 G/DL (ref 12–16)
HGB BLD-MCNC: 11.2 G/DL (ref 12–16)
HGB UR QL STRIP: NEGATIVE
HYALINE CASTS #/AREA URNS LPF: 0 /LPF
IMM GRANULOCYTES # BLD AUTO: 0.03 K/UL (ref 0–0.04)
IMM GRANULOCYTES # BLD AUTO: 0.04 K/UL (ref 0–0.04)
IMM GRANULOCYTES NFR BLD AUTO: 0.3 % (ref 0–0.5)
KETONES UR QL STRIP: NEGATIVE
LACTATE SERPL-SCNC: 1.1 MMOL/L (ref 0.5–2.2)
LEUKOCYTE ESTERASE UR QL STRIP: NEGATIVE
LYMPHOCYTES # BLD AUTO: 2.1 K/UL (ref 1–4.8)
LYMPHOCYTES # BLD AUTO: 2.6 K/UL (ref 1–4.8)
LYMPHOCYTES NFR BLD: 20.9 % (ref 18–48)
LYMPHOCYTES NFR BLD: 22.2 % (ref 18–48)
MCH RBC QN AUTO: 28.5 PG (ref 27–31)
MCH RBC QN AUTO: 29.1 PG (ref 27–31)
MCHC RBC AUTO-ENTMCNC: 28.7 G/DL (ref 32–36)
MCHC RBC AUTO-ENTMCNC: 30 G/DL (ref 32–36)
MCV RBC AUTO: 97 FL (ref 82–98)
MCV RBC AUTO: 99 FL (ref 82–98)
MICROSCOPIC COMMENT: ABNORMAL
MONOCYTES # BLD AUTO: 0.4 K/UL (ref 0.3–1)
MONOCYTES # BLD AUTO: 0.4 K/UL (ref 0.3–1)
MONOCYTES NFR BLD: 3.3 % (ref 4–15)
MONOCYTES NFR BLD: 4 % (ref 4–15)
NEUTROPHILS # BLD AUTO: 6.7 K/UL (ref 1.8–7.7)
NEUTROPHILS # BLD AUTO: 9.1 K/UL (ref 1.8–7.7)
NEUTROPHILS NFR BLD: 70.6 % (ref 38–73)
NEUTROPHILS NFR BLD: 73.8 % (ref 38–73)
NITRITE UR QL STRIP: NEGATIVE
NRBC BLD-RTO: 0 /100 WBC
NRBC BLD-RTO: 0 /100 WBC
PH UR STRIP: 6 [PH] (ref 5–8)
PLATELET # BLD AUTO: 369 K/UL (ref 150–350)
PLATELET # BLD AUTO: 396 K/UL (ref 150–350)
PMV BLD AUTO: 10 FL (ref 9.2–12.9)
PMV BLD AUTO: 11.1 FL (ref 9.2–12.9)
POTASSIUM SERPL-SCNC: 3.9 MMOL/L (ref 3.5–5.1)
POTASSIUM SERPL-SCNC: 4 MMOL/L (ref 3.5–5.1)
PROT SERPL-MCNC: 7.3 G/DL (ref 6–8.4)
PROT SERPL-MCNC: 7.9 G/DL (ref 6–8.4)
PROT UR QL STRIP: ABNORMAL
RBC # BLD AUTO: 3.81 M/UL (ref 4–5.4)
RBC # BLD AUTO: 3.93 M/UL (ref 4–5.4)
RBC #/AREA URNS HPF: 0 /HPF (ref 0–4)
SODIUM SERPL-SCNC: 141 MMOL/L (ref 136–145)
SODIUM SERPL-SCNC: 142 MMOL/L (ref 136–145)
SP GR UR STRIP: 1.02 (ref 1–1.03)
URN SPEC COLLECT METH UR: ABNORMAL
UROBILINOGEN UR STRIP-ACNC: NEGATIVE EU/DL
WBC # BLD AUTO: 12.34 K/UL (ref 3.9–12.7)
WBC # BLD AUTO: 9.43 K/UL (ref 3.9–12.7)
WBC #/AREA URNS HPF: 15 /HPF (ref 0–5)

## 2020-02-13 PROCEDURE — 81000 URINALYSIS NONAUTO W/SCOPE: CPT

## 2020-02-13 PROCEDURE — 83605 ASSAY OF LACTIC ACID: CPT

## 2020-02-13 PROCEDURE — 85025 COMPLETE CBC W/AUTO DIFF WBC: CPT

## 2020-02-13 PROCEDURE — 80053 COMPREHEN METABOLIC PANEL: CPT

## 2020-02-13 PROCEDURE — 87040 BLOOD CULTURE FOR BACTERIA: CPT

## 2020-02-13 PROCEDURE — 87086 URINE CULTURE/COLONY COUNT: CPT

## 2020-02-13 PROCEDURE — 86140 C-REACTIVE PROTEIN: CPT

## 2020-02-13 PROCEDURE — 85651 RBC SED RATE NONAUTOMATED: CPT

## 2020-02-13 PROCEDURE — 99285 EMERGENCY DEPT VISIT HI MDM: CPT | Mod: 25

## 2020-02-13 PROCEDURE — 36415 COLL VENOUS BLD VENIPUNCTURE: CPT

## 2020-02-13 NOTE — PROGRESS NOTES
Forwarded results to Gladys Cates NP. Home Health had ordered the result during home visits.  They would take appropriate actions based on her encounter with the patient.

## 2020-02-13 NOTE — PROGRESS NOTES
2/13/2020  11:00am: I contacted Dr. Haris Brambila's office since he saw pt last 1/22/2020. Spoke with Neeru, Dr. Brambila's nurse, who relayed the information of her decubitus ulcers deterioration and abnormal lab results, ESR 70, from yesterday ordered by me. Patient has new PCP appointment but not until 3/2020. Dr. Brambila recommended patient go to ER.    11:15am: called Anastasia ([phone]), patient's personal caregiver, but she was not home. Another sitter, Elise, was sitting with patient while Anastasia was out.    11:20am: contacted Denny Woodward ([phone]), patient's HPOA and informed him of Dr. Brambila's recommendations. Denny stated that he gets off of work at 4pm today and would pick patient up and bring her to [facility] via personal vehicle.    11:45am: Anastasia called me back, I informed her of the above. She states she will wait for Denny and go to ER with patient.    12:40pm: I called report to [facility], Samara Phillips RN.     1:00pm: I notified [facility] Yohana Murillo, of above.

## 2020-02-14 ENCOUNTER — ANESTHESIA (OUTPATIENT)
Dept: ENDOSCOPY | Facility: HOSPITAL | Age: 85
DRG: 377 | End: 2020-02-14
Payer: MEDICARE

## 2020-02-14 ENCOUNTER — ANESTHESIA EVENT (OUTPATIENT)
Dept: ENDOSCOPY | Facility: HOSPITAL | Age: 85
DRG: 377 | End: 2020-02-14
Payer: MEDICARE

## 2020-02-14 PROBLEM — E43 SEVERE MALNUTRITION: Status: ACTIVE | Noted: 2020-02-14

## 2020-02-14 PROBLEM — L89.152 PRESSURE INJURY OF SACRAL REGION, STAGE 2: Status: ACTIVE | Noted: 2020-02-14

## 2020-02-14 LAB
ABO + RH BLD: NORMAL
ANION GAP SERPL CALC-SCNC: 10 MMOL/L (ref 8–16)
APTT BLDCRRT: 43.5 SEC (ref 21–32)
BASOPHILS # BLD AUTO: 0.03 K/UL (ref 0–0.2)
BASOPHILS # BLD AUTO: 0.07 K/UL (ref 0–0.2)
BASOPHILS NFR BLD: 0.3 % (ref 0–1.9)
BASOPHILS NFR BLD: 0.5 % (ref 0–1.9)
BLD GP AB SCN CELLS X3 SERPL QL: NORMAL
BUN SERPL-MCNC: 27 MG/DL (ref 10–30)
CALCIUM SERPL-MCNC: 9.8 MG/DL (ref 8.7–10.5)
CHLORIDE SERPL-SCNC: 107 MMOL/L (ref 95–110)
CO2 SERPL-SCNC: 22 MMOL/L (ref 23–29)
CREAT SERPL-MCNC: 0.7 MG/DL (ref 0.5–1.4)
DIFFERENTIAL METHOD: ABNORMAL
DIFFERENTIAL METHOD: ABNORMAL
EOSINOPHIL # BLD AUTO: 0.1 K/UL (ref 0–0.5)
EOSINOPHIL # BLD AUTO: 0.2 K/UL (ref 0–0.5)
EOSINOPHIL NFR BLD: 0.7 % (ref 0–8)
EOSINOPHIL NFR BLD: 1.3 % (ref 0–8)
ERYTHROCYTE [DISTWIDTH] IN BLOOD BY AUTOMATED COUNT: 14.7 % (ref 11.5–14.5)
ERYTHROCYTE [DISTWIDTH] IN BLOOD BY AUTOMATED COUNT: 14.8 % (ref 11.5–14.5)
ERYTHROCYTE [SEDIMENTATION RATE] IN BLOOD BY WESTERGREN METHOD: 100 MM/HR (ref 0–20)
EST. GFR  (AFRICAN AMERICAN): >60 ML/MIN/1.73 M^2
EST. GFR  (NON AFRICAN AMERICAN): >60 ML/MIN/1.73 M^2
FERRITIN SERPL-MCNC: 1259 NG/ML (ref 20–300)
GLUCOSE SERPL-MCNC: 103 MG/DL (ref 70–110)
HCT VFR BLD AUTO: 32.4 % (ref 37–48.5)
HCT VFR BLD AUTO: 34.2 % (ref 37–48.5)
HGB BLD-MCNC: 10.2 G/DL (ref 12–16)
HGB BLD-MCNC: 9.9 G/DL (ref 12–16)
IMM GRANULOCYTES # BLD AUTO: 0.03 K/UL (ref 0–0.04)
IMM GRANULOCYTES # BLD AUTO: 0.05 K/UL (ref 0–0.04)
INR PPP: 1 (ref 0.8–1.2)
IRON SERPL-MCNC: 32 UG/DL (ref 30–160)
LACTATE SERPL-SCNC: 0.7 MMOL/L (ref 0.5–2.2)
LYMPHOCYTES # BLD AUTO: 2.2 K/UL (ref 1–4.8)
LYMPHOCYTES # BLD AUTO: 3.7 K/UL (ref 1–4.8)
LYMPHOCYTES NFR BLD: 21.5 % (ref 18–48)
LYMPHOCYTES NFR BLD: 28 % (ref 18–48)
MAGNESIUM SERPL-MCNC: 1.9 MG/DL (ref 1.6–2.6)
MCH RBC QN AUTO: 28.7 PG (ref 27–31)
MCH RBC QN AUTO: 28.9 PG (ref 27–31)
MCHC RBC AUTO-ENTMCNC: 29.8 G/DL (ref 32–36)
MCHC RBC AUTO-ENTMCNC: 30.6 G/DL (ref 32–36)
MCV RBC AUTO: 94 FL (ref 82–98)
MCV RBC AUTO: 97 FL (ref 82–98)
MONOCYTES # BLD AUTO: 0.4 K/UL (ref 0.3–1)
MONOCYTES # BLD AUTO: 0.5 K/UL (ref 0.3–1)
MONOCYTES NFR BLD: 3.8 % (ref 4–15)
MONOCYTES NFR BLD: 4.2 % (ref 4–15)
NEUTROPHILS # BLD AUTO: 7.3 K/UL (ref 1.8–7.7)
NEUTROPHILS # BLD AUTO: 8.7 K/UL (ref 1.8–7.7)
NEUTROPHILS NFR BLD: 66 % (ref 38–73)
NEUTROPHILS NFR BLD: 73 % (ref 38–73)
NRBC BLD-RTO: 0 /100 WBC
NRBC BLD-RTO: 0 /100 WBC
PHOSPHATE SERPL-MCNC: 3.1 MG/DL (ref 2.7–4.5)
PLATELET # BLD AUTO: 308 K/UL (ref 150–350)
PLATELET # BLD AUTO: 324 K/UL (ref 150–350)
PMV BLD AUTO: 10.1 FL (ref 9.2–12.9)
PMV BLD AUTO: 9.8 FL (ref 9.2–12.9)
POCT GLUCOSE: 148 MG/DL (ref 70–110)
POTASSIUM SERPL-SCNC: 3.8 MMOL/L (ref 3.5–5.1)
PROTHROMBIN TIME: 10.1 SEC (ref 9–12.5)
RBC # BLD AUTO: 3.45 M/UL (ref 4–5.4)
RBC # BLD AUTO: 3.53 M/UL (ref 4–5.4)
SATURATED IRON: 16 % (ref 20–50)
SODIUM SERPL-SCNC: 139 MMOL/L (ref 136–145)
TOTAL IRON BINDING CAPACITY: 195 UG/DL (ref 250–450)
TRANSFERRIN SERPL-MCNC: 132 MG/DL (ref 200–375)
WBC # BLD AUTO: 10.01 K/UL (ref 3.9–12.7)
WBC # BLD AUTO: 13.24 K/UL (ref 3.9–12.7)

## 2020-02-14 PROCEDURE — 37000008 HC ANESTHESIA 1ST 15 MINUTES: Performed by: INTERNAL MEDICINE

## 2020-02-14 PROCEDURE — D9220A PRA ANESTHESIA: Mod: ANES,,, | Performed by: ANESTHESIOLOGY

## 2020-02-14 PROCEDURE — D9220A PRA ANESTHESIA: ICD-10-PCS | Mod: CRNA,,, | Performed by: NURSE ANESTHETIST, CERTIFIED REGISTERED

## 2020-02-14 PROCEDURE — 63600175 PHARM REV CODE 636 W HCPCS: Performed by: EMERGENCY MEDICINE

## 2020-02-14 PROCEDURE — D9220A PRA ANESTHESIA: Mod: CRNA,,, | Performed by: NURSE ANESTHETIST, CERTIFIED REGISTERED

## 2020-02-14 PROCEDURE — 85610 PROTHROMBIN TIME: CPT

## 2020-02-14 PROCEDURE — 63600175 PHARM REV CODE 636 W HCPCS: Performed by: NURSE ANESTHETIST, CERTIFIED REGISTERED

## 2020-02-14 PROCEDURE — 63600175 PHARM REV CODE 636 W HCPCS: Performed by: NURSE PRACTITIONER

## 2020-02-14 PROCEDURE — 84100 ASSAY OF PHOSPHORUS: CPT

## 2020-02-14 PROCEDURE — 83605 ASSAY OF LACTIC ACID: CPT

## 2020-02-14 PROCEDURE — 25000003 PHARM REV CODE 250: Performed by: NURSE PRACTITIONER

## 2020-02-14 PROCEDURE — 97802 MEDICAL NUTRITION INDIV IN: CPT

## 2020-02-14 PROCEDURE — C9113 INJ PANTOPRAZOLE SODIUM, VIA: HCPCS | Performed by: NURSE PRACTITIONER

## 2020-02-14 PROCEDURE — 96367 TX/PROPH/DG ADDL SEQ IV INF: CPT

## 2020-02-14 PROCEDURE — 86850 RBC ANTIBODY SCREEN: CPT

## 2020-02-14 PROCEDURE — 87070 CULTURE OTHR SPECIMN AEROBIC: CPT

## 2020-02-14 PROCEDURE — 85025 COMPLETE CBC W/AUTO DIFF WBC: CPT

## 2020-02-14 PROCEDURE — 11000001 HC ACUTE MED/SURG PRIVATE ROOM

## 2020-02-14 PROCEDURE — 96366 THER/PROPH/DIAG IV INF ADDON: CPT

## 2020-02-14 PROCEDURE — 83735 ASSAY OF MAGNESIUM: CPT

## 2020-02-14 PROCEDURE — 63600175 PHARM REV CODE 636 W HCPCS: Performed by: INTERNAL MEDICINE

## 2020-02-14 PROCEDURE — 99900035 HC TECH TIME PER 15 MIN (STAT)

## 2020-02-14 PROCEDURE — 87186 SC STD MICRODIL/AGAR DIL: CPT | Mod: 59

## 2020-02-14 PROCEDURE — 99222 PR INITIAL HOSPITAL CARE,LEVL II: ICD-10-PCS | Mod: ,,, | Performed by: INTERNAL MEDICINE

## 2020-02-14 PROCEDURE — 43235 PR EGD, FLEX, DIAGNOSTIC: ICD-10-PCS | Mod: ,,, | Performed by: INTERNAL MEDICINE

## 2020-02-14 PROCEDURE — 96365 THER/PROPH/DIAG IV INF INIT: CPT

## 2020-02-14 PROCEDURE — 87077 CULTURE AEROBIC IDENTIFY: CPT

## 2020-02-14 PROCEDURE — 43235 EGD DIAGNOSTIC BRUSH WASH: CPT | Mod: ,,, | Performed by: INTERNAL MEDICINE

## 2020-02-14 PROCEDURE — 99222 1ST HOSP IP/OBS MODERATE 55: CPT | Mod: ,,, | Performed by: INTERNAL MEDICINE

## 2020-02-14 PROCEDURE — 85730 THROMBOPLASTIN TIME PARTIAL: CPT

## 2020-02-14 PROCEDURE — 36415 COLL VENOUS BLD VENIPUNCTURE: CPT

## 2020-02-14 PROCEDURE — D9220A PRA ANESTHESIA: ICD-10-PCS | Mod: ANES,,, | Performed by: ANESTHESIOLOGY

## 2020-02-14 PROCEDURE — 80048 BASIC METABOLIC PNL TOTAL CA: CPT

## 2020-02-14 PROCEDURE — 43235 EGD DIAGNOSTIC BRUSH WASH: CPT | Performed by: INTERNAL MEDICINE

## 2020-02-14 PROCEDURE — 82728 ASSAY OF FERRITIN: CPT

## 2020-02-14 PROCEDURE — 83540 ASSAY OF IRON: CPT

## 2020-02-14 RX ORDER — IBUPROFEN 200 MG
16 TABLET ORAL
Status: DISCONTINUED | OUTPATIENT
Start: 2020-02-14 | End: 2020-02-17 | Stop reason: HOSPADM

## 2020-02-14 RX ORDER — POTASSIUM CHLORIDE 20 MEQ/15ML
60 SOLUTION ORAL
Status: DISCONTINUED | OUTPATIENT
Start: 2020-02-14 | End: 2020-02-17 | Stop reason: HOSPADM

## 2020-02-14 RX ORDER — ONDANSETRON 2 MG/ML
4 INJECTION INTRAMUSCULAR; INTRAVENOUS EVERY 8 HOURS PRN
Status: DISCONTINUED | OUTPATIENT
Start: 2020-02-14 | End: 2020-02-17 | Stop reason: HOSPADM

## 2020-02-14 RX ORDER — HYDROCODONE BITARTRATE AND ACETAMINOPHEN 5; 325 MG/1; MG/1
1 TABLET ORAL EVERY 6 HOURS PRN
Status: DISCONTINUED | OUTPATIENT
Start: 2020-02-14 | End: 2020-02-17 | Stop reason: HOSPADM

## 2020-02-14 RX ORDER — IPRATROPIUM BROMIDE AND ALBUTEROL SULFATE 2.5; .5 MG/3ML; MG/3ML
3 SOLUTION RESPIRATORY (INHALATION) EVERY 4 HOURS PRN
Status: DISCONTINUED | OUTPATIENT
Start: 2020-02-14 | End: 2020-02-17 | Stop reason: HOSPADM

## 2020-02-14 RX ORDER — POTASSIUM CHLORIDE 20 MEQ/15ML
40 SOLUTION ORAL
Status: DISCONTINUED | OUTPATIENT
Start: 2020-02-14 | End: 2020-02-17 | Stop reason: HOSPADM

## 2020-02-14 RX ORDER — IPRATROPIUM BROMIDE AND ALBUTEROL SULFATE 2.5; .5 MG/3ML; MG/3ML
3 SOLUTION RESPIRATORY (INHALATION) EVERY 4 HOURS PRN
Status: DISCONTINUED | OUTPATIENT
Start: 2020-02-14 | End: 2020-02-14 | Stop reason: SDUPTHER

## 2020-02-14 RX ORDER — TALC
9 POWDER (GRAM) TOPICAL NIGHTLY PRN
Status: DISCONTINUED | OUTPATIENT
Start: 2020-02-14 | End: 2020-02-17 | Stop reason: HOSPADM

## 2020-02-14 RX ORDER — SODIUM CHLORIDE 9 MG/ML
INJECTION, SOLUTION INTRAVENOUS CONTINUOUS
Status: DISCONTINUED | OUTPATIENT
Start: 2020-02-14 | End: 2020-02-14

## 2020-02-14 RX ORDER — CARBIDOPA AND LEVODOPA 25; 100 MG/1; MG/1
2 TABLET ORAL 2 TIMES DAILY
Status: DISCONTINUED | OUTPATIENT
Start: 2020-02-14 | End: 2020-02-15

## 2020-02-14 RX ORDER — LANOLIN ALCOHOL/MO/W.PET/CERES
800 CREAM (GRAM) TOPICAL
Status: DISCONTINUED | OUTPATIENT
Start: 2020-02-14 | End: 2020-02-17 | Stop reason: HOSPADM

## 2020-02-14 RX ORDER — SODIUM CHLORIDE 0.9 % (FLUSH) 0.9 %
10 SYRINGE (ML) INJECTION
Status: DISCONTINUED | OUTPATIENT
Start: 2020-02-14 | End: 2020-02-17 | Stop reason: HOSPADM

## 2020-02-14 RX ORDER — PROPOFOL 10 MG/ML
VIAL (ML) INTRAVENOUS
Status: DISCONTINUED | OUTPATIENT
Start: 2020-02-14 | End: 2020-02-14

## 2020-02-14 RX ORDER — SODIUM CHLORIDE 9 MG/ML
INJECTION, SOLUTION INTRAVENOUS CONTINUOUS
Status: DISCONTINUED | OUTPATIENT
Start: 2020-02-14 | End: 2020-02-17 | Stop reason: HOSPADM

## 2020-02-14 RX ORDER — FERROUS SULFATE 325(65) MG
325 TABLET, DELAYED RELEASE (ENTERIC COATED) ORAL DAILY
Status: DISCONTINUED | OUTPATIENT
Start: 2020-02-14 | End: 2020-02-17 | Stop reason: HOSPADM

## 2020-02-14 RX ORDER — METOPROLOL TARTRATE 25 MG/1
25 TABLET, FILM COATED ORAL 2 TIMES DAILY
Status: DISCONTINUED | OUTPATIENT
Start: 2020-02-14 | End: 2020-02-17 | Stop reason: HOSPADM

## 2020-02-14 RX ORDER — LIDOCAINE HCL/PF 100 MG/5ML
SYRINGE (ML) INTRAVENOUS
Status: DISCONTINUED | OUTPATIENT
Start: 2020-02-14 | End: 2020-02-14

## 2020-02-14 RX ORDER — PRAVASTATIN SODIUM 40 MG/1
40 TABLET ORAL NIGHTLY
Status: DISCONTINUED | OUTPATIENT
Start: 2020-02-14 | End: 2020-02-17 | Stop reason: HOSPADM

## 2020-02-14 RX ORDER — GLUCAGON 1 MG
1 KIT INJECTION
Status: DISCONTINUED | OUTPATIENT
Start: 2020-02-14 | End: 2020-02-17 | Stop reason: HOSPADM

## 2020-02-14 RX ORDER — ACETAMINOPHEN 325 MG/1
650 TABLET ORAL EVERY 6 HOURS PRN
Status: DISCONTINUED | OUTPATIENT
Start: 2020-02-14 | End: 2020-02-17 | Stop reason: HOSPADM

## 2020-02-14 RX ORDER — IBUPROFEN 200 MG
24 TABLET ORAL
Status: DISCONTINUED | OUTPATIENT
Start: 2020-02-14 | End: 2020-02-17 | Stop reason: HOSPADM

## 2020-02-14 RX ADMIN — CEFTRIAXONE 1 G: 1 INJECTION, SOLUTION INTRAVENOUS at 12:02

## 2020-02-14 RX ADMIN — ONDANSETRON 4 MG: 2 INJECTION, SOLUTION INTRAMUSCULAR; INTRAVENOUS at 02:02

## 2020-02-14 RX ADMIN — DEXTROSE 8 MG/HR: 50 INJECTION, SOLUTION INTRAVENOUS at 03:02

## 2020-02-14 RX ADMIN — SODIUM CHLORIDE: 0.9 INJECTION, SOLUTION INTRAVENOUS at 03:02

## 2020-02-14 RX ADMIN — SODIUM CHLORIDE: 0.9 INJECTION, SOLUTION INTRAVENOUS at 04:02

## 2020-02-14 RX ADMIN — PROPOFOL 70 MG: 10 INJECTION, EMULSION INTRAVENOUS at 02:02

## 2020-02-14 RX ADMIN — DEXTROSE 8 MG/HR: 50 INJECTION, SOLUTION INTRAVENOUS at 07:02

## 2020-02-14 RX ADMIN — PRAVASTATIN SODIUM 40 MG: 40 TABLET ORAL at 04:02

## 2020-02-14 RX ADMIN — PRAVASTATIN SODIUM 40 MG: 40 TABLET ORAL at 08:02

## 2020-02-14 RX ADMIN — METOPROLOL TARTRATE 25 MG: 25 TABLET, FILM COATED ORAL at 08:02

## 2020-02-14 RX ADMIN — SODIUM CHLORIDE: 0.9 INJECTION, SOLUTION INTRAVENOUS at 01:02

## 2020-02-14 RX ADMIN — FERROUS SULFATE TAB EC 325 MG (65 MG FE EQUIVALENT) 325 MG: 325 (65 FE) TABLET DELAYED RESPONSE at 03:02

## 2020-02-14 RX ADMIN — CARBIDOPA AND LEVODOPA 2 TABLET: 25; 100 TABLET ORAL at 03:02

## 2020-02-14 RX ADMIN — DEXTROSE 8 MG/HR: 50 INJECTION, SOLUTION INTRAVENOUS at 04:02

## 2020-02-14 RX ADMIN — LIDOCAINE HYDROCHLORIDE 100 MG: 20 INJECTION INTRAVENOUS at 02:02

## 2020-02-14 RX ADMIN — METOPROLOL TARTRATE 25 MG: 25 TABLET, FILM COATED ORAL at 03:02

## 2020-02-14 RX ADMIN — CARBIDOPA AND LEVODOPA 2 TABLET: 25; 100 TABLET ORAL at 08:02

## 2020-02-14 RX ADMIN — PIPERACILLIN AND TAZOBACTAM 4.5 G: 4; .5 INJECTION, POWDER, LYOPHILIZED, FOR SOLUTION INTRAVENOUS; PARENTERAL at 03:02

## 2020-02-14 NOTE — ASSESSMENT & PLAN NOTE
Acute/chronic due to recent surgery/illnesses/prolonged hospitalization and nutritional status   - hx of previous falls  - high risk of fall / injury utilize all safety measures and fall precautions

## 2020-02-14 NOTE — ED NOTES
Partial bed bath givenper 2 ER techs, linens changed and wounds to sacrum and buttocks covered drainage noted. Family at bedside call light in reach aware to notify nurse of needs or concerns.

## 2020-02-14 NOTE — HPI
Maricarmen Woodward is a 89 y.o. 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 The patient was referred here by her home health nurse for poorly healing wounds despite wound care and antibiotics. The patient is a limited historian. No family is at the bedside. Previous records reviewed. Per the records the patient is bed bound and cared for by personal sitters. She was noted to have a GIB at the end of 2019 and chose not to undergo a colonoscopy. The patient is currently complaining of chronic back pain, but she denies fever/chills. She also reports dysuria, but denies N/V/D. The patient denies any SOB, chest pain, cough, or headache. Her ED work up is significant for elevated CRP and sed rate, UTI, and heme positive stool. Her lactate is negative and sacral CT reveals no evidence of osteomyelitis. The patient was given IV rocephin in the ED. She was noted to have 1 black, tarry stool while in the ED. The patient will be placed in observation for further work up and evaluation.

## 2020-02-14 NOTE — H&P
[facility] Medicine  History & Physical    Patient Name: Maricarmen Woodward  MRN: [id number]  Admission Date: 2/13/2020  Attending Physician: Timmy Eason MD   Primary Care Provider: Faustino Mccord PA-C         Patient information was obtained from patient, past medical records and ER records.     Subjective:     Principal Problem:GI bleed    Chief Complaint:   Chief Complaint   Patient presents with    Skin Ulcer     Also reports dysuria.        HPI: Maricarmen Woodward is a 89 y.o. 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 The patient was referred here by her home health nurse for poorly healing wounds despite wound care and antibiotics. The patient is a limited historian. No family is at the bedside. Previous records reviewed. Per the records the patient is bed bound and cared for by personal sitters. She was noted to have a GIB at the end of 2019 and chose not to undergo a colonoscopy. The patient is currently complaining of chronic back pain, but she denies fever/chills. She also reports dysuria, but denies N/V/D. The patient denies any SOB, chest pain, cough, or headache. Her ED work up is significant for elevated CRP and sed rate, UTI, and heme positive stool. Her lactate is negative and sacral CT reveals no evidence of osteomyelitis. The patient was given IV rocephin in the ED. She was noted to have 1 black, tarry stool while in the ED. The patient will be placed in observation for further work up and evaluation.     Past Medical History:   Diagnosis Date    Arthritis     Hypertension     Parkinson's disease        Past Surgical History:   Procedure Laterality Date    ESOPHAGOGASTRODUODENOSCOPY N/A 12/1/2019    Procedure: EGD (ESOPHAGOGASTRODUODENOSCOPY);  Surgeon: Heidi Gallagher MD;  Location: [facility];  Service: Endoscopy;  Laterality: N/A;    FRACTURE SURGERY       HEMIARTHROPLASTY OF HIP Left 12/8/2018    Procedure: HEMIARTHROPLASTY, HIP, Terese, peg board, first assist;  Surgeon: Cheng Mccormack MD;  Location: [facility] OR;  Service: Orthopedics;  Laterality: Left;    JOINT REPLACEMENT      right hip replacement    ORIF FEMUR FRACTURE Right 11/22/2019    Procedure: ORIF, FRACTURE, FEMUR, Synthes, radiolucent triangle, 1st assist;  Surgeon: Cheng Mccormack MD;  Location: [facility] OR;  Service: Orthopedics;  Laterality: Right;       Review of patient's allergies indicates:  No Known Allergies    No current facility-administered medications on file prior to encounter.      Current Outpatient Medications on File Prior to Encounter   Medication Sig    acetaminophen (TYLENOL) 325 MG tablet Take 2 tablets (650 mg total) by mouth every 4 (four) hours as needed.    albuterol-ipratropium (DUO-NEB) 2.5 mg-0.5 mg/3 mL nebulizer solution Take 3 mLs by nebulization every 4 (four) hours as needed for Wheezing or Shortness of Breath. Rescue    ascorbic acid, vitamin C, (VITAMIN C) 500 MG tablet Take 500 mg by mouth 2 (two) times daily.    aspirin 81 MG Chew Take 81 mg by mouth once daily.    calcium carbonate (CALCIUM ANTACID ORAL) Take 500 mg by mouth 2 (two) times daily. Calcium Antacid 500mg CHW tablet, Give one chewable tablet PO BID.    carbidopa-levodopa  mg (SINEMET)  mg per tablet Take 2 tablets by mouth 2 (two) times daily.     carbidopa-levodopa  mg (SINEMET CR)  mg TbSR Take 1.5 tablets by mouth every evening.    cholecalciferol, vitamin D3, (VITAMIN D3) 2,000 unit Tab Take 2,000 Units by mouth once daily.    clonazePAM (KLONOPIN) 0.5 MG tablet Take 1 tablet (0.5 mg total) by mouth 2 (two) times daily as needed for Anxiety. (Patient not taking: Reported on 1/23/2020)    cyanocobalamin (VITAMIN B-12) 100 MCG tablet Take 100 mcg by mouth once daily.     ferrous sulfate 325 (65 FE) MG EC tablet Take 325 mg by mouth once daily.     guaifenesin (TUSSIN ORAL) Take by mouth. Tussin 100mg/5mL, give 10mL (200mg) PO Q 6 hours prn cough.    hydroCHLOROthiazide (HYDRODIURIL) 12.5 MG Tab Take 12.5 mg by mouth once daily.    HYDROcodone-acetaminophen (NORCO) 5-325 mg per tablet Take 1 tablet by mouth every 6 (six) hours as needed. (Patient taking differently: Take 1 tablet by mouth every 6 (six) hours as needed (increase pain). )    irbesartan-hydrochlorothiazide (AVALIDE) 150-12.5 mg per tablet Take 1 tablet by mouth once daily.    magnesium hydroxide (MILK OF MAGNESIA CONCENTRATED ORAL) Take 30 mLs by mouth every 12 (twelve) hours as needed (constipation).    magnesium oxide (MAG-OX) 400 mg (241.3 mg magnesium) tablet Take 1 tablet (400 mg total) by mouth once daily. (Patient not taking: Reported on 1/24/2020)    melatonin (MELATIN) 3 mg tablet Take 9 mg by mouth nightly as needed for Insomnia.    metoprolol tartrate (LOPRESSOR) 25 MG tablet Take 1 tablet (25 mg total) by mouth 2 (two) times daily.    multivit-iron-min-folic acid (MULTIVITAMIN-IRON-MINERALS-FOLIC ACID) 3,500-18-0.4 unit-mg-mg Chew Take 1 tablet by mouth once daily.      omega-3 fatty acids-vitamin E (FISH OIL) 1,000 mg Cap Take 1 capsule by mouth once daily.    omeprazole (PRILOSEC) 40 MG capsule Take 40 mg by mouth once daily.    oxyCODONE-acetaminophen (LYNOX)  mg per tablet Take 1 tablet by mouth every 4 (four) hours as needed for Pain.    potassium chloride SA (K-DUR,KLOR-CON) 10 MEQ tablet Take 10 mEq by mouth once daily.      pravastatin (PRAVACHOL) 40 MG tablet Take 40 mg by mouth every evening.    senna-docusate 8.6-50 mg (SENNA WITH DOCUSATE SODIUM) 8.6-50 mg per tablet Take 1 tablet by mouth once daily.    tiZANidine (ZANAFLEX) 4 MG tablet Take 4 mg by mouth every 6 (six) hours as needed.    UNABLE TO FIND Pro Heal Critical Care 30mL PO BID.    zinc sulfate 220 mg Tab tablet Take 220 mg by mouth once daily.     Family History     Problem Relation (Age  of Onset)    No Known Problems Mother        Tobacco Use    Smoking status: Former Smoker     Packs/day: 0.25     Years: 5.00     Pack years: 1.25     Last attempt to quit: 4/17/2009     Years since quitting: 10.8    Smokeless tobacco: Never Used   Substance and Sexual Activity    Alcohol use: No    Drug use: No    Sexual activity: Not Currently     Partners: Male     Birth control/protection: Abstinence, Post-menopausal     Review of Systems   Constitutional: Negative for appetite change, chills, diaphoresis and fever.   HENT: Negative for congestion, ear pain, postnasal drip and rhinorrhea.    Eyes: Negative for photophobia, pain, redness and visual disturbance.   Respiratory: Negative for cough, chest tightness, shortness of breath and wheezing.    Cardiovascular: Negative for chest pain, palpitations and leg swelling.   Gastrointestinal: Negative for abdominal distention, abdominal pain, constipation, diarrhea, nausea and vomiting.   Genitourinary: Positive for dysuria. Negative for difficulty urinating, flank pain, frequency and hematuria.   Musculoskeletal: Positive for arthralgias (chronic hip pain) and back pain (chronic back pain). Negative for gait problem, myalgias and neck pain.   Skin: Positive for wound (reports chronic sacral wound and left hip wound). Negative for color change, pallor and rash.   Neurological: Negative for dizziness, syncope, weakness, light-headedness, numbness and headaches.   Psychiatric/Behavioral: Negative for agitation, confusion and hallucinations. The patient is not nervous/anxious.      Objective:     Vital Signs (Most Recent):  Temp: 98.2 °F (36.8 °C) (02/13/20 1713)  Pulse: 88 (02/14/20 0010)  Resp: 16 (02/13/20 1713)  BP: (!) 154/61 (02/13/20 2101)  SpO2: 100 % (02/14/20 0010) Vital Signs (24h Range):  Temp:  [98.2 °F (36.8 °C)] 98.2 °F (36.8 °C)  Pulse:  [80-96] 88  Resp:  [16] 16  SpO2:  [95 %-100 %] 100 %  BP: (130-163)/(61-82) 154/61     Weight: 56.7 kg (125  lb)  Body mass index is 24.41 kg/m².    Physical Exam   Constitutional: She is oriented to person, place, and time. She appears well-developed. No distress.   Patient is chronically ill appearing   HENT:   Head: Normocephalic and atraumatic.   Right Ear: External ear normal.   Left Ear: External ear normal.   Mouth/Throat: Oropharynx is clear and moist.   Eyes: Pupils are equal, round, and reactive to light. EOM are normal.   Neck: Normal range of motion. No JVD present.   Cardiovascular: Normal rate, regular rhythm and intact distal pulses.   No murmur heard.  Pulmonary/Chest: Effort normal and breath sounds normal. No respiratory distress. She has no wheezes.   Lungs CTA bilaterally; currently on room air   Abdominal: Soft. Bowel sounds are normal. She exhibits no distension. There is no tenderness.   Genitourinary:   Genitourinary Comments: deferred   Musculoskeletal: She exhibits tenderness. She exhibits no edema or deformity.   Contractures to BLE, tenderness noted over right and left hip, no obvious deformity   Neurological: She is alert and oriented to person.   Patient is forgetful but able to answer simple questions and follow commands   Skin: Skin is warm and dry. Capillary refill takes less than 2 seconds. She is not diaphoretic. There is pallor.   Questionable stage 2 right sacral decubitus ulcer with discharge and a foul odor. No significant surrounding erythema.    Psychiatric: She has a normal mood and affect.   Nursing note and vitals reviewed.            CRANIAL NERVES     CN III, IV, VI   Pupils are equal, round, and reactive to light.  Extraocular motions are normal.        Significant Labs:   BMP:   Recent Labs   Lab 02/13/20  1804   *      K 4.0      CO2 25   BUN 31*   CREATININE 0.9   CALCIUM 10.4     CBC:   Recent Labs   Lab 02/12/20  1605 02/13/20  1804   WBC 9.43 12.34   HGB 11.2* 11.1*   HCT 39.0 37.0   * 369*     Lactic Acid:   Recent Labs   Lab 02/13/20  1804    LACTATE 1.1     Urine Studies:   Recent Labs   Lab 02/13/20  2308   COLORU Yellow   APPEARANCEUA Hazy*   PHUR 6.0   SPECGRAV 1.020   PROTEINUA 1+*   GLUCUA Negative   KETONESU Negative   BILIRUBINUA Negative   OCCULTUA Negative   NITRITE Negative   UROBILINOGEN Negative   LEUKOCYTESUR Negative   RBCUA 0   WBCUA 15*   BACTERIA Many*   HYALINECASTS 0     All pertinent labs within the past 24 hours have been reviewed.    Significant Imaging: I have reviewed and interpreted all pertinent imaging results/findings within the past 24 hours.    Assessment/Plan:     * GI bleed  Heme occult +  Keep patient NPO.  Follow H/H closely. Type and screen blood and transfuse as needed.  Continue IV Protonix infusion 8 mg/hr.  Consult Gastronetrologist.   Continue IVF hydration.   Use IV anti-emetics as needed.     Acute cystitis without hematuria  UA reviewed, follow culture.  Continue IV rocephin.    Pressure injury of buttock, stage 2  Concern from home health of possible infection, no improvement and possible foul drainage. No s/s of sepsis. Patient is afebrile with no leukocytosis.\  CT sacrum with no osseous erosion to indicate osteomyelitis.  Wound care consulted.  Turn patient q2 hrs.  Culture wound.  Blood cultures obtained.  Sed rate 100.  CRP 12.1.  Lactic acid negative.  Consider infectious disease consult.    Frail elderly  Acute/chronic due to recent surgery/illnesses/prolonged hospitalization and nutritional status   - hx of previous falls  - high risk of fall / injury utilize all safety measures and fall precautions     Anemia  Chronic. Patient's anemia is currently controlled.    Current CBC reviewed-   Lab Results   Component Value Date    HGB 11.1 (L) 02/13/2020    HCT 37.0 02/13/2020     Monitor serial CBC and transfuse if patient becomes hemodynamically unstable, symptomatic or H/H drops below 7/21.     Parkinson's disease  Chronic, stable. Continue home sinemet.    Essential hypertension  Chronic, controlled.   Latest blood pressure and vitals reviewed-   Temp:  [98.2 °F (36.8 °C)]   Pulse:  [80-96]   Resp:  [16]   BP: (130-163)/(61-82)   SpO2:  [95 %-100 %] .   Will continue BB but hold other anti hypertensives for now 2/2 GI bleed. Home meds for hypertension were reviewed and noted below. Hospital anti-hypertensive changes were made as shown below.  Hypertension Medications             hydroCHLOROthiazide (HYDRODIURIL) 12.5 MG Tab Take 12.5 mg by mouth once daily.    irbesartan-hydrochlorothiazide (AVALIDE) 150-12.5 mg per tablet Take 1 tablet by mouth once daily.    metoprolol tartrate (LOPRESSOR) 25 MG tablet Take 1 tablet (25 mg total) by mouth 2 (two) times daily.        Will utilize p.r.n. blood pressure medication only if patient's blood pressure greater than  180/110 and she develops symptoms such as worsening chest pain or shortness of breath.          VTE Risk Mitigation (From admission, onward)    None             Sandy Isbell NP  Department of Hospital Medicine   [facility]

## 2020-02-14 NOTE — ED NOTES
Talking small amount aware she has a urine infection and she will stay in the hospital tonight. States thank you I feel better. Warm blankets applied pt turned onto left side.

## 2020-02-14 NOTE — ANESTHESIA PREPROCEDURE EVALUATION
02/14/2020  Maricarmen Woodward is a 89 y.o., female.    Pre-op Assessment    I have reviewed the Patient Summary Reports.     I have reviewed the Nursing Notes.   I have reviewed the Medications.     Review of Systems  Anesthesia Hx:  No problems with previous Anesthesia  Denies Family Hx of Anesthesia complications.   Denies Personal Hx of Anesthesia complications.   Social:  Non-Smoker    Hematology/Oncology:         -- Anemia: Hematology Comments: Hgb 9.9, PT normal, elevated PTT   Cardiovascular:   Hypertension ECG has been reviewed.    Pulmonary:   Pneumonia    Hepatic/GI:   PUD, GI bleed   Musculoskeletal:   Left hip fracture - recent repair Bone Disorders: Fracture , location of femur.     Neurological:   Neuromuscular Disease, parkinsonism Movement Disorder Dx, Parkinson's Disease       Physical Exam  General:  Well nourished    Airway/Jaw/Neck:  Airway Findings: Mouth Opening: Normal Tongue: Normal  General Airway Assessment: Adult, Good  Mallampati: II  Improves to II with phonation.  TM Distance: 4-6 cm  Jaw/Neck Findings:  Neck ROM: Extension Decreased, Mild      Dental:  Dental Findings: In tact   Chest/Lungs:  Chest/Lungs Findings: Clear to auscultation, Normal Respiratory Rate     Heart/Vascular:  Heart Findings: Rate: Normal  Rhythm: Regular Rhythm  Sounds: Normal  Heart murmur: negative       Mental Status:  Mental Status Findings:  Somnolent, Cooperative         Anesthesia Plan  Type of Anesthesia, risks & benefits discussed:  Anesthesia Type:  general  Patient's Preference:   Intra-op Monitoring Plan: standard ASA monitors  Intra-op Monitoring Plan Comments:   Post Op Pain Control Plan:   Post Op Pain Control Plan Comments:   Induction:   IV  Beta Blocker:  Patient is not currently on a Beta-Blocker (No further documentation required).       Informed Consent: Patient understands risks  and agrees with Anesthesia plan.  Questions answered. Anesthesia consent signed with patient.  ASA Score: 3     Day of Surgery Review of History & Physical:    H&P update referred to the surgeon.     Anesthesia Plan Notes: Informed consent from POA/nephew.        Ready For Surgery From Anesthesia Perspective.

## 2020-02-14 NOTE — PLAN OF CARE
Intervention: to be determined     Recommendation:   1) Advance PO diet to goal of regular + boost plus BID to promote PO intakes   2) feed pt if needed   3) Weigh pt weekly      Goals: 1) PO intakes > 50% of meals at f/u  Nutrition Goal Status: new  Communication of RD Recs: (POC, sticky note, second sign )

## 2020-02-14 NOTE — ASSESSMENT & PLAN NOTE
Chronic. Patient's anemia is currently controlled.    Current CBC reviewed-   Lab Results   Component Value Date    HGB 11.1 (L) 02/13/2020    HCT 37.0 02/13/2020     Monitor serial CBC and transfuse if patient becomes hemodynamically unstable, symptomatic or H/H drops below 7/21.

## 2020-02-14 NOTE — PROGRESS NOTES
"[facility]  Adult Nutrition  Progress Note    SUMMARY    Intervention: to be determined    Recommendation:   1) Advance PO diet to goal of regular + boost plus BID to promote PO intakes   2) feed pt if needed   3) Weigh pt weekly     Goals: 1) PO intakes > 50% of meals at f/u  Nutrition Goal Status: new  Communication of RD Recs: (POC, sticky note, second sign )    Reason for Assessment    Reason For Assessment: identified at risk by screening criteria(MST)  Diagnosis: (GIB)  Relevant Medical History: HTN, PUD, and arthritis, parkinson's  Interdisciplinary Rounds: attended    General Information Comments: 89 y/o female admitted with GIB, cleared by GI. Also with sacral and hip stage 2 PIs. NFPE done 20, mild-severe wasting seen. Significant wt loss per chart review. NPO x 1 day, she states she usually has a better appetite and is hungry today. Agreeable to supplements.    Nutrition Discharge Planning: to be determined- Regular diet + boost plus BID    Nutrition Risk Screen    Nutrition Risk Screen: large or nonhealing wound, burn or pressure injury    Nutrition/Diet History    Patient Reported Diet/Restrictions/Preferences: general  Spiritual, Cultural Beliefs, Hindu Practices, Values that Affect Care: no  Food Allergies: NKFA  Factors Affecting Nutritional Intake: NPO    Anthropometrics    Temp: 97.7 °F (36.5 °C)  Height Method: Stated  Height: 5' 2"  Height (inches): 62 in  Weight Method: Bed Scale  Weight: 47.1 kg (103 lb 13.4 oz)  Weight (lb): 103.84 lb  Ideal Body Weight (IBW), Female: 110 lb  % Ideal Body Weight, Female (lb): 94.4 %  BMI (Calculated): 19  BMI Grade: (underweight for age- small frame)  Weight Loss: unintentional  Usual Body Weight (UBW), k kg(19)  % Usual Body Weight: 90.77  % Weight Change From Usual Weight: -9.42 %       Lab/Procedures/Meds    Pertinent Labs Reviewed: reviewed  BMP  Lab Results   Component Value Date     2020    K 3.8 " 02/14/2020     02/14/2020    CO2 22 (L) 02/14/2020    BUN 27 02/14/2020    CREATININE 0.7 02/14/2020    CALCIUM 9.8 02/14/2020    ANIONGAP 10 02/14/2020    ESTGFRAFRICA >60 02/14/2020    EGFRNONAA >60 02/14/2020     Lab Results   Component Value Date    IRON 55 11/19/2019    TIBC 272 11/19/2019    FERRITIN 1,259 (H) 02/14/2020     Lab Results   Component Value Date    ALBUMIN 3.7 02/13/2020       Lab Results   Component Value Date    JZYVSJGN40 1,758 (H) 04/11/2014       Pertinent Medications Reviewed: reviewed  Pertinent Medications Comments: iron, statin, NS @ 150 ml/hr, zofran, KCl    Estimated/Assessed Needs    Weight Used For Calorie Calculations: 47.1 kg (103 lb 13.4 oz)  Energy Calorie Requirements (kcal): [place] St Jeor ( x 1.5) wt gain = 1250 kcal  Energy Need Method: [place]-St Jeor  Protein Requirements: 1.4-1.5 g protein/kg ( stage 2 PIs/ age/ wasting) = 66-70 g protein  Weight Used For Protein Calculations: 47.1 kg (103 lb 13.4 oz)  Fluid Requirements (mL): 1250 ml  Estimated Fluid Requirement Method: RDA Method  CHO Requirement: N/A      Nutrition Prescription Ordered    Current Diet Order: NPO  Nutrition Order Comments: x 1 day    Evaluation of Received Nutrient/Fluid Intake    Energy Calories Required: not meeting needs  Protein Required: not meeting needs  Fluid Required: exceeds needs  Comments: NS @ 150 ml/hr  Tolerance: other (see comments)(NPO)  % Intake of Estimated Energy Needs: 0%  % Meal Intake: 0%    Nutrition Risk    Level of Risk/Frequency of Follow-up: moderate 2 x weekly    Assessment and Plan    Severe malnutrition  Contributing Nutrition Diagnosis  Severe chronic condition related malnutrition    Related to (etiology):   Decreased appetite     Signs and Symptoms (as evidenced by):   1) 9% wt loss x 3 months  2) mild-severe muscle/fat wasting as charted below  3) two stage 2 PIs buttocks and hip  Interventions/Recommendations (treatment strategy):  Above    Nutrition Diagnosis  Status:   New           Monitor and Evaluation    Food and Nutrient Intake: energy intake, food and beverage intake  Food and Nutrient Adminstration: diet order  Anthropometric Measurements: weight  Biochemical Data, Medical Tests and Procedures: electrolyte and renal panel, gastrointestinal profile  Nutrition-Focused Physical Findings: overall appearance, skin     Malnutrition Assessment  Malnutrition Type: chronic illness  Skin (Micronutrient): (stage 2 PI buttocks and hip)       Weight Loss (Malnutrition): greater than 7.5% in 3 months   Orbital Region (Subcutaneous Fat Loss): well nourished  Upper Arm Region (Subcutaneous Fat Loss): mild depletion  Thoracic and Lumbar Region: mild depletion   Islam Region (Muscle Loss): mild depletion  Clavicle Bone Region (Muscle Loss): moderate depletion  Clavicle and Acromion Bone Region (Muscle Loss): moderate depletion  Scapular Bone Region (Muscle Loss): moderate depletion  Dorsal Hand (Muscle Loss): moderate depletion  Patellar Region (Muscle Loss): severe depletion  Anterior Thigh Region (Muscle Loss): severe depletion  Posterior Calf Region (Muscle Loss): severe depletion(severe @ biceps)   Edema (Fluid Accumulation): 0-->no edema present   Subcutaneous Fat Loss (Final Summary): moderate protein-calorie malnutrition  Muscle Loss Evaluation (Final Summary): severe protein-calorie malnutrition         Nutrition Follow-Up    RD Follow-up?: Yes

## 2020-02-14 NOTE — UM SECONDARY REVIEW
Physician Advisor External    Level of Care Issue    Approved Inpatient for 2/14/2020 per ehr md review.

## 2020-02-14 NOTE — ED NOTES
Patient identifiers for Maricarmen Woodward checked and correct.  LOC: Patient is awake, alert, and aware of self. Mumbles at times and smiles  APPEARANCE: Patient resting comfortably and in no acute distress. Patient is clean and well groomed, patient's clothing is properly fastened.  SKIN: The skin is warm and dry. Patient has normal skin turgor and moist mucus membrances. Skin is intact; no bruising or breakdown noted. Except to buttocks and sacrum (see pictures in chart)   MUSKULOSKELETAL: Patient is moving all extremities well, no obvious deformities noted. Pulses intact. Except lower leg contracture, padding placed between joints and turned q 2 hrs    RESPIRATORY: Airway is open and patent. Respirations are spontaneous and non-labored with normal effort and rate.  CARDIAC: Patient has a normal rate and rhythm. No peripheral edema noted. Capillary refill < 3 seconds.  ABDOMEN: No distention noted. Bowel sounds active in all 4 quadrants. Soft and non-tender upon palpation. Family states c/o dysuria. Black tarry stool noted Guaiac +    NEUROLOGICAL: PERRL. Facial expression is symmetrical. Hand grasps are equal bilaterally. Normal sensation in all extremities when touched with finger.  Allergies reported: Review of patient's allergies indicates:  No Known Allergies

## 2020-02-14 NOTE — PLAN OF CARE
1115- CM met with pt bedside to complete discharge assessment. Pt unable to verify information. CM will call family      1610- CM called pt's nephew, Denny, [phone] who it pt's POA. Denny verified information on facesheet as correct. Reports that pt lives with sitters that they hire (Anastasia). Reports she is with pt 24/7. PCP is Dr. Brambila. Pharm is [facility]. [facility]- hospital bed, wc, walker. Reports pt needs assistance with ADLs. [facility] (disclosure completed). Denny will provide transportation home upon discharge. DC plan is home with continued home health.        02/14/20 1610   Discharge Assessment   Assessment Type Discharge Planning Assessment   Confirmed/corrected address and phone number on facesheet? Yes   Assessment information obtained from? Other   Communicated expected length of stay with patient/caregiver yes   Prior to hospitilization cognitive status: Not Oriented to Time;Not Oriented to Place   Prior to hospitalization functional status: Needs Assistance   Current cognitive status: Not Oriented to Place;Not Oriented to Time   Current Functional Status: Needs Assistance   Lives With other (see comments)   Able to Return to Prior Arrangements yes   Is patient able to care for self after discharge? No   Patient's perception of discharge disposition home health   Readmission Within the Last 30 Days no previous admission in last 30 days   Patient currently being followed by outpatient case management? No   Patient currently receives any other outside agency services? No   Equipment Currently Used at Home wheelchair;hospital bed;bedside commode;walker, rolling   Do you have any problems affording any of your prescribed medications? No   Is the patient taking medications as prescribed? yes   Does the patient have transportation home? Yes   Transportation Anticipated family or friend will provide   Does the patient receive services at the [facility]? No   Discharge Plan  A Home Health   Discharge Plan B Home   DME Needed Upon Discharge  none   Patient/Family in Agreement with Plan yes

## 2020-02-14 NOTE — ED NOTES
"Son states that home health nurse was concerned pt has "an infection in her blood" due to wounds to right buttock and sacrum (see picture in chart) pt non verbal son at bedside.   "

## 2020-02-14 NOTE — CARE UPDATE
02/14/20 1212   PRE-TX-O2   O2 Device (Oxygen Therapy) room air   SpO2 99 %   Aerosol Therapy   $ Aerosol Therapy Charges PRN treatment not required

## 2020-02-14 NOTE — ED NOTES
"Spouse states that he has work in the morning and is going home, if pt needs a ride home "I guess I will try to come pick her up"   "

## 2020-02-14 NOTE — CONSULTS
[facility]     CC: Melena    HPI 89 y.o. 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 The patient was referred here by her home health nurse for poorly healing wounds despite wound care and antibiotics. The patient is a limited historian. No family is at the bedside. Previous records reviewed. Per the records the patient is bed bound and cared for by personal sitters. She was noted to have a GIB at the end of 2019 and chose not to undergo a colonoscopy. The patient is currently complaining of chronic back pain, but she denies fever/chills. She also reports dysuria, but denies N/V/D. The patient denies any SOB, chest pain, cough, or headache. Her ED work up is significant for elevated CRP and sed rate, UTI, and heme positive stool. Her lactate is negative and sacral CT reveals no evidence of osteomyelitis. The patient was given IV rocephin in the ED. She was noted to have 1 black, tarry stool while in the ED. The patient will be placed in observation for further work up and evaluation.     ADDITIONAL HISTORY:  Above obtained from review of notes from ED physician and discussion with nursing on floor.  In addition, on my interview, patient states that she did not see her BM but was told that it was black, one occurrence, no associated abdominal pain, with no alleviating/exacerbating factors.  No anticoagulation.  She has acute on chronic anemia with normal MCV.   Last EGD was in 12/2019 and was unremarkable.      Past Medical History:   Diagnosis Date    Arthritis     Hypertension     Parkinson's disease        Past Surgical History:   Procedure Laterality Date    ESOPHAGOGASTRODUODENOSCOPY N/A 12/1/2019    Procedure: EGD (ESOPHAGOGASTRODUODENOSCOPY);  Surgeon: Heidi Gallagher MD;  Location: [facility];  Service: Endoscopy;  Laterality: N/A;    FRACTURE SURGERY      HEMIARTHROPLASTY OF HIP Left 12/8/2018     "Procedure: HEMIARTHROPLASTY, HIP, [place], peg board, first assist;  Surgeon: Cheng Mccormack MD;  Location: [facility] OR;  Service: Orthopedics;  Laterality: Left;    JOINT REPLACEMENT      right hip replacement    ORIF FEMUR FRACTURE Right 11/22/2019    Procedure: ORIF, FRACTURE, FEMUR, [place]ynthes, radiolucent triangle, 1st assist;  Surgeon: Cheng Mccormack MD;  Location: [facility] OR;  Service: Orthopedics;  Laterality: Right;       Social History     Tobacco Use    Smoking status: Former Smoker     Packs/day: 0.25     Years: 5.00     Pack years: 1.25     Last attempt to quit: 4/17/2009     Years since quitting: 10.8    Smokeless tobacco: Never Used   Substance Use Topics    Alcohol use: No    Drug use: No       Family History   Problem Relation Age of Onset    No Known Problems Mother        Review of Systems  General ROS: negative for - chills, fever or weight loss  Psychological ROS: negative for - hallucination, depression or suicidal ideation  Ophthalmic ROS: negative for - blurry vision, photophobia or eye pain  ENT ROS: negative for - epistaxis, sore throat or rhinorrhea  Respiratory ROS: no cough, shortness of breath, or wheezing  Cardiovascular ROS: no chest pain or dyspnea on exertion  Gastrointestinal ROS: + black stool  Genito-Urinary ROS: no dysuria, trouble voiding, or hematuria  Musculoskeletal ROS: negative for - arthralgia, myalgia, weakness  Neurological ROS: no syncope or seizures; no ataxia  Dermatological ROS: negative for pruritis, rash and jaundice    Physical Examination  BP (!) 164/74 (BP Location: Right arm, Patient Position: Lying)   Pulse 83   Temp 98.8 °F (37.1 °C) (Oral)   Resp 18   Ht 5' 2" (1.575 m)   Wt 47.1 kg (103 lb 13.4 oz)   SpO2 99%   Breastfeeding? No   BMI 18.99 kg/m²   General appearance: alert, cooperative, no distress  HENT: Normocephalic, atraumatic, neck symmetrical, no nasal discharge   Eyes: conjunctivae/corneas clear, PERRL, EOM's intact, sclera " anicteric  Lungs: clear to auscultation bilaterally, no dullness to percussion bilaterally, symmetric expansion, breathing unlabored  Heart: regular rate and rhythm without rub; no displacement of the PMI   Abdomen: soft, NT  Extremities: extremities symmetric; no clubbing, cyanosis, or edema  Integument: Skin color, texture, turgor normal; no rashes; hair distrubution normal, no jaundice  Neurologic: Alert and oriented X 3, no focal sensory or motor neurologic deficits  Psychiatric: no pressured speech; normal affect; no evidence of impaired cognition, no anxiety/depression     Labs:  Lab Results   Component Value Date    WBC 13.24 (H) 02/14/2020    HGB 9.9 (L) 02/14/2020    HCT 32.4 (L) 02/14/2020    MCV 94 02/14/2020     02/14/2020         CMP  Sodium   Date Value Ref Range Status   02/14/2020 139 136 - 145 mmol/L Final     Potassium   Date Value Ref Range Status   02/14/2020 3.8 3.5 - 5.1 mmol/L Final     Chloride   Date Value Ref Range Status   02/14/2020 107 95 - 110 mmol/L Final     CO2   Date Value Ref Range Status   02/14/2020 22 (L) 23 - 29 mmol/L Final     Glucose   Date Value Ref Range Status   02/14/2020 103 70 - 110 mg/dL Final     BUN, Bld   Date Value Ref Range Status   02/14/2020 27 10 - 30 mg/dL Final     Creatinine   Date Value Ref Range Status   02/14/2020 0.7 0.5 - 1.4 mg/dL Final   06/07/2013 0.9 0.5 - 1.4 mg/dL Final     Calcium   Date Value Ref Range Status   02/14/2020 9.8 8.7 - 10.5 mg/dL Final   06/07/2013 9.3 8.7 - 10.5 mg/dL Final     Total Protein   Date Value Ref Range Status   02/13/2020 7.9 6.0 - 8.4 g/dL Final     Albumin   Date Value Ref Range Status   02/13/2020 3.7 3.5 - 5.2 g/dL Final     Total Bilirubin   Date Value Ref Range Status   02/13/2020 0.3 0.1 - 1.0 mg/dL Final     Comment:     For infants and newborns, interpretation of results should be based  on gestational age, weight and in agreement with clinical  observations.  Premature Infant recommended reference  ranges:  Up to 24 hours.............<8.0 mg/dL  Up to 48 hours............<12.0 mg/dL  3-5 days..................<15.0 mg/dL  6-29 days.................<15.0 mg/dL       Alkaline Phosphatase   Date Value Ref Range Status   02/13/2020 97 55 - 135 U/L Final   04/17/2012 53 23 - 119 UNIT/L Final     AST   Date Value Ref Range Status   02/13/2020 14 10 - 40 U/L Final   04/17/2012 11 10 - 30 UNIT/L Final     ALT   Date Value Ref Range Status   02/13/2020 5 (L) 10 - 44 U/L Final     Anion Gap   Date Value Ref Range Status   02/14/2020 10 8 - 16 mmol/L Final   06/07/2013 11 5 - 15 meq/L Final     eGFR if    Date Value Ref Range Status   02/14/2020 >60 >60 mL/min/1.73 m^2 Final     eGFR if non    Date Value Ref Range Status   02/14/2020 >60 >60 mL/min/1.73 m^2 Final     Comment:     Calculation used to obtain the estimated glomerular filtration  rate (eGFR) is the CKD-EPI equation.              Imaging:  CT sacrum was independently visualized and reviewed by me and showed no osteomyelitis.    I have personally reviewed these images    Old records from ER physician (Dr. Eason) reviewed and are as summarized above in the HPI.      Assessment:   1.  Melena  2.  Anemia  3.  Sacral ulcer  4.  Constipation with rectal thickening    Plan:  1.  NPO  2.  EGD for above  3.  Miralax BID and may give soap suds enema PRN  4.  Check iron studies.  Transfuse PRN  5.  PPI  6.  Further recommendations to follow after above.  7.  Communication will be sent to the referring provider, Dr. Khan regarding my assessment and plan on this patient via EPIC.      Jony Powers MD  [facility]  [address], [address]  [place] [address]  Office: [phone]  Fax: [phone]

## 2020-02-14 NOTE — ED PROVIDER NOTES
Encounter Date: 2/13/2020    SCRIBE #1 NOTE: Jojo PARRISH, am scribing for, and in the presence of, Dr. Eason.       History     Chief Complaint   Patient presents with    Skin Ulcer     Also reports dysuria.       Time seen by provider: 8:58 PM on 02/13/2020    Maricarmen Woodward is a 89 y.o. female who presents to the ED with c/o a chronic skin ulcer over the right hip and buttock. Patient was referred here by her home health nurse for poorly healing wounds despite antibiotics and home wound care. She c/o dysuria but denies fever or n/v. She has no other complaints. PMHx includes HTN and Parkinson's disease. PSHx includes left hemiarthroplasty.           The history is provided by the patient.     Review of patient's allergies indicates:  No Known Allergies  Past Medical History:   Diagnosis Date    Arthritis     Hypertension     Parkinson's disease      Past Surgical History:   Procedure Laterality Date    ESOPHAGOGASTRODUODENOSCOPY N/A 12/1/2019    Procedure: EGD (ESOPHAGOGASTRODUODENOSCOPY);  Surgeon: Heidi Gallagher MD;  Location: [facility];  Service: Endoscopy;  Laterality: N/A;    FRACTURE SURGERY      HEMIARTHROPLASTY OF HIP Left 12/8/2018    Procedure: HEMIARTHROPLASTY, HIP, [place], peg board, first assist;  Surgeon: Cheng Mccormack MD;  Location: [facility];  Service: Orthopedics;  Laterality: Left;    JOINT REPLACEMENT      right hip replacement    ORIF FEMUR FRACTURE Right 11/22/2019    Procedure: ORIF, FRACTURE, FEMUR, Synthes, radiolucent triangle, 1st assist;  Surgeon: Cheng Mccormack MD;  Location: [facility];  Service: Orthopedics;  Laterality: Right;     Family History   Problem Relation Age of Onset    No Known Problems Mother      Social History     Tobacco Use    Smoking status: Former Smoker     Packs/day: 0.25     Years: 5.00     Pack years: 1.25     Last attempt to quit: 4/17/2009     Years since quitting: 10.8    Smokeless tobacco: Never Used   Substance Use  Topics    Alcohol use: No    Drug use: No     Review of Systems   Constitutional: Negative for fever.   Respiratory: Negative for shortness of breath.    Genitourinary: Positive for dysuria. Negative for flank pain.   Musculoskeletal: Negative for gait problem.   Skin: Positive for wound (right hip and buttock).   Neurological: Negative for weakness.   Psychiatric/Behavioral: Negative for confusion.       Physical Exam     Initial Vitals [02/13/20 1713]   BP Pulse Resp Temp SpO2   130/82 80 16 98.2 °F (36.8 °C) 100 %      MAP       --         Physical Exam    Nursing note and vitals reviewed.  Constitutional: She appears well-developed and well-nourished.  Non-toxic appearance. No distress.   Patient appears elderly.    HENT:   Head: Normocephalic and atraumatic.   Eyes: EOM are normal. Pupils are equal, round, and reactive to light.   Neck: Normal range of motion. Neck supple. No neck rigidity. No JVD present.   Cardiovascular: Normal rate, regular rhythm, normal heart sounds and intact distal pulses. Exam reveals no gallop and no friction rub.    No murmur heard.  Pulmonary/Chest: Breath sounds normal. She has no wheezes. She has no rhonchi. She has no rales.   Abdominal: Soft. Bowel sounds are normal. She exhibits no distension. There is no tenderness. There is no rebound and no guarding.   Genitourinary:   Genitourinary Comments: Tarry stool, hemoccult positive. Chaperone present.    Musculoskeletal: Normal range of motion.   Contraction in the BLE.    Neurological: She is alert and oriented to person, place, and time. She has normal strength and normal reflexes. No cranial nerve deficit or sensory deficit. She exhibits normal muscle tone. Coordination normal. GCS eye subscore is 4. GCS verbal subscore is 5. GCS motor subscore is 6.   Skin: Skin is warm and dry.   Questionable stage 2 right sacral decubitus ulcer with discharge and a foul odor. No significant surrounding erythema.          Psychiatric: She has  a normal mood and affect. Her speech is normal and behavior is normal. She is not actively hallucinating.             ED Course   Procedures  Labs Reviewed   CBC W/ AUTO DIFFERENTIAL - Abnormal; Notable for the following components:       Result Value    RBC 3.81 (*)     Hemoglobin 11.1 (*)     Mean Corpuscular Hemoglobin Conc 30.0 (*)     RDW 14.7 (*)     Platelets 369 (*)     Gran # (ANC) 9.1 (*)     Gran% 73.8 (*)     Mono% 3.3 (*)     All other components within normal limits   COMPREHENSIVE METABOLIC PANEL - Abnormal; Notable for the following components:    Glucose 119 (*)     BUN, Bld 31 (*)     ALT 5 (*)     eGFR if non  56 (*)     All other components within normal limits   C-REACTIVE PROTEIN - Abnormal; Notable for the following components:    CRP 12.1 (*)     All other components within normal limits   URINALYSIS, REFLEX TO URINE CULTURE - Abnormal; Notable for the following components:    Appearance, UA Hazy (*)     Protein, UA 1+ (*)     All other components within normal limits    Narrative:     Preferred Collection Type->Urine, Clean Catch   URINALYSIS MICROSCOPIC - Abnormal; Notable for the following components:    WBC, UA 15 (*)     Bacteria Many (*)     All other components within normal limits    Narrative:     Preferred Collection Type->Urine, Clean Catch   CULTURE, BLOOD   CULTURE, BLOOD   CULTURE, URINE   LACTIC ACID, PLASMA   SEDIMENTATION RATE          Imaging Results          CT Sacrum Without Contrast (Final result)  Result time 02/13/20 22:08:32    Final result by Mark Gonzalez MD (02/13/20 22:08:32)                 Impression:      1. No osseous erosion to indicate osteomyelitis.  2. Bilateral hip arthroplasties with associated metallic artifact which diminishes visualization.  3. Retained feces in the rectum and colon with mild wall thickening in the rectum.  Findings may be associated with constipation or proximal impaction with mild associated  proctitis.      Electronically signed by: Mark Norman  Date:    02/13/2020  Time:    22:08             Narrative:    EXAMINATION:  CT SACRUM WITHOUT CONTRAST    CLINICAL HISTORY:  Evaluate for osteomyelitis;    TECHNIQUE:  Rotated axial images are submitted through the pelvis.  Sagittal and coronal reconstructions.  The lack of IV contrast diminishes the sensitivity.    COMPARISON:  None    FINDINGS:  Metallic artifact associated with hip arthroplasties bilaterally.    Retained feces in the colon and rectum.  Impaction not excluded.  There is mild diffuse rectal wall thickening present.  No focal lesion is identified.    Bone cement noted at L5 with moderate chronic compression.  Probable compression fracture L4 also.    No evidence of osseous destruction.  No osteomyelitis is identified in the sacrum or coccyx.    No evidence of abscess.                                 Medical Decision Making:   History:   Old Medical Records: I decided to obtain old medical records.  Initial Assessment:   89-year-old woman presents for evaluation poorly healing ulcers on her sacrum have developed foul odor despite outpatient oral antibiotic therapy.  She has no evidence of septic criteria.  CT performed shows no evidence of bony erosion to suspect osteomyelitis despite an elevated ESR and CRP.  She is found to have a urinary tract infection but also was incidentally found to have melena.  Patient will be admitted for observation to [facility] and start on IV PPI as well as wound care management of her sacral decubitus ulcer.  Clinical Tests:   Lab Tests: Ordered and Reviewed  Radiological Study: Ordered and Reviewed            Scribe Attestation:   Scribe #1: I performed the above scribed service and the documentation accurately describes the services I performed. I attest to the accuracy of the note.                   I, Jenaro Lau, personally performed the services described in this documentation. All medical record  entries made by the scribe were at my direction and in my presence.  I have reviewed the chart and agree that the record reflects my personal performance and is accurate and complete. Timmy Eason MD.        Clinical Impression:       ICD-10-CM ICD-9-CM   1. Pressure injury of sacral region, stage 2 L89.152 707.03     707.22   2. Melena K92.1 578.1   3. Urinary tract infection without hematuria, site unspecified N39.0 599.0         Disposition:   Disposition: Admitted                     Timmy Eason MD  02/14/20 0239

## 2020-02-14 NOTE — ASSESSMENT & PLAN NOTE
Chronic, controlled.  Latest blood pressure and vitals reviewed-   Temp:  [98.2 °F (36.8 °C)]   Pulse:  [80-96]   Resp:  [16]   BP: (130-163)/(61-82)   SpO2:  [95 %-100 %] .   Will continue BB but hold other anti hypertensives for now 2/2 GI bleed. Home meds for hypertension were reviewed and noted below. Hospital anti-hypertensive changes were made as shown below.  Hypertension Medications             hydroCHLOROthiazide (HYDRODIURIL) 12.5 MG Tab Take 12.5 mg by mouth once daily.    irbesartan-hydrochlorothiazide (AVALIDE) 150-12.5 mg per tablet Take 1 tablet by mouth once daily.    metoprolol tartrate (LOPRESSOR) 25 MG tablet Take 1 tablet (25 mg total) by mouth 2 (two) times daily.        Will utilize p.r.n. blood pressure medication only if patient's blood pressure greater than  180/110 and she develops symptoms such as worsening chest pain or shortness of breath.

## 2020-02-14 NOTE — SUBJECTIVE & OBJECTIVE
Past Medical History:   Diagnosis Date    Arthritis     Hypertension     Parkinson's disease        Past Surgical History:   Procedure Laterality Date    ESOPHAGOGASTRODUODENOSCOPY N/A 12/1/2019    Procedure: EGD (ESOPHAGOGASTRODUODENOSCOPY);  Surgeon: Heidi Gallagher MD;  Location: [facility];  Service: Endoscopy;  Laterality: N/A;    FRACTURE SURGERY      HEMIARTHROPLASTY OF HIP Left 12/8/2018    Procedure: HEMIARTHROPLASTY, HIP, Terese, peg board, first assist;  Surgeon: Cheng Mccormack MD;  Location: [facility];  Service: Orthopedics;  Laterality: Left;    JOINT REPLACEMENT      right hip replacement    ORIF FEMUR FRACTURE Right 11/22/2019    Procedure: ORIF, FRACTURE, FEMUR, Synthes, radiolucent triangle, 1st assist;  Surgeon: Cheng Mccormack MD;  Location: [facility];  Service: Orthopedics;  Laterality: Right;       Review of patient's allergies indicates:  No Known Allergies    No current facility-administered medications on file prior to encounter.      Current Outpatient Medications on File Prior to Encounter   Medication Sig    acetaminophen (TYLENOL) 325 MG tablet Take 2 tablets (650 mg total) by mouth every 4 (four) hours as needed.    albuterol-ipratropium (DUO-NEB) 2.5 mg-0.5 mg/3 mL nebulizer solution Take 3 mLs by nebulization every 4 (four) hours as needed for Wheezing or Shortness of Breath. Rescue    ascorbic acid, vitamin C, (VITAMIN C) 500 MG tablet Take 500 mg by mouth 2 (two) times daily.    aspirin 81 MG Chew Take 81 mg by mouth once daily.    calcium carbonate (CALCIUM ANTACID ORAL) Take 500 mg by mouth 2 (two) times daily. Calcium Antacid 500mg CHW tablet, Give one chewable tablet PO BID.    carbidopa-levodopa  mg (SINEMET)  mg per tablet Take 2 tablets by mouth 2 (two) times daily.     carbidopa-levodopa  mg (SINEMET CR)  mg TbSR Take 1.5 tablets by mouth every evening.    cholecalciferol, vitamin D3, (VITAMIN D3) 2,000 unit Tab Take  2,000 Units by mouth once daily.    clonazePAM (KLONOPIN) 0.5 MG tablet Take 1 tablet (0.5 mg total) by mouth 2 (two) times daily as needed for Anxiety. (Patient not taking: Reported on 1/23/2020)    cyanocobalamin (VITAMIN B-12) 100 MCG tablet Take 100 mcg by mouth once daily.     ferrous sulfate 325 (65 FE) MG EC tablet Take 325 mg by mouth once daily.    guaifenesin (TUSSIN ORAL) Take by mouth. Tussin 100mg/5mL, give 10mL (200mg) PO Q 6 hours prn cough.    hydroCHLOROthiazide (HYDRODIURIL) 12.5 MG Tab Take 12.5 mg by mouth once daily.    HYDROcodone-acetaminophen (NORCO) 5-325 mg per tablet Take 1 tablet by mouth every 6 (six) hours as needed. (Patient taking differently: Take 1 tablet by mouth every 6 (six) hours as needed (increase pain). )    irbesartan-hydrochlorothiazide (AVALIDE) 150-12.5 mg per tablet Take 1 tablet by mouth once daily.    magnesium hydroxide (MILK OF MAGNESIA CONCENTRATED ORAL) Take 30 mLs by mouth every 12 (twelve) hours as needed (constipation).    magnesium oxide (MAG-OX) 400 mg (241.3 mg magnesium) tablet Take 1 tablet (400 mg total) by mouth once daily. (Patient not taking: Reported on 1/24/2020)    melatonin (MELATIN) 3 mg tablet Take 9 mg by mouth nightly as needed for Insomnia.    metoprolol tartrate (LOPRESSOR) 25 MG tablet Take 1 tablet (25 mg total) by mouth 2 (two) times daily.    multivit-iron-min-folic acid (MULTIVITAMIN-IRON-MINERALS-FOLIC ACID) 3,500-18-0.4 unit-mg-mg Chew Take 1 tablet by mouth once daily.      omega-3 fatty acids-vitamin E (FISH OIL) 1,000 mg Cap Take 1 capsule by mouth once daily.    omeprazole (PRILOSEC) 40 MG capsule Take 40 mg by mouth once daily.    oxyCODONE-acetaminophen (LYNOX)  mg per tablet Take 1 tablet by mouth every 4 (four) hours as needed for Pain.    potassium chloride SA (K-DUR,KLOR-CON) 10 MEQ tablet Take 10 mEq by mouth once daily.      pravastatin (PRAVACHOL) 40 MG tablet Take 40 mg by mouth every evening.     senna-docusate 8.6-50 mg (SENNA WITH DOCUSATE SODIUM) 8.6-50 mg per tablet Take 1 tablet by mouth once daily.    tiZANidine (ZANAFLEX) 4 MG tablet Take 4 mg by mouth every 6 (six) hours as needed.    UNABLE TO FIND Pro Heal Critical Care 30mL PO BID.    zinc sulfate 220 mg Tab tablet Take 220 mg by mouth once daily.     Family History     Problem Relation (Age of Onset)    No Known Problems Mother        Tobacco Use    Smoking status: Former Smoker     Packs/day: 0.25     Years: 5.00     Pack years: 1.25     Last attempt to quit: 4/17/2009     Years since quitting: 10.8    Smokeless tobacco: Never Used   Substance and Sexual Activity    Alcohol use: No    Drug use: No    Sexual activity: Not Currently     Partners: Male     Birth control/protection: Abstinence, Post-menopausal     Review of Systems   Constitutional: Negative for appetite change, chills, diaphoresis and fever.   HENT: Negative for congestion, ear pain, postnasal drip and rhinorrhea.    Eyes: Negative for photophobia, pain, redness and visual disturbance.   Respiratory: Negative for cough, chest tightness, shortness of breath and wheezing.    Cardiovascular: Negative for chest pain, palpitations and leg swelling.   Gastrointestinal: Negative for abdominal distention, abdominal pain, constipation, diarrhea, nausea and vomiting.   Genitourinary: Positive for dysuria. Negative for difficulty urinating, flank pain, frequency and hematuria.   Musculoskeletal: Positive for arthralgias (chronic hip pain) and back pain (chronic back pain). Negative for gait problem, myalgias and neck pain.   Skin: Positive for wound (reports chronic sacral wound and left hip wound). Negative for color change, pallor and rash.   Neurological: Negative for dizziness, syncope, weakness, light-headedness, numbness and headaches.   Psychiatric/Behavioral: Negative for agitation, confusion and hallucinations. The patient is not nervous/anxious.      Objective:     Vital  Signs (Most Recent):  Temp: 98.2 °F (36.8 °C) (02/13/20 1713)  Pulse: 88 (02/14/20 0010)  Resp: 16 (02/13/20 1713)  BP: (!) 154/61 (02/13/20 2101)  SpO2: 100 % (02/14/20 0010) Vital Signs (24h Range):  Temp:  [98.2 °F (36.8 °C)] 98.2 °F (36.8 °C)  Pulse:  [80-96] 88  Resp:  [16] 16  SpO2:  [95 %-100 %] 100 %  BP: (130-163)/(61-82) 154/61     Weight: 56.7 kg (125 lb)  Body mass index is 24.41 kg/m².    Physical Exam   Constitutional: She is oriented to person, place, and time. She appears well-developed. No distress.   Patient is chronically ill appearing   HENT:   Head: Normocephalic and atraumatic.   Right Ear: External ear normal.   Left Ear: External ear normal.   Mouth/Throat: Oropharynx is clear and moist.   Eyes: Pupils are equal, round, and reactive to light. EOM are normal.   Neck: Normal range of motion. No JVD present.   Cardiovascular: Normal rate, regular rhythm and intact distal pulses.   No murmur heard.  Pulmonary/Chest: Effort normal and breath sounds normal. No respiratory distress. She has no wheezes.   Lungs CTA bilaterally; currently on room air   Abdominal: Soft. Bowel sounds are normal. She exhibits no distension. There is no tenderness.   Genitourinary:   Genitourinary Comments: deferred   Musculoskeletal: She exhibits tenderness. She exhibits no edema or deformity.   Contractures to BLE, tenderness noted over right and left hip, no obvious deformity   Neurological: She is alert and oriented to person, place, and time.   Patient is forgetful but able to answer simple questions and follow commands   Skin: Skin is warm and dry. Capillary refill takes less than 2 seconds. She is not diaphoretic. There is pallor.   Questionable stage 2 right sacral decubitus ulcer with discharge and a foul odor. No significant surrounding erythema.    Psychiatric: She has a normal mood and affect.   Nursing note and vitals reviewed.            CRANIAL NERVES     CN III, IV, VI   Pupils are equal, round, and  reactive to light.  Extraocular motions are normal.        Significant Labs:   BMP:   Recent Labs   Lab 02/13/20  1804   *      K 4.0      CO2 25   BUN 31*   CREATININE 0.9   CALCIUM 10.4     CBC:   Recent Labs   Lab 02/12/20  1605 02/13/20  1804   WBC 9.43 12.34   HGB 11.2* 11.1*   HCT 39.0 37.0   * 369*     Lactic Acid:   Recent Labs   Lab 02/13/20  1804   LACTATE 1.1     Urine Studies:   Recent Labs   Lab 02/13/20  2308   COLORU Yellow   APPEARANCEUA Hazy*   PHUR 6.0   SPECGRAV 1.020   PROTEINUA 1+*   GLUCUA Negative   KETONESU Negative   BILIRUBINUA Negative   OCCULTUA Negative   NITRITE Negative   UROBILINOGEN Negative   LEUKOCYTESUR Negative   RBCUA 0   WBCUA 15*   BACTERIA Many*   HYALINECASTS 0     All pertinent labs within the past 24 hours have been reviewed.    Significant Imaging: I have reviewed and interpreted all pertinent imaging results/findings within the past 24 hours.

## 2020-02-14 NOTE — ED NOTES
Straight cath done with assist of Evelina RAMOS, pt stiff with contractures, black tarry stool noted guaiac positive  MD aware. Pt urinated during cath, partial bed bath given, linens changed and new brief placed. Turned onto opposite side she was laying on previously warm blankets given. Frequent checks made on pt.

## 2020-02-14 NOTE — [employer] PATIENT INSTRUCTIONS
Discharge Summary/Instructions after an Endoscopic Procedure  Patient Name: Maricarmen Woodward  Patient MRN: [id number]  Patient YOB: 1927 Friday, February 14, 2020  Jony Powers MD  RESTRICTIONS:  During your procedure today, you received medications for sedation.  These   medications may affect your judgment, balance and coordination.  Therefore,   for 24 hours, you have the following restrictions:   - DO NOT drive a car, operate machinery, make legal/financial decisions,   sign important papers or drink alcohol.    ACTIVITY:  Today: no heavy lifting, straining or running due to procedural   sedation/anesthesia.  The following day: return to full activity including work.  DIET:  Eat and drink normally unless instructed otherwise.     TREATMENT FOR COMMON SIDE EFFECTS:  - Mild abdominal pain, nausea, belching, bloating or excessive gas:  rest,   eat lightly and use a heating pad.  - Sore Throat: treat with throat lozenges and/or gargle with warm salt   water.  - Because air was used during the procedure, expelling large amounts of air   from your rectum or belching is normal.  - If a bowel prep was taken, you may not have a bowel movement for 1-3 days.    This is normal.  SYMPTOMS TO WATCH FOR AND REPORT TO YOUR PHYSICIAN:  1. Abdominal pain or bloating, other than gas cramps.  2. Chest pain.  3. Back pain.  4. Signs of infection such as: chills or fever occurring within 24 hours   after the procedure.  5. Rectal bleeding, which would show as bright red, maroon, or black stools.   (A tablespoon of blood from the rectum is not serious, especially if   hemorrhoids are present.)  6. Vomiting.  7. Weakness or dizziness.  GO DIRECTLY TO THE NEAREST EMERGENCY ROOM IF YOU HAVE ANY OF THE FOLLOWING:      Difficulty breathing              Chills and/or fever over 101 F   Persistent vomiting and/or vomiting blood   Severe abdominal pain   Severe chest pain   Black, tarry stools   Bleeding- more than one  tablespoon   Any other symptom or condition that you feel may need urgent attention  Your doctor recommends these additional instructions:  If any biopsies were taken, your doctors clinic will contact you in 1 to 2   weeks with any results.  - [facility] patient to [facility] for [facility] care.   - Resume regular diet.   - Continue present medications.   - No aspirin, ibuprofen, naproxen, or other non-steroidal anti-inflammatory   drugs.   - Follow an antireflux regimen.  For questions, problems or results please call your physician - Jony Powers MD at Work:  [phone].  [facility], EMERGENCY ROOM PHONE NUMBER: [phone]  IF A COMPLICATION OR EMERGENCY SITUATION ARISES AND YOU ARE UNABLE TO REACH   YOUR PHYSICIAN - GO DIRECTLY TO THE EMERGENCY ROOM.  Jony Powers MD  2/14/2020 2:18:31 PM  This report has been verified and signed electronically.  [employer]

## 2020-02-14 NOTE — ASSESSMENT & PLAN NOTE
Heme occult +  Keep patient NPO.  Follow H/H closely. Type and screen blood and transfuse as needed.  Continue IV Protonix infusion 8 mg/hr.  Consult Gastronetrologist.   Continue IVF hydration.   Use IV anti-emetics as needed.

## 2020-02-14 NOTE — ASSESSMENT & PLAN NOTE
Contributing Nutrition Diagnosis  Severe chronic condition related malnutrition    Related to (etiology):   Decreased appetite     Signs and Symptoms (as evidenced by):   1) 9% wt loss x 3 months  2) mild-severe muscle/fat wasting as charted below  3) two stage 2 PIs buttocks and hip  Interventions/Recommendations (treatment strategy):  Above    Nutrition Diagnosis Status:   New

## 2020-02-14 NOTE — ASSESSMENT & PLAN NOTE
Concern from home health of possible infection, no improvement and possible foul drainage. No s/s of sepsis. Patient is afebrile with no leukocytosis.\  CT sacrum with no osseous erosion to indicate osteomyelitis.  Wound care consulted.  Turn patient q2 hrs.  Culture wound.  Blood cultures obtained.  Sed rate 100.  CRP 12.1.  Lactic acid negative.  Consider infectious disease consult.

## 2020-02-14 NOTE — PROGRESS NOTES
02/14/20 1253   WOCN Assessment   Visit Date 02/14/20   Consult Type New   WOCN Speciality Wound   Wound pressure;deep tissue injury   Number of Wounds 1   Intervention coordination of care;assessed   Marked no     Has sacral area DTI with 3 areas of skin integrity impairment; unstageable; eschar and slough present. Periwound tissue has redness and induration. No odor.  Discussed with Dr Khan, who will consult surgeon for possible debridement.  Will follow.

## 2020-02-14 NOTE — ANESTHESIA POSTPROCEDURE EVALUATION
Anesthesia Post Evaluation    Patient: Maricarmen MILLER Trace    Procedure(s) Performed: Procedure(s) (LRB):  EGD (ESOPHAGOGASTRODUODENOSCOPY) (N/A)    Final Anesthesia Type: general    Patient location during evaluation: PACU  Patient participation: Yes- Able to Participate  Level of consciousness: sedated and awake  Post-procedure vital signs: reviewed and stable  Pain management: adequate  Airway patency: patent    PONV status at discharge: No PONV  Anesthetic complications: no      Cardiovascular status: hypertensive and blood pressure returned to baseline  Respiratory status: spontaneous ventilation  Hydration status: euvolemic  Follow-up not needed.          Vitals Value Taken Time   /65 2/14/2020  3:22 PM   Temp 36.5 °C (97.7 °F) 2/14/2020  2:30 PM   Pulse 82 2/14/2020  3:22 PM   Resp 20 2/14/2020  3:22 PM   SpO2 98 % 2/14/2020  3:22 PM         Event Time     Out of Recovery 02/14/2020 14:33:17          Pain/King Score: King Score: 9 (2/14/2020  2:30 PM)

## 2020-02-14 NOTE — TRANSFER OF CARE
"Anesthesia Transfer of Care Note    Patient: Maricarmen MILLER Trace    Procedure(s) Performed: Procedure(s) (LRB):  EGD (ESOPHAGOGASTRODUODENOSCOPY) (N/A)    Patient location: PACU    Anesthesia Type: general    Transport from OR: Transported from OR on room air with adequate spontaneous ventilation    Post pain: adequate analgesia    Post assessment: no apparent anesthetic complications and tolerated procedure well    Post vital signs: stable    Level of consciousness: sedated    Nausea/Vomiting: no nausea/vomiting    Complications: none    Transfer of care protocol was followed      Last vitals:   Visit Vitals  /60   Pulse 69   Temp 36.9 °C (98.4 °F) (Skin)   Resp 16   Ht 5' 2" (1.575 m)   Wt 47.1 kg (103 lb 13.4 oz)   SpO2 98%   Breastfeeding? No   BMI 18.99 kg/m²     "

## 2020-02-15 PROBLEM — L89.310 PRESSURE INJURY OF RIGHT BUTTOCK, UNSTAGEABLE: Status: ACTIVE | Noted: 2019-11-10

## 2020-02-15 PROBLEM — L89.152 PRESSURE INJURY OF SACRAL REGION, STAGE 2: Status: ACTIVE | Noted: 2020-02-15

## 2020-02-15 PROBLEM — L89.222: Status: ACTIVE | Noted: 2020-02-15

## 2020-02-15 PROBLEM — L89.150 PRESSURE INJURY OF SACRAL REGION, UNSTAGEABLE: Status: ACTIVE | Noted: 2020-02-14

## 2020-02-15 LAB
ANION GAP SERPL CALC-SCNC: 9 MMOL/L (ref 8–16)
BACTERIA UR CULT: NO GROWTH
BASOPHILS # BLD AUTO: 0.05 K/UL (ref 0–0.2)
BASOPHILS # BLD AUTO: 0.06 K/UL (ref 0–0.2)
BASOPHILS NFR BLD: 0.4 % (ref 0–1.9)
BASOPHILS NFR BLD: 0.5 % (ref 0–1.9)
BUN SERPL-MCNC: 19 MG/DL (ref 10–30)
CALCIUM SERPL-MCNC: 9.5 MG/DL (ref 8.7–10.5)
CHLORIDE SERPL-SCNC: 107 MMOL/L (ref 95–110)
CO2 SERPL-SCNC: 22 MMOL/L (ref 23–29)
CREAT SERPL-MCNC: 0.8 MG/DL (ref 0.5–1.4)
DIFFERENTIAL METHOD: ABNORMAL
DIFFERENTIAL METHOD: ABNORMAL
EOSINOPHIL # BLD AUTO: 0.2 K/UL (ref 0–0.5)
EOSINOPHIL # BLD AUTO: 0.2 K/UL (ref 0–0.5)
EOSINOPHIL NFR BLD: 1.3 % (ref 0–8)
EOSINOPHIL NFR BLD: 1.5 % (ref 0–8)
ERYTHROCYTE [DISTWIDTH] IN BLOOD BY AUTOMATED COUNT: 14.6 % (ref 11.5–14.5)
ERYTHROCYTE [DISTWIDTH] IN BLOOD BY AUTOMATED COUNT: 14.6 % (ref 11.5–14.5)
EST. GFR  (AFRICAN AMERICAN): >60 ML/MIN/1.73 M^2
EST. GFR  (NON AFRICAN AMERICAN): >60 ML/MIN/1.73 M^2
GLUCOSE SERPL-MCNC: 96 MG/DL (ref 70–110)
HCT VFR BLD AUTO: 32.9 % (ref 37–48.5)
HCT VFR BLD AUTO: 33.9 % (ref 37–48.5)
HGB BLD-MCNC: 10 G/DL (ref 12–16)
HGB BLD-MCNC: 10.3 G/DL (ref 12–16)
IMM GRANULOCYTES # BLD AUTO: 0.05 K/UL (ref 0–0.04)
IMM GRANULOCYTES # BLD AUTO: 0.05 K/UL (ref 0–0.04)
LYMPHOCYTES # BLD AUTO: 2.6 K/UL (ref 1–4.8)
LYMPHOCYTES # BLD AUTO: 3.1 K/UL (ref 1–4.8)
LYMPHOCYTES NFR BLD: 18.7 % (ref 18–48)
LYMPHOCYTES NFR BLD: 24 % (ref 18–48)
MAGNESIUM SERPL-MCNC: 1.8 MG/DL (ref 1.6–2.6)
MCH RBC QN AUTO: 28.7 PG (ref 27–31)
MCH RBC QN AUTO: 29.3 PG (ref 27–31)
MCHC RBC AUTO-ENTMCNC: 30.4 G/DL (ref 32–36)
MCHC RBC AUTO-ENTMCNC: 30.4 G/DL (ref 32–36)
MCV RBC AUTO: 94 FL (ref 82–98)
MCV RBC AUTO: 96 FL (ref 82–98)
MONOCYTES # BLD AUTO: 0.5 K/UL (ref 0.3–1)
MONOCYTES # BLD AUTO: 0.5 K/UL (ref 0.3–1)
MONOCYTES NFR BLD: 3.6 % (ref 4–15)
MONOCYTES NFR BLD: 4.2 % (ref 4–15)
NEUTROPHILS # BLD AUTO: 10.3 K/UL (ref 1.8–7.7)
NEUTROPHILS # BLD AUTO: 8.8 K/UL (ref 1.8–7.7)
NEUTROPHILS NFR BLD: 69.4 % (ref 38–73)
NEUTROPHILS NFR BLD: 75.6 % (ref 38–73)
NRBC BLD-RTO: 0 /100 WBC
NRBC BLD-RTO: 0 /100 WBC
PHOSPHATE SERPL-MCNC: 2.9 MG/DL (ref 2.7–4.5)
PLATELET # BLD AUTO: 311 K/UL (ref 150–350)
PLATELET # BLD AUTO: 326 K/UL (ref 150–350)
PMV BLD AUTO: 10.2 FL (ref 9.2–12.9)
PMV BLD AUTO: 10.3 FL (ref 9.2–12.9)
POTASSIUM SERPL-SCNC: 3.8 MMOL/L (ref 3.5–5.1)
RBC # BLD AUTO: 3.49 M/UL (ref 4–5.4)
RBC # BLD AUTO: 3.52 M/UL (ref 4–5.4)
SODIUM SERPL-SCNC: 138 MMOL/L (ref 136–145)
WBC # BLD AUTO: 12.69 K/UL (ref 3.9–12.7)
WBC # BLD AUTO: 13.64 K/UL (ref 3.9–12.7)

## 2020-02-15 PROCEDURE — 84100 ASSAY OF PHOSPHORUS: CPT

## 2020-02-15 PROCEDURE — 25000003 PHARM REV CODE 250: Performed by: HOSPITALIST

## 2020-02-15 PROCEDURE — 36415 COLL VENOUS BLD VENIPUNCTURE: CPT

## 2020-02-15 PROCEDURE — 25000003 PHARM REV CODE 250: Performed by: NURSE PRACTITIONER

## 2020-02-15 PROCEDURE — 85025 COMPLETE CBC W/AUTO DIFF WBC: CPT | Mod: 91

## 2020-02-15 PROCEDURE — 99900035 HC TECH TIME PER 15 MIN (STAT)

## 2020-02-15 PROCEDURE — 63600175 PHARM REV CODE 636 W HCPCS: Performed by: NURSE PRACTITIONER

## 2020-02-15 PROCEDURE — 83735 ASSAY OF MAGNESIUM: CPT

## 2020-02-15 PROCEDURE — 94761 N-INVAS EAR/PLS OXIMETRY MLT: CPT

## 2020-02-15 PROCEDURE — C9113 INJ PANTOPRAZOLE SODIUM, VIA: HCPCS | Performed by: NURSE PRACTITIONER

## 2020-02-15 PROCEDURE — 63600175 PHARM REV CODE 636 W HCPCS: Performed by: INTERNAL MEDICINE

## 2020-02-15 PROCEDURE — 11000001 HC ACUTE MED/SURG PRIVATE ROOM

## 2020-02-15 PROCEDURE — 80048 BASIC METABOLIC PNL TOTAL CA: CPT

## 2020-02-15 RX ORDER — CLONAZEPAM 0.5 MG/1
0.5 TABLET ORAL 2 TIMES DAILY PRN
Status: DISCONTINUED | OUTPATIENT
Start: 2020-02-15 | End: 2020-02-17 | Stop reason: HOSPADM

## 2020-02-15 RX ORDER — CHOLECALCIFEROL (VITAMIN D3) 25 MCG
2000 TABLET ORAL DAILY
Status: DISCONTINUED | OUTPATIENT
Start: 2020-02-16 | End: 2020-02-17 | Stop reason: HOSPADM

## 2020-02-15 RX ORDER — IRBESARTAN 150 MG/1
150 TABLET ORAL DAILY
Status: DISCONTINUED | OUTPATIENT
Start: 2020-02-16 | End: 2020-02-17 | Stop reason: HOSPADM

## 2020-02-15 RX ORDER — PANTOPRAZOLE SODIUM 40 MG/1
40 TABLET, DELAYED RELEASE ORAL DAILY
Status: DISCONTINUED | OUTPATIENT
Start: 2020-02-16 | End: 2020-02-17 | Stop reason: HOSPADM

## 2020-02-15 RX ORDER — CARBIDOPA AND LEVODOPA 50; 200 MG/1; MG/1
1 TABLET, EXTENDED RELEASE ORAL NIGHTLY
Status: DISCONTINUED | OUTPATIENT
Start: 2020-02-15 | End: 2020-02-17 | Stop reason: HOSPADM

## 2020-02-15 RX ORDER — PNV NO.95/FERROUS FUM/FOLIC AC 28MG-0.8MG
100 TABLET ORAL DAILY
Status: DISCONTINUED | OUTPATIENT
Start: 2020-02-16 | End: 2020-02-17 | Stop reason: HOSPADM

## 2020-02-15 RX ORDER — HYDROCHLOROTHIAZIDE 12.5 MG/1
12.5 TABLET ORAL DAILY
Status: DISCONTINUED | OUTPATIENT
Start: 2020-02-16 | End: 2020-02-17 | Stop reason: HOSPADM

## 2020-02-15 RX ORDER — CALCIUM CARBONATE 200(500)MG
500 TABLET,CHEWABLE ORAL 2 TIMES DAILY
Status: DISCONTINUED | OUTPATIENT
Start: 2020-02-15 | End: 2020-02-17 | Stop reason: HOSPADM

## 2020-02-15 RX ORDER — OXYCODONE AND ACETAMINOPHEN 10; 325 MG/1; MG/1
1 TABLET ORAL EVERY 6 HOURS PRN
Status: DISCONTINUED | OUTPATIENT
Start: 2020-02-15 | End: 2020-02-17 | Stop reason: HOSPADM

## 2020-02-15 RX ORDER — CARBIDOPA AND LEVODOPA 25; 100 MG/1; MG/1
2 TABLET ORAL 2 TIMES DAILY WITH MEALS
Status: DISCONTINUED | OUTPATIENT
Start: 2020-02-16 | End: 2020-02-17 | Stop reason: HOSPADM

## 2020-02-15 RX ADMIN — PIPERACILLIN AND TAZOBACTAM 4.5 G: 4; .5 INJECTION, POWDER, LYOPHILIZED, FOR SOLUTION INTRAVENOUS; PARENTERAL at 04:02

## 2020-02-15 RX ADMIN — METOPROLOL TARTRATE 25 MG: 25 TABLET, FILM COATED ORAL at 10:02

## 2020-02-15 RX ADMIN — CARBIDOPA AND LEVODOPA 1 TABLET: 50; 200 TABLET, EXTENDED RELEASE ORAL at 09:02

## 2020-02-15 RX ADMIN — CARBIDOPA AND LEVODOPA 2 TABLET: 25; 100 TABLET ORAL at 10:02

## 2020-02-15 RX ADMIN — DEXTROSE 8 MG/HR: 50 INJECTION, SOLUTION INTRAVENOUS at 02:02

## 2020-02-15 RX ADMIN — PIPERACILLIN AND TAZOBACTAM 4.5 G: 4; .5 INJECTION, POWDER, LYOPHILIZED, FOR SOLUTION INTRAVENOUS; PARENTERAL at 12:02

## 2020-02-15 RX ADMIN — DEXTROSE 8 MG/HR: 50 INJECTION, SOLUTION INTRAVENOUS at 05:02

## 2020-02-15 RX ADMIN — Medication 9 MG: at 09:02

## 2020-02-15 RX ADMIN — PRAVASTATIN SODIUM 40 MG: 40 TABLET ORAL at 09:02

## 2020-02-15 RX ADMIN — FERROUS SULFATE TAB EC 325 MG (65 MG FE EQUIVALENT) 325 MG: 325 (65 FE) TABLET DELAYED RESPONSE at 10:02

## 2020-02-15 RX ADMIN — CALCIUM CARBONATE (ANTACID) CHEW TAB 500 MG 500 MG: 500 CHEW TAB at 09:02

## 2020-02-15 RX ADMIN — METOPROLOL TARTRATE 25 MG: 25 TABLET, FILM COATED ORAL at 09:02

## 2020-02-15 RX ADMIN — CLONAZEPAM 0.5 MG: 0.5 TABLET ORAL at 09:02

## 2020-02-15 RX ADMIN — PIPERACILLIN AND TAZOBACTAM 4.5 G: 4; .5 INJECTION, POWDER, LYOPHILIZED, FOR SOLUTION INTRAVENOUS; PARENTERAL at 10:02

## 2020-02-15 NOTE — SUBJECTIVE & OBJECTIVE
No current facility-administered medications on file prior to encounter.      Current Outpatient Medications on File Prior to Encounter   Medication Sig    acetaminophen (TYLENOL) 325 MG tablet Take 2 tablets (650 mg total) by mouth every 4 (four) hours as needed.    ascorbic acid, vitamin C, (VITAMIN C) 500 MG tablet Take 500 mg by mouth 2 (two) times daily.    aspirin 81 MG Chew Take 81 mg by mouth once daily.    calcium carbonate (CALCIUM ANTACID ORAL) Take 500 mg by mouth 2 (two) times daily. Calcium Antacid 500mg CHW tablet, Give one chewable tablet PO BID.    carbidopa-levodopa  mg (SINEMET)  mg per tablet Take 2 tablets by mouth 2 (two) times daily.     carbidopa-levodopa  mg (SINEMET CR)  mg TbSR Take 1.5 tablets by mouth every evening.    cholecalciferol, vitamin D3, (VITAMIN D3) 2,000 unit Tab Take 2,000 Units by mouth once daily.    clonazePAM (KLONOPIN) 0.5 MG tablet Take 1 tablet (0.5 mg total) by mouth 2 (two) times daily as needed for Anxiety.    cyanocobalamin (VITAMIN B-12) 100 MCG tablet Take 100 mcg by mouth once daily.     ferrous sulfate 325 (65 FE) MG EC tablet Take 325 mg by mouth once daily.    guaifenesin (TUSSIN ORAL) Take by mouth. Tussin 100mg/5mL, give 10mL (200mg) PO Q 6 hours prn cough.    HYDROcodone-acetaminophen (NORCO) 5-325 mg per tablet Take 1 tablet by mouth every 6 (six) hours as needed. (Patient taking differently: Take 1 tablet by mouth every 6 (six) hours as needed (increase pain). )    irbesartan-hydrochlorothiazide (AVALIDE) 150-12.5 mg per tablet Take 1 tablet by mouth once daily.    magnesium hydroxide (MILK OF MAGNESIA CONCENTRATED ORAL) Take 30 mLs by mouth every 12 (twelve) hours as needed (constipation).    melatonin (MELATIN) 3 mg tablet Take 9 mg by mouth nightly as needed for Insomnia.    metoprolol tartrate (LOPRESSOR) 25 MG tablet Take 1 tablet (25 mg total) by mouth 2 (two) times daily.    multivit-iron-min-folic acid  (MULTIVITAMIN-IRON-MINERALS-FOLIC ACID) 3,500-18-0.4 unit-mg-mg Chew Take 1 tablet by mouth once daily.      omega-3 fatty acids-vitamin E (FISH OIL) 1,000 mg Cap Take 1 capsule by mouth once daily.    omeprazole (PRILOSEC) 40 MG capsule Take 40 mg by mouth once daily.    potassium chloride SA (K-DUR,KLOR-CON) 10 MEQ tablet Take 10 mEq by mouth once daily.      pravastatin (PRAVACHOL) 40 MG tablet Take 40 mg by mouth every evening.    zinc sulfate 220 mg Tab tablet Take 220 mg by mouth once daily.    albuterol-ipratropium (DUO-NEB) 2.5 mg-0.5 mg/3 mL nebulizer solution Take 3 mLs by nebulization every 4 (four) hours as needed for Wheezing or Shortness of Breath. Rescue    hydroCHLOROthiazide (HYDRODIURIL) 12.5 MG Tab Take 12.5 mg by mouth once daily.    magnesium oxide (MAG-OX) 400 mg (241.3 mg magnesium) tablet Take 1 tablet (400 mg total) by mouth once daily. (Patient not taking: Reported on 1/24/2020)    oxyCODONE-acetaminophen (LYNOX)  mg per tablet Take 1 tablet by mouth every 4 (four) hours as needed for Pain.    senna-docusate 8.6-50 mg (SENNA WITH DOCUSATE SODIUM) 8.6-50 mg per tablet Take 1 tablet by mouth once daily.    tiZANidine (ZANAFLEX) 4 MG tablet Take 4 mg by mouth every 6 (six) hours as needed.    UNABLE TO FIND Pro Heal Critical Care 30mL PO BID.       Review of patient's allergies indicates:  No Known Allergies    Past Medical History:   Diagnosis Date    Arthritis     Hypertension     Parkinson's disease      Past Surgical History:   Procedure Laterality Date    ESOPHAGOGASTRODUODENOSCOPY N/A 12/1/2019    Procedure: EGD (ESOPHAGOGASTRODUODENOSCOPY);  Surgeon: Heidi Gallagher MD;  Location: [facility];  Service: Endoscopy;  Laterality: N/A;    FRACTURE SURGERY      HEMIARTHROPLASTY OF HIP Left 12/8/2018    Procedure: HEMIARTHROPLASTY, HIP, Distant, peg board, first assist;  Surgeon: Cheng Mccormack MD;  Location: [facility];  Service: Orthopedics;  Laterality: Left;     JOINT REPLACEMENT      right hip replacement    ORIF FEMUR FRACTURE Right 11/22/2019    Procedure: ORIF, FRACTURE, FEMUR, Synthes, radiolucent triangle, 1st assist;  Surgeon: Cheng Mccormack MD;  [facility]  Service: Orthopedics;  Laterality: Right;     Family History     Problem Relation (Age of Onset)    No Known Problems Mother        Tobacco Use    Smoking status: Former Smoker     Packs/day: 0.25     Years: 5.00     Pack years: 1.25     Last attempt to quit: 4/17/2009     Years since quitting: 10.8    Smokeless tobacco: Never Used   Substance and Sexual Activity    Alcohol use: No    Drug use: No    Sexual activity: Not Currently     Partners: Male     Birth control/protection: Abstinence, Post-menopausal     Review of Systems   Unable to perform ROS: Dementia     Objective:     Vital Signs (Most Recent):  Temp: 97 °F (36.1 °C) (02/15/20 1205)  Pulse: 64 (02/15/20 1205)  Resp: 18 (02/15/20 1205)  BP: (!) 111/54 (02/15/20 1205)  SpO2: 97 % (02/15/20 1205) Vital Signs (24h Range):  Temp:  [97 °F (36.1 °C)-98.6 °F (37 °C)] 97 °F (36.1 °C)  Pulse:  [64-82] 64  Resp:  [14-20] 18  SpO2:  [97 %-100 %] 97 %  BP: (108-180)/(50-74) 111/54     Weight: 47.1 kg (103 lb 13.4 oz)  Body mass index is 18.99 kg/m².    Physical Exam   Constitutional: She appears well-developed and well-nourished.  Non-toxic appearance. No distress.   HENT:   Head: Normocephalic and atraumatic. Head is without abrasion and without laceration.   Right Ear: External ear normal.   Left Ear: External ear normal.   Nose: Nose normal.   Mouth/Throat: Oropharynx is clear and moist.   Eyes: Pupils are equal, round, and reactive to light. EOM are normal.   Neck: Trachea normal. No tracheal deviation and normal range of motion present. No thyroid mass and no thyromegaly present.   Cardiovascular: Normal rate and regular rhythm.   Pulmonary/Chest: Effort normal. No accessory muscle usage. No tachypnea. No respiratory distress.    Abdominal: Soft. Normal appearance and bowel sounds are normal. She exhibits no distension and no mass. There is no hepatosplenomegaly. There is no tenderness. There is no tenderness at McBurney's point and negative Garrett's sign. No hernia.   Musculoskeletal:   Significant contractures involving both lower extremities   Lymphadenopathy:     She has no cervical adenopathy.     She has no axillary adenopathy.        Right: No inguinal adenopathy present.        Left: No inguinal adenopathy present.   Neurological: She is alert. Coordination and gait normal.   Skin: Skin is warm and intact.   Sacral decubitus ulcer.  There is no cellulitis or evidence of infection.  The wound is actually relatively clean.  There is a superficial thin eschar in 2 areas.   Psychiatric: She has a normal mood and affect. Her speech is normal and behavior is normal.       Significant Labs:  CBC:   Recent Labs   Lab 02/15/20  0552   WBC 12.69   RBC 3.49*   HGB 10.0*   HCT 32.9*      MCV 94   MCH 28.7   MCHC 30.4*     CMP:   Recent Labs   Lab 02/13/20  1804  02/15/20  0552   *   < > 96   CALCIUM 10.4   < > 9.5   ALBUMIN 3.7  --   --    PROT 7.9  --   --       < > 138   K 4.0   < > 3.8   CO2 25   < > 22*      < > 107   BUN 31*   < > 19   CREATININE 0.9   < > 0.8   ALKPHOS 97  --   --    ALT 5*  --   --    AST 14  --   --    BILITOT 0.3  --   --     < > = values in this interval not displayed.       Significant Diagnostics:  I have reviewed all pertinent imaging results/findings within the past 24 hours.     CT Pelvis -   Impression       1. No osseous erosion to indicate osteomyelitis.  2. Bilateral hip arthroplasties with associated metallic artifact which diminishes visualization.  3. Retained feces in the rectum and colon with mild wall thickening in the rectum.  Findings may be associated with constipation or proximal impaction with mild associated proctitis.

## 2020-02-15 NOTE — CONSULTS
[facility] Surgery  Consult Note    Patient Name: Maricarmen Woodward  MRN: [id number]  Code Status: Full Code  Admission Date: 2/13/2020  Hospital Length of Stay: 1 days  Attending Physician: Richardson Cueva MD  Primary Care Provider: Faustino Mccord PA-C    Patient information was obtained from patient, past medical records and ER records.     Consults  Subjective:     Principal Problem: GI bleed    History of Present Illness: Maricarmen Woodward is a 89 y.o. female with a PMHx of Parkinson's, HTN, PUD, and arthritis who was referred here by her home health nurse for poorly healing wounds despite wound care and antibiotics. The patient is a limited historian. No family is at the bedside. Previous records reviewed. Per the records the patient is bed bound and cared for by personal sitters.  The patient states she has had the wound for about 5 weeks.  She has never had surgery for this.  She has never had a prior wound. Surgery is consulted to evaluate for possible surgical debridement.    No current facility-administered medications on file prior to encounter.      Current Outpatient Medications on File Prior to Encounter   Medication Sig    acetaminophen (TYLENOL) 325 MG tablet Take 2 tablets (650 mg total) by mouth every 4 (four) hours as needed.    ascorbic acid, vitamin C, (VITAMIN C) 500 MG tablet Take 500 mg by mouth 2 (two) times daily.    aspirin 81 MG Chew Take 81 mg by mouth once daily.    calcium carbonate (CALCIUM ANTACID ORAL) Take 500 mg by mouth 2 (two) times daily. Calcium Antacid 500mg CHW tablet, Give one chewable tablet PO BID.    carbidopa-levodopa  mg (SINEMET)  mg per tablet Take 2 tablets by mouth 2 (two) times daily.     carbidopa-levodopa  mg (SINEMET CR)  mg TbSR Take 1.5 tablets by mouth every evening.    cholecalciferol, vitamin D3, (VITAMIN D3) 2,000 unit Tab Take 2,000 Units by mouth once daily.    clonazePAM (KLONOPIN)  0.5 MG tablet Take 1 tablet (0.5 mg total) by mouth 2 (two) times daily as needed for Anxiety.    cyanocobalamin (VITAMIN B-12) 100 MCG tablet Take 100 mcg by mouth once daily.     ferrous sulfate 325 (65 FE) MG EC tablet Take 325 mg by mouth once daily.    guaifenesin (TUSSIN ORAL) Take by mouth. Tussin 100mg/5mL, give 10mL (200mg) PO Q 6 hours prn cough.    HYDROcodone-acetaminophen (NORCO) 5-325 mg per tablet Take 1 tablet by mouth every 6 (six) hours as needed. (Patient taking differently: Take 1 tablet by mouth every 6 (six) hours as needed (increase pain). )    irbesartan-hydrochlorothiazide (AVALIDE) 150-12.5 mg per tablet Take 1 tablet by mouth once daily.    magnesium hydroxide (MILK OF MAGNESIA CONCENTRATED ORAL) Take 30 mLs by mouth every 12 (twelve) hours as needed (constipation).    melatonin (MELATIN) 3 mg tablet Take 9 mg by mouth nightly as needed for Insomnia.    metoprolol tartrate (LOPRESSOR) 25 MG tablet Take 1 tablet (25 mg total) by mouth 2 (two) times daily.    multivit-iron-min-folic acid (MULTIVITAMIN-IRON-MINERALS-FOLIC ACID) 3,500-18-0.4 unit-mg-mg Chew Take 1 tablet by mouth once daily.      omega-3 fatty acids-vitamin E (FISH OIL) 1,000 mg Cap Take 1 capsule by mouth once daily.    omeprazole (PRILOSEC) 40 MG capsule Take 40 mg by mouth once daily.    potassium chloride SA (K-DUR,KLOR-CON) 10 MEQ tablet Take 10 mEq by mouth once daily.      pravastatin (PRAVACHOL) 40 MG tablet Take 40 mg by mouth every evening.    zinc sulfate 220 mg Tab tablet Take 220 mg by mouth once daily.    albuterol-ipratropium (DUO-NEB) 2.5 mg-0.5 mg/3 mL nebulizer solution Take 3 mLs by nebulization every 4 (four) hours as needed for Wheezing or Shortness of Breath. Rescue    hydroCHLOROthiazide (HYDRODIURIL) 12.5 MG Tab Take 12.5 mg by mouth once daily.    magnesium oxide (MAG-OX) 400 mg (241.3 mg magnesium) tablet Take 1 tablet (400 mg total) by mouth once daily. (Patient not taking: Reported  on 1/24/2020)    oxyCODONE-acetaminophen (LYNOX)  mg per tablet Take 1 tablet by mouth every 4 (four) hours as needed for Pain.    senna-docusate 8.6-50 mg (SENNA WITH DOCUSATE SODIUM) 8.6-50 mg per tablet Take 1 tablet by mouth once daily.    tiZANidine (ZANAFLEX) 4 MG tablet Take 4 mg by mouth every 6 (six) hours as needed.    UNABLE TO FIND [facility] 30mL PO BID.       Review of patient's allergies indicates:  No Known Allergies    Past Medical History:   Diagnosis Date    Arthritis     Hypertension     Parkinson's disease      Past Surgical History:   Procedure Laterality Date    ESOPHAGOGASTRODUODENOSCOPY N/A 12/1/2019    Procedure: EGD (ESOPHAGOGASTRODUODENOSCOPY);  Surgeon: Heidi Gallagher MD;  Location: [facility];  Service: Endoscopy;  Laterality: N/A;    FRACTURE SURGERY      HEMIARTHROPLASTY OF HIP Left 12/8/2018    Procedure: HEMIARTHROPLASTY, HIP, Terese, peg board, first assist;  Surgeon: Cheng Mccormack MD;  Location: [facility];  Service: Orthopedics;  Laterality: Left;    JOINT REPLACEMENT      right hip replacement    ORIF FEMUR FRACTURE Right 11/22/2019    Procedure: ORIF, FRACTURE, FEMUR, Synthes, radiolucent triangle, 1st assist;  Surgeon: Cheng Mccormack MD;  Location: [facility];  Service: Orthopedics;  Laterality: Right;     Family History     Problem Relation (Age of Onset)    No Known Problems Mother        Tobacco Use    Smoking status: Former Smoker     Packs/day: 0.25     Years: 5.00     Pack years: 1.25     Last attempt to quit: 4/17/2009     Years since quitting: 10.8    Smokeless tobacco: Never Used   Substance and Sexual Activity    Alcohol use: No    Drug use: No    Sexual activity: Not Currently     Partners: Male     Birth control/protection: Abstinence, Post-menopausal     Review of Systems   Unable to perform ROS: Dementia     Objective:     Vital Signs (Most Recent):  Temp: 97 °F (36.1 °C) (02/15/20 1205)  Pulse: 64 (02/15/20  1205)  Resp: 18 (02/15/20 1205)  BP: (!) 111/54 (02/15/20 1205)  SpO2: 97 % (02/15/20 1205) Vital Signs (24h Range):  Temp:  [97 °F (36.1 °C)-98.6 °F (37 °C)] 97 °F (36.1 °C)  Pulse:  [64-82] 64  Resp:  [14-20] 18  SpO2:  [97 %-100 %] 97 %  BP: (108-180)/(50-74) 111/54     Weight: 47.1 kg (103 lb 13.4 oz)  Body mass index is 18.99 kg/m².    Physical Exam   Constitutional: She appears well-developed and well-nourished.  Non-toxic appearance. No distress.   HENT:   Head: Normocephalic and atraumatic. Head is without abrasion and without laceration.   Right Ear: External ear normal.   Left Ear: External ear normal.   Nose: Nose normal.   Mouth/Throat: Oropharynx is clear and moist.   Eyes: Pupils are equal, round, and reactive to light. EOM are normal.   Neck: Trachea normal. No tracheal deviation and normal range of motion present. No thyroid mass and no thyromegaly present.   Cardiovascular: Normal rate and regular rhythm.   Pulmonary/Chest: Effort normal. No accessory muscle usage. No tachypnea. No respiratory distress.   Abdominal: Soft. Normal appearance and bowel sounds are normal. She exhibits no distension and no mass. There is no hepatosplenomegaly. There is no tenderness. There is no tenderness at McBurney's point and negative Garrett's sign. No hernia.   Musculoskeletal:   Significant contractures involving both lower extremities   Lymphadenopathy:     She has no cervical adenopathy.     She has no axillary adenopathy.        Right: No inguinal adenopathy present.        Left: No inguinal adenopathy present.   Neurological: She is alert. Coordination and gait normal.   Skin: Skin is warm and intact.   Sacral decubitus ulcer.  There is no cellulitis or evidence of infection.  The wound is actually relatively clean.  There is a superficial thin eschar in 2 areas.   Psychiatric: She has a normal mood and affect. Her speech is normal and behavior is normal.       Significant Labs:  CBC:   Recent Labs   Lab  02/15/20  0552   WBC 12.69   RBC 3.49*   HGB 10.0*   HCT 32.9*      MCV 94   MCH 28.7   MCHC 30.4*     CMP:   Recent Labs   Lab 02/13/20  1804  02/15/20  0552   *   < > 96   CALCIUM 10.4   < > 9.5   ALBUMIN 3.7  --   --    PROT 7.9  --   --       < > 138   K 4.0   < > 3.8   CO2 25   < > 22*      < > 107   BUN 31*   < > 19   CREATININE 0.9   < > 0.8   ALKPHOS 97  --   --    ALT 5*  --   --    AST 14  --   --    BILITOT 0.3  --   --     < > = values in this interval not displayed.       Significant Diagnostics:  I have reviewed all pertinent imaging results/findings within the past 24 hours.     CT Pelvis -   Impression       1. No osseous erosion to indicate osteomyelitis.  2. Bilateral hip arthroplasties with associated metallic artifact which diminishes visualization.  3. Retained feces in the rectum and colon with mild wall thickening in the rectum.  Findings may be associated with constipation or proximal impaction with mild associated proctitis.         Assessment/Plan:     Pressure injury of sacral region, stage 2  Recommend wound care nurse evaluation  The patient does not need surgical debridement at this time.    Recommend enzymatic debridement directed by wound care nurse.  Standard decubitus precautions to relieve further pressure      VTE Risk Mitigation (From admission, onward)         Ordered     IP VTE HIGH RISK PATIENT  Once      02/14/20 0227     Place sequential compression device  Until discontinued      02/14/20 0227     Reason for No Pharmacological VTE Prophylaxis  Once     Question:  Reasons:  Answer:  Active Bleeding    02/14/20 0227                Thank you for your consult. I will sign off. Please contact us if you have any additional questions.    Lisseth Luevano MD  General Surgery  [facility]

## 2020-02-15 NOTE — HPI
Maricarmen Woodward is a 89 y.o. female with a PMHx of Parkinson's, HTN, PUD, and arthritis who was referred here by her home health nurse for poorly healing wounds despite wound care and antibiotics. The patient is a limited historian. No family is at the bedside. Previous records reviewed. Per the records the patient is bed bound and cared for by personal sitters.  The patient states she has had the wound for about 5 weeks.  She has never had surgery for this.  She has never had a prior wound. Surgery is consulted to evaluate for possible surgical debridement.

## 2020-02-15 NOTE — ASSESSMENT & PLAN NOTE
Recommend wound care nurse evaluation  The patient does not need surgical debridement at this time.    Recommend enzymatic debridement directed by wound care nurse.  Standard decubitus precautions to relieve further pressure

## 2020-02-15 NOTE — PLAN OF CARE
Pt resting quietly at this time. Able to make needs known. Jenkins draining to gravity. Denies pain. Bed low and locked. Call light in reach.

## 2020-02-16 LAB
BASOPHILS # BLD AUTO: 0.05 K/UL (ref 0–0.2)
BASOPHILS NFR BLD: 0.5 % (ref 0–1.9)
DIFFERENTIAL METHOD: ABNORMAL
EOSINOPHIL # BLD AUTO: 0.4 K/UL (ref 0–0.5)
EOSINOPHIL NFR BLD: 3.7 % (ref 0–8)
ERYTHROCYTE [DISTWIDTH] IN BLOOD BY AUTOMATED COUNT: 14.6 % (ref 11.5–14.5)
HCT VFR BLD AUTO: 29.7 % (ref 37–48.5)
HGB BLD-MCNC: 9 G/DL (ref 12–16)
IMM GRANULOCYTES # BLD AUTO: 0.04 K/UL (ref 0–0.04)
LYMPHOCYTES # BLD AUTO: 2.4 K/UL (ref 1–4.8)
LYMPHOCYTES NFR BLD: 25.7 % (ref 18–48)
MCH RBC QN AUTO: 28.7 PG (ref 27–31)
MCHC RBC AUTO-ENTMCNC: 30.3 G/DL (ref 32–36)
MCV RBC AUTO: 95 FL (ref 82–98)
MONOCYTES # BLD AUTO: 0.5 K/UL (ref 0.3–1)
MONOCYTES NFR BLD: 5.4 % (ref 4–15)
NEUTROPHILS # BLD AUTO: 6.1 K/UL (ref 1.8–7.7)
NEUTROPHILS NFR BLD: 64.3 % (ref 38–73)
NRBC BLD-RTO: 0 /100 WBC
PLATELET # BLD AUTO: 268 K/UL (ref 150–350)
PMV BLD AUTO: 10.3 FL (ref 9.2–12.9)
RBC # BLD AUTO: 3.14 M/UL (ref 4–5.4)
WBC # BLD AUTO: 9.47 K/UL (ref 3.9–12.7)

## 2020-02-16 PROCEDURE — 36415 COLL VENOUS BLD VENIPUNCTURE: CPT

## 2020-02-16 PROCEDURE — 25000003 PHARM REV CODE 250: Performed by: NURSE PRACTITIONER

## 2020-02-16 PROCEDURE — 85025 COMPLETE CBC W/AUTO DIFF WBC: CPT

## 2020-02-16 PROCEDURE — 11000001 HC ACUTE MED/SURG PRIVATE ROOM

## 2020-02-16 PROCEDURE — 25000003 PHARM REV CODE 250: Performed by: HOSPITALIST

## 2020-02-16 PROCEDURE — 63600175 PHARM REV CODE 636 W HCPCS: Performed by: INTERNAL MEDICINE

## 2020-02-16 PROCEDURE — 99900035 HC TECH TIME PER 15 MIN (STAT)

## 2020-02-16 RX ADMIN — MELATONIN 2000 UNITS: at 08:02

## 2020-02-16 RX ADMIN — CARBIDOPA AND LEVODOPA 1 TABLET: 50; 200 TABLET, EXTENDED RELEASE ORAL at 08:02

## 2020-02-16 RX ADMIN — FERROUS SULFATE TAB EC 325 MG (65 MG FE EQUIVALENT) 325 MG: 325 (65 FE) TABLET DELAYED RESPONSE at 08:02

## 2020-02-16 RX ADMIN — IRBESARTAN 150 MG: 150 TABLET, FILM COATED ORAL at 08:02

## 2020-02-16 RX ADMIN — HYDROCHLOROTHIAZIDE 12.5 MG: 12.5 TABLET ORAL at 08:02

## 2020-02-16 RX ADMIN — CARBIDOPA AND LEVODOPA 2 TABLET: 25; 100 TABLET ORAL at 08:02

## 2020-02-16 RX ADMIN — METOPROLOL TARTRATE 25 MG: 25 TABLET, FILM COATED ORAL at 08:02

## 2020-02-16 RX ADMIN — CALCIUM CARBONATE (ANTACID) CHEW TAB 500 MG 500 MG: 500 CHEW TAB at 08:02

## 2020-02-16 RX ADMIN — PIPERACILLIN AND TAZOBACTAM 4.5 G: 4; .5 INJECTION, POWDER, LYOPHILIZED, FOR SOLUTION INTRAVENOUS; PARENTERAL at 05:02

## 2020-02-16 RX ADMIN — Medication 9 MG: at 08:02

## 2020-02-16 RX ADMIN — PANTOPRAZOLE SODIUM 40 MG: 40 TABLET, DELAYED RELEASE ORAL at 08:02

## 2020-02-16 RX ADMIN — CARBIDOPA AND LEVODOPA 2 TABLET: 25; 100 TABLET ORAL at 05:02

## 2020-02-16 RX ADMIN — PRAVASTATIN SODIUM 40 MG: 40 TABLET ORAL at 08:02

## 2020-02-16 RX ADMIN — CLONAZEPAM 0.5 MG: 0.5 TABLET ORAL at 08:02

## 2020-02-16 RX ADMIN — VITAM B12 100 MCG: 100 TAB at 08:02

## 2020-02-16 RX ADMIN — PIPERACILLIN AND TAZOBACTAM 4.5 G: 4; .5 INJECTION, POWDER, LYOPHILIZED, FOR SOLUTION INTRAVENOUS; PARENTERAL at 12:02

## 2020-02-16 RX ADMIN — PIPERACILLIN AND TAZOBACTAM 4.5 G: 4; .5 INJECTION, POWDER, LYOPHILIZED, FOR SOLUTION INTRAVENOUS; PARENTERAL at 08:02

## 2020-02-16 NOTE — CARE UPDATE
02/16/20 1007   Patient Assessment/Suction   All Lung Fields Breath Sounds clear;diminished   Aerosol Therapy   $ Aerosol Therapy Charges PRN treatment not required

## 2020-02-16 NOTE — ASSESSMENT & PLAN NOTE
Continue treatment.  Urine culture not growing anything as of yet.    Antibiotics (From admission, onward)    Start     Stop Route Frequency Ordered    02/14/20 0830  piperacillin-tazobactam 4.5 g in dextrose 5 % 100 mL IVPB (ready to mix system)      -- IV Every 8 hours (non-standard times) 02/14/20 1622

## 2020-02-16 NOTE — ASSESSMENT & PLAN NOTE
Chronic. Patient's anemia is currently controlled.    Current CBC reviewed-   Lab Results   Component Value Date    HGB 10.3 (L) 02/15/2020    HCT 33.9 (L) 02/15/2020     Monitor serial CBC and transfuse if patient becomes hemodynamically unstable, symptomatic or H/H drops below 7/21.

## 2020-02-16 NOTE — PLAN OF CARE
02/15/20 1944   PRE-TX-O2   O2 Device (Oxygen Therapy) room air   SpO2 100 %   Pulse Oximetry Type Intermittent   $ Pulse Oximetry - Multiple Charge Pulse Oximetry - Multiple   Pulse 73   Resp 18   Aerosol Therapy   $ Aerosol Therapy Charges PRN treatment not required   Respiratory Treatment Status (SVN) PRN treatment not required

## 2020-02-16 NOTE — ASSESSMENT & PLAN NOTE
Was present on admission.  Surgeon consulted.  No need for surgical debridement.  Consulted wound care nurse for advice on enzymatic or other forms of debridement.

## 2020-02-16 NOTE — PROGRESS NOTES
[facility] Medicine  Progress Note    Patient Name: Maricarmen Woodward  MRN: [id number]  Patient Class: IP- Inpatient   Admission Date: 2/13/2020  Length of Stay: 1 days  Attending Physician: Richardson Cueva MD  Primary Care Provider: Faustino Mccord PA-C        Subjective:     Principal Problem:GI bleed        HPI:  Maricarmen Woodward is a 89 y.o. 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 The patient was referred here by her home health nurse for poorly healing wounds despite wound care and antibiotics. The patient is a limited historian. No family is at the bedside. Previous records reviewed. Per the records the patient is bed bound and cared for by personal sitters. She was noted to have a GIB at the end of 2019 and chose not to undergo a colonoscopy. The patient is currently complaining of chronic back pain, but she denies fever/chills. She also reports dysuria, but denies N/V/D. The patient denies any SOB, chest pain, cough, or headache. Her ED work up is significant for elevated CRP and sed rate, UTI, and heme positive stool. Her lactate is negative and sacral CT reveals no evidence of osteomyelitis. The patient was given IV rocephin in the ED. She was noted to have 1 black, tarry stool while in the ED. The patient will be placed in observation for further work up and evaluation.     Overview/Hospital Course:  No notes on file    Interval History:  No new complaints.  No change in status.  Seen by general surgeon.    Review of Systems   Constitutional: Negative for chills and fever.   Respiratory: Negative for cough and shortness of breath.    Cardiovascular: Negative for chest pain and leg swelling.   Gastrointestinal: Negative for abdominal pain, nausea and vomiting.     Objective:     Vital Signs (Most Recent):  Temp: 97.8 °F (36.6 °C) (02/15/20 1714)  Pulse: 78 (02/15/20  2109)  Resp: 18 (02/15/20 1944)  BP: (!) 147/66 (02/15/20 2109)  SpO2: 99 % (02/15/20 2109) Vital Signs (24h Range):  Temp:  [97 °F (36.1 °C)-98.6 °F (37 °C)] 97.8 °F (36.6 °C)  Pulse:  [64-81] 78  Resp:  [16-18] 18  SpO2:  [97 %-100 %] 99 %  BP: (111-180)/(54-73) 147/66     Weight: 47.1 kg (103 lb 13.4 oz)  Body mass index is 18.99 kg/m².    Intake/Output Summary (Last 24 hours) at 2/15/2020 2135  Last data filed at 2/15/2020 1800  Gross per 24 hour   Intake 800 ml   Output 1951 ml   Net -1151 ml      Physical Exam   Constitutional: She is oriented to person, place, and time. She has a sickly appearance. No distress.   HENT:   Mouth/Throat: Oropharynx is clear and moist.   Eyes: Right eye exhibits no discharge. Left eye exhibits no discharge.   Neck: No JVD present.   Cardiovascular: Normal rate and regular rhythm.   Pulmonary/Chest: Effort normal and breath sounds normal.   Abdominal: Soft. Bowel sounds are normal. She exhibits no distension. There is no tenderness.   Musculoskeletal: She exhibits no edema.   Neurological: She is alert and oriented to person, place, and time.   Contractures, mynor in legs.   Skin: Skin is warm. No rash noted.   Pressure injuries:  Unstageable on right buttock.  Stage II sacrum.  Stage II left trochanter.   Vitals reviewed.      Significant Labs: All pertinent labs within the past 24 hours have been reviewed.    Significant Imaging: I have reviewed all pertinent imaging results/findings within the past 24 hours.      Assessment/Plan:      * GI bleed  Heme occult +  GI saw.  EGD normal.  Diet advanced.  PO pantoprazole.    Pressure injury of sacral region, stage 2  Was present on admission.  Wound care nurse consulted for advice.      Pressure injury of trochanteric region of left hip, stage 2  Was present on admission.  Wound care nurse consulted for advice.      Severe malnutrition  Follow dietician's advice.    Frail elderly  Acute/chronic due to recent surgery/illnesses/prolonged  hospitalization and nutritional status   - hx of previous falls  - high risk of fall / injury utilize all safety measures and fall precautions     Pressure injury of right buttock, unstageable  Was present on admission.  Surgeon consulted.  No need for surgical debridement.  Consulted wound care nurse for advice on enzymatic or other forms of debridement.    Acute cystitis without hematuria  Continue treatment.  Urine culture not growing anything as of yet.    Antibiotics (From admission, onward)    Start     Stop Route Frequency Ordered    02/14/20 0830  piperacillin-tazobactam 4.5 g in dextrose 5 % 100 mL IVPB (ready to mix system)      -- IV Every 8 hours (non-standard times) 02/14/20 0729            Anemia  Chronic. Patient's anemia is currently controlled.    Current CBC reviewed-   Lab Results   Component Value Date    HGB 10.3 (L) 02/15/2020    HCT 33.9 (L) 02/15/2020     Monitor serial CBC and transfuse if patient becomes hemodynamically unstable, symptomatic or H/H drops below 7/21.     Parkinson's disease  Chronic, stable. Continue home sinemet.    Essential hypertension  Chronic, controlled.  Latest blood pressure and vitals reviewed-   Temp:  [97 °F (36.1 °C)-98.6 °F (37 °C)]   Pulse:  [64-81]   Resp:  [16-18]   BP: (111-180)/(54-73)   SpO2:  [97 %-100 %] .   Will continue BB but hold other anti hypertensives for now 2/2 GI bleed. Home meds for hypertension were reviewed and noted below. Hospital anti-hypertensive changes were made as shown below.  outpt Hypertension Medications             hydroCHLOROthiazide (HYDRODIURIL) 12.5 MG Tab Take 12.5 mg by mouth once daily.    irbesartan-hydrochlorothiazide (AVALIDE) 150-12.5 mg per tablet Take 1 tablet by mouth once daily.    metoprolol tartrate (LOPRESSOR) 25 MG tablet Take 1 tablet (25 mg total) by mouth 2 (two) times daily.      Hospital Medications             hydroCHLOROthiazide tablet 12.5 mg Starting on 2/16/2020. 12.5 mg, Oral, Daily    irbesartan  tablet 150 mg Starting on 2/16/2020. 150 mg, Oral, Daily    metoprolol tartrate (LOPRESSOR) tablet 25 mg 25 mg, Oral, 2 times daily              VTE Risk Mitigation (From admission, onward)         Ordered     IP VTE HIGH RISK PATIENT  Once      02/14/20 0227     Place sequential compression device  Until discontinued      02/14/20 0227     Reason for No Pharmacological VTE Prophylaxis  Once     Question:  Reasons:  Answer:  Active Bleeding    02/14/20 0227                      Richardson Cueva MD  Department of Hospital Medicine   [facility]

## 2020-02-16 NOTE — SUBJECTIVE & OBJECTIVE
Interval History:  No new complaints.  No change in status.  Seen by general surgeon.    Review of Systems   Constitutional: Negative for chills and fever.   Respiratory: Negative for cough and shortness of breath.    Cardiovascular: Negative for chest pain and leg swelling.   Gastrointestinal: Negative for abdominal pain, nausea and vomiting.     Objective:     Vital Signs (Most Recent):  Temp: 97.8 °F (36.6 °C) (02/15/20 1714)  Pulse: 78 (02/15/20 2109)  Resp: 18 (02/15/20 1944)  BP: (!) 147/66 (02/15/20 2109)  SpO2: 99 % (02/15/20 2109) Vital Signs (24h Range):  Temp:  [97 °F (36.1 °C)-98.6 °F (37 °C)] 97.8 °F (36.6 °C)  Pulse:  [64-81] 78  Resp:  [16-18] 18  SpO2:  [97 %-100 %] 99 %  BP: (111-180)/(54-73) 147/66     Weight: 47.1 kg (103 lb 13.4 oz)  Body mass index is 18.99 kg/m².    Intake/Output Summary (Last 24 hours) at 2/15/2020 2135  Last data filed at 2/15/2020 1800  Gross per 24 hour   Intake 800 ml   Output 1951 ml   Net -1151 ml      Physical Exam   Constitutional: She is oriented to person, place, and time. She has a sickly appearance. No distress.   HENT:   Mouth/Throat: Oropharynx is clear and moist.   Eyes: Right eye exhibits no discharge. Left eye exhibits no discharge.   Neck: No JVD present.   Cardiovascular: Normal rate and regular rhythm.   Pulmonary/Chest: Effort normal and breath sounds normal.   Abdominal: Soft. Bowel sounds are normal. She exhibits no distension. There is no tenderness.   Musculoskeletal: She exhibits no edema.   Neurological: She is alert and oriented to person, place, and time.   Contractures, mynor in legs.   Skin: Skin is warm. No rash noted.   Pressure injuries:  Unstageable on right buttock.  Stage II sacrum.  Stage II left trochanter.   Vitals reviewed.      Significant Labs: All pertinent labs within the past 24 hours have been reviewed.    Significant Imaging: I have reviewed all pertinent imaging results/findings within the past 24 hours.

## 2020-02-16 NOTE — ASSESSMENT & PLAN NOTE
Chronic, controlled.  Latest blood pressure and vitals reviewed-   Temp:  [97 °F (36.1 °C)-98.6 °F (37 °C)]   Pulse:  [64-81]   Resp:  [16-18]   BP: (111-180)/(54-73)   SpO2:  [97 %-100 %] .   Will continue BB but hold other anti hypertensives for now 2/2 GI bleed. Home meds for hypertension were reviewed and noted below. Hospital anti-hypertensive changes were made as shown below.  outpt Hypertension Medications             hydroCHLOROthiazide (HYDRODIURIL) 12.5 MG Tab Take 12.5 mg by mouth once daily.    irbesartan-hydrochlorothiazide (AVALIDE) 150-12.5 mg per tablet Take 1 tablet by mouth once daily.    metoprolol tartrate (LOPRESSOR) 25 MG tablet Take 1 tablet (25 mg total) by mouth 2 (two) times daily.      Hospital Medications             hydroCHLOROthiazide tablet 12.5 mg Starting on 2/16/2020. 12.5 mg, Oral, Daily    irbesartan tablet 150 mg Starting on 2/16/2020. 150 mg, Oral, Daily    metoprolol tartrate (LOPRESSOR) tablet 25 mg 25 mg, Oral, 2 times daily

## 2020-02-16 NOTE — PLAN OF CARE
Pt resting quietly at this time. Able to make needs known. Gregory noted draining to gravity. Bed low and locked. Call light in reach.

## 2020-02-17 VITALS
OXYGEN SATURATION: 96 % | HEART RATE: 75 BPM | TEMPERATURE: 98 F | BODY MASS INDEX: 19.1 KG/M2 | RESPIRATION RATE: 18 BRPM | HEIGHT: 62 IN | DIASTOLIC BLOOD PRESSURE: 70 MMHG | SYSTOLIC BLOOD PRESSURE: 138 MMHG | WEIGHT: 103.81 LBS

## 2020-02-17 PROBLEM — L89.92: Status: ACTIVE | Noted: 2020-02-17

## 2020-02-17 PROBLEM — L08.9: Status: ACTIVE | Noted: 2020-02-17

## 2020-02-17 PROBLEM — K92.2 GI BLEED: Status: RESOLVED | Noted: 2019-11-30 | Resolved: 2020-02-17

## 2020-02-17 LAB
BASOPHILS # BLD AUTO: 0.05 K/UL (ref 0–0.2)
BASOPHILS NFR BLD: 0.5 % (ref 0–1.9)
DIFFERENTIAL METHOD: ABNORMAL
EOSINOPHIL # BLD AUTO: 0.3 K/UL (ref 0–0.5)
EOSINOPHIL NFR BLD: 3.5 % (ref 0–8)
ERYTHROCYTE [DISTWIDTH] IN BLOOD BY AUTOMATED COUNT: 14.4 % (ref 11.5–14.5)
HCT VFR BLD AUTO: 29.3 % (ref 37–48.5)
HGB BLD-MCNC: 9.1 G/DL (ref 12–16)
IMM GRANULOCYTES # BLD AUTO: 0.04 K/UL (ref 0–0.04)
LYMPHOCYTES # BLD AUTO: 2 K/UL (ref 1–4.8)
LYMPHOCYTES NFR BLD: 20.5 % (ref 18–48)
MCH RBC QN AUTO: 28.9 PG (ref 27–31)
MCHC RBC AUTO-ENTMCNC: 31.1 G/DL (ref 32–36)
MCV RBC AUTO: 93 FL (ref 82–98)
MONOCYTES # BLD AUTO: 0.5 K/UL (ref 0.3–1)
MONOCYTES NFR BLD: 4.7 % (ref 4–15)
NEUTROPHILS # BLD AUTO: 6.8 K/UL (ref 1.8–7.7)
NEUTROPHILS NFR BLD: 70.4 % (ref 38–73)
NRBC BLD-RTO: 0 /100 WBC
PLATELET # BLD AUTO: 285 K/UL (ref 150–350)
PMV BLD AUTO: 10.4 FL (ref 9.2–12.9)
RBC # BLD AUTO: 3.15 M/UL (ref 4–5.4)
WBC # BLD AUTO: 9.63 K/UL (ref 3.9–12.7)

## 2020-02-17 PROCEDURE — 25000003 PHARM REV CODE 250: Performed by: NURSE PRACTITIONER

## 2020-02-17 PROCEDURE — 25000003 PHARM REV CODE 250: Performed by: HOSPITALIST

## 2020-02-17 PROCEDURE — 85025 COMPLETE CBC W/AUTO DIFF WBC: CPT

## 2020-02-17 PROCEDURE — 36415 COLL VENOUS BLD VENIPUNCTURE: CPT

## 2020-02-17 PROCEDURE — 63600175 PHARM REV CODE 636 W HCPCS: Performed by: INTERNAL MEDICINE

## 2020-02-17 PROCEDURE — 99900035 HC TECH TIME PER 15 MIN (STAT)

## 2020-02-17 RX ORDER — SULFAMETHOXAZOLE AND TRIMETHOPRIM 800; 160 MG/1; MG/1
1 TABLET ORAL 2 TIMES DAILY
Qty: 10 TABLET | Refills: 0 | Status: SHIPPED | OUTPATIENT
Start: 2020-02-17 | End: 2020-02-22

## 2020-02-17 RX ORDER — SULFAMETHOXAZOLE AND TRIMETHOPRIM 800; 160 MG/1; MG/1
1 TABLET ORAL ONCE
Status: COMPLETED | OUTPATIENT
Start: 2020-02-17 | End: 2020-02-17

## 2020-02-17 RX ADMIN — METOPROLOL TARTRATE 25 MG: 25 TABLET, FILM COATED ORAL at 08:02

## 2020-02-17 RX ADMIN — PIPERACILLIN AND TAZOBACTAM 4.5 G: 4; .5 INJECTION, POWDER, LYOPHILIZED, FOR SOLUTION INTRAVENOUS; PARENTERAL at 12:02

## 2020-02-17 RX ADMIN — HYDROCHLOROTHIAZIDE 12.5 MG: 12.5 TABLET ORAL at 08:02

## 2020-02-17 RX ADMIN — VITAM B12 100 MCG: 100 TAB at 08:02

## 2020-02-17 RX ADMIN — MELATONIN 2000 UNITS: at 08:02

## 2020-02-17 RX ADMIN — PANTOPRAZOLE SODIUM 40 MG: 40 TABLET, DELAYED RELEASE ORAL at 08:02

## 2020-02-17 RX ADMIN — FERROUS SULFATE TAB EC 325 MG (65 MG FE EQUIVALENT) 325 MG: 325 (65 FE) TABLET DELAYED RESPONSE at 08:02

## 2020-02-17 RX ADMIN — SULFAMETHOXAZOLE AND TRIMETHOPRIM 1 TABLET: 800; 160 TABLET ORAL at 02:02

## 2020-02-17 RX ADMIN — CARBIDOPA AND LEVODOPA 2 TABLET: 25; 100 TABLET ORAL at 08:02

## 2020-02-17 RX ADMIN — CALCIUM CARBONATE (ANTACID) CHEW TAB 500 MG 500 MG: 500 CHEW TAB at 08:02

## 2020-02-17 RX ADMIN — IRBESARTAN 150 MG: 150 TABLET, FILM COATED ORAL at 08:02

## 2020-02-17 RX ADMIN — PIPERACILLIN AND TAZOBACTAM 4.5 G: 4; .5 INJECTION, POWDER, LYOPHILIZED, FOR SOLUTION INTRAVENOUS; PARENTERAL at 08:02

## 2020-02-17 NOTE — NURSING
D/C instructions provided and explained to pt and pt's nephew, understanding of D/C instructions verbalized. IV removed, catheter intact. Jenkins removed. Tolerated well. Telemetry monitor removed and returned to monitor room. VSS. Pt denies complaints. Transported off unit via wheelchair.

## 2020-02-17 NOTE — CONSULTS
Follow up visit. Sacral area wound looks better with local care.  Discussed with Dr. Cueva; recommend wash daily with wound cleanser and cover with border sacrum dressing.

## 2020-02-17 NOTE — ASSESSMENT & PLAN NOTE
Chronic. Patient's anemia is currently controlled.    Current CBC reviewed-   Lab Results   Component Value Date    HGB 9.0 (L) 02/16/2020    HCT 29.7 (L) 02/16/2020     Monitor serial CBC and transfuse if patient becomes hemodynamically unstable, symptomatic or H/H drops below 7/21.

## 2020-02-17 NOTE — PLAN OF CARE
02/17/20 1514   Final Note   Assessment Type Final Discharge Note   Anticipated Discharge Disposition Home-Health   Hospital Follow Up  Appt(s) scheduled? Yes

## 2020-02-17 NOTE — PROGRESS NOTES
[facility] Hospital Medicine  Progress Note    Patient Name: Maricarmen Woodward  MRN: [id number]  Patient Class: IP- Inpatient   Admission Date: 2/13/2020  Length of Stay: 2 days  Attending Physician: Richardson Cueva MD  Primary Care Provider: Faustino Mccord PA-C        Subjective:     Principal Problem:GI bleed        HPI:  Maricarmen Woodward is a 89 y.o. 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 The patient was referred here by her home health nurse for poorly healing wounds despite wound care and antibiotics. The patient is a limited historian. No family is at the bedside. Previous records reviewed. Per the records the patient is bed bound and cared for by personal sitters. She was noted to have a GIB at the end of 2019 and chose not to undergo a colonoscopy. The patient is currently complaining of chronic back pain, but she denies fever/chills. She also reports dysuria, but denies N/V/D. The patient denies any SOB, chest pain, cough, or headache. Her ED work up is significant for elevated CRP and sed rate, UTI, and heme positive stool. Her lactate is negative and sacral CT reveals no evidence of osteomyelitis. The patient was given IV rocephin in the ED. She was noted to have 1 black, tarry stool while in the ED. The patient will be placed in observation for further work up and evaluation.     Overview/Hospital Course:  No notes on file    Interval History:  No new complaints.  No change in status.      Review of Systems   Constitutional: Negative for chills and fever.   Respiratory: Negative for cough and shortness of breath.    Cardiovascular: Negative for chest pain and leg swelling.   Gastrointestinal: Negative for abdominal pain, nausea and vomiting.     Objective:     Vital Signs (Most Recent):  Temp: 97.8 °F (36.6 °C) (02/16/20 2029)  Pulse: 76 (02/16/20 2029)  Resp: 16 (02/16/20  1608)  BP: (!) 149/68 (02/16/20 2029)  SpO2: 97 % (02/16/20 2029) Vital Signs (24h Range):  Temp:  [96.3 °F (35.7 °C)-97.8 °F (36.6 °C)] 97.8 °F (36.6 °C)  Pulse:  [63-76] 76  Resp:  [14-16] 16  SpO2:  [97 %-100 %] 97 %  BP: (122-149)/(59-68) 149/68     Weight: 47.1 kg (103 lb 13.4 oz)  Body mass index is 18.99 kg/m².    Intake/Output Summary (Last 24 hours) at 2/16/2020 2124  Last data filed at 2/16/2020 0600  Gross per 24 hour   Intake --   Output 500 ml   Net -500 ml      Physical Exam   Constitutional: She is oriented to person, place, and time. She has a sickly appearance. No distress.   HENT:   Mouth/Throat: Oropharynx is clear and moist.   Eyes: Right eye exhibits no discharge. Left eye exhibits no discharge.   Neck: No JVD present.   Cardiovascular: Normal rate and regular rhythm.   Pulmonary/Chest: Effort normal and breath sounds normal.   Abdominal: Soft. Bowel sounds are normal. She exhibits no distension. There is no tenderness.   Musculoskeletal: She exhibits no edema.   Neurological: She is alert and oriented to person, place, and time.   Contractures, mynor in legs.   Skin: Skin is warm. No rash noted.   Pressure injuries:  Unstageable on right buttock.  Stage II sacrum.  Stage II left trochanter.   Vitals reviewed.      Significant Labs: All pertinent labs within the past 24 hours have been reviewed.    Significant Imaging: I have reviewed all pertinent imaging results/findings within the past 24 hours.      Assessment/Plan:      * GI bleed  Heme occult +  GI saw.  EGD normal.  Diet advanced.  PO pantoprazole.    Pressure injury of sacral region, stage 2  Was present on admission.  Wound care nurse consulted for advice.      Pressure injury of trochanteric region of left hip, stage 2  Was present on admission.  Wound care nurse consulted for advice.      Severe malnutrition  Follow dietician's advice.    Frail elderly  Acute/chronic due to recent surgery/illnesses/prolonged hospitalization and  nutritional status   - hx of previous falls  - high risk of fall / injury utilize all safety measures and fall precautions     Pressure injury of right buttock, unstageable  Was present on admission.  Surgeon consulted.  No need for surgical debridement.  Consulted wound care nurse for advice on enzymatic or other forms of debridement.    Acute cystitis without hematuria  Continue treatment.  Urine culture not growing anything as of yet.    Antibiotics (From admission, onward)    Start     Stop Route Frequency Ordered    02/14/20 0830  piperacillin-tazobactam 4.5 g in dextrose 5 % 100 mL IVPB (ready to mix system)      -- IV Every 8 hours (non-standard times) 02/14/20 0729            Anemia  Chronic. Patient's anemia is currently controlled.    Current CBC reviewed-   Lab Results   Component Value Date    HGB 9.0 (L) 02/16/2020    HCT 29.7 (L) 02/16/2020     Monitor serial CBC and transfuse if patient becomes hemodynamically unstable, symptomatic or H/H drops below 7/21.     Parkinson's disease  Chronic, stable. Continue home sinemet.    Essential hypertension  Chronic, controlled.  Latest blood pressure and vitals reviewed-   Temp:  [96.3 °F (35.7 °C)-97.8 °F (36.6 °C)]   Pulse:  [63-76]   Resp:  [14-16]   BP: (122-149)/(59-68)   SpO2:  [97 %-100 %] .   Will continue BB but hold other anti hypertensives for now 2/2 GI bleed. Home meds for hypertension were reviewed and noted below. Hospital anti-hypertensive changes were made as shown below.  outpt Hypertension Medications             hydroCHLOROthiazide (HYDRODIURIL) 12.5 MG Tab Take 12.5 mg by mouth once daily.    irbesartan-hydrochlorothiazide (AVALIDE) 150-12.5 mg per tablet Take 1 tablet by mouth once daily.    metoprolol tartrate (LOPRESSOR) 25 MG tablet Take 1 tablet (25 mg total) by mouth 2 (two) times daily.      Hospital Medications             hydroCHLOROthiazide tablet 12.5 mg Starting on 2/16/2020. 12.5 mg, Oral, Daily    irbesartan tablet 150 mg  Starting on 2/16/2020. 150 mg, Oral, Daily    metoprolol tartrate (LOPRESSOR) tablet 25 mg 25 mg, Oral, 2 times daily            VTE Risk Mitigation (From admission, onward)         Ordered     IP VTE HIGH RISK PATIENT  Once      02/14/20 0227     Place sequential compression device  Until discontinued      02/14/20 0227     Reason for No Pharmacological VTE Prophylaxis  Once     Question:  Reasons:  Answer:  Active Bleeding    02/14/20 0227                      Richardson Cueva MD  Department of Hospital Medicine   [facility]

## 2020-02-17 NOTE — PLAN OF CARE
Pt resting quietly at this time. Able to make needs known. Gregory noted draining to gravity. Denies pain. Bed low and locked. Call light in reach.

## 2020-02-17 NOTE — NURSING
"Notified Denny (nephew) that pt is discharged and setup with home health. He said "it will be a while before I can get her, but I will come and get her."  "

## 2020-02-17 NOTE — RESPIRATORY THERAPY
02/17/20 0815   PRE-TX-O2   O2 Device (Oxygen Therapy) room air   SpO2 98 %   Pulse Oximetry Type Intermittent   Aerosol Therapy   $ Aerosol Therapy Charges PRN treatment not required

## 2020-02-17 NOTE — ASSESSMENT & PLAN NOTE
Chronic, controlled.  Latest blood pressure and vitals reviewed-   Temp:  [96.3 °F (35.7 °C)-97.8 °F (36.6 °C)]   Pulse:  [63-76]   Resp:  [14-16]   BP: (122-149)/(59-68)   SpO2:  [97 %-100 %] .   Will continue BB but hold other anti hypertensives for now 2/2 GI bleed. Home meds for hypertension were reviewed and noted below. Hospital anti-hypertensive changes were made as shown below.  outpt Hypertension Medications             hydroCHLOROthiazide (HYDRODIURIL) 12.5 MG Tab Take 12.5 mg by mouth once daily.    irbesartan-hydrochlorothiazide (AVALIDE) 150-12.5 mg per tablet Take 1 tablet by mouth once daily.    metoprolol tartrate (LOPRESSOR) 25 MG tablet Take 1 tablet (25 mg total) by mouth 2 (two) times daily.      Hospital Medications             hydroCHLOROthiazide tablet 12.5 mg Starting on 2/16/2020. 12.5 mg, Oral, Daily    irbesartan tablet 150 mg Starting on 2/16/2020. 150 mg, Oral, Daily    metoprolol tartrate (LOPRESSOR) tablet 25 mg 25 mg, Oral, 2 times daily

## 2020-02-17 NOTE — PLAN OF CARE
Date Status/Notes Created By   · 2/17/2020 2:29:53 PM Completed  Marsha Guzmán  · 2/17/2020 2:26:35 PM Accepted  [email or web address]  · 2/17/2020 2:00:30 PM New  Marsha Guzmán  The pt is accepted at [facility] . I booked the discharge destination and updated the pts AVS.  Marsha Guzmán, LCSW     02/17/20 1430   Post-Acute Status   Post-Acute Authorization Home Health/Hospice   Home Health/Hospice Status Set-up Complete

## 2020-02-17 NOTE — PLAN OF CARE
CM requested ambulatory referral to outpt wound care from Dr. Cueva.     1240- CM faxed referral along with pt's facesheet and wound care notes to [facility] for outpt wound care to [phone]       02/17/20 1142   Discharge Assessment   Assessment Type Discharge Planning Reassessment

## 2020-02-17 NOTE — SUBJECTIVE & OBJECTIVE
Interval History:  No new complaints.  No change in status.      Review of Systems   Constitutional: Negative for chills and fever.   Respiratory: Negative for cough and shortness of breath.    Cardiovascular: Negative for chest pain and leg swelling.   Gastrointestinal: Negative for abdominal pain, nausea and vomiting.     Objective:     Vital Signs (Most Recent):  Temp: 97.8 °F (36.6 °C) (02/16/20 2029)  Pulse: 76 (02/16/20 2029)  Resp: 16 (02/16/20 1608)  BP: (!) 149/68 (02/16/20 2029)  SpO2: 97 % (02/16/20 2029) Vital Signs (24h Range):  Temp:  [96.3 °F (35.7 °C)-97.8 °F (36.6 °C)] 97.8 °F (36.6 °C)  Pulse:  [63-76] 76  Resp:  [14-16] 16  SpO2:  [97 %-100 %] 97 %  BP: (122-149)/(59-68) 149/68     Weight: 47.1 kg (103 lb 13.4 oz)  Body mass index is 18.99 kg/m².    Intake/Output Summary (Last 24 hours) at 2/16/2020 2124  Last data filed at 2/16/2020 0600  Gross per 24 hour   Intake --   Output 500 ml   Net -500 ml      Physical Exam   Constitutional: She is oriented to person, place, and time. She has a sickly appearance. No distress.   HENT:   Mouth/Throat: Oropharynx is clear and moist.   Eyes: Right eye exhibits no discharge. Left eye exhibits no discharge.   Neck: No JVD present.   Cardiovascular: Normal rate and regular rhythm.   Pulmonary/Chest: Effort normal and breath sounds normal.   Abdominal: Soft. Bowel sounds are normal. She exhibits no distension. There is no tenderness.   Musculoskeletal: She exhibits no edema.   Neurological: She is alert and oriented to person, place, and time.   Contractures, mynor in legs.   Skin: Skin is warm. No rash noted.   Pressure injuries:  Unstageable on right buttock.  Stage II sacrum.  Stage II left trochanter.   Vitals reviewed.      Significant Labs: All pertinent labs within the past 24 hours have been reviewed.    Significant Imaging: I have reviewed all pertinent imaging results/findings within the past 24 hours.

## 2020-02-17 NOTE — PLAN OF CARE
I sent the pts HH orders and HH packet to [facility] I updated the pts AVS . Marsha Guzmán, OMAYRA     02/17/20 1401   Post-Acute Status   Post-Acute Authorization Home Health/Hospice   Home Health/Hospice Status Referrals Sent

## 2020-02-17 NOTE — PLAN OF CARE
SHAR spoke with Macy at [facility]. Verified that they received referral. SHAR scheduled first wound care visit for this Friday. AVS updated.        02/17/20 9679   Discharge Assessment   Assessment Type Discharge Planning Reassessment

## 2020-02-17 NOTE — PLAN OF CARE
Pt has been referred to [facility] at [facility]. A NP will see pt at home for follow up appt.        02/17/20 1204   Discharge Assessment   Assessment Type Discharge Planning Reassessment

## 2020-02-18 ENCOUNTER — TELEPHONE (OUTPATIENT)
Dept: MEDSURG UNIT | Facility: HOSPITAL | Age: 85
End: 2020-02-18

## 2020-02-18 LAB
BACTERIA SPEC AEROBE CULT: ABNORMAL
BACTERIA SPEC AEROBE CULT: ABNORMAL

## 2020-02-18 NOTE — DISCHARGE SUMMARY
[facility] Medicine  Discharge Summary      Patient Name: Maricarmen Woodward  MRN: [id number]  Admission Date: 2/13/2020  Hospital Length of Stay: 3 days  Discharge Date and Time: 2/17/2020  5:28 PM  Attending Physician: No att. providers found   Discharging Provider: Richardson Cueva MD  Primary Care Provider: Faustino Mccord PA-C      HPI:   Maricarmen Woodward is a 89 y.o. 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 The patient was referred here by her home health nurse for poorly healing wounds despite wound care and antibiotics. The patient is a limited historian. No family is at the bedside. Previous records reviewed. Per the records the patient is bed bound and cared for by personal sitters. She was noted to have a GIB at the end of 2019 and chose not to undergo a colonoscopy. The patient is currently complaining of chronic back pain, but she denies fever/chills. She also reports dysuria, but denies N/V/D. The patient denies any SOB, chest pain, cough, or headache. Her ED work up is significant for elevated CRP and sed rate, UTI, and heme positive stool. Her lactate is negative and sacral CT reveals no evidence of osteomyelitis. The patient was given IV rocephin in the ED. She was noted to have 1 black, tarry stool while in the ED. The patient will be placed in observation for further work up and evaluation.     Procedure(s) (LRB):  EGD (ESOPHAGOGASTRODUODENOSCOPY) (N/A)      Hospital Course:   Pt was admitted with GI bleeding and acute cystitis.  GI performed EGD which was completely normal.  No signs of bleeding or stigmata of bleeding.  Pt was continued on oral PPI.  We treated the urine with IVAB.  Pt had some erythema and induration surrounding the pressure ulcers on her lower back, so she was treated for cellulitis.  We consulted with general surgeon for consideration of surgical  debridement of the sacral stage II and the unstageable buttock ulcer.  Recommendation was non-surgical wound care.  WOCN made recommendations.  Cultures were obtained of some of the exudate from the buttock wound, which had a somewhat foul odor.  Proteus and Staph aureus grew.  At discharge, the susceptibilities were not yet resulted for the Staph.  The urine resulted with no growth.  Pt's condition improved.  Her HGB stayed stable.  She was discharged home.  I ordered referral to Dr[facility] in [place].  We [facility] also home health [facility].  Bactrim was prescribed.    Physical Exam   Constitutional: She is oriented to person, place, and time. She has a sickly appearance. No distress.   HENT:   Mouth/Throat: Oropharynx is clear and moist.   Eyes: Right eye exhibits no discharge. Left eye exhibits no discharge.   Neck: No JVD present.   Cardiovascular: Normal rate and regular rhythm.   Pulmonary/Chest: Effort normal and breath sounds normal.   Abdominal: Soft. Bowel sounds are normal. She exhibits no distension. There is no tenderness.   Musculoskeletal: She exhibits no edema.   Neurological: She is alert and oriented to person, place, and time.   Contractures, mynor in legs.   Skin: Skin is warm. No rash noted.   Pressure injuries:  Unstageable on right buttock.  Stage II sacrum.  Stage II left trochanter.       Consults:   Consults (From admission, onward)        Status Ordering Provider     Inpatient consult to Gastroenterology  Once     Provider:  Ivory Orellana MD    Completed VITO KINNEY     Inpatient consult to Social Work/Case Management  Once     Provider:  (Not yet assigned)    IVORY Sanchez     IP consult to case management  Once     Provider:  (Not yet assigned)    JOEL Burns          No new Assessment & Plan notes have been filed under this hospital service since the last note was  generated.  Service: Hospital Medicine    Final Active Diagnoses:    Diagnosis Date Noted POA    PRINCIPAL PROBLEM:  Decubitus ulcer of sacrum, stage 2 with infection [L89.92, L08.9] 02/17/2020 Yes    Pressure injury of trochanteric region of left hip, stage 2 [L89.222] 02/15/2020 Yes    Severe malnutrition [E43] 02/14/2020 Yes    Frail elderly [R54] 01/28/2020 Yes    Pressure injury of right buttock, unstageable [L89.310] 11/10/2019 Yes    Acute cystitis without hematuria [N30.00] 11/10/2019 Yes    Anemia [D64.9] 12/06/2018 Yes    Essential hypertension [I10] 01/13/2017 Yes    Parkinson's disease [G20] 01/13/2017 Yes      Problems Resolved During this Admission:    Diagnosis Date Noted Date Resolved POA    GI bleed [K92.2] 11/30/2019 02/17/2020 Yes       Discharged Condition: good    [place] [facility]    Follow Up:  Follow-up Information     Florencio Hinton MD.    Specialty:  Wound Care  Contact information:  [address] [facility]  [place] [address]  [phone]             Faustino Mccord PA-C In 2 weeks.    Specialty:  Family Medicine  Contact information:  [address]  [address]  [place] [address]  [phone]             [facility] On 2/17/2020.    Why:  Home Health  Contact information:  [address]  [place] [address]  [phone]           [facility] On 2/21/2020.    Specialty:  Wound Care  Why:  11:00 AM for wound care visit  Contact information:  [address]  [address]  [place] [address]  [phone]                 Patient Instructions:      Ambulatory referral/consult to [facility] [place] [facility]   Standing Status: Future   Referral Priority: Routine Referral Type: Consultation   Referral Reason: Specialty Services Required   Number of Visits Requested: 1     Ambulatory referral/consult to Wound Clinic   Standing Status: Future   Referral Priority: Routine Referral Type:  Consultation   Referral Reason: Specialty Services Required   Referred to Provider: ROBER LINARES Requested Specialty: Wound Care   Number of Visits Requested: 1     Diet Adult Regular     SUBSEQUENT HOME HEALTH ORDERS   Order Comments: Subsequent Home Health Orders    Review hospital discharge medication list with patient/family.    Diet:   regular diet    Activities:   activity as tolerated    Labs:  SN to perform labs:  CBC: Weekly; 2 week(s) and BMP: Weekly; 2 week(s) and Report Lab results to PCP.     Order Specific Question Answer Comments   What Home Health Agency is the patient currently using? [facility]      Activity as tolerated       Significant Diagnostic Studies:   Lab Results   Component Value Date    WBC 9.63 02/17/2020    HGB 9.1 (L) 02/17/2020    HCT 29.3 (L) 02/17/2020    MCV 93 02/17/2020     02/17/2020       BMP  Lab Results   Component Value Date     02/15/2020    K 3.8 02/15/2020     02/15/2020    CO2 22 (L) 02/15/2020    BUN 19 02/15/2020    CREATININE 0.8 02/15/2020    CALCIUM 9.5 02/15/2020    ANIONGAP 9 02/15/2020    ESTGFRAFRICA >60 02/15/2020    EGFRNONAA >60 02/15/2020       Hemoglobin   Date Value Ref Range Status   02/17/2020 9.1 (L) 12.0 - 16.0 g/dL Final   02/16/2020 9.0 (L) 12.0 - 16.0 g/dL Final   02/15/2020 10.3 (L) 12.0 - 16.0 g/dL Final   02/15/2020 10.0 (L) 12.0 - 16.0 g/dL Final       Pending Diagnostic Studies:     None         Medications:  Reconciled Home Medications:      Medication List      START taking these medications    sulfamethoxazole-trimethoprim 800-160mg 800-160 mg Tab  Commonly known as:  BACTRIM DS  Take 1 tablet by mouth 2 (two) times daily. for 5 days        CHANGE how you take these medications    HYDROcodone-acetaminophen 5-325 mg per tablet  Commonly known as:  NORCO  Take 1 tablet by mouth every 6 (six) hours as needed.  What changed:  reasons to take this        CONTINUE taking these medications    acetaminophen 325 MG  tablet  Commonly known as:  TYLENOL  Take 2 tablets (650 mg total) by mouth every 4 (four) hours as needed.     albuterol-ipratropium 2.5 mg-0.5 mg/3 mL nebulizer solution  Commonly known as:  DUO-NEB  Take 3 mLs by nebulization every 4 (four) hours as needed for Wheezing or Shortness of Breath. Rescue     aspirin 81 MG Chew  Take 81 mg by mouth once daily.     CALCIUM ANTACID ORAL  Take 500 mg by mouth 2 (two) times daily. Calcium Antacid 500mg CHW tablet, Give one chewable tablet PO BID.     * carbidopa-levodopa  mg  mg per tablet  Commonly known as:  SINEMET  Take 2 tablets by mouth 2 (two) times daily.     * carbidopa-levodopa  mg  mg Tbsr  Commonly known as:  SINEMET CR  Take 1.5 tablets by mouth every evening.     Centrum 3,500-18-0.4 unit-mg-mg Chew  Generic drug:  multivit-iron-min-folic acid  Take 1 tablet by mouth once daily.     clonazePAM 0.5 MG tablet  Commonly known as:  KLONOPIN  Take 1 tablet (0.5 mg total) by mouth 2 (two) times daily as needed for Anxiety.     ferrous sulfate 325 (65 FE) MG EC tablet  Take 325 mg by mouth once daily.     Fish Oil 1,000 mg Cap  Generic drug:  omega-3 fatty acids-vitamin E  Take 1 capsule by mouth once daily.     hydroCHLOROthiazide 12.5 MG Tab  Commonly known as:  HYDRODIURIL  Take 12.5 mg by mouth once daily.     irbesartan-hydrochlorothiazide 150-12.5 mg per tablet  Commonly known as:  AVALIDE  Take 1 tablet by mouth once daily.     magnesium oxide 400 mg (241.3 mg magnesium) tablet  Commonly known as:  MAG-OX  Take 1 tablet (400 mg total) by mouth once daily.     melatonin 3 mg tablet  Commonly known as:  MELATIN  Take 9 mg by mouth nightly as needed for Insomnia.     metoprolol tartrate 25 MG tablet  Commonly known as:  LOPRESSOR  Take 1 tablet (25 mg total) by mouth 2 (two) times daily.     MILK OF MAGNESIA CONCENTRATED ORAL  Take 30 mLs by mouth every 12 (twelve) hours as needed (constipation).     omeprazole 40 MG capsule  Commonly  known as:  PRILOSEC  Take 40 mg by mouth once daily.     oxyCODONE-acetaminophen  mg per tablet  Commonly known as:  LYNOX  Take 1 tablet by mouth every 4 (four) hours as needed for Pain.     potassium chloride SA 10 MEQ tablet  Commonly known as:  K-DUR,KLOR-CON  Take 10 mEq by mouth once daily.     pravastatin 40 MG tablet  Commonly known as:  PRAVACHOL  Take 40 mg by mouth every evening.     Senna with Docusate Sodium 8.6-50 mg per tablet  Generic drug:  senna-docusate 8.6-50 mg  Take 1 tablet by mouth once daily.     tiZANidine 4 MG tablet  Commonly known as:  ZANAFLEX  Take 4 mg by mouth every 6 (six) hours as needed.     TUSSIN ORAL  Take by mouth. Tussin 100mg/5mL, give 10mL (200mg) PO Q 6 hours prn cough.     UNABLE TO FIND  Pro Heal Critical Care 30mL PO BID.     Vitamin B-12 100 MCG tablet  Generic drug:  cyanocobalamin  Take 100 mcg by mouth once daily.     Vitamin C 500 MG tablet  Generic drug:  ascorbic acid (vitamin C)  Take 500 mg by mouth 2 (two) times daily.     Vitamin D3 2,000 unit Tab  Generic drug:  cholecalciferol (vitamin D3)  Take 2,000 Units by mouth once daily.     zinc sulfate 220 mg Tab tablet  Take 220 mg by mouth once daily.         * This list has 2 medication(s) that are the same as other medications prescribed for you. Read the directions carefully, and ask your doctor or other care provider to review them with you.                Indwelling Lines/Drains at time of discharge:   Lines/Drains/Airways     Pressure Ulcer                 Pressure Injury Left anterior Greater trochanter Suspected Unstageable -- days                Time spent on the discharge of patient: 33 minutes  Patient was seen and examined on the date of discharge and determined to be suitable for discharge.         Richardson Cueva MD  Department of Hospital Medicine  [facility]

## 2020-02-18 NOTE — HOSPITAL COURSE
Pt was admitted with GI bleeding and acute cystitis.  GI performed EGD which was completely normal.  No signs of bleeding or stigmata of bleeding.  Pt was continued on oral PPI.  We treated the urine with IVAB.  Pt had some erythema and induration surrounding the pressure ulcers on her lower back, so she was treated for cellulitis.  We consulted with general surgeon for consideration of surgical debridement of the sacral stage II and the unstageable buttock ulcer.  Recommendation was non-surgical wound care.  WOCN made recommendations.  Cultures were obtained of some of the exudate from the buttock wound, which had a somewhat foul odor.  Proteus and Staph aureus grew.  At discharge, the susceptibilities were not yet resulted for the Staph.  The urine resulted with no growth.  Pt's condition improved.  Her HGB stayed stable.  She was discharged home.  I ordered referral to Dr. Florencio Hinton, a [facility] here in [place].  We arranged also home health and [facility] nurse practitioner visit.  Bactrim was prescribed.    Physical Exam   Constitutional: She is oriented to person, place, and time. She has a sickly appearance. No distress.   HENT:   Mouth/Throat: Oropharynx is clear and moist.   Eyes: Right eye exhibits no discharge. Left eye exhibits no discharge.   Neck: No JVD present.   Cardiovascular: Normal rate and regular rhythm.   Pulmonary/Chest: Effort normal and breath sounds normal.   Abdominal: Soft. Bowel sounds are normal. She exhibits no distension. There is no tenderness.   Musculoskeletal: She exhibits no edema.   Neurological: She is alert and oriented to person, place, and time.   Contractures, mynor in legs.   Skin: Skin is warm. No rash noted.   Pressure injuries:  Unstageable on right buttock.  Stage II sacrum.  Stage II left trochanter.

## 2020-02-19 ENCOUNTER — CARE AT HOME (OUTPATIENT)
Dept: HOME HEALTH SERVICES | Facility: CLINIC | Age: 85
End: 2020-02-19
Payer: MEDICARE

## 2020-02-19 ENCOUNTER — PATIENT OUTREACH (OUTPATIENT)
Dept: ADMINISTRATIVE | Facility: CLINIC | Age: 85
End: 2020-02-19

## 2020-02-19 VITALS
WEIGHT: 103 LBS | TEMPERATURE: 98 F | SYSTOLIC BLOOD PRESSURE: 110 MMHG | OXYGEN SATURATION: 98 % | BODY MASS INDEX: 18.84 KG/M2 | HEART RATE: 75 BPM | DIASTOLIC BLOOD PRESSURE: 72 MMHG | RESPIRATION RATE: 16 BRPM

## 2020-02-19 DIAGNOSIS — Z09 HOSPITAL DISCHARGE FOLLOW-UP: Primary | ICD-10-CM

## 2020-02-19 DIAGNOSIS — L89.152 PRESSURE INJURY OF SACRAL REGION, STAGE 2: ICD-10-CM

## 2020-02-19 DIAGNOSIS — R19.5 HEME POSITIVE STOOL: ICD-10-CM

## 2020-02-19 DIAGNOSIS — N39.0 URINARY TRACT INFECTION WITHOUT HEMATURIA, SITE UNSPECIFIED: ICD-10-CM

## 2020-02-19 DIAGNOSIS — L89.220 PRESSURE INJURY OF LEFT HIP, UNSTAGEABLE: ICD-10-CM

## 2020-02-19 LAB
BACTERIA BLD CULT: NORMAL
BACTERIA BLD CULT: NORMAL

## 2020-02-19 PROCEDURE — 99496 TRANSITIONAL CARE MANAGE SERVICE 7 DAY DISCHARGE: ICD-10-PCS | Mod: S$GLB,,, | Performed by: NURSE PRACTITIONER

## 2020-02-19 PROCEDURE — 99496 TRANSJ CARE MGMT HIGH F2F 7D: CPT | Mod: S$GLB,,, | Performed by: NURSE PRACTITIONER

## 2020-02-19 NOTE — PATIENT INSTRUCTIONS
Treating Pressure Ulcers: Cleaning and Dressing  Its important that pressure ulcers be kept clean, moist, and covered. This helps reduce the risk of infection and speeds up the healing process. To promote healing, clean pressure ulcers at each dressing change. Take care to choose the most appropriate type of cleanser and dressing.    Caution  · Do not use heat lamps or drying agents, such as alcohol. They dry out wounds and can kill fragile new tissue.  · Do not use antiseptic agents, such as povidone-iodine and hydrogen peroxide, because they are toxic to new cells.  · Be aware that if dressings become dry, they may fuse with new cells, causing loss of new tissue when dressings are removed. Remove or change dressings before they dry out.  · Silver-based dressings are very effective for killing bacteria, and they are safe for newly developing tissues.   Wound irrigation  An irrigating catheter or syringe and saline may be used to flush the ulcer free of debris. Wound cleansers may also be used to loosen up and clean out debris. The amount of pressure used during irrigation should be enough to clean the wound without damaging it. Follow your facilitys guidelines regarding irrigation.  Adding moisture  Maintaining a clean, moist wound bed is essential for promoting healing. Certain dressings help keep ulcers moist. Be sure to fill spaces loosely with dressings to prevent fluid and bacteria from building up. Hydrogels can also help retain moisture.  Types of dressings  Many kinds of dressings are available. Be sure to follow the s instructions for the specific dressing used. If a wound doesnt respond to one type of dressing, consider changing the treatment plan.  · Moist gauze helps keep the wound moist and absorbs excess fluid. Gauze should be damp--not wet--with saline. Too-wet gauze can weaken surrounding tissue.  · Transparent films are thin and flexible and help protect wounds from water and  bacteria.   · Hydrocolloids absorb exudate, forming a nonadhesive gel. This helps maintain a moist wound environment. Hydrocolloids also protect the wound from water and bacteria.  · Hydrogels are water-based gels and dressing sheets that keep wounds moist. They are also soothing and can help ease pain.  · Alginates are highly absorptive dressings made from seaweed. When combined with wound exudate the dressing may form a gel that helps maintain a moist wound bed.  · Foams absorb exudate and keep the wound moist. They are used to cover or fill wounds.  · Collagens absorb exudate and help maintain a moist wound environment. They may also promote new tissue growth.  · Antimicrobials help prevent and treat infection. These dressings come in many forms.  Negative pressure wound therapy  Negative pressure wound therapy (NPWT)--also called vacuum-assisted closure--removes exudate, helps reduce bacterial growth, and promotes blood flow and granulation formation. First, a foam dressing is placed in the wound and the wound is covered with an occlusive dressing. Then tubing is attached to a pump, which creates subatmospheric pressure in the wound. Keep in mind that patients may require analgesia as dressing changes can be painful.  Date Last Reviewed: 1/24/2016  © 8951-2226 [employer] [address] [place] [address], [place] [address]. All rights reserved. This information is not intended as a substitute for professional medical care. Always follow your healthcare professional's instructions.

## 2020-02-19 NOTE — TELEPHONE ENCOUNTER
C3 nurse attempted to contact patient. The following occurred:   C3 nurse attempted to contact Maricarmen Woodward  for a TCC post hospital discharge follow up call. The patient is unable to conduct the call @ this time. The patient requested a callback.    The patient has a scheduled HOSFU appointment with Gladys Cates NP on 2/19/2020 at 11:00 AM

## 2020-02-19 NOTE — PATIENT INSTRUCTIONS
Wound Care  Taking proper care of your wound will help it heal. Your healthcare provider may show you how to clean and dress the wound. He or she will also explain how to tell if the wound is healing normally. If you are unsure of how to take care of the wound, be sure to clarify what dressing to use and how often you should change the bandages. Here are the basic steps.     A wound that's not healing normally may be dark in color or have white streaks.   Wash your hands  Tips for washing your hands include:  · Use liquid soap and lather for 2 minutes. Scrub between your fingers and under your nails.  · Rinse with warm water, keeping your fingers pointing down.  · Use a paper towel to dry your hands and to turn off the faucet.  Remove the used dressing  Here are suggestions for removing the dressing:  · If dressing changes cause you pain, be sure to take your pain medicine as prescribed by your healthcare provider 30 minutes before dressing changes.  · Set up your supplies.  · Put on disposable gloves if youre dressing a wound for someone else or your wound is infected.  · Loosen the tape by pulling gently toward the wound.  · Gently take off the old dressing. If the dressing is stuck to the wound, moisten it with saline (if available) or clean water.  · If you have a drain or tube in the wound, be careful not to pull on it.  · Remove the dressing 1 layer at a time and put it in a plastic bag.  · Remove your gloves.  Inspect and dress the wound  Check the wound carefully:  · Each time you change the dressing, inspect the wound carefully to be sure its healing normally by making sure your wound appears to be pink and moist, and is free of infection.    · Wash your hands again. Put on a new pair of gloves.  · Clean and dress the wound as directed by your healthcare provider or nurse. Do not put anything in the wound that is not prescribed or directed by your healthcare provider. If you have a drain or tube, be  careful not to pull on it. Make sure to secure the drain or tube as well.  · Put all supplies in a plastic bag. Seal the bag and put it in the trash.  · Be sure to wash your hands again.  Call your healthcare provider  Call your healthcare provider if you see any of the following signs of a problem:  · Bleeding that soaks the dressing  · Pink fluid weeping from the wound  · Increased drainage or drainage that is yellow, yellow-green, or foul-smelling  · Increased swelling or pain, or redness or swelling in the skin around the wound  · A change in the color of the wound, or if streaks develop in a direction away from the wound  · The area between any stitches opens up  · An increase in the size of the wound  · A fever of 100.4°F (38°C) or higher, or as directed by your healthcare provider  · Chills, increased fatigue, or a loss of appetite      Date Last Reviewed: 7/30/2015  © 7185-2194 [employer] [address] [place] [address], [place] [address]. All rights reserved. This information is not intended as a substitute for professional medical care. Always follow your healthcare professional's instructions.

## 2020-02-19 NOTE — PROGRESS NOTES
[facility]  Transition of Care Home Visit    Visit Date: 2/19/2020  Encounter Provider: Gladys Cates NP  PCP:  Faustino Mccord PA-C    PRESENTING HISTORY      Patient ID: Maricarmen Woodward is a 89 y.o. female.    Consult Requested By:  None  Reason for Consult:  Recent hospitalization/TCC    Maricarmen is being seen at home due to physical limitations    Chief Complaint: Hospital Follow Up (hospital); Urinary Tract Infection; Anemia; GI Bleed and Pressure Ulcers      History of Present Illness: Ms. Maricarmen Woodward is a 89 y.o. female who was recently admitted to the hospital.    Admit: 2/13/2020   Discharge 2/17/2020  HPI:   Maricarmen Woodward is a 89 y.o. 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 The patient was referred here by her home health nurse for poorly healing wounds despite wound care and antibiotics. The patient is a limited historian. No family is at the bedside. Previous records reviewed. Per the records the patient is bed bound and cared for by personal sitters. She was noted to have a GIB at the end of 2019 and chose not to undergo a colonoscopy. The patient is currently complaining of chronic back pain, but she denies fever/chills. She also reports dysuria, but denies N/V/D. The patient denies any SOB, chest pain, cough, or headache. Her ED work up is significant for elevated CRP and sed rate, UTI, and heme positive stool. Her lactate is negative and sacral CT reveals no evidence of osteomyelitis. The patient was given IV rocephin in the ED. She was noted to have 1 black, tarry stool while in the ED. The patient will be placed in observation for further work up and evaluation.      Procedure(s) (LRB):  EGD (ESOPHAGOGASTRODUODENOSCOPY) (N/A)       Hospital Course:   Pt was admitted with GI bleeding and acute cystitis.  GI performed EGD which was completely normal.  No signs of bleeding  "or stigmata of bleeding.  Pt was continued on oral PPI.  We treated the urine with IVAB.  Pt had some erythema and induration surrounding the pressure ulcers on her lower back, so she was treated for cellulitis.  We consulted with general surgeon for consideration of surgical debridement of the sacral stage II and the unstageable buttock ulcer.  Recommendation was non-surgical wound care.  WOCN made recommendations.  Cultures were obtained of some of the exudate from the buttock wound, which had a somewhat foul odor.  Proteus and Staph aureus grew.  At discharge, the susceptibilities were not yet resulted for the Staph.  The urine resulted with no growth.  Pt's condition improved.  Her HGB stayed stable.  She was discharged home.  I ordered referral [facility] [place].  We arranged also home health and [facility] practitioner [facility].  Bactrim was prescribed.      ___________________________________________________________________    Today:    HPI:  Maricarmen is being seen 2 days after hospital discharge for UTI, anemia, GI Bleed and worsening decubitus ulcers. She is found at home today with a different sitter, Michelle, who is with her only occasionally. Normally, Anastasia, is with her but today she is running errands. Maricarmen is found sitting up in a chair feeding herself some fried fish and french fries. She is much more alert and interactive than I have seen her before. She complains of her left hip "sore" hurting. She is a poor historian however and Michelle does not know much about what her latest hospitalization entailed.     It is noted that the hospital bed that was ordered weeks ago is still sitting in the living room not set up and not being used. Michelle and FRANCOIS both set up the bed and place Maricarmen in it.      Both Michelle and Maricarmen are unaware of the recommendation/referral to [facility] for wound care. Will follow up with Anastasia/Denny about this. The " importance of [facility] not missing this appointment was stressed.    No fever, chills or loss of appetite is reported since being home from the hospital. Patient had BM during visit today, no blood noted. Urine appeared clear and yellow also.    Review of Systems   Constitutional: Positive for activity change, appetite change and fatigue. Negative for fever.   HENT: Negative.    Eyes: Negative.    Respiratory: Negative.    Cardiovascular: Negative.    Gastrointestinal: Negative.    Endocrine: Negative.    Genitourinary: Negative.         + urinary incontinence   Musculoskeletal: Positive for arthralgias and gait problem.   Skin: Positive for pallor and wounds.   Allergic/Immunologic: Negative.    Neurological: Positive for dizziness, tremors and weakness. Negative for seizures, speech difficulty and headaches.   Hematological: Bruises/bleeds easily.   Psychiatric/Behavioral: Negative.       Assessments:  ·           Environmental: clean, adequate lighting and temperature, no foul odors  ·           Functional Status: dependent but can feed self, non-ambulatory, no longer standing, bed bound/recliner chair bound  ·           Safety: no issues identified  ·           Nutritional: seems adequate but not observable today  ·           Home Health/DME/Supplies: [facility], hospital bed, BSC, walker      PAST HISTORY:     Past Medical History:   Diagnosis Date    Arthritis     Hospital discharge follow-up 2/19/2020    Hypertension     Parkinson's disease        Past Surgical History:   Procedure Laterality Date    ESOPHAGOGASTRODUODENOSCOPY N/A 12/1/2019    Procedure: EGD (ESOPHAGOGASTRODUODENOSCOPY);  Surgeon: Heidi Gallagher MD;  Location: [facility];  Service: Endoscopy;  Laterality: N/A;    ESOPHAGOGASTRODUODENOSCOPY N/A 2/14/2020    Procedure: EGD (ESOPHAGOGASTRODUODENOSCOPY);  Surgeon: Jony Orellana MD;  Location: [facility];  Service: Endoscopy;  Laterality: N/A;    FRACTURE SURGERY       HEMIARTHROPLASTY OF HIP Left 12/8/2018    Procedure: HEMIARTHROPLASTY, HIP, [place], peg board, first assist;  Surgeon: Cheng Mccormack MD;  Location: [facility] OR;  Service: Orthopedics;  Laterality: Left;    JOINT REPLACEMENT      right hip replacement    ORIF FEMUR FRACTURE Right 11/22/2019    Procedure: ORIF, FRACTURE, FEMUR, Synthes, radiolucent triangle, 1st assist;  Surgeon: Cheng Mccormack MD;  Location: [facility] OR;  Service: Orthopedics;  Laterality: Right;       Family History   Problem Relation Age of Onset    No Known Problems Mother        Social History     Socioeconomic History    Marital status:      Spouse name: Not on file    Number of children: Not on file    Years of education: Not on file    Highest education level: Not on file   Occupational History    Not on file   Social Needs    Financial resource strain: Not on file    Food insecurity:     Worry: Not on file     Inability: Not on file    Transportation needs:     Medical: Not on file     Non-medical: Not on file   Tobacco Use    Smoking status: Former Smoker     Packs/day: 0.25     Years: 5.00     Pack years: 1.25     Last attempt to quit: 4/17/2009     Years since quitting: 10.8    Smokeless tobacco: Never Used   Substance and Sexual Activity    Alcohol use: No    Drug use: No    Sexual activity: Not Currently     Partners: Male     Birth control/protection: Abstinence, Post-menopausal   Lifestyle    Physical activity:     Days per week: Not on file     Minutes per session: Not on file    Stress: Not on file   Relationships    Social connections:     Talks on phone: Not on file     Gets together: Not on file     Attends [employer] service: Not on file     Active member of club or organization: Not on file     Attends meetings of clubs or organizations: Not on file     Relationship status: Not on file   Other Topics Concern    Not on file   Social History Narrative    Not on file       MEDICATIONS &  ALLERGIES:     Current Outpatient Medications on File Prior to Visit   Medication Sig Dispense Refill    acetaminophen (TYLENOL) 325 MG tablet Take 2 tablets (650 mg total) by mouth every 4 (four) hours as needed.  0    ascorbic acid, vitamin C, (VITAMIN C) 500 MG tablet Take 500 mg by mouth 2 (two) times daily.      aspirin 81 MG Chew Take 81 mg by mouth once daily.      calcium carbonate (CALCIUM ANTACID ORAL) Take 500 mg by mouth 2 (two) times daily. Calcium Antacid 500mg CHW tablet, Give one chewable tablet PO BID.      carbidopa-levodopa  mg (SINEMET)  mg per tablet Take 2 tablets by mouth 2 (two) times daily.       carbidopa-levodopa  mg (SINEMET CR)  mg TbSR Take 1.5 tablets by mouth every evening.      cholecalciferol, vitamin D3, (VITAMIN D3) 2,000 unit Tab Take 2,000 Units by mouth once daily.      cyanocobalamin (VITAMIN B-12) 100 MCG tablet Take 100 mcg by mouth once daily.       ferrous sulfate 325 (65 FE) MG EC tablet Take 325 mg by mouth once daily.      hydroCHLOROthiazide (HYDRODIURIL) 12.5 MG Tab Take 12.5 mg by mouth once daily.      irbesartan-hydrochlorothiazide (AVALIDE) 150-12.5 mg per tablet Take 1 tablet by mouth once daily.      metoprolol tartrate (LOPRESSOR) 25 MG tablet Take 1 tablet (25 mg total) by mouth 2 (two) times daily. 60 tablet 11    multivit-iron-min-folic acid (MULTIVITAMIN-IRON-MINERALS-FOLIC ACID) 3,500-18-0.4 unit-mg-mg Chew Take 1 tablet by mouth once daily.        omega-3 fatty acids-vitamin E (FISH OIL) 1,000 mg Cap Take 1 capsule by mouth once daily.      omeprazole (PRILOSEC) 40 MG capsule Take 40 mg by mouth once daily.      potassium chloride SA (K-DUR,KLOR-CON) 10 MEQ tablet Take 10 mEq by mouth once daily.        pravastatin (PRAVACHOL) 40 MG tablet Take 40 mg by mouth every evening.      senna-docusate 8.6-50 mg (SENNA WITH DOCUSATE SODIUM) 8.6-50 mg per tablet Take 1 tablet by mouth once daily.       sulfamethoxazole-trimethoprim 800-160mg (BACTRIM DS) 800-160 mg Tab Take 1 tablet by mouth 2 (two) times daily. for 5 days 10 tablet 0    albuterol-ipratropium (DUO-NEB) 2.5 mg-0.5 mg/3 mL nebulizer solution Take 3 mLs by nebulization every 4 (four) hours as needed for Wheezing or Shortness of Breath. Rescue 1 Box 0    clonazePAM (KLONOPIN) 0.5 MG tablet Take 1 tablet (0.5 mg total) by mouth 2 (two) times daily as needed for Anxiety. (Patient not taking: Reported on 2/20/2020) 10 tablet 0    guaifenesin (TUSSIN ORAL) Take by mouth. Tussin 100mg/5mL, give 10mL (200mg) PO Q 6 hours prn cough.      HYDROcodone-acetaminophen (NORCO) 5-325 mg per tablet Take 1 tablet by mouth every 6 (six) hours as needed. 15 tablet 0    magnesium hydroxide (MILK OF MAGNESIA CONCENTRATED ORAL) Take 30 mLs by mouth every 12 (twelve) hours as needed (constipation).      magnesium oxide (MAG-OX) 400 mg (241.3 mg magnesium) tablet Take 1 tablet (400 mg total) by mouth once daily. (Patient not taking: Reported on 2/19/2020)  0    melatonin (MELATIN) 3 mg tablet Take 9 mg by mouth nightly as needed for Insomnia.      oxyCODONE-acetaminophen (LYNOX)  mg per tablet Take 1 tablet by mouth every 4 (four) hours as needed for Pain.      tiZANidine (ZANAFLEX) 4 MG tablet Take 4 mg by mouth every 6 (six) hours as needed.      UNABLE TO FIND [facility] 30mL PO BID.      zinc sulfate 220 mg Tab tablet Take 220 mg by mouth once daily.       No current facility-administered medications on file prior to visit.         Review of patient's allergies indicates:  No Known Allergies    OBJECTIVE:     Vital Signs:  Vitals:    02/19/20 1110   BP: 110/72   Pulse: 75   Resp: 16   Temp: 98.1 °F (36.7 °C)     Body mass index is 18.84 kg/m².     Physical Exam:  Physical Exam   Constitutional: She is oriented to person, place, and time.   She is oriented to person, place, and time.   Thin, frail, weak, elderly,  female      HENT:    Head: Normocephalic and atraumatic.   Right Ear: External ear normal.   Left Ear: External ear normal.   Nose: Nose normal.   Mouth/Throat: Oropharynx is clear and moist.   Very [place]   Eyes: Pupils are equal, round, and reactive to light. Conjunctivae and EOM are normal.   Neck: Normal range of motion. Neck supple.   Cardiovascular: Normal rate, regular rhythm, normal heart sounds and intact distal pulses.   Pulmonary/Chest: Effort normal and breath sounds normal. No respiratory distress. She has no wheezes.   Abdominal: Soft. Bowel sounds are normal. She exhibits no distension and no mass. There is no tenderness.   Musculoskeletal:   Contractures at knees, profound bony prominences, generalized weakness   Neurological: She is alert and oriented to person, place, and time.   Skin: Skin is warm and dry. Capillary refill takes 2 to 3 seconds. Ecchymosis (scattered) and petechiae (scattered) noted.         Right inner thigh open blister- 1cm x 0.5 cm      Sacral decubitus      Left trochanter pressure ulcer with eschar, 3.5cm x 2 cm  X 0.5cm, no odor, mild drainage    Psychiatric: She has a normal mood and affect. Her behavior is normal. Judgment and thought content normal.       Laboratory  Lab Results   Component Value Date    WBC 9.63 02/17/2020    HGB 9.1 (L) 02/17/2020    HCT 29.3 (L) 02/17/2020    MCV 93 02/17/2020     02/17/2020     Lab Results   Component Value Date    INR 1.0 02/14/2020    INR 1.0 11/30/2019    INR 0.9 11/19/2019     Lab Results   Component Value Date    HGBA1C 4.6 11/19/2019     No results for input(s): POCTGLUCOSE in the last 72 hours.          TRANSITION OF CARE:     [facility] Contact Note: 2/19/2020    Family and/or Caretaker present at visit?  Yes.  Diagnostic tests reviewed/disposition: No diagnosic tests pending after this hospitalization.  Disease/illness education: Wound care  Home health/community services discussion/referrals: Patient has home health established at  [facility].   Establishment or re-establishment of referral orders for community resources: No other necessary community resources.   Discussion with other health care providers: will forward note to [facility]/PCP.     Transition of Care Visit:     I have reviewed and updated the history and problem list.  I have reconciled the medication list.  I have discussed the hospitalization and current medical issues, prognosis and plans with the patient/family.  I  spent more than 50% of time discussing the care with the patient/family.  Total Face-to-Face Encounter: 60 minutes.    Medications Reconciliation:   I have reconciled the patient's home medications and discharge medications with the patient/family. I have updated all changes.  Refer to After-Visit Medication List.    ASSESSMENT & PLAN:       Maricarmen was seen today for hospital follow up, urinary tract infection, anemia and pressure ulcer.    Diagnoses and all orders for this visit:    Hospital discharge follow-up    Pressure injury of left hip, unstageable    Pressure injury of sacral region, stage 2    Urinary tract infection without hematuria, site unspecified    Heme positive stool        Were controlled substances prescribed?  No    Instructions for the patient:  Take all medications as prescribed.  Keep all follow up appointments, INCLUDING WOUND CARE appointment with [facility].  Continue Home Health SNV/wound care.  Fall precautions and safety discussed.  Educated on importance of offloading and nutrition for wound healing.  S/S of UTI discussed.          Scheduled Follow-up :  Future Appointments   Date Time Provider Department Center   3/3/2020  2:20 PM Faustino Mccord PA-C [facility]       After Visit Medication List :     Medication List           Accurate as of February 19, 2020 11:59 PM. If you have any questions, ask your nurse or doctor.               CONTINUE taking these medications    acetaminophen 325 MG  tablet  Commonly known as:  TYLENOL  Take 2 tablets (650 mg total) by mouth every 4 (four) hours as needed.     albuterol-ipratropium 2.5 mg-0.5 mg/3 mL nebulizer solution  Commonly known as:  DUO-NEB  Take 3 mLs by nebulization every 4 (four) hours as needed for Wheezing or Shortness of Breath. Rescue     aspirin 81 MG Chew     CALCIUM ANTACID ORAL     * carbidopa-levodopa  mg  mg per tablet  Commonly known as:  SINEMET     * carbidopa-levodopa  mg  mg Tbsr  Commonly known as:  SINEMET CR     Centrum 3,500-18-0.4 unit-mg-mg Chew  Generic drug:  multivit-iron-min-folic acid     clonazePAM 0.5 MG tablet  Commonly known as:  KLONOPIN  Take 1 tablet (0.5 mg total) by mouth 2 (two) times daily as needed for Anxiety.     ferrous sulfate 325 (65 FE) MG EC tablet     Fish Oil 1,000 mg Cap  Generic drug:  omega-3 fatty acids-vitamin E     hydroCHLOROthiazide 12.5 MG Tab  Commonly known as:  HYDRODIURIL     HYDROcodone-acetaminophen 5-325 mg per tablet  Commonly known as:  NORCO  Take 1 tablet by mouth every 6 (six) hours as needed.     irbesartan-hydrochlorothiazide 150-12.5 mg per tablet  Commonly known as:  AVALIDE     magnesium oxide 400 mg (241.3 mg magnesium) tablet  Commonly known as:  MAG-OX  Take 1 tablet (400 mg total) by mouth once daily.     melatonin 3 mg tablet  Commonly known as:  MELATIN     metoprolol tartrate 25 MG tablet  Commonly known as:  LOPRESSOR  Take 1 tablet (25 mg total) by mouth 2 (two) times daily.     MILK OF MAGNESIA CONCENTRATED ORAL     omeprazole 40 MG capsule  Commonly known as:  PRILOSEC     oxyCODONE-acetaminophen  mg per tablet  Commonly known as:  LYNOX     potassium chloride SA 10 MEQ tablet  Commonly known as:  K-DUR,KLOR-CON     pravastatin 40 MG tablet  Commonly known as:  PRAVACHOL     Senna with Docusate Sodium 8.6-50 mg per tablet  Generic drug:  senna-docusate 8.6-50 mg     sulfamethoxazole-trimethoprim 800-160mg 800-160 mg Tab  Commonly known as:   BACTRIM DS  Take 1 tablet by mouth 2 (two) times daily. for 5 days     tiZANidine 4 MG tablet  Commonly known as:  ZANAFLEX     TUSSIN ORAL     UNABLE TO FIND     Vitamin B-12 100 MCG tablet  Generic drug:  cyanocobalamin     Vitamin C 500 MG tablet  Generic drug:  ascorbic acid (vitamin C)     Vitamin D3 2,000 unit Tab  Generic drug:  cholecalciferol (vitamin D3)     zinc sulfate 220 mg Tab tablet         * This list has 2 medication(s) that are the same as other medications prescribed for you. Read the directions carefully, and ask your doctor or other care provider to review them with you.            Consent for treatment obtained from patient on visit today.     Signature:  Gladys Cates NP

## 2020-02-20 ENCOUNTER — LAB VISIT (OUTPATIENT)
Dept: LAB | Facility: HOSPITAL | Age: 85
End: 2020-02-20
Payer: MEDICARE

## 2020-02-20 DIAGNOSIS — L89.310 UNSTAGEABLE PRESSURE ULCER OF RIGHT BUTTOCK: Primary | ICD-10-CM

## 2020-02-20 LAB
ANION GAP SERPL CALC-SCNC: 12 MMOL/L (ref 8–16)
BASOPHILS # BLD AUTO: 0.05 K/UL (ref 0–0.2)
BASOPHILS NFR BLD: 0.5 % (ref 0–1.9)
BUN SERPL-MCNC: 12 MG/DL (ref 10–30)
CALCIUM SERPL-MCNC: 9.3 MG/DL (ref 8.7–10.5)
CHLORIDE SERPL-SCNC: 103 MMOL/L (ref 95–110)
CO2 SERPL-SCNC: 22 MMOL/L (ref 23–29)
CREAT SERPL-MCNC: 0.8 MG/DL (ref 0.5–1.4)
DIFFERENTIAL METHOD: ABNORMAL
EOSINOPHIL # BLD AUTO: 0.2 K/UL (ref 0–0.5)
EOSINOPHIL NFR BLD: 2.4 % (ref 0–8)
ERYTHROCYTE [DISTWIDTH] IN BLOOD BY AUTOMATED COUNT: 14.9 % (ref 11.5–14.5)
EST. GFR  (AFRICAN AMERICAN): >60 ML/MIN/1.73 M^2
EST. GFR  (NON AFRICAN AMERICAN): >60 ML/MIN/1.73 M^2
GLUCOSE SERPL-MCNC: 101 MG/DL (ref 70–110)
HCT VFR BLD AUTO: 31.3 % (ref 37–48.5)
HGB BLD-MCNC: 9.3 G/DL (ref 12–16)
IMM GRANULOCYTES # BLD AUTO: 0.03 K/UL (ref 0–0.04)
IMM GRANULOCYTES NFR BLD AUTO: 0.3 % (ref 0–0.5)
LYMPHOCYTES # BLD AUTO: 3.2 K/UL (ref 1–4.8)
LYMPHOCYTES NFR BLD: 31.2 % (ref 18–48)
MCH RBC QN AUTO: 28.1 PG (ref 27–31)
MCHC RBC AUTO-ENTMCNC: 29.7 G/DL (ref 32–36)
MCV RBC AUTO: 95 FL (ref 82–98)
MONOCYTES # BLD AUTO: 0.4 K/UL (ref 0.3–1)
MONOCYTES NFR BLD: 4.3 % (ref 4–15)
NEUTROPHILS # BLD AUTO: 6.3 K/UL (ref 1.8–7.7)
NEUTROPHILS NFR BLD: 61.3 % (ref 38–73)
NRBC BLD-RTO: 0 /100 WBC
PLATELET # BLD AUTO: 313 K/UL (ref 150–350)
PMV BLD AUTO: 11 FL (ref 9.2–12.9)
POTASSIUM SERPL-SCNC: 3.5 MMOL/L (ref 3.5–5.1)
RBC # BLD AUTO: 3.31 M/UL (ref 4–5.4)
SODIUM SERPL-SCNC: 137 MMOL/L (ref 136–145)
WBC # BLD AUTO: 10.21 K/UL (ref 3.9–12.7)

## 2020-02-20 PROCEDURE — 80048 BASIC METABOLIC PNL TOTAL CA: CPT

## 2020-02-20 PROCEDURE — 85025 COMPLETE CBC W/AUTO DIFF WBC: CPT

## 2020-02-28 NOTE — NURSING
Situation Principle Problem:  Closed left hip fracture, initial encounter      Reason for Calling: Pt with decreased UO throughout the night.    Provider Calling: Jose Eduardo Claudio   Background Vitals:    12/09/18 1629 12/09/18 2100 12/09/18 2244 12/10/18 0359   BP: (!) 123/56 (!) 143/60 (!) 141/63 (!) 148/60   BP Location: Right arm Right arm Right arm    Patient Position: Lying Lying Lying    Pulse: 68 83 85 79   Resp: 18 16 18 18   Temp: 97.9 °F (36.6 °C) 98.1 °F (36.7 °C) 98.5 °F (36.9 °C) 98.7 °F (37.1 °C)   TempSrc: Oral Oral Oral    SpO2: 97%  100% 95%   Weight:       Height:           No results found for: POCTGLUCOSE    Intake/Output:    Intake/Output Summary (Last 24 hours) at 12/10/2018 0507  Last data filed at 12/9/2018 1700  Gross per 24 hour   Intake 200 ml   Output 550 ml   Net -350 ml        Assessment What is happening: Pt's philippe was removed this morning with a failed voiding trial. During the day they ended up having to do an In and Out around 1600 and received 450 mL. This evening she's been receiving IVF at 100 mL/hr. I have bladder scanned her 3 times and each time showed <100. Pt tried voiding on the bedpan and in a brief but was unsuccessful each time. We did an In and Out this morning at 0310 and only received back 150 mL. I wanted to make you aware. Any interventions?   Response Provider Response: No interventions at this time.         8

## 2020-03-05 ENCOUNTER — LAB VISIT (OUTPATIENT)
Dept: LAB | Facility: HOSPITAL | Age: 85
End: 2020-03-05
Attending: INTERNAL MEDICINE
Payer: MEDICARE

## 2020-03-05 DIAGNOSIS — R68.89 MECHANICAL AND MOTOR PROBLEMS WITH INTERNAL ORGANS: ICD-10-CM

## 2020-03-05 DIAGNOSIS — L89.310 UNSTAGEABLE PRESSURE ULCER OF RIGHT BUTTOCK: ICD-10-CM

## 2020-03-05 DIAGNOSIS — R79.89 HYPOURICEMIA: Primary | ICD-10-CM

## 2020-03-05 LAB
ANION GAP SERPL CALC-SCNC: 8 MMOL/L (ref 8–16)
BASOPHILS # BLD AUTO: 0.04 K/UL (ref 0–0.2)
BASOPHILS NFR BLD: 0.4 % (ref 0–1.9)
BUN SERPL-MCNC: 32 MG/DL (ref 10–30)
CALCIUM SERPL-MCNC: 9.6 MG/DL (ref 8.7–10.5)
CHLORIDE SERPL-SCNC: 103 MMOL/L (ref 95–110)
CO2 SERPL-SCNC: 25 MMOL/L (ref 23–29)
CREAT SERPL-MCNC: 0.8 MG/DL (ref 0.5–1.4)
DIFFERENTIAL METHOD: ABNORMAL
EOSINOPHIL # BLD AUTO: 0.2 K/UL (ref 0–0.5)
EOSINOPHIL NFR BLD: 1.4 % (ref 0–8)
ERYTHROCYTE [DISTWIDTH] IN BLOOD BY AUTOMATED COUNT: 15.4 % (ref 11.5–14.5)
EST. GFR  (AFRICAN AMERICAN): >60 ML/MIN/1.73 M^2
EST. GFR  (NON AFRICAN AMERICAN): >60 ML/MIN/1.73 M^2
GLUCOSE SERPL-MCNC: 86 MG/DL (ref 70–110)
HCT VFR BLD AUTO: 31 % (ref 37–48.5)
HGB BLD-MCNC: 9.6 G/DL (ref 12–16)
IMM GRANULOCYTES # BLD AUTO: 0.03 K/UL (ref 0–0.04)
LYMPHOCYTES # BLD AUTO: 3.6 K/UL (ref 1–4.8)
LYMPHOCYTES NFR BLD: 34.4 % (ref 18–48)
MCH RBC QN AUTO: 29.7 PG (ref 27–31)
MCHC RBC AUTO-ENTMCNC: 31 G/DL (ref 32–36)
MCV RBC AUTO: 96 FL (ref 82–98)
MONOCYTES # BLD AUTO: 0.5 K/UL (ref 0.3–1)
MONOCYTES NFR BLD: 4.6 % (ref 4–15)
NEUTROPHILS # BLD AUTO: 6.2 K/UL (ref 1.8–7.7)
NEUTROPHILS NFR BLD: 58.9 % (ref 38–73)
NRBC BLD-RTO: 0 /100 WBC
PLATELET # BLD AUTO: 282 K/UL (ref 150–350)
PMV BLD AUTO: 10.2 FL (ref 9.2–12.9)
POTASSIUM SERPL-SCNC: 4.2 MMOL/L (ref 3.5–5.1)
RBC # BLD AUTO: 3.23 M/UL (ref 4–5.4)
SODIUM SERPL-SCNC: 136 MMOL/L (ref 136–145)
WBC # BLD AUTO: 10.52 K/UL (ref 3.9–12.7)

## 2020-03-05 PROCEDURE — 80048 BASIC METABOLIC PNL TOTAL CA: CPT

## 2020-03-05 PROCEDURE — 85025 COMPLETE CBC W/AUTO DIFF WBC: CPT

## 2020-03-06 ENCOUNTER — CARE AT HOME (OUTPATIENT)
Dept: HOME HEALTH SERVICES | Facility: CLINIC | Age: 85
End: 2020-03-06
Payer: MEDICARE

## 2020-03-06 VITALS
SYSTOLIC BLOOD PRESSURE: 112 MMHG | HEART RATE: 68 BPM | RESPIRATION RATE: 16 BRPM | TEMPERATURE: 99 F | DIASTOLIC BLOOD PRESSURE: 68 MMHG | OXYGEN SATURATION: 97 %

## 2020-03-06 DIAGNOSIS — M24.561 CONTRACTURES INVOLVING BOTH KNEES: ICD-10-CM

## 2020-03-06 DIAGNOSIS — Z78.9 IMPAIRED MOBILITY AND ADLS: Primary | ICD-10-CM

## 2020-03-06 DIAGNOSIS — G20.A1 PARKINSON'S DISEASE: ICD-10-CM

## 2020-03-06 DIAGNOSIS — Z74.01 BEDBOUND: ICD-10-CM

## 2020-03-06 DIAGNOSIS — M24.562 CONTRACTURES INVOLVING BOTH KNEES: ICD-10-CM

## 2020-03-06 DIAGNOSIS — L89.220 PRESSURE INJURY OF LEFT HIP, UNSTAGEABLE: ICD-10-CM

## 2020-03-06 DIAGNOSIS — L89.313 PRESSURE ULCER OF RIGHT BUTTOCK, STAGE 3: ICD-10-CM

## 2020-03-06 DIAGNOSIS — R53.81 DEBILITY: ICD-10-CM

## 2020-03-06 DIAGNOSIS — L89.211 PRESSURE ULCER OF RIGHT HIP, STAGE 1: ICD-10-CM

## 2020-03-06 DIAGNOSIS — Z74.09 IMPAIRED MOBILITY AND ADLS: Primary | ICD-10-CM

## 2020-03-06 DIAGNOSIS — R54 FRAIL ELDERLY: ICD-10-CM

## 2020-03-06 PROCEDURE — 99349 PR HOME VISIT,ESTAB PATIENT,LEVEL III: ICD-10-PCS | Mod: S$GLB,,, | Performed by: NURSE PRACTITIONER

## 2020-03-06 PROCEDURE — 99349 HOME/RES VST EST MOD MDM 40: CPT | Mod: S$GLB,,, | Performed by: NURSE PRACTITIONER

## 2020-03-06 NOTE — Clinical Note
I sent a referral to [facility] for this patient. She has no PCP currently. Saw Dr. Brambila in the past but has [facility] listed but has never seen him, she cannot get out of the home. I have been following her and her wounds are getting worse. Caregiver has health issues herself so not sure how much that helps patient. I have sent this patient to the hospital before because of labs, elevated sed rate. Home Health is going out but wounds are worsening.

## 2020-03-08 PROBLEM — L89.211: Status: ACTIVE | Noted: 2020-03-08

## 2020-03-08 PROBLEM — L89.313 PRESSURE ULCER OF RIGHT BUTTOCK, STAGE 3: Status: ACTIVE | Noted: 2020-03-08

## 2020-03-08 NOTE — PATIENT INSTRUCTIONS
Wound Care  Taking proper care of your wound will help it heal. Your healthcare provider may show you how to clean and dress the wound. He or she will also explain how to tell if the wound is healing normally. If you are unsure of how to take care of the wound, be sure to clarify what dressing to use and how often you should change the bandages. Here are the basic steps.     A wound that's not healing normally may be dark in color or have white streaks.   Wash your hands  Tips for washing your hands include:  · Use liquid soap and lather for 2 minutes. Scrub between your fingers and under your nails.  · Rinse with warm water, keeping your fingers pointing down.  · Use a paper towel to dry your hands and to turn off the faucet.  Remove the used dressing  Here are suggestions for removing the dressing:  · If dressing changes cause you pain, be sure to take your pain medicine as prescribed by your healthcare provider 30 minutes before dressing changes.  · Set up your supplies.  · Put on disposable gloves if youre dressing a wound for someone else or your wound is infected.  · Loosen the tape by pulling gently toward the wound.  · Gently take off the old dressing. If the dressing is stuck to the wound, moisten it with saline (if available) or clean water.  · If you have a drain or tube in the wound, be careful not to pull on it.  · Remove the dressing 1 layer at a time and put it in a plastic bag.  · Remove your gloves.  Inspect and dress the wound  Check the wound carefully:  · Each time you change the dressing, inspect the wound carefully to be sure its healing normally by making sure your wound appears to be pink and moist, and is free of infection.    · Wash your hands again. Put on a new pair of gloves.  · Clean and dress the wound as directed by your healthcare provider or nurse. Do not put anything in the wound that is not prescribed or directed by your healthcare provider. If you have a drain or tube, be  careful not to pull on it. Make sure to secure the drain or tube as well.  · Put all supplies in a plastic bag. Seal the bag and put it in the trash.  · Be sure to wash your hands again.  Call your healthcare provider  Call your healthcare provider if you see any of the following signs of a problem:  · Bleeding that soaks the dressing  · Pink fluid weeping from the wound  · Increased drainage or drainage that is yellow, yellow-green, or foul-smelling  · Increased swelling or pain, or redness or swelling in the skin around the wound  · A change in the color of the wound, or if streaks develop in a direction away from the wound  · The area between any stitches opens up  · An increase in the size of the wound  · A fever of 100.4°F (38°C) or higher, or as directed by your healthcare provider  · Chills, increased fatigue, or a loss of appetite      Date Last Reviewed: 7/30/2015  © 2665-8814 [employer]. [address] [place] [address], [place] [address]. All rights reserved. This information is not intended as a substitute for professional medical care. Always follow your healthcare professional's instructions.

## 2020-03-09 DIAGNOSIS — L89.310 PRESSURE INJURY OF RIGHT BUTTOCK, UNSTAGEABLE: ICD-10-CM

## 2020-03-09 DIAGNOSIS — L89.92: ICD-10-CM

## 2020-03-09 DIAGNOSIS — L89.313 PRESSURE ULCER OF RIGHT BUTTOCK, STAGE 3: Primary | ICD-10-CM

## 2020-03-09 DIAGNOSIS — L08.9: ICD-10-CM

## 2020-03-09 DIAGNOSIS — G20.A1 PARKINSON'S DISEASE: ICD-10-CM

## 2020-03-18 ENCOUNTER — OUTPATIENT CASE MANAGEMENT (OUTPATIENT)
Dept: ADMINISTRATIVE | Facility: OTHER | Age: 85
End: 2020-03-18

## 2020-03-18 NOTE — PROGRESS NOTES
03/18/21-Called and received permission from patient to do assessment with caregiver, Anastasia. Anastasia stays with patient 24 hours a day. Patient does not have transportation to wound care. Wound Care was set up at [facility] in [place] by the hospital when she was dischaged on 02/17/20.Anastasia states that she is unable to dress patient to go to appointments. She would like assistance giving her baths and washing her hair.Patient has [facility] home health nurse two times a week for wound care. [facility] has [facility] scheduled for 03/20/20 home visit. Patient is bed bound with bilateral contractures.  Anastasia gets patient up daily and places her in a lift chair. Patient fell out of her w/c on 12/23/20 at [facility]. Patient with several decubitus on her sacral area. Caregiver does wound care on the days home health does not come. Patient needs a better mattress on the hospital bed and the right hand rail will not go all the way up per caregiver. Will follow up with patient/caregiver.  03/19/20-[facility]ed [facility]: [phone] spoke to Kyra. Patient with an order for low air loss mattress since January. DME needs wound care information to send to Medicare for approval. DME needs measurements and location of wounds and are they getting better or worse. Called and left a message for Gladys Cates NP [facility]. Called and spoke to Anastasia who states that [facility] has already called and talked to her this morning about sending out a tech to look at the hospital rail. Updated her that I am working on the low air mattress order, verbalized understanding. MORALES Graham called back and left a voice mail that she is placing wound care measurements and pictures in her notes. Called and spoke to Kyra at [facility] who looked at the notes from MORALES Graham on 03/06/20. For the low air mattress patient needs two stage 2 decubitus or more. She is qualified for the gel overlay mattress at this time.  [phone].   Called [facility] at Phone: [phone] and spoke to Cesilia who states per notes patient has two stage 2 decubitus plus other unshakeable break downs. Cesilia faxed over the notes to [facility] while we were on the phone. [phone] Attn-Kyra..   Called and spoke to Gladys Cates NP and updated her on order for low air loss mattress. She will have the staff fax over her notes after her visit tomorrow. Patient does not want to go to a nursing home per MORALES Graham. Patient needs more support at home. Will update SW before she calls patient/caregive for assessment.     PLAN-  Follow up in one week  Refer to [facility] SW for transportation and support at home     Outpatient Care Management  Initial Patient Assessment    Patient: Maricarmen Woodward  MRN: [id number]  Date of Service: 03/18/2020  Completed by: Elise oDbson RN/20 and patient needs two or more stage two decubitus to qualify   Referral Date: 03/09/2020  Program: Case Management (High Risk)    Reason for Visit   Patient presents with    OPCM Enrollment Call    Initial Screen    Nursing Assessment    PHQ-9    Plan Of Care       Brief Summary: see note     Assessment Documentation     OPCM Initial Assessment    Involvement of Care  Do I have permission to speak with other family members about your care?:  Yes (Comment: Teetee Laura)  Assessment completed by:  Paid caregiver  Patient Reported Insurance  Verified current insurance plan:  Medicare, Other (see comment) (Comment: [employer])  Current Health Status  Patient Health Rating  Compared to other people your age, how would you rate your health?:  Poor  Patient Reported Labs & Vitals  Any patient reported labs and/or vitals?:  No  Social Determinants  Advanced Care Planning  Do you have any of the following?:  Medical power of  (Comment: Uqnr-pilrkyl-SPJS and LAPOST on file)  If yes, do we have a copy?:  Yes  If no, would the patient like  Advance Directive resources?:  N/A  Advanced Care Planning resources provided?:  No  Is Advanced Care Planning an area of need?:  No  Support  Caregiver presence?:  Yes  Name of primary caregiver:  LAZARO  Primary caregiver phone number:  [phone]  Primary caregiver relationship:  paid caregiver  Present activity level:  must stay in bed most of the time  Who takes you to your medical appointments?:  other (see comment) (Comment: needs transporation assistance )  Is the caregiver supporting a need?:  Yes  Does the current primary caregiver meet the patient's needs?:  No  Housing  Living arrangements:  paid caregiver  Number of people in home:  2  Type of residence:  single family home  Own or rent?:  own  Permanent residence?:  yes  Does the patient's residence have any of the following?:  n/a (Comment: ramp outside)  Is housing an area of need?:  no  Access to [employer]  Does the patient have access to any of the following devices or technologies?:  none  Clinical Assessment  Medication Adherence  How does the patient obtain their medications?:  pharmacy delivery  How many days a week do you miss medications?:  never (Comment: caregiver gives her medications to her )  Do you use a pill box or medication chart to help you manage your medications?:  pill box  Do you sometimes have difficulty refilling your medications?:  no  Medication reconciliation completed?:  Yes  Is medication adherence an area of need?:  No  *Active medication list was reviewed and reconciled with patient and/or caregiver:    Nutritional Status  Diet:  Regular  Change in appetite?:  no  Recent changes in weight?:  no  Dentition:  N/A  Is nutrition an area of need?:  no  Labs  Do you have regular lab work to monitor your medications?:  no  Type of lab work:  other (see comment) (Comment: CBC/BMP)  Where do you get your lab work done?:  [facility]  Is lab adherence an area of need?:  no (Comment: home health drawing labs )  PHQ  Depression Screen  Does patient's PHQ Depression Screening indicate a barrier to meeting self-care needs?:  No  Cognitive/Behavioral Health  Alert and oriented?:  yes  Difficulty thinking?:  no  Requires prompting?:  no  Requires assistance for routine expression?:  no  Is Cognitive/Behavioral health an area of need?:  no  Culture/[place]  Does patient have cultural or Anglican beliefs that may impact ability to access healthcare?:  no  Communication  Language preference:  English   needed?:  no  Hearing problems?:  no  Decreased vision?:  yes  Legally blind?:  no  Vision assistance:  glasses  Is Communication an area of need?:  no  Health Literacy  Preferred learning method:  face to face, reading materials  How often do you need to have someone help you read instructions, pamphlets, or other written material from your doctor or pharmacy?:  occasionally  Is there a Health Literacy need?:  no  Activities of Daily Living  Ambulation:  dependent  Dressing:  dependent  Bathing:  dependent  Transfers:  dependent  Toileting:  dependent  Feeding:  independent  Cleaning home/chores:  dependent  Telephone use:  assistance required  Shopping/attending doctors' appointments:  dependent  Paying bills:  dependent  Taking meds:  dependent  Climbing stairs:  dependent  Fall Risk  Patient mobility status:  Wheelchair Bound  Number of falls in the past 12 months:  1  Fall risk?:  Yes (Comment: slipped out of w/c)  Equipment/Current Services  Equipment/supplies used in home:  hospital bed, wheelchair (Comment: lift chair)  Current services:  wound care, home health nurse (Comment: [facility] )  Is Equipment/Supplies/Services an area of need?:  yes (Comment: hospital bed needs better mattress and right handrail will not come up)  Community & Government Programs  Support:  none  Government suppot assistance:  N/A  [facility]:  N/A  Community Resource Assessment  Based on the  assessment of needs:  Patient is in need of community resources  [facility]  consulted to assist with the following:  transportation, other (see comment) (Comment: sipport at home)  Completion of Initial Assessment  Is the Initial Assessment Complete at this time?:  yes         Problem List and History     Patient Active Problem List   Diagnosis    Syncope    Generalized weakness    Essential hypertension    Parkinson's disease    Hypokalemia    Pneumonia- RLL    Closed left hip fracture, initial encounter    Anemia    Pre-op evaluation    Left hip pain    Macrocytic anemia    Hyponatremia    Arthritis    Debility    Hypomagnesemia    Acute cystitis without hematuria    Contractures involving both knees    Pressure injury of right buttock, unstageable     ACTIVE    Anemia associated with acute blood loss    Compression fracture of L4 lumbar vertebra, closed, initial encounter    Intramuscular hematoma    Closed fracture of condyle of right femur    Ecchymosis    Hypophosphatemia    S/P ORIF (open reduction internal fixation) fracture    Impaired mobility and ADLs-ACTIVE    Frail elderly    Bedbound  ACTIVE    Severe malnutrition    Pressure injury of trochanteric region of left hip, stage 2    Decubitus ulcer of sacrum, stage 2 with infection ACTIVE    Hospital discharge follow-up    Pressure injury of left hip, unstageable ACTIVE    Pressure injury of sacral region, stage 2 ACTIVE    Urinary tract infection without hematuria    Heme positive stool    Pressure ulcer of right buttock, stage 3 ACTIVE    Pressure ulcer of right hip, stage 1 ACTIVE        Reviewed Active Problem List with patient and/or Caregiver. The following were identified as areas of need: -wound care, safety/falls    Medical History:  Reviewed medical history with patient and/or caregiver    Social History:  Reviewed social history with patient and/or caregiver    Complex Care Plan    Care plan was  discussed and completed today with input from patient and/or caregiver.        Patient Instructions     Instructions were provided via the [facility] patient resources and are available for the patient to view on the patient portal, if active.    Next steps: see note    Follow up in about 1 week (around 3/25/2020).    Todays OPCM Self-Management Care Plan was developed with the patients/caregivers input and was based on identified barriers from todays assessment.  Goals were written today with the patient/caregiver and the patient has agreed to work towards these goals to improve his/her overall well-being. Patient verbalized understanding of the care plan, goals, and all of today's instructions. Encouraged patient/caregiver to communicate with his/her physician and health care team about health conditions and the treatment plan.  Provided my contact information today and encouraged patient/caregiver to call me with any questions as needed.

## 2020-03-20 ENCOUNTER — CARE AT HOME (OUTPATIENT)
Dept: HOME HEALTH SERVICES | Facility: CLINIC | Age: 85
End: 2020-03-20
Payer: MEDICARE

## 2020-03-20 ENCOUNTER — OUTPATIENT CASE MANAGEMENT (OUTPATIENT)
Dept: ADMINISTRATIVE | Facility: OTHER | Age: 85
End: 2020-03-20

## 2020-03-20 VITALS
HEART RATE: 60 BPM | RESPIRATION RATE: 16 BRPM | DIASTOLIC BLOOD PRESSURE: 64 MMHG | TEMPERATURE: 99 F | OXYGEN SATURATION: 98 % | SYSTOLIC BLOOD PRESSURE: 106 MMHG

## 2020-03-20 DIAGNOSIS — L89.312 DECUBITUS ULCER OF RIGHT BUTTOCK, STAGE 2: ICD-10-CM

## 2020-03-20 DIAGNOSIS — M24.562 CONTRACTURES INVOLVING BOTH KNEES: ICD-10-CM

## 2020-03-20 DIAGNOSIS — G20.A1 PARKINSON'S DISEASE: ICD-10-CM

## 2020-03-20 DIAGNOSIS — L89.220 PRESSURE INJURY OF LEFT HIP, UNSTAGEABLE: ICD-10-CM

## 2020-03-20 DIAGNOSIS — L89.212: ICD-10-CM

## 2020-03-20 DIAGNOSIS — L89.312 PRESSURE INJURY OF RIGHT ISCHIUM, STAGE 2: ICD-10-CM

## 2020-03-20 DIAGNOSIS — Z74.01 BEDBOUND: ICD-10-CM

## 2020-03-20 DIAGNOSIS — Z78.9 IMPAIRED MOBILITY AND ADLS: ICD-10-CM

## 2020-03-20 DIAGNOSIS — M24.561 CONTRACTURES INVOLVING BOTH KNEES: ICD-10-CM

## 2020-03-20 DIAGNOSIS — L89.153 PRESSURE ULCER OF COCCYGEAL REGION, STAGE 3: Primary | ICD-10-CM

## 2020-03-20 DIAGNOSIS — R53.81 DEBILITY: ICD-10-CM

## 2020-03-20 DIAGNOSIS — Z74.09 IMPAIRED MOBILITY AND ADLS: ICD-10-CM

## 2020-03-20 DIAGNOSIS — L89.313 PRESSURE ULCER OF RIGHT BUTTOCK, STAGE 3: ICD-10-CM

## 2020-03-20 DIAGNOSIS — R54 FRAIL ELDERLY: ICD-10-CM

## 2020-03-20 PROCEDURE — 99349 HOME/RES VST EST MOD MDM 40: CPT | Mod: S$GLB,,, | Performed by: NURSE PRACTITIONER

## 2020-03-20 PROCEDURE — 99349 PR HOME VISIT,ESTAB PATIENT,LEVEL III: ICD-10-PCS | Mod: S$GLB,,, | Performed by: NURSE PRACTITIONER

## 2020-03-20 RX ORDER — DOXYCYCLINE 100 MG/1
100 CAPSULE ORAL EVERY 12 HOURS
Qty: 20 CAPSULE | Refills: 0 | Status: SHIPPED | OUTPATIENT
Start: 2020-03-20 | End: 2020-03-30

## 2020-03-20 NOTE — PROGRESS NOTES
03/20/20-  Gladys Cates NP at [facility] called ([phone]) after making a home visit with the patient. Wounds are getting worse with several stage 2 decubituses. Patient and caregiver are in agreement for patient to go to a nursing home or other. MORALES Graham spoke to [facility] that suggested [facility] for wound care.     [phone] and spoke to Donnie. After LTAC receives face sheet with insurance, list of medications, consults and notes on wounds. They will look over it and if appropriate will send someone to the home to access for admission to LTAC.       [facility] [phone]    [facility] Phone: [phone] called and spoke to Yohana who fax  information over to [facility].     Called Gladys and updated her with [facility]. MORALES Graham will finish her note today and will have office fax note to  [facility].    Called  [facility] and they have received all the information and they have reviewed it. Was given phone number for nurse, Gracie who is going to access patient for admission. Called [phone] at [facility] , she called patient's house and she was sleeping. She will access patient on Monday. LTAC does not have a bed so she could not of been admitted this weekend. Gracie will call RN Monday after visit. Gracie called Yohana at [facility] and updated her also.    Called YANI Cates NP and left message with an update. They will accept admit orders from a NP.

## 2020-03-21 NOTE — PROGRESS NOTES
"[facility]  Medical Home Visit    Visit Date: 3/22/2020  Encounter Provider: Gladys Cates NP  PCP:  Faustino Mccord PA-C    Subjective:      Patient ID: Maricarmen Woodward is a 89 y.o. female.    Consult Requested By:  Gladys Cates NP  Reason for Consult:  Medical Follow Up/Wound Check    Maricarmen is being seen at home due to physical limitations    Chief Complaint: Wound Check, Multiple decubitus ulcers    History of Present Illness: Maricarmen Woodward is a 89 year old female with a PMHX of parkinsons disease, hypertension, osteoarthritis and numerous hospitalizations since July 2019. She was discharged from [facility] on 1/20/2020 after spending 2 months there following a hospitalization for a lower GI bleed in November 2019. She is , has no children, has a nephew that lives across the street from her and she has a  named Anastasia who was present today. Anastasia appears to have significant health issues herself.     With this visit today Anastasia () is present with Maricarmen. Patient is lying in a hospital bed on her right side, as she is usually when I see her. When asked how she is doing, Anastasia said "not too good" but could not offer specifics. When the patient responds she states "I don't feel good, my hip hurts". Anastasia states she is physically unable to get Maricarmen into a care and get [facility] an [facility] as ordered on last hospital admission.    Maricarmen is receiving Home Health visits with wound care and Anastasia performs wound care in between. Upon assessing wounds today, it was noted that on 2 of the wounds, dressings were not in correct place, not touching wound at all. Patient also had a moderate amount of stool present in sacral/gluteal wounds.    Upon assessment of her wounds, there is absolute deterioration since last assessed by me 2 weeks ago. Coccyx area pressure ulcer with depth now, new right hip " "ulcerations x 2 with + foul odor. See pictures in assessment. It is evident that these wounds cannot be managed properly in the home and this patient needs LTAC placement for wound care.     Patient's appetite is reported to be fair. When asked about drinking Boost or Ensure, Anastasia states patient drinks some kind of "soybean drink" everyday but does not eat much. Encouraged to provide Boost or Ensure twice a day along with high protein meals/egss, peanut butter. Anastasia reports that she tries to get Chiffone to change positions in bed but she ultimately only wants to lie on her right side.    Anastasia does report that she gets Chiffone out of bed daily into a wheelchair and then into a recliner chair. This was attempted today with my help but patient cannot stand and is very high risk of falling due to caregivers poor health. Anastasia was encouraged to keep patient in bed for now. DME company is coming today to bring a low air loss mattress for patient's hospital bed.     Home Health is coming on Tuesdays and Thursdays to do wound care and Anastasia is doing wound care in between. Went over proper technique with Anastasia again today as I did wound care on patient.       Review of Systems   Constitutional: Positive for activity change, appetite change and fatigue. Negative for fever.   HENT: Negative.    Eyes: Negative.    Respiratory: Negative.    Cardiovascular: Negative.    Gastrointestinal: Negative.  + bowel incontinence.  Endocrine: Negative.    Genitourinary: Negative.         + urinary incontinence   Musculoskeletal: Positive for arthralgias and gait problem.   Skin: Positive for pallor and wounds.   Allergic/Immunologic: Negative.    Neurological: Positive for dizziness, tremors and weakness. Negative for seizures, speech difficulty and headaches.   Hematological: Bruises/bleeds easily.   Psychiatric/Behavioral: Negative.       Assessments:  ·           Environmental: clean, adequate lighting and temperature, no foul " odors  ·           Functional Status: dependent but can feed self, non-ambulatory, no longer standing, bed bound/recliner chair bound  ·           Safety: no issues identified  ·           Nutritional: seems adequate but not observable today  ·           Home Health/DME/Supplies: [facility], hospital bed, BSC, walker, wound care supplies       Objective:   Physical Exam   Constitutional: She is oriented to person, place, and time.   She is oriented to person, place, and time.   Thin, frail, weak, elderly,  female      HENT:   Head: Normocephalic and atraumatic.   Right Ear: External ear normal.   Left Ear: External ear normal.   Nose: Nose normal.   Mouth/Throat: Oropharynx is clear and moist.   Very [place]   Eyes: Pupils are equal, round, and reactive to light. Conjunctivae and EOM are normal.   Neck: Normal range of motion. Neck supple.   Cardiovascular: Normal rate, regular rhythm, normal heart sounds and intact distal pulses.   Pulmonary/Chest: Effort normal and breath sounds normal. No respiratory distress. She has no wheezes.   Abdominal: Soft. Bowel sounds are normal. She exhibits no distension and no mass. There is no tenderness.   Musculoskeletal:   Contractures at knees, profound bony prominences, generalized weakness   Neurological: She is alert and oriented to person, place, and time.   Skin: Skin is warm and dry. Capillary refill takes 2 to 3 seconds. Ecchymosis (scattered) and petechiae (scattered) noted.   Psychiatric: She has a normal mood and affect. Her behavior is normal. Judgment and thought content normal.    WOUNDS:  1. Right trochanteric stage 2 pressure ulcer (new), 1cm x 1cm, +foul odor, no drainage      2.RIght ischium stage 2 pressure ulcer (new), + foul odor, 4cm x 4 cm, mild serous drainage noted on dressing      3. Buttocks/Gluteal/Coccygeal pressure ulcer, Coccygeal area worsening since last visit 2 weeks ago, now with depth, no bone obvious, no foul odor 3cm x 1.5cm x 0.5  cm depth, mild serous drainage       4. Left trochanter pressure ulcer, no odor, no drainage, 4 cm x 2 cm       Vitals:    03/20/20 1024   BP: 106/64   Pulse: 60   Resp: 16   Temp: 98.7 °F (37.1 °C)   TempSrc: Temporal   SpO2: 98%   PainSc:   3   PainLoc: Buttocks     There is no height or weight on file to calculate BMI.    Assessment:     1. Pressure ulcer of coccygeal region, stage 3    2. Impaired mobility and ADLs    3. Debility    4. Frail elderly    5. Contractures involving both knees    6. Bedbound    7. Parkinson's disease    8. Pressure ulcer of right buttock, stage 3    9. Pressure injury of left hip, unstageable    10. Decubitus ulcer of right buttock, stage 2    11. Pressure injury of right ischium, stage 2    12. Pressure ulcer of trochanteric region of right hip, stage 2        Plan:          Maricarmen was seen today for wound check.    Diagnoses and all orders for this visit:    Pressure ulcer of coccygeal region, stage 3  Spoke with [facility] Case Management, Elise Dobson RN, for assistance with getting an evaluation from [facility] in [place], [place] for placement. Consult with be done by [facility] in patient's home on Monday, 3/23/2020.   Impaired mobility and ADLs      Debility      Frail elderly    Contractures involving both knees      Bedbound      Parkinson's disease      Pressure ulcer of right buttock, stage 3  [facility], Elise Dobson RN, for assistance with getting an evaluation from [facility] in [place], [place] for placement. Consult with be done by [facility] in patient's home on Monday, 3/23/2020.    Decubitus ulcer of right buttock, stage 2  Spoke with [facility], Elise Dobson RN, for assistance with getting an evaluation from [facility] in [place], [place] for placement. Consult with be done by [facility] in patient's home on Monday, 3/23/2020.  Pressure injury of right ischium, stage 2  -     doxycycline (VIBRAMYCIN) 100 MG Cap; Take 1 capsule (100 mg total) by  mouth every 12 (twelve) hours. for 10 days  Pressure ulcer of trochanteric region of right hip, stage 2  -     doxycycline (VIBRAMYCIN) 100 MG Cap; Take 1 capsule (100 mg total) by mouth every 12 (twelve) hours. for 10 days    Discussed with both patient and caregiver Anastasia today:  Offloading importance  Keeping stool and urine away from pressure ulcers  Increase protein intake, get Boost/Ensure for patient to drink at least 2 per day as well as eating things like eggs, peanut butter.  Get Doxycycline RX from Pharmacy as soon as it is ready and take twice a day x 10 days.  Will have LTAC come do a consult for wound evaluation and treatment.  Continue Home Health for wound care until LTAC admission.    Were controlled substances prescribed?  No    Follow Up Appointments:   No future appointments.    Consent for visit obtained verbally from patient and signature obtained from sitter today.     Signature:  Gladys Cates NP     Attestation:   Screening criteria to assess the level of the patient's risk for infection with COVID-19 as recommended by the CDC at the time of the above documented home visit concluded appropriateness to proceed. Universal precautions were maintained at all times, including provider use of 60% alcohol gel hand  immediately prior to entry and upon departure of patient's home as well as cleaning of equipment used in home visit with antibacterial/germicidal disposable wipes.

## 2020-03-22 PROBLEM — L89.212: Status: ACTIVE | Noted: 2020-03-22

## 2020-03-22 PROBLEM — L89.153 PRESSURE ULCER OF COCCYGEAL REGION, STAGE 3: Status: ACTIVE | Noted: 2020-03-22

## 2020-03-22 PROBLEM — L89.312 PRESSURE INJURY OF RIGHT ISCHIUM, STAGE 2: Status: ACTIVE | Noted: 2020-03-22

## 2020-03-22 PROBLEM — L89.312 DECUBITUS ULCER OF RIGHT BUTTOCK, STAGE 2: Status: ACTIVE | Noted: 2020-03-22

## 2020-03-23 ENCOUNTER — OUTPATIENT CASE MANAGEMENT (OUTPATIENT)
Dept: ADMINISTRATIVE | Facility: OTHER | Age: 85
End: 2020-03-23

## 2020-03-23 NOTE — PROGRESS NOTES
03/23/20-Called [phone] at [facility] . She did an assessment on patient this morning and will review it with her admission team. Gracie called back and patient has been Accepted to [facility]. Patient can be admitted to the LT today. Orders for LTAC -  Admit to [facility].  Consult Wound Care- TX and Eval  Consult PT/OT- TX and Eval  Diet-  Med List   And any other orders that are necessary per Gracie.    Called Gladys Cates NP -[facility] at  ([phone]) and left a message that patient has been accepted to [facility] and can be admitted today with admit orders. Fax to [phone].  Received voice mail from Gracie  that they are estimating patient being with them for one week. They will look at her after one week.  Called Gladys Cates NP and left her updated information. Gladys Cates NP called back and is sending orders to LT.  Received message negro Gracie at [facility]. She received the orders. She will coordinate the transfer ro the LTAC and call the family. -enroll She will set up [facility] for the patient to be admitted today to LT.  Called [facility] Phone: [phone] and spoke to Yohana. Updated her that patient would be admitted to [facility] today.  Will close case and dis-enroll patient at this time since she is being admitted to LTAC.

## 2020-04-18 ENCOUNTER — HOSPITAL ENCOUNTER (EMERGENCY)
Facility: HOSPITAL | Age: 85
Discharge: HOME | End: 2020-04-18
Attending: EMERGENCY MEDICINE
Payer: MEDICARE

## 2020-04-18 VITALS — BODY MASS INDEX: 18.84 KG/M2 | TEMPERATURE: 99 F | WEIGHT: 103 LBS | RESPIRATION RATE: 16 BRPM

## 2020-04-18 DIAGNOSIS — Z45.2 ENCOUNTER FOR ASSESSMENT OF PERIPHERALLY INSERTED CENTRAL CATHETER (PICC): Primary | ICD-10-CM

## 2020-04-18 LAB — SARS-COV-2 RDRP RESP QL NAA+PROBE: NEGATIVE

## 2020-04-18 PROCEDURE — U0002 COVID-19 LAB TEST NON-CDC: HCPCS

## 2020-04-18 PROCEDURE — 99283 EMERGENCY DEPT VISIT LOW MDM: CPT | Mod: 25

## 2020-04-18 RX ORDER — DOCUSATE SODIUM 100 MG/1
100 CAPSULE, LIQUID FILLED ORAL DAILY
COMMUNITY

## 2020-04-18 RX ORDER — PANTOPRAZOLE SODIUM 40 MG/1
40 TABLET, DELAYED RELEASE ORAL DAILY
COMMUNITY

## 2020-04-18 RX ORDER — ATORVASTATIN CALCIUM 10 MG/1
10 TABLET, FILM COATED ORAL DAILY
COMMUNITY

## 2020-04-18 RX ORDER — DOXYCYCLINE HYCLATE 100 MG
100 TABLET ORAL 2 TIMES DAILY
COMMUNITY

## 2020-04-18 RX ORDER — MEGESTROL ACETATE 40 MG/ML
40 SUSPENSION ORAL DAILY
COMMUNITY

## 2020-04-18 NOTE — ED PROVIDER NOTES
Encounter Date: 4/18/2020    SCRIBE #1 NOTE: I, Ceci Cox, am scribing for, and in the presence of, Diego Hart MD.       History     Chief Complaint   Patient presents with    Fever     [facility] requesting covid testing    Vascular Access Problem     leaking around mid [facility] site. [facility]       Seen by provider on 04/18/2020    Maricarmen Woodward is a 89 y.o. female who presents to the ED per EMS from [facility] with an onset of leakage around midline IV site to left upper arm. Pt currently gets IV Rocephin. [facility] also reports possible fever and has requested COVID-19 screening. The patient denies any complaints or symptoms at this time. No pertinent PMHx or PSHx.    The history is provided by the patient, the EMS personnel and the correction.     Review of patient's allergies indicates:  No Known Allergies  Past Medical History:   Diagnosis Date    Arthritis     Hospital discharge follow-up 2/19/2020    Hypertension     Parkinson's disease      Past Surgical History:   Procedure Laterality Date    ESOPHAGOGASTRODUODENOSCOPY N/A 12/1/2019    Procedure: EGD (ESOPHAGOGASTRODUODENOSCOPY);  Surgeon: Heidi Gallagher MD;  Location: [facility];  Service: Endoscopy;  Laterality: N/A;    ESOPHAGOGASTRODUODENOSCOPY N/A 2/14/2020    Procedure: EGD (ESOPHAGOGASTRODUODENOSCOPY);  Surgeon: Jony Orellana MD;  Location: [facility];  Service: Endoscopy;  Laterality: N/A;    FRACTURE SURGERY      HEMIARTHROPLASTY OF HIP Left 12/8/2018    Procedure: HEMIARTHROPLASTY, HIP, [place], peg board, first assist;  Surgeon: Cheng Mccormack MD;  Location: [facility];  Service: Orthopedics;  Laterality: Left;    JOINT REPLACEMENT      right hip replacement    ORIF FEMUR FRACTURE Right 11/22/2019    Procedure: ORIF, FRACTURE, FEMUR, [place], radiolucent triangle, 1st assist;  Surgeon: Cheng Mccormack MD;  Location: [facility];  Service: Orthopedics;   Laterality: Right;     Family History   Problem Relation Age of Onset    No Known Problems Mother      Social History     Tobacco Use    Smoking status: Former Smoker     Packs/day: 0.25     Years: 5.00     Pack years: 1.25     Last attempt to quit: 2009     Years since quittin.0    Smokeless tobacco: Never Used   Substance Use Topics    Alcohol use: No    Drug use: No     Review of Systems   Constitutional: Positive for fever.   HENT: Negative for sore throat.    Respiratory: Negative for shortness of breath.    Cardiovascular: Negative for chest pain.   Gastrointestinal: Negative for nausea.   Genitourinary: Negative for dysuria.   Musculoskeletal: Negative for back pain.   Skin: Negative for rash.   Neurological: Negative for weakness.   Hematological: Does not bruise/bleed easily.       Physical Exam     Initial Vitals [20 0837]   BP Pulse Resp Temp SpO2   -- -- 16 99.1 °F (37.3 °C) --      MAP       --         Physical Exam    Nursing note and vitals reviewed.  Constitutional: She appears well-developed and well-nourished. She is not diaphoretic. No distress.   HENT:   Head: Normocephalic and atraumatic.   Eyes: EOM are normal. Pupils are equal, round, and reactive to light.   Neck: Normal range of motion. Neck supple.   Cardiovascular: Normal rate, regular rhythm, normal heart sounds and intact distal pulses. Exam reveals no gallop and no friction rub.    No murmur heard.  Pulmonary/Chest: Breath sounds normal. No respiratory distress. She has no wheezes. She has no rhonchi. She has no rales.   Abdominal: Soft. Bowel sounds are normal. There is no tenderness. There is no rebound and no guarding.   Musculoskeletal: Normal range of motion.   Small amount of drainage under dressing to midline IV site to left upper arm. No erythema or swelling.   Neurological: She is alert.   Awake and alert.   Skin: Skin is warm and dry.   Multiple sacral chronic skin ulcers.   Psychiatric: She has a normal  mood and affect. Her behavior is normal. Judgment and thought content normal.   Confused.         ED Course   Procedures  Labs Reviewed   SARS-COV-2 RNA AMPLIFICATION, QUAL          Imaging Results          X-Ray Chest AP Portable (Final result)  Result time 04/18/20 09:23:38    Final result by Faustino Salomon MD (04/18/20 09:23:38)                 Impression:      No acute cardiopulmonary abnormality.      Electronically signed by: Faustino Salomon  Date:    04/18/2020  Time:    09:23             Narrative:    EXAMINATION:  XR CHEST AP PORTABLE    CLINICAL HISTORY:  Suspected Covid-19 Virus Infection;    TECHNIQUE:  Single frontal view of the chest was performed.    COMPARISON:  11/26/2019    FINDINGS:  Biapical scarring right greater than left.  No airspace disease.  Normal size heart.  Aortic arch atherosclerosis.  No pleural effusion or pneumothorax.  Osteopenia.  Vascular catheter overlies the left upper arm with tip terminating in the axillary region                                 Medical Decision Making:   History:   Old Medical Records: I decided to obtain old medical records.  Initial Assessment:   89-year-old female presented with a problem with her midline catheter.  Clinical Tests:   Lab Tests: Ordered and Reviewed  Radiological Study: Ordered and Reviewed  ED Management:  The patient was emergently evaluated in the emergency department, her evaluation was significant for an elderly female with a midline IV catheter in place to the left upper arm.  There is a small amount of drainage noted at the site only.  The site does not appear to be infected at this time.  There is no one available at the hospital to change the midline at this time.  This can be done on an outpatient basis in 2 days on Monday.  The nursing home staff is been instructed that they can give the patient's Rocephin IM instead of using the midline.  Additionally we did conduct a COVID test on this patient at the request of her  nursing home.  The patient's COVID test was negative and her chest x-ray showed no acute abnormalities per Radiology as well.  She is stable for discharge back her nursing home at this time.            Scribe Attestation:   Scribe #1: I performed the above scribed service and the documentation accurately describes the services I performed. I attest to the accuracy of the note.              I, Dr. Diego Hart, personally performed the services described in this documentation. All medical record entries made by the scribe were at my direction and in my presence.  I have reviewed the chart and agree that the record reflects my personal performance and is accurate and complete. Diego Hart MD.  11:35 AM 04/18/2020               Clinical Impression:       ICD-10-CM ICD-9-CM   1. Encounter for assessment of peripherally inserted central catheter (PICC) Z45.2 V49.89         Disposition:   Disposition: Discharged  Condition: Stable     ED Disposition Condition    Discharge Stable        ED Prescriptions     None        Follow-up Information     Follow up With Specialties Details Why Contact Info    [facility] Schedule an appointment as soon as possible for a visit   [address]  [address]  [facility] [address]  [phone]                                       Dieog Hart MD  04/18/20 5504

## 2020-04-21 ENCOUNTER — DOCUMENT SCAN (OUTPATIENT)
Dept: HOME HEALTH SERVICES | Facility: HOSPITAL | Age: 85
End: 2020-04-21
Payer: MEDICARE

## 2020-05-12 ENCOUNTER — HOSPITAL ENCOUNTER (EMERGENCY)
Facility: HOSPITAL | Age: 85
Discharge: HOME | End: 2020-05-13
Attending: EMERGENCY MEDICINE
Payer: MEDICARE
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
## 2020-05-12 LAB
CREAT SERPL-MCNC: 0.8 MG/DL (ref 0.5–1.4)
EST. GFR  (AFRICAN AMERICAN): >60 ML/MIN/1.73 M^2
EST. GFR  (NON AFRICAN AMERICAN): >60 ML/MIN/1.73 M^2

## 2020-05-12 PROCEDURE — 82565 ASSAY OF CREATININE: CPT

## 2020-05-12 PROCEDURE — 99284 EMERGENCY DEPT VISIT MOD MDM: CPT | Mod: 25

## 2020-05-12 PROCEDURE — 36000 PLACE NEEDLE IN VEIN: CPT

## 2020-05-12 PROCEDURE — 36415 COLL VENOUS BLD VENIPUNCTURE: CPT

## 2020-05-12 PROCEDURE — 25500020 PHARM REV CODE 255

## 2020-05-12 RX ADMIN — IOHEXOL 75 ML: 350 INJECTION, SOLUTION INTRAVENOUS at 11:05

## 2020-05-13 VITALS
WEIGHT: 103 LBS | BODY MASS INDEX: 18.84 KG/M2 | OXYGEN SATURATION: 99 % | SYSTOLIC BLOOD PRESSURE: 121 MMHG | HEART RATE: 103 BPM | RESPIRATION RATE: 16 BRPM | DIASTOLIC BLOOD PRESSURE: 58 MMHG | TEMPERATURE: 99 F

## 2020-05-13 PROCEDURE — 25000003 PHARM REV CODE 250: Performed by: EMERGENCY MEDICINE

## 2020-05-13 RX ORDER — ACETAMINOPHEN 500 MG
1000 TABLET ORAL
Status: COMPLETED | OUTPATIENT
Start: 2020-05-13 | End: 2020-05-13

## 2020-05-13 RX ADMIN — ACETAMINOPHEN 1000 MG: 500 TABLET ORAL at 12:05

## 2020-05-13 NOTE — ED PROVIDER NOTES
Encounter Date: 5/12/2020    SCRIBE #1 NOTE: Deanna PARRISH am scribing for, and in the presence of, Heron Foss MD.       History     Chief Complaint   Patient presents with    Fall     unwitnessed. patient has contractures       Time seen by provider: 10:21 PM on 05/12/2020    Maricarmen Woodward is a 89 y.o. female with PMHx of HTN, parkinson's disease, arthritis and contractures who presents to the ED with c/o bilateral hip pain s/p an unwitnessed fall out of bed PTA. Patient is a bed-bound resident at [facility]. Patient has a wound vac of the right abdomen in place. Pt denies SOB, cough, congestion, rhinorrhea, dysuria, numbness, tingling, or any other symptoms at this time. PSHx of ORIF right femur, right hip replacement, and left hip hemiarthroplasty.       The history is provided by the patient and the EMS personnel.     Review of patient's allergies indicates:  No Known Allergies  Past Medical History:   Diagnosis Date    Arthritis     Hospital discharge follow-up 2/19/2020    Hypertension     Parkinson's disease      Past Surgical History:   Procedure Laterality Date    ESOPHAGOGASTRODUODENOSCOPY N/A 12/1/2019    Procedure: EGD (ESOPHAGOGASTRODUODENOSCOPY);  Surgeon: Heidi Gallagher MD;  Location: [facility];  Service: Endoscopy;  Laterality: N/A;    ESOPHAGOGASTRODUODENOSCOPY N/A 2/14/2020    Procedure: EGD (ESOPHAGOGASTRODUODENOSCOPY);  Surgeon: Jony Orellana MD;  Location: [facility];  Service: Endoscopy;  Laterality: N/A;    FRACTURE SURGERY      HEMIARTHROPLASTY OF HIP Left 12/8/2018    Procedure: HEMIARTHROPLASTY, HIP, [place], peg board, first assist;  Surgeon: Cheng Mccormack MD;  [facility]: [facility];  Service: Orthopedics;  Laterality: Left;    JOINT REPLACEMENT      right hip replacement    ORIF FEMUR FRACTURE Right 11/22/2019    Procedure: ORIF, FRACTURE, FEMUR, Synthes, radiolucent triangle, 1st assist;  Surgeon: Cheng Mccormack MD;  Location: [facility];   Service: Orthopedics;  Laterality: Right;     Family History   Problem Relation Age of Onset    No Known Problems Mother      Social History     Tobacco Use    Smoking status: Former Smoker     Packs/day: 0.25     Years: 5.00     Pack years: 1.25     Last attempt to quit: 2009     Years since quittin.0    Smokeless tobacco: Never Used   Substance Use Topics    Alcohol use: No    Drug use: No     Review of Systems   Constitutional: Negative for fever.   HENT: Negative for congestion and rhinorrhea.    Respiratory: Negative for cough and shortness of breath.    Cardiovascular: Negative for chest pain.   Gastrointestinal: Negative for abdominal pain and nausea.   Genitourinary: Negative for dysuria.   Musculoskeletal: Positive for arthralgias. Negative for back pain, neck pain and neck stiffness.   Skin: Negative for rash and wound.   Neurological: Negative for weakness and numbness.   Psychiatric/Behavioral: Negative for confusion.       Physical Exam     Initial Vitals   BP Pulse Resp Temp SpO2   202 20   (!) 116/58 90 16 98.8 °F (37.1 °C) 97 %      MAP       --                Physical Exam    Nursing note and vitals reviewed.  Constitutional: She appears well-developed and well-nourished. She is not diaphoretic. She appears ill. No distress.   Elderly appearance. Chronically ill-appearance.   HENT:   Head: Normocephalic and atraumatic.   Nose: Nose normal.   Eyes: EOM are normal. No scleral icterus.   Neck: Normal range of motion. Neck supple.   Cardiovascular: Normal rate, regular rhythm, normal heart sounds and intact distal pulses. Exam reveals no gallop and no friction rub.    No murmur heard.  Pulmonary/Chest: Breath sounds normal. No stridor. No respiratory distress. She has no wheezes. She has no rhonchi. She has no rales.   Abdominal: Soft. Bowel sounds are normal. There is no tenderness.   Wound vac of the right lower abdomen  without surrounding erythema, swelling or tenderness.   Musculoskeletal: She exhibits tenderness.        Right hip: She exhibits tenderness.        Left hip: She exhibits tenderness.   Bilateral hip tenderness. No cervical, thoracic, or lumbar spine tenderness. No swelling, bruising or ecchymosis. No signs of trauma.   Neurological: She is alert and oriented to person, place, and time. No cranial nerve deficit.   Skin: Skin is warm and dry. Capillary refill takes less than 2 seconds. No bruising, no ecchymosis and no rash noted. No erythema.   Psychiatric: She has a normal mood and affect.         ED Course   Procedures  Labs Reviewed   CREATININE, SERUM          Imaging Results           CT Abdomen Pelvis With Contrast (Final result)  Result time 05/12/20 23:46:24    Final result by Obinna Velazquez MD (05/12/20 23:46:24)                 Impression:      No CT evidence of injury in the abdomen or pelvis, allowing for study limitations as above.    Large amount of stool within the rectal vault with thickening of the perirectal fascia.  The findings are suggestive of stercoral colitis.    Soft tissue laceration in the right lateral abdominal wall.  Suggest correlation with possible soft tissue infection.    Additional stable findings as above.    This report was flagged in Epic as abnormal.      Electronically signed by: Obinna Velazquez MD  Date:    05/12/2020  Time:    23:46             Narrative:    EXAMINATION:  CT ABDOMEN PELVIS WITH CONTRAST    CLINICAL HISTORY:  Abdomen-pelvis trauma, minor, blunt;Fall, with BL hip pain and L-spine pain;    TECHNIQUE:  Low dose axial images, sagittal and coronal reformations were obtained from the lung bases to the pubic symphysis following the IV administration of 75 mL of Omnipaque 350 .  Oral contrast was not given.  There are some motion limitations to the examination.    COMPARISON:  CT scan of the chest abdomen pelvis dated 11/19/2019.    FINDINGS:  There are no pleural  effusions.  There is no evidence of a pneumothorax.  No airspace opacity is present.  No discrete pulmonary nodule is identified.    The heart is normal in size.  There are extensive coronary artery calcifications.  There is normal tapering of the abdominal aorta.  There are extensive calcifications along the course of the abdominal aorta and its branch vessels.  There are calcifications identified at the ostia of the bilateral renal arteries.  The portal veins and mesenteric vessels remain patent.  The IVC and the remainder of the venous structures are within normal limits.  There is no evidence of lymphadenopathy in the abdomen or pelvis.    The esophagus, stomach, and duodenum are within normal limits.  The small bowel loops appear unremarkable.  The appendix is not definitely visualized.  There are no secondary findings of acute appendicitis.  There is colonic diverticula without evidence of acute diverticulitis.  There is large amount of stool within the rectal vault.  There is thickening of the perirectal fascia.    There is unchanged appearance of multiple hypodensities in the liver.  The liver is otherwise unremarkable.  There is mild gallbladder wall thickening.  The biliary tree is stable in appearance with prominence of the CBD.  The spleen is within normal limits.  The pancreas is unremarkable.    The adrenal glands are unremarkable. The right kidney is normal in size.  The left kidney appears slightly atrophic.  There are subcentimeter hypodensities in the right kidney.  This is too small complete characterization.  There is a low-attenuation lesion in the lower pole of the left kidney with Hounsfield unit measuring up to 24.  This appears stable compared to the prior examination.  Additional hypodensities are also identified in the left kidney.  The ureters are unremarkable.  The urinary bladder is distended.    Evaluation of the pelvic structures is somewhat limited given streak artifact limitations  from bilateral hip prostheses.  No definitive collections identified in the pelvis.  There is no evidence of free air.  There is no evidence of pneumatosis.    The psoas margins are unremarkable.  There is soft tissue a ulceration along the right lateral abdominal wall.  There are changes of bilateral hip arthroplasty.  The appearance of the hardware is unremarkable.  There is probable transitional lumbosacral anatomy.  There are vertebral augmentation changes at the level of L1 and L4.  The appearance is unchanged compared to prior examination.  There is unchanged appearance of wedge compression fracture of the L3 vertebral body with approximately 70% loss of the vertebral body height.  There is small retropulsion of fracture fragment into the spinal canal.  This is unchanged compared to the prior examination.                               CT Head Without Contrast (Final result)  Result time 05/12/20 23:27:55    Final result by Obinna Velazquez MD (05/12/20 23:27:55)                 Impression:      No acute intracranial process, allowing for motion limitations.  Repeat examination is suggested, when clinically appropriate.      Electronically signed by: Obinna Velazquez MD  Date:    05/12/2020  Time:    23:27             Narrative:    EXAMINATION:  CT HEAD WITHOUT CONTRAST    CLINICAL HISTORY:  Headache, post trauma;    TECHNIQUE:  Low dose axial images were obtained through the head.  Coronal and sagittal reformations were also performed. Contrast was not administered.  There are significant motion limitations to the exam.    COMPARISON:  CT head dated 12/21/2019.    FINDINGS:  The subcutaneous tissues are unremarkable.  The bony calvarium is intact.  The paranasal sinuses are within normal limits.  The mastoid air cells are clear.  There are postoperative changes in the right orbit.  The left orbit is unremarkable.    The craniocervical junction appears unremarkable.  No definitive extra-axial fluid collections,  allowing for significant motion limitations.  The ventricles and sulci are prominent, consistent with cerebral volume loss.  There are extensive hypodensities within the periventricular and subcortical white matter.  The gray-white differentiation is maintained.  There is no evidence of mass effect.                               CT Cervical Spine Without Contrast (Final result)  Result time 05/12/20 23:33:13    Final result by Obinna Velazquez MD (05/12/20 23:33:13)                 Impression:      No evidence of acute fracture or listhesis of the cervical spine.    Multilevel degenerative cervical spine.    Fibrotic changes in the lung apices with a stable 4.5 mm nodule in the left lung apex.      Electronically signed by: Obinna Velazquez MD  Date:    05/12/2020  Time:    23:33             Narrative:    EXAMINATION:  CT CERVICAL SPINE WITHOUT CONTRAST    CLINICAL HISTORY:  C-spine trauma, NEXUS/CCR positive, low risk;    TECHNIQUE:  Low dose axial images, sagittal and coronal reformations were performed though the cervical spine.  Contrast was not administered.    COMPARISON:  CT cervical spine 11/19/2019.    FINDINGS:  The craniocervical junction is unremarkable.  The predental space is maintained.  No prevertebral soft tissue swelling is identified.    The cervical alignment is maintained.  The vertebral body heights are maintained.  There is hypertrophy of the posterior elements.  The C1 ring is intact.  There is intervertebral disc space narrowing at multiple levels.  There is no evidence of jumped or perched facet.  There is no evidence of acute fracture or listhesis of the cervical spine.    There is heterogeneous appearance of the thyroid gland.  There are calcifications involving the neck vessels.  There is no evidence of a pneumothorax.  There are fibrotic changes in the lung apices.  There is a stable 4.5 mm nodule in the left lung apex.                                 Medical Decision Making:   History:   Old  Medical Records: I decided to obtain old medical records.  Independently Interpreted Test(s):   I have ordered and independently interpreted X-rays - see summary below.  Clinical Tests:   Lab Tests: Ordered and Reviewed  Radiological Study: Ordered and Reviewed  ED Management:  89-year-old chronically ill female presents today from nursing home after being found down on the ground.  Unwitnessed fall.  Patient denies any loss of consciousness although she has very poor historian.  She does complain of bilateral hip and lower back pain.  CT with no emergent pathology however discussed results with patient and recommend close follow-up outpatient with PCP.  Patient stable for discharge back to nursing home but given strict return precautions for any new or worsening symptoms.            Scribe Attestation:   Scribe #1: I performed the above scribed service and the documentation accurately describes the services I performed. I attest to the accuracy of the note.      Attending Attestation:     Physician Attestation for Scribe:    I, Dr. Heron Foss, personally performed the services described in this documentation.   All medical record entries made by the scribe were at my direction and in my presence.   I have reviewed the chart and agree that the record is accurate and complete.   Heron Foss MD  6:46 AM 05/13/2020     DISCLAIMER: This note was prepared with [employer] voice recognition transcription software. Garbled syntax, mangled pronouns, and other bizarre constructions may be attributed to that software system.          ED Course as of May 13 0646   Tue May 12, 2020   2351 Impression      No CT evidence of injury in the abdomen or pelvis, allowing for study limitations as above.    Large amount of stool within the rectal vault with thickening of the perirectal fascia.  The findings are suggestive of stercoral colitis.    Soft tissue laceration in the right lateral abdominal wall.  Suggest  correlation with possible soft tissue infection.    Additional stable findings as above.    This report was flagged in Epic as abnormal.      Electronically signed by: Obinna Velazquez MD  Date: 05/12/2020  Time: 23:46        [BD]   Wed May 13, 2020   0022   Impression      No evidence of acute fracture or listhesis of the cervical spine.    Multilevel degenerative cervical spine.    Fibrotic changes in the lung apices with a stable 4.5 mm nodule in the left lung apex.      Electronically signed by: Obinna Velazquez MD  Date: 05/12/2020  Time: 23:33        [BD]   0022 Impression      No acute intracranial process, allowing for motion limitations.  Repeat examination is suggested, when clinically appropriate.      Electronically signed by: Obinna Velazquez MD  Date: 05/12/2020  Time: 23:27        [BD]      ED Course User Index  [BD] Heron Foss MD                Clinical Impression:       ICD-10-CM ICD-9-CM   1. Fall, initial encounter W19.XXXA E888.9             ED Disposition Condition    Discharge Stable        ED Prescriptions     None        Follow-up Information     Follow up With Specialties Details Why Contact Info    Faustino Mccord PA-C Family Medicine Go in 1 day  [address]  [address]  [facility] [address]  [phone]      [facility] Emergency Medicine Go to  As needed, If symptoms worsen [address]  [facility] [address] [phone]                                     Heron Foss MD  05/13/20 0601

## 2020-05-13 NOTE — ED NOTES
Patient is resting in bed without any needs at this time. Patient has been repositioned for comfort.

## 2020-05-13 NOTE — ED NOTES
Maricarmen Woodward presents to the ED with c/o back pain that is secondary to an unwitnessed fall. Patient is a resident at [facility] who is bed bound. Staff reports they are unsure how the patient fell out of bed secondary to contractures. AAO to self and situation. Patient arrives to ED with a wound vac in place. Mucous membranes are pink and moist. Skin is warm, dry and intact. Lungs are clear bilaterally, respirations are regular and unlabored. Denies SOB, cough, congestion or rhinorrhea. BS active x4, no tenderness with palpation, abd is soft and not distended. Denies any appetite or activity change. S1S2, capillary refill is < 2 seconds. Denies dysuria, difficulty urinating, frequency, numbness, tingling or weakness. DESHAWN BROCK

## 2020-05-13 NOTE — ED NOTES
Upon discharge, patient is AAOx4, no cardiac or respiratory complications. Follow up care reviewed with patient and has been instructed to return to the ER if needed. [facility] verbalized understanding [facility]  DELMY HESS

## 2020-05-25 PROBLEM — Z09 HOSPITAL DISCHARGE FOLLOW-UP: Status: RESOLVED | Noted: 2020-02-19 | Resolved: 2020-05-25
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
## 2022-09-16 NOTE — PROGRESS NOTES
----- Message from FIDELINA Min sent at 9/16/2022  7:44 AM CDT -----  Kidneys, liver, electrolytes all acceptable   [facility] Note    CC: Hematochezia    HPI 89 y.o. female with history of PUD and diverticulosis who is admitted with acute, painless, moderate volume  Hematochezia  not associated with hemodynamic instablity or drop in blood counts (she is hypertensive). She has recent knee injury and is s/p ORIF last week with post-op anemia and has been taking Ibuprofen as well ? Receiving Lovenox at NH. Prior records reviewed, notable for EGD in 2012 performed by Dr. Pizano with large antral ulcer (healing on subsequent endoscopy, though to be due to Mobic). Colonoscopy in 2012 notable for sigmoid diverticulosis and prominent hemorrhoids.     INTERVAL HISTORY:  Since yesterday, patient refused bowel prep and H/H remained stable post transfusion.  Consideration was given to enemas to prep for colonoscopy, but she refused these as well.  She tolerated a clear liquid diet all day.  This afternoon nursing called to notify of large bloody BM.  Photos of this were reviewed and stool appeared to be dark/consisting of old blood, with mild red tinge.  No appearance of ongoing/fresh bleeding.  Repeat H/H which was done STAT showed only mild decrease and patient was hemondynamically stable and resting comfortably.  She states that she would like to avoid scope if possible.  Case was discussed with her nephew who is POA and he states that if she really needs colonoscopy he will consent for her.  I related to him that, given her advanced age and known history of hemorrhoids and diverticulosis, that conservative management for now is reasonable.  He understands and agrees.    Past Medical History:   Diagnosis Date    Arthritis     Hypertension     Parkinson's disease          Review of Systems  General ROS: negative for - chills, fever or weight loss  Cardiovascular ROS: no chest pain or dyspnea on exertion  Gastrointestinal ROS: no active bleeding but old blood noted in stool    Physical Examination  BP (!) 152/65 (BP  Location: Right arm, Patient Position: Lying)   Pulse 80   Temp 96.2 °F (35.7 °C) (Oral)   Resp 18   Ht 5' (1.524 m)   Wt 51.7 kg (114 lb)   SpO2 98%   Breastfeeding? No   BMI 22.26 kg/m²   General appearance: alert, cooperative, no distress  HENT: Normocephalic, atraumatic, neck symmetrical, no nasal discharge, sclera anicteric   Lungs: clear to auscultation bilaterally, symmetric chest wall expansion bilaterally  Heart: regular rate and rhythm without rub; no displacement of the PMI   Abdomen: soft, NT  Extremities: extremities symmetric; no clubbing, cyanosis, or edema  Neurologic: Alert and oriented X 3, no sensory or motor neurologic deficits      Labs:  Lab Results   Component Value Date    WBC 7.82 12/02/2019    HGB 8.9 (L) 12/02/2019    HCT 26.7 (L) 12/02/2019    MCV 95 12/02/2019     12/02/2019         CMP  Sodium   Date Value Ref Range Status   12/02/2019 137 136 - 145 mmol/L Final     Potassium   Date Value Ref Range Status   12/02/2019 3.9 3.5 - 5.1 mmol/L Final     Chloride   Date Value Ref Range Status   12/02/2019 108 95 - 110 mmol/L Final     CO2   Date Value Ref Range Status   12/02/2019 23 23 - 29 mmol/L Final     Glucose   Date Value Ref Range Status   12/02/2019 98 70 - 110 mg/dL Final     BUN, Bld   Date Value Ref Range Status   12/02/2019 15 10 - 30 mg/dL Final     Creatinine   Date Value Ref Range Status   12/02/2019 0.7 0.5 - 1.4 mg/dL Final   06/07/2013 0.9 0.5 - 1.4 mg/dL Final     Calcium   Date Value Ref Range Status   12/02/2019 8.6 (L) 8.7 - 10.5 mg/dL Final   06/07/2013 9.3 8.7 - 10.5 mg/dL Final     Total Protein   Date Value Ref Range Status   11/30/2019 5.6 (L) 6.0 - 8.4 g/dL Final     Albumin   Date Value Ref Range Status   11/30/2019 2.8 (L) 3.5 - 5.2 g/dL Final     Total Bilirubin   Date Value Ref Range Status   11/30/2019 1.2 (H) 0.1 - 1.0 mg/dL Final     Comment:     For infants and newborns, interpretation of results should be based  on gestational age, weight  and in agreement with clinical  observations.  Premature Infant recommended reference ranges:  Up to 24 hours.............<8.0 mg/dL  Up to 48 hours............<12.0 mg/dL  3-5 days..................<15.0 mg/dL  6-29 days.................<15.0 mg/dL       Alkaline Phosphatase   Date Value Ref Range Status   11/30/2019 71 55 - 135 U/L Final   04/17/2012 53 23 - 119 UNIT/L Final     AST   Date Value Ref Range Status   11/30/2019 15 10 - 40 U/L Final   04/17/2012 11 10 - 30 UNIT/L Final     ALT   Date Value Ref Range Status   11/30/2019 <5 (L) 10 - 44 U/L Final     Anion Gap   Date Value Ref Range Status   12/02/2019 6 (L) 8 - 16 mmol/L Final   06/07/2013 11 5 - 15 meq/L Final     eGFR if    Date Value Ref Range Status   12/02/2019 >60 >60 mL/min/1.73 m^2 Final     eGFR if non    Date Value Ref Range Status   12/02/2019 >60 >60 mL/min/1.73 m^2 Final     Comment:     Calculation used to obtain the estimated glomerular filtration  rate (eGFR) is the CKD-EPI equation.            Assessment:   1.  History of PUD  2.  Diverticulosis  3.  Hematochezia - resolving  4.  Anemia - stable      Plan:  1.  Recheck CBC in AM  2.  Monitor VS and monitor clinical status for evidence of recurrent bleeding.  If she rebleeds, will need to consider rapid bowel prep for colonoscopy versus imaging (CTA or tagged RBC scan)  3.  Continue to hold anticoagulation  4.  Clear liquid diet for now and will reassess in AM.  5.  Will follow.    Jony Powers MD  [facility]  [address], [address]  [place] [address]  Office: [phone]  Fax: [phone]

## 2024-02-03 NOTE — NURSING
Report called to Karina RUELAS at [facility]. Will be sending out van to pick pt up.   36.3

## (undated) DEVICE — SLEEVE SCD EXPRESS CALF MEDIUM

## (undated) DEVICE — APPLICATOR CHLORAPREP ORN 26ML

## (undated) DEVICE — BLADE SAG DUAL 18MMX1.27MMX90M

## (undated) DEVICE — SOL IRR NACL .9% 3000ML

## (undated) DEVICE — SCREW CORTEX 4.5 42M
Type: IMPLANTABLE DEVICE | Site: LEG | Status: NON-FUNCTIONAL
Removed: 2019-11-22

## (undated) DEVICE — Device

## (undated) DEVICE — SUT VICRYL 2-0 CT-1 18 CR

## (undated) DEVICE — DRAPE STERI-DRAPE 1000 17X11IN

## (undated) DEVICE — PACK CUSTOM UNIV BASIN SLI

## (undated) DEVICE — SEE MEDLINE ITEM 153151

## (undated) DEVICE — GLOVE PROTEXIS PI CLASSIC 8.0

## (undated) DEVICE — SYS CLSR DERMABOND PRINEO 22CM

## (undated) DEVICE — SUT VICRYL 1 CT-1 27 UNDIE

## (undated) DEVICE — PACK BASIC

## (undated) DEVICE — SUT 0 VICRYL / CT-1

## (undated) DEVICE — GLOVE SURG ULTRA TOUCH 7

## (undated) DEVICE — UNDERGLOVES BIOGEL PI SIZE 8

## (undated) DEVICE — BLADE SURG CARBON STEEL #10

## (undated) DEVICE — KNEE IMMB UNV FOAM/MESH 20IN

## (undated) DEVICE — DRAPE INCISE IOBAN 2 23X33IN

## (undated) DEVICE — BLADE SURG #15 CARBON STEEL

## (undated) DEVICE — BIT DRILL

## (undated) DEVICE — LINER SUCTION 3000CC

## (undated) DEVICE — RETRIEVER SUTURE HEWSON DISP

## (undated) DEVICE — SOL 9P NACL IRR PIC IL

## (undated) DEVICE — SEE MEDLINE ITEM 152530

## (undated) DEVICE — STRAP OR TABLE 5IN X 72IN

## (undated) DEVICE — SEE MEDLINE ITEM 157117

## (undated) DEVICE — SUT CTD VICRYL CT-1 27

## (undated) DEVICE — ALCOHOL 70% ISOP RUBBING 4OZ

## (undated) DEVICE — BIT DRILL QC STRL SS 3.2X145

## (undated) DEVICE — SUT 2-0 VICRYL / CT-1

## (undated) DEVICE — SEE MEDLINE ITEM 107746

## (undated) DEVICE — GLOVE SURG ULTRA TOUCH 8

## (undated) DEVICE — DRAPE INCISE IOBAN 2 23X17IN

## (undated) DEVICE — GLOVE SURG ULTRA TOUCH 6

## (undated) DEVICE — DRAPE STERI U-SHAPED 47X51IN

## (undated) DEVICE — DRESSING MEPILEX BORDR AG 4X10

## (undated) DEVICE — PAD CAST SPECIALIST STRL 6

## (undated) DEVICE — INTERPULSE SET

## (undated) DEVICE — UNDERGLOVE BIOGEL PI SZ 6.5 LF

## (undated) DEVICE — DRAPE HIP TIBURON 87X115X134

## (undated) DEVICE — GUIDEWIRE 2.5
Type: IMPLANTABLE DEVICE | Site: LEG | Status: NON-FUNCTIONAL
Removed: 2019-11-22

## (undated) DEVICE — SUT X425H ETHIBOND 1-0

## (undated) DEVICE — SUT ETHILON 3-0 FS-1 30

## (undated) DEVICE — DRESSING AQUACEL AG 3.5X10IN

## (undated) DEVICE — SUT STRATAFIX SPRL PS-2 3-0

## (undated) DEVICE — SEE MEDLINE ITEM 146231

## (undated) DEVICE — GAUZE SPONGE BULKEE 6X6.75IN

## (undated) DEVICE — DRAPE PLASTIC U 60X72

## (undated) DEVICE — ELECTRODE REM PLYHSV RETURN 9

## (undated) DEVICE — MANIFOLD 4 PORT

## (undated) DEVICE — SPACESUIT TOGA T5 ZIPPER PEEL

## (undated) DEVICE — TUBE SUCTION YANKAUER HI CAP

## (undated) DEVICE — SEE MEDLINE ITEM 152622

## (undated) DEVICE — GLOVE 8 PROTEXIS PI BLUE

## (undated) DEVICE — SEE MEDLINE ITEM 157131

## (undated) DEVICE — DRAPE C ARM 42 X 120 10/BX

## (undated) DEVICE — SEE MEDLINE ITEM 157216

## (undated) DEVICE — SPONGE SUPER KERLIX 6X6.75IN

## (undated) DEVICE — GLOVE SURGEONS ULTRA TOUCH 6.5